# Patient Record
Sex: FEMALE | Race: WHITE | Employment: OTHER | URBAN - METROPOLITAN AREA
[De-identification: names, ages, dates, MRNs, and addresses within clinical notes are randomized per-mention and may not be internally consistent; named-entity substitution may affect disease eponyms.]

---

## 2019-01-01 ENCOUNTER — APPOINTMENT (OUTPATIENT)
Dept: GENERAL RADIOLOGY | Age: 84
DRG: 291 | End: 2019-01-01
Attending: EMERGENCY MEDICINE
Payer: MEDICARE

## 2019-01-01 ENCOUNTER — HOME CARE VISIT (OUTPATIENT)
Dept: SCHEDULING | Facility: HOME HEALTH | Age: 84
End: 2019-01-01
Payer: MEDICARE

## 2019-01-01 ENCOUNTER — APPOINTMENT (OUTPATIENT)
Dept: GENERAL RADIOLOGY | Age: 84
DRG: 640 | End: 2019-01-01
Attending: EMERGENCY MEDICINE
Payer: MEDICARE

## 2019-01-01 ENCOUNTER — APPOINTMENT (OUTPATIENT)
Dept: GENERAL RADIOLOGY | Age: 84
DRG: 291 | End: 2019-01-01
Attending: HOSPITALIST
Payer: MEDICARE

## 2019-01-01 ENCOUNTER — HOME CARE VISIT (OUTPATIENT)
Dept: HOME HEALTH SERVICES | Facility: HOME HEALTH | Age: 84
End: 2019-01-01
Payer: MEDICARE

## 2019-01-01 ENCOUNTER — APPOINTMENT (OUTPATIENT)
Dept: GENERAL RADIOLOGY | Age: 84
DRG: 291 | End: 2019-01-01
Attending: INTERNAL MEDICINE
Payer: MEDICARE

## 2019-01-01 ENCOUNTER — HOME CARE VISIT (OUTPATIENT)
Dept: HOSPICE | Facility: HOSPICE | Age: 84
End: 2019-01-01
Payer: MEDICARE

## 2019-01-01 ENCOUNTER — APPOINTMENT (OUTPATIENT)
Dept: ULTRASOUND IMAGING | Age: 84
DRG: 291 | End: 2019-01-01
Attending: HOSPITALIST
Payer: MEDICARE

## 2019-01-01 ENCOUNTER — APPOINTMENT (OUTPATIENT)
Dept: NON INVASIVE DIAGNOSTICS | Age: 84
DRG: 291 | End: 2019-01-01
Attending: INTERNAL MEDICINE
Payer: MEDICARE

## 2019-01-01 ENCOUNTER — HOME CARE VISIT (OUTPATIENT)
Dept: HOME HEALTH SERVICES | Facility: HOME HEALTH | Age: 84
End: 2019-01-01

## 2019-01-01 ENCOUNTER — HOSPITAL ENCOUNTER (INPATIENT)
Age: 84
LOS: 7 days | Discharge: NURSING, REHAB OR LONG-TERM CARE | DRG: 291 | End: 2019-12-03
Attending: EMERGENCY MEDICINE | Admitting: HOSPITALIST
Payer: MEDICARE

## 2019-01-01 ENCOUNTER — PATIENT OUTREACH (OUTPATIENT)
Dept: CASE MANAGEMENT | Age: 84
End: 2019-01-01

## 2019-01-01 ENCOUNTER — APPOINTMENT (OUTPATIENT)
Dept: GENERAL RADIOLOGY | Age: 84
DRG: 308 | End: 2019-01-01
Attending: INTERNAL MEDICINE
Payer: MEDICARE

## 2019-01-01 ENCOUNTER — APPOINTMENT (OUTPATIENT)
Dept: NON INVASIVE DIAGNOSTICS | Age: 84
DRG: 291 | End: 2019-01-01
Attending: FAMILY MEDICINE
Payer: MEDICARE

## 2019-01-01 ENCOUNTER — APPOINTMENT (OUTPATIENT)
Dept: NON INVASIVE DIAGNOSTICS | Age: 84
DRG: 308 | End: 2019-01-01
Attending: INTERNAL MEDICINE
Payer: MEDICARE

## 2019-01-01 ENCOUNTER — APPOINTMENT (OUTPATIENT)
Dept: GENERAL RADIOLOGY | Age: 84
DRG: 291 | End: 2019-01-01
Attending: FAMILY MEDICINE
Payer: MEDICARE

## 2019-01-01 ENCOUNTER — HOSPITAL ENCOUNTER (INPATIENT)
Dept: INTERVENTIONAL RADIOLOGY/VASCULAR | Age: 84
Discharge: HOME | DRG: 291 | End: 2019-11-15
Attending: SURGERY | Admitting: FAMILY MEDICINE
Payer: MEDICARE

## 2019-01-01 ENCOUNTER — HOSPITAL ENCOUNTER (INPATIENT)
Age: 84
LOS: 5 days | Discharge: NURSING, REHAB OR LONG-TERM CARE | DRG: 308 | End: 2019-12-27
Attending: EMERGENCY MEDICINE | Admitting: INTERNAL MEDICINE
Payer: MEDICARE

## 2019-01-01 ENCOUNTER — HOME HEALTH ADMISSION (OUTPATIENT)
Dept: HOME HEALTH SERVICES | Facility: HOME HEALTH | Age: 84
End: 2019-01-01
Payer: MEDICARE

## 2019-01-01 ENCOUNTER — APPOINTMENT (OUTPATIENT)
Dept: VASCULAR SURGERY | Age: 84
DRG: 291 | End: 2019-01-01
Attending: INTERNAL MEDICINE
Payer: MEDICARE

## 2019-01-01 ENCOUNTER — HOSPITAL ENCOUNTER (INPATIENT)
Age: 84
LOS: 3 days | Discharge: HOME HEALTH | DRG: 291 | End: 2019-10-29
Attending: EMERGENCY MEDICINE | Admitting: INTERNAL MEDICINE
Payer: MEDICARE

## 2019-01-01 ENCOUNTER — APPOINTMENT (OUTPATIENT)
Dept: GENERAL RADIOLOGY | Age: 84
DRG: 308 | End: 2019-01-01
Attending: EMERGENCY MEDICINE
Payer: MEDICARE

## 2019-01-01 ENCOUNTER — HOSPICE ADMISSION (OUTPATIENT)
Dept: HOSPICE | Facility: HOSPICE | Age: 84
End: 2019-01-01
Payer: MEDICARE

## 2019-01-01 ENCOUNTER — HOSPITAL ENCOUNTER (INPATIENT)
Age: 84
LOS: 2 days | Discharge: HOSPICE | DRG: 640 | End: 2019-12-30
Attending: EMERGENCY MEDICINE | Admitting: HOSPITALIST
Payer: MEDICARE

## 2019-01-01 ENCOUNTER — HOSPITAL ENCOUNTER (INPATIENT)
Age: 84
LOS: 14 days | Discharge: NURSING, REHAB OR LONG-TERM CARE | DRG: 291 | End: 2019-11-22
Attending: EMERGENCY MEDICINE | Admitting: FAMILY MEDICINE
Payer: MEDICARE

## 2019-01-01 VITALS
DIASTOLIC BLOOD PRESSURE: 63 MMHG | OXYGEN SATURATION: 100 % | SYSTOLIC BLOOD PRESSURE: 89 MMHG | HEART RATE: 87 BPM | RESPIRATION RATE: 14 BRPM

## 2019-01-01 VITALS
DIASTOLIC BLOOD PRESSURE: 65 MMHG | OXYGEN SATURATION: 95 % | HEART RATE: 91 BPM | RESPIRATION RATE: 16 BRPM | TEMPERATURE: 96.5 F | SYSTOLIC BLOOD PRESSURE: 92 MMHG

## 2019-01-01 VITALS
DIASTOLIC BLOOD PRESSURE: 58 MMHG | OXYGEN SATURATION: 100 % | TEMPERATURE: 96 F | HEART RATE: 81 BPM | RESPIRATION RATE: 16 BRPM | SYSTOLIC BLOOD PRESSURE: 102 MMHG

## 2019-01-01 VITALS
TEMPERATURE: 97 F | HEART RATE: 75 BPM | OXYGEN SATURATION: 98 % | RESPIRATION RATE: 17 BRPM | SYSTOLIC BLOOD PRESSURE: 104 MMHG | DIASTOLIC BLOOD PRESSURE: 72 MMHG

## 2019-01-01 VITALS
OXYGEN SATURATION: 100 % | DIASTOLIC BLOOD PRESSURE: 55 MMHG | HEART RATE: 89 BPM | BODY MASS INDEX: 30.78 KG/M2 | WEIGHT: 152.7 LBS | SYSTOLIC BLOOD PRESSURE: 102 MMHG | HEIGHT: 59 IN | TEMPERATURE: 97.3 F | RESPIRATION RATE: 16 BRPM

## 2019-01-01 VITALS
TEMPERATURE: 97.8 F | HEART RATE: 145 BPM | OXYGEN SATURATION: 98 % | SYSTOLIC BLOOD PRESSURE: 93 MMHG | DIASTOLIC BLOOD PRESSURE: 70 MMHG

## 2019-01-01 VITALS
TEMPERATURE: 96.3 F | DIASTOLIC BLOOD PRESSURE: 66 MMHG | HEART RATE: 81 BPM | OXYGEN SATURATION: 100 % | SYSTOLIC BLOOD PRESSURE: 86 MMHG | RESPIRATION RATE: 14 BRPM

## 2019-01-01 VITALS
WEIGHT: 139.99 LBS | RESPIRATION RATE: 20 BRPM | BODY MASS INDEX: 30.2 KG/M2 | HEIGHT: 57 IN | SYSTOLIC BLOOD PRESSURE: 106 MMHG | OXYGEN SATURATION: 88 % | TEMPERATURE: 97.3 F | DIASTOLIC BLOOD PRESSURE: 38 MMHG | HEART RATE: 80 BPM

## 2019-01-01 VITALS
WEIGHT: 137.5 LBS | HEART RATE: 87 BPM | SYSTOLIC BLOOD PRESSURE: 83 MMHG | DIASTOLIC BLOOD PRESSURE: 64 MMHG | OXYGEN SATURATION: 90 %

## 2019-01-01 VITALS
RESPIRATION RATE: 18 BRPM | WEIGHT: 136.3 LBS | HEART RATE: 62 BPM | SYSTOLIC BLOOD PRESSURE: 114 MMHG | OXYGEN SATURATION: 95 % | DIASTOLIC BLOOD PRESSURE: 49 MMHG | TEMPERATURE: 97.7 F

## 2019-01-01 VITALS
OXYGEN SATURATION: 100 % | SYSTOLIC BLOOD PRESSURE: 102 MMHG | HEART RATE: 81 BPM | DIASTOLIC BLOOD PRESSURE: 58 MMHG | TEMPERATURE: 96 F

## 2019-01-01 VITALS
RESPIRATION RATE: 18 BRPM | HEIGHT: 57 IN | DIASTOLIC BLOOD PRESSURE: 52 MMHG | SYSTOLIC BLOOD PRESSURE: 121 MMHG | OXYGEN SATURATION: 99 % | BODY MASS INDEX: 30.48 KG/M2 | WEIGHT: 141.3 LBS | HEART RATE: 101 BPM | TEMPERATURE: 98.3 F

## 2019-01-01 VITALS
TEMPERATURE: 97.6 F | HEART RATE: 97 BPM | HEIGHT: 57 IN | RESPIRATION RATE: 16 BRPM | WEIGHT: 135.3 LBS | DIASTOLIC BLOOD PRESSURE: 60 MMHG | SYSTOLIC BLOOD PRESSURE: 113 MMHG | OXYGEN SATURATION: 100 % | BODY MASS INDEX: 29.19 KG/M2

## 2019-01-01 DIAGNOSIS — I48.91 ATRIAL FIBRILLATION WITH RAPID VENTRICULAR RESPONSE (HCC): Primary | ICD-10-CM

## 2019-01-01 DIAGNOSIS — Z99.2 ESRD NEEDING DIALYSIS (HCC): ICD-10-CM

## 2019-01-01 DIAGNOSIS — N30.00 ACUTE CYSTITIS WITHOUT HEMATURIA: ICD-10-CM

## 2019-01-01 DIAGNOSIS — Z71.89 GOALS OF CARE, COUNSELING/DISCUSSION: ICD-10-CM

## 2019-01-01 DIAGNOSIS — I48.91 ATRIAL FIBRILLATION, UNSPECIFIED TYPE (HCC): Primary | ICD-10-CM

## 2019-01-01 DIAGNOSIS — I95.9 HYPOTENSION, UNSPECIFIED HYPOTENSION TYPE: ICD-10-CM

## 2019-01-01 DIAGNOSIS — N18.6 ESRD NEEDING DIALYSIS (HCC): ICD-10-CM

## 2019-01-01 DIAGNOSIS — J96.01 ACUTE RESPIRATORY FAILURE WITH HYPOXIA (HCC): ICD-10-CM

## 2019-01-01 DIAGNOSIS — I50.9 ACUTE CONGESTIVE HEART FAILURE, UNSPECIFIED HEART FAILURE TYPE (HCC): ICD-10-CM

## 2019-01-01 DIAGNOSIS — E87.1 HYPONATREMIA: ICD-10-CM

## 2019-01-01 DIAGNOSIS — R77.8 ELEVATED TROPONIN: ICD-10-CM

## 2019-01-01 DIAGNOSIS — R79.1 ELEVATED INR: ICD-10-CM

## 2019-01-01 DIAGNOSIS — R06.00 DYSPNEA, UNSPECIFIED TYPE: Primary | ICD-10-CM

## 2019-01-01 DIAGNOSIS — N17.9 ACUTE RENAL FAILURE, UNSPECIFIED ACUTE RENAL FAILURE TYPE (HCC): ICD-10-CM

## 2019-01-01 DIAGNOSIS — R53.81 DEBILITY: ICD-10-CM

## 2019-01-01 DIAGNOSIS — R06.03 RESPIRATORY DISTRESS: Primary | ICD-10-CM

## 2019-01-01 DIAGNOSIS — R09.02 HYPOXIA: ICD-10-CM

## 2019-01-01 DIAGNOSIS — R06.03 ACUTE RESPIRATORY DISTRESS: Primary | ICD-10-CM

## 2019-01-01 DIAGNOSIS — J81.1 CHRONIC PULMONARY EDEMA: ICD-10-CM

## 2019-01-01 DIAGNOSIS — R06.02 SHORTNESS OF BREATH: ICD-10-CM

## 2019-01-01 DIAGNOSIS — J96.00 ACUTE RESPIRATORY FAILURE, UNSPECIFIED WHETHER WITH HYPOXIA OR HYPERCAPNIA (HCC): ICD-10-CM

## 2019-01-01 DIAGNOSIS — Z71.89 ADVANCED CARE PLANNING/COUNSELING DISCUSSION: ICD-10-CM

## 2019-01-01 LAB
ABO + RH BLD: NORMAL
ABO + RH BLD: NORMAL
ALBUMIN SERPL-MCNC: 2.1 G/DL (ref 3.4–5)
ALBUMIN SERPL-MCNC: 2.2 G/DL (ref 3.4–5)
ALBUMIN SERPL-MCNC: 2.3 G/DL (ref 3.4–5)
ALBUMIN SERPL-MCNC: 2.6 G/DL (ref 3.4–5)
ALBUMIN SERPL-MCNC: 2.6 G/DL (ref 3.4–5)
ALBUMIN SERPL-MCNC: 2.7 G/DL (ref 3.4–5)
ALBUMIN SERPL-MCNC: 2.9 G/DL (ref 3.4–5)
ALBUMIN SERPL-MCNC: 2.9 G/DL (ref 3.4–5)
ALBUMIN SERPL-MCNC: 3.1 G/DL (ref 3.4–5)
ALBUMIN SERPL-MCNC: 3.2 G/DL (ref 3.4–5)
ALBUMIN SERPL-MCNC: 3.2 G/DL (ref 3.4–5)
ALBUMIN SERPL-MCNC: 3.3 G/DL (ref 3.4–5)
ALBUMIN SERPL-MCNC: 3.7 G/DL (ref 3.4–5)
ALBUMIN/GLOB SERPL: 0.7 {RATIO} (ref 0.8–1.7)
ALBUMIN/GLOB SERPL: 0.7 {RATIO} (ref 0.8–1.7)
ALBUMIN/GLOB SERPL: 0.8 {RATIO} (ref 0.8–1.7)
ALBUMIN/GLOB SERPL: 0.8 {RATIO} (ref 0.8–1.7)
ALBUMIN/GLOB SERPL: 1 {RATIO} (ref 0.8–1.7)
ALBUMIN/GLOB SERPL: 1.1 {RATIO} (ref 0.8–1.7)
ALBUMIN/GLOB SERPL: 1.2 {RATIO} (ref 0.8–1.7)
ALP SERPL-CCNC: 105 U/L (ref 45–117)
ALP SERPL-CCNC: 108 U/L (ref 45–117)
ALP SERPL-CCNC: 111 U/L (ref 45–117)
ALP SERPL-CCNC: 146 U/L (ref 45–117)
ALP SERPL-CCNC: 198 U/L (ref 45–117)
ALP SERPL-CCNC: 262 U/L (ref 45–117)
ALP SERPL-CCNC: 93 U/L (ref 45–117)
ALP SERPL-CCNC: 93 U/L (ref 45–117)
ALP SERPL-CCNC: 96 U/L (ref 45–117)
ALP SERPL-CCNC: 97 U/L (ref 45–117)
ALP SERPL-CCNC: 97 U/L (ref 45–117)
ALT SERPL-CCNC: 11 U/L (ref 13–56)
ALT SERPL-CCNC: 11 U/L (ref 13–56)
ALT SERPL-CCNC: 12 U/L (ref 13–56)
ALT SERPL-CCNC: 12 U/L (ref 13–56)
ALT SERPL-CCNC: 18 U/L (ref 13–56)
ALT SERPL-CCNC: 18 U/L (ref 13–56)
ALT SERPL-CCNC: 19 U/L (ref 13–56)
ALT SERPL-CCNC: 24 U/L (ref 13–56)
ALT SERPL-CCNC: 30 U/L (ref 13–56)
ALT SERPL-CCNC: 33 U/L (ref 13–56)
ALT SERPL-CCNC: 72 U/L (ref 13–56)
ANION GAP SERPL CALC-SCNC: 10 MMOL/L (ref 3–18)
ANION GAP SERPL CALC-SCNC: 11 MMOL/L (ref 3–18)
ANION GAP SERPL CALC-SCNC: 12 MMOL/L (ref 3–18)
ANION GAP SERPL CALC-SCNC: 13 MMOL/L (ref 3–18)
ANION GAP SERPL CALC-SCNC: 14 MMOL/L (ref 3–18)
ANION GAP SERPL CALC-SCNC: 15 MMOL/L (ref 3–18)
ANION GAP SERPL CALC-SCNC: 15 MMOL/L (ref 3–18)
ANION GAP SERPL CALC-SCNC: 5 MMOL/L (ref 3–18)
ANION GAP SERPL CALC-SCNC: 5 MMOL/L (ref 3–18)
ANION GAP SERPL CALC-SCNC: 6 MMOL/L (ref 3–18)
ANION GAP SERPL CALC-SCNC: 6 MMOL/L (ref 3–18)
ANION GAP SERPL CALC-SCNC: 7 MMOL/L (ref 3–18)
ANION GAP SERPL CALC-SCNC: 8 MMOL/L (ref 3–18)
ANION GAP SERPL CALC-SCNC: 8 MMOL/L (ref 3–18)
ANION GAP SERPL CALC-SCNC: 9 MMOL/L (ref 3–18)
APPEARANCE UR: ABNORMAL
APPEARANCE UR: CLEAR
APTT PPP: 110.8 SEC (ref 23–36.4)
APTT PPP: 121.8 SEC (ref 23–36.4)
APTT PPP: 150.2 SEC (ref 23–36.4)
APTT PPP: 175.2 SEC (ref 23–36.4)
APTT PPP: 35.8 SEC (ref 23–36.4)
APTT PPP: 40.5 SEC (ref 23–36.4)
APTT PPP: 43.8 SEC (ref 23–36.4)
APTT PPP: 63 SEC (ref 23–36.4)
APTT PPP: 66.1 SEC (ref 23–36.4)
APTT PPP: 85.4 SEC (ref 23–36.4)
APTT PPP: 89.8 SEC (ref 23–36.4)
APTT PPP: 95.3 SEC (ref 23–36.4)
APTT PPP: 98.7 SEC (ref 23–36.4)
APTT PPP: >180 SEC (ref 23–36.4)
ARTERIAL PATENCY WRIST A: ABNORMAL
ARTERIAL PATENCY WRIST A: YES
AST SERPL-CCNC: 16 U/L (ref 10–38)
AST SERPL-CCNC: 16 U/L (ref 10–38)
AST SERPL-CCNC: 17 U/L (ref 10–38)
AST SERPL-CCNC: 18 U/L (ref 10–38)
AST SERPL-CCNC: 18 U/L (ref 10–38)
AST SERPL-CCNC: 19 U/L (ref 10–38)
AST SERPL-CCNC: 20 U/L (ref 10–38)
AST SERPL-CCNC: 21 U/L (ref 10–38)
AST SERPL-CCNC: 27 U/L (ref 10–38)
AST SERPL-CCNC: 29 U/L (ref 10–38)
AST SERPL-CCNC: 79 U/L (ref 10–38)
ATRIAL RATE: 159 BPM
ATRIAL RATE: 163 BPM
ATRIAL RATE: 63 BPM
ATRIAL RATE: 65 BPM
ATRIAL RATE: 97 BPM
AV VELOCITY RATIO: 0.58
AV VELOCITY RATIO: 0.63
AV VELOCITY RATIO: 0.77
AV VTI RATIO: 0.6
AV VTI RATIO: 0.7
AV VTI RATIO: 0.8
BACTERIA SPEC CULT: ABNORMAL
BACTERIA SPEC CULT: NORMAL
BACTERIA URNS QL MICRO: ABNORMAL /HPF
BASE DEFICIT BLD-SCNC: 2 MMOL/L
BASE DEFICIT BLDV-SCNC: 8 MMOL/L
BASOPHILS # BLD: 0 K/UL (ref 0–0.1)
BASOPHILS # BLD: 0.1 K/UL (ref 0–0.1)
BASOPHILS NFR BLD: 0 % (ref 0–2)
BASOPHILS NFR BLD: 0 % (ref 0–3)
BASOPHILS NFR BLD: 1 % (ref 0–2)
BDY SITE: ABNORMAL
BDY SITE: ABNORMAL
BILIRUB SERPL-MCNC: 0.4 MG/DL (ref 0.2–1)
BILIRUB SERPL-MCNC: 0.4 MG/DL (ref 0.2–1)
BILIRUB SERPL-MCNC: 0.5 MG/DL (ref 0.2–1)
BILIRUB SERPL-MCNC: 0.6 MG/DL (ref 0.2–1)
BILIRUB SERPL-MCNC: 0.7 MG/DL (ref 0.2–1)
BILIRUB SERPL-MCNC: 0.9 MG/DL (ref 0.2–1)
BILIRUB SERPL-MCNC: 1 MG/DL (ref 0.2–1)
BILIRUB UR QL: ABNORMAL
BILIRUB UR QL: NEGATIVE
BLD PROD TYP BPU: NORMAL
BLOOD GROUP ANTIBODIES SERPL: NORMAL
BLOOD GROUP ANTIBODIES SERPL: NORMAL
BNP SERPL-MCNC: 2420 PG/ML (ref 0–1800)
BNP SERPL-MCNC: 9930 PG/ML (ref 0–1800)
BNP SERPL-MCNC: ABNORMAL PG/ML (ref 0–1800)
BPU ID: NORMAL
BUN SERPL-MCNC: 101 MG/DL (ref 7–18)
BUN SERPL-MCNC: 21 MG/DL (ref 7–18)
BUN SERPL-MCNC: 22 MG/DL (ref 7–18)
BUN SERPL-MCNC: 27 MG/DL (ref 7–18)
BUN SERPL-MCNC: 27 MG/DL (ref 7–18)
BUN SERPL-MCNC: 28 MG/DL (ref 7–18)
BUN SERPL-MCNC: 31 MG/DL (ref 7–18)
BUN SERPL-MCNC: 32 MG/DL (ref 7–18)
BUN SERPL-MCNC: 33 MG/DL (ref 7–18)
BUN SERPL-MCNC: 34 MG/DL (ref 7–18)
BUN SERPL-MCNC: 37 MG/DL (ref 7–18)
BUN SERPL-MCNC: 37 MG/DL (ref 7–18)
BUN SERPL-MCNC: 38 MG/DL (ref 7–18)
BUN SERPL-MCNC: 39 MG/DL (ref 7–18)
BUN SERPL-MCNC: 42 MG/DL (ref 7–18)
BUN SERPL-MCNC: 43 MG/DL (ref 7–18)
BUN SERPL-MCNC: 46 MG/DL (ref 7–18)
BUN SERPL-MCNC: 48 MG/DL (ref 7–18)
BUN SERPL-MCNC: 54 MG/DL (ref 7–18)
BUN SERPL-MCNC: 55 MG/DL (ref 7–18)
BUN SERPL-MCNC: 72 MG/DL (ref 7–18)
BUN SERPL-MCNC: 76 MG/DL (ref 7–18)
BUN SERPL-MCNC: 77 MG/DL (ref 7–18)
BUN SERPL-MCNC: 81 MG/DL (ref 7–18)
BUN SERPL-MCNC: 84 MG/DL (ref 7–18)
BUN SERPL-MCNC: 85 MG/DL (ref 7–18)
BUN SERPL-MCNC: 87 MG/DL (ref 7–18)
BUN SERPL-MCNC: 88 MG/DL (ref 7–18)
BUN SERPL-MCNC: 96 MG/DL (ref 7–18)
BUN/CREAT SERPL: 10 (ref 12–20)
BUN/CREAT SERPL: 11 (ref 12–20)
BUN/CREAT SERPL: 12 (ref 12–20)
BUN/CREAT SERPL: 12 (ref 12–20)
BUN/CREAT SERPL: 13 (ref 12–20)
BUN/CREAT SERPL: 14 (ref 12–20)
BUN/CREAT SERPL: 14 (ref 12–20)
BUN/CREAT SERPL: 15 (ref 12–20)
BUN/CREAT SERPL: 16 (ref 12–20)
BUN/CREAT SERPL: 17 (ref 12–20)
BUN/CREAT SERPL: 18 (ref 12–20)
BUN/CREAT SERPL: 19 (ref 12–20)
BUN/CREAT SERPL: 20 (ref 12–20)
BUN/CREAT SERPL: 24 (ref 12–20)
BUN/CREAT SERPL: 27 (ref 12–20)
BUN/CREAT SERPL: 29 (ref 12–20)
BUN/CREAT SERPL: 30 (ref 12–20)
BUN/CREAT SERPL: 31 (ref 12–20)
BUN/CREAT SERPL: 9 (ref 12–20)
CALCIUM SERPL-MCNC: 7.7 MG/DL (ref 8.5–10.1)
CALCIUM SERPL-MCNC: 7.8 MG/DL (ref 8.5–10.1)
CALCIUM SERPL-MCNC: 7.9 MG/DL (ref 8.5–10.1)
CALCIUM SERPL-MCNC: 8 MG/DL (ref 8.5–10.1)
CALCIUM SERPL-MCNC: 8 MG/DL (ref 8.5–10.1)
CALCIUM SERPL-MCNC: 8.1 MG/DL (ref 8.5–10.1)
CALCIUM SERPL-MCNC: 8.2 MG/DL (ref 8.5–10.1)
CALCIUM SERPL-MCNC: 8.3 MG/DL (ref 8.5–10.1)
CALCIUM SERPL-MCNC: 8.4 MG/DL (ref 8.5–10.1)
CALCIUM SERPL-MCNC: 8.5 MG/DL (ref 8.5–10.1)
CALCIUM SERPL-MCNC: 8.6 MG/DL (ref 8.5–10.1)
CALCIUM SERPL-MCNC: 8.8 MG/DL (ref 8.5–10.1)
CALCULATED P AXIS, ECG09: 35 DEGREES
CALCULATED P AXIS, ECG09: 50 DEGREES
CALCULATED R AXIS, ECG10: 11 DEGREES
CALCULATED R AXIS, ECG10: 22 DEGREES
CALCULATED R AXIS, ECG10: 31 DEGREES
CALCULATED R AXIS, ECG10: 35 DEGREES
CALCULATED R AXIS, ECG10: 43 DEGREES
CALCULATED T AXIS, ECG11: -157 DEGREES
CALCULATED T AXIS, ECG11: -157 DEGREES
CALCULATED T AXIS, ECG11: 158 DEGREES
CALCULATED T AXIS, ECG11: 37 DEGREES
CALCULATED T AXIS, ECG11: 52 DEGREES
CALLED TO:,BCALL1: NORMAL
CAOX CRY URNS QL MICRO: ABNORMAL
CHLORIDE SERPL-SCNC: 100 MMOL/L (ref 100–111)
CHLORIDE SERPL-SCNC: 100 MMOL/L (ref 100–111)
CHLORIDE SERPL-SCNC: 101 MMOL/L (ref 100–111)
CHLORIDE SERPL-SCNC: 102 MMOL/L (ref 100–111)
CHLORIDE SERPL-SCNC: 103 MMOL/L (ref 100–111)
CHLORIDE SERPL-SCNC: 104 MMOL/L (ref 100–111)
CHLORIDE SERPL-SCNC: 105 MMOL/L (ref 100–111)
CHLORIDE SERPL-SCNC: 105 MMOL/L (ref 100–111)
CHLORIDE SERPL-SCNC: 107 MMOL/L (ref 100–111)
CHLORIDE SERPL-SCNC: 86 MMOL/L (ref 100–111)
CHLORIDE SERPL-SCNC: 86 MMOL/L (ref 100–111)
CHLORIDE SERPL-SCNC: 87 MMOL/L (ref 100–111)
CHLORIDE SERPL-SCNC: 88 MMOL/L (ref 100–111)
CHLORIDE SERPL-SCNC: 88 MMOL/L (ref 100–111)
CHLORIDE SERPL-SCNC: 89 MMOL/L (ref 100–111)
CHLORIDE SERPL-SCNC: 90 MMOL/L (ref 100–111)
CHLORIDE SERPL-SCNC: 91 MMOL/L (ref 100–111)
CHLORIDE SERPL-SCNC: 92 MMOL/L (ref 100–111)
CHLORIDE SERPL-SCNC: 94 MMOL/L (ref 100–111)
CHLORIDE SERPL-SCNC: 94 MMOL/L (ref 100–111)
CHLORIDE SERPL-SCNC: 96 MMOL/L (ref 100–111)
CHLORIDE SERPL-SCNC: 96 MMOL/L (ref 100–111)
CHLORIDE SERPL-SCNC: 97 MMOL/L (ref 100–111)
CHLORIDE SERPL-SCNC: 98 MMOL/L (ref 100–111)
CHLORIDE SERPL-SCNC: 98 MMOL/L (ref 100–111)
CHLORIDE SERPL-SCNC: 99 MMOL/L (ref 100–111)
CK MB CFR SERPL CALC: 2.6 % (ref 0–4)
CK MB CFR SERPL CALC: 2.8 % (ref 0–4)
CK MB CFR SERPL CALC: 3.3 % (ref 0–4)
CK MB CFR SERPL CALC: 3.4 % (ref 0–4)
CK MB CFR SERPL CALC: 3.9 % (ref 0–4)
CK MB CFR SERPL CALC: 4.8 % (ref 0–4)
CK MB CFR SERPL CALC: 4.9 % (ref 0–4)
CK MB CFR SERPL CALC: ABNORMAL % (ref 0–4)
CK MB CFR SERPL CALC: NORMAL % (ref 0–4)
CK MB SERPL-MCNC: 1.1 NG/ML (ref 5–25)
CK MB SERPL-MCNC: 1.8 NG/ML (ref 5–25)
CK MB SERPL-MCNC: 2 NG/ML (ref 5–25)
CK MB SERPL-MCNC: 2.2 NG/ML (ref 5–25)
CK MB SERPL-MCNC: 2.3 NG/ML (ref 5–25)
CK MB SERPL-MCNC: 2.3 NG/ML (ref 5–25)
CK MB SERPL-MCNC: 2.4 NG/ML (ref 5–25)
CK MB SERPL-MCNC: <1 NG/ML (ref 5–25)
CK SERPL-CCNC: 13 U/L (ref 26–192)
CK SERPL-CCNC: 14 U/L (ref 26–192)
CK SERPL-CCNC: 39 U/L (ref 26–192)
CK SERPL-CCNC: 47 U/L (ref 26–192)
CK SERPL-CCNC: 48 U/L (ref 26–192)
CK SERPL-CCNC: 54 U/L (ref 26–192)
CK SERPL-CCNC: 57 U/L (ref 26–192)
CK SERPL-CCNC: 58 U/L (ref 26–192)
CK SERPL-CCNC: 61 U/L (ref 26–192)
CK SERPL-CCNC: 70 U/L (ref 26–192)
CK SERPL-CCNC: 70 U/L (ref 26–192)
CO2 SERPL-SCNC: 21 MMOL/L (ref 21–32)
CO2 SERPL-SCNC: 22 MMOL/L (ref 21–32)
CO2 SERPL-SCNC: 22 MMOL/L (ref 21–32)
CO2 SERPL-SCNC: 23 MMOL/L (ref 21–32)
CO2 SERPL-SCNC: 24 MMOL/L (ref 21–32)
CO2 SERPL-SCNC: 26 MMOL/L (ref 21–32)
CO2 SERPL-SCNC: 27 MMOL/L (ref 21–32)
CO2 SERPL-SCNC: 28 MMOL/L (ref 21–32)
CO2 SERPL-SCNC: 29 MMOL/L (ref 21–32)
CO2 SERPL-SCNC: 30 MMOL/L (ref 21–32)
CO2 SERPL-SCNC: 31 MMOL/L (ref 21–32)
CO2 SERPL-SCNC: 32 MMOL/L (ref 21–32)
CO2 SERPL-SCNC: 32 MMOL/L (ref 21–32)
CO2 SERPL-SCNC: 33 MMOL/L (ref 21–32)
COLOR UR: ABNORMAL
COLOR UR: YELLOW
CREAT SERPL-MCNC: 1.66 MG/DL (ref 0.6–1.3)
CREAT SERPL-MCNC: 1.7 MG/DL (ref 0.6–1.3)
CREAT SERPL-MCNC: 1.75 MG/DL (ref 0.6–1.3)
CREAT SERPL-MCNC: 1.84 MG/DL (ref 0.6–1.3)
CREAT SERPL-MCNC: 1.86 MG/DL (ref 0.6–1.3)
CREAT SERPL-MCNC: 1.91 MG/DL (ref 0.6–1.3)
CREAT SERPL-MCNC: 2.12 MG/DL (ref 0.6–1.3)
CREAT SERPL-MCNC: 2.15 MG/DL (ref 0.6–1.3)
CREAT SERPL-MCNC: 2.22 MG/DL (ref 0.6–1.3)
CREAT SERPL-MCNC: 2.31 MG/DL (ref 0.6–1.3)
CREAT SERPL-MCNC: 2.34 MG/DL (ref 0.6–1.3)
CREAT SERPL-MCNC: 2.36 MG/DL (ref 0.6–1.3)
CREAT SERPL-MCNC: 2.45 MG/DL (ref 0.6–1.3)
CREAT SERPL-MCNC: 2.47 MG/DL (ref 0.6–1.3)
CREAT SERPL-MCNC: 2.5 MG/DL (ref 0.6–1.3)
CREAT SERPL-MCNC: 2.51 MG/DL (ref 0.6–1.3)
CREAT SERPL-MCNC: 2.56 MG/DL (ref 0.6–1.3)
CREAT SERPL-MCNC: 2.61 MG/DL (ref 0.6–1.3)
CREAT SERPL-MCNC: 2.73 MG/DL (ref 0.6–1.3)
CREAT SERPL-MCNC: 2.77 MG/DL (ref 0.6–1.3)
CREAT SERPL-MCNC: 2.77 MG/DL (ref 0.6–1.3)
CREAT SERPL-MCNC: 2.99 MG/DL (ref 0.6–1.3)
CREAT SERPL-MCNC: 3 MG/DL (ref 0.6–1.3)
CREAT SERPL-MCNC: 3.16 MG/DL (ref 0.6–1.3)
CREAT SERPL-MCNC: 3.19 MG/DL (ref 0.6–1.3)
CREAT SERPL-MCNC: 3.89 MG/DL (ref 0.6–1.3)
CREAT SERPL-MCNC: 4.2 MG/DL (ref 0.6–1.3)
CREAT SERPL-MCNC: 4.25 MG/DL (ref 0.6–1.3)
CREAT SERPL-MCNC: 4.37 MG/DL (ref 0.6–1.3)
CREAT SERPL-MCNC: 4.39 MG/DL (ref 0.6–1.3)
CREAT SERPL-MCNC: 5.14 MG/DL (ref 0.6–1.3)
CREAT SERPL-MCNC: 5.32 MG/DL (ref 0.6–1.3)
CREAT SERPL-MCNC: 6.27 MG/DL (ref 0.6–1.3)
CREAT UR-MCNC: 76 MG/DL (ref 30–125)
CROSSMATCH RESULT,%XM: NORMAL
DIAGNOSIS, 93000: NORMAL
DIFFERENTIAL METHOD BLD: ABNORMAL
ECHO AO ASC DIAM: 2.34 CM
ECHO AO ASC DIAM: 2.76 CM
ECHO AO ASC DIAM: 2.92 CM
ECHO AO SINUS VALSALVA DIAM: 2.51 CM
ECHO AV AREA PEAK VELOCITY: 1.2 CM2
ECHO AV AREA PEAK VELOCITY: 1.7 CM2
ECHO AV AREA PEAK VELOCITY: 1.8 CM2
ECHO AV AREA VTI: 1.3 CM2
ECHO AV AREA VTI: 1.7 CM2
ECHO AV AREA VTI: 1.8 CM2
ECHO AV AREA/BSA PEAK VELOCITY: 0.7 CM2/M2
ECHO AV AREA/BSA PEAK VELOCITY: 1.2 CM2/M2
ECHO AV AREA/BSA VTI: 0.8 CM2/M2
ECHO AV AREA/BSA VTI: 1.2 CM2/M2
ECHO AV MEAN GRADIENT: 2.3 MMHG
ECHO AV MEAN GRADIENT: 2.8 MMHG
ECHO AV MEAN GRADIENT: 4.1 MMHG
ECHO AV MEAN VELOCITY: 0.71 M/S
ECHO AV MEAN VELOCITY: 0.76 M/S
ECHO AV MEAN VELOCITY: 0.96 M/S
ECHO AV PEAK GRADIENT: 4.6 MMHG
ECHO AV PEAK GRADIENT: 5.5 MMHG
ECHO AV PEAK GRADIENT: 7.3 MMHG
ECHO AV PEAK VELOCITY: 106.72 CM/S
ECHO AV PEAK VELOCITY: 117.13 CM/S
ECHO AV PEAK VELOCITY: 135.38 CM/S
ECHO AV VTI: 13.23 CM
ECHO AV VTI: 15.92 CM
ECHO AV VTI: 28.65 CM
ECHO IVC PROX: 1.46 CM
ECHO IVC PROX: 2.08 CM
ECHO LA AREA 2C: 17.53 CM2
ECHO LA AREA 2C: 24.94 CM2
ECHO LA AREA 4C: 16 CM2
ECHO LA AREA 4C: 18.8 CM2
ECHO LA AREA 4C: 23.6 CM2
ECHO LA MAJOR AXIS: 3.75 CM
ECHO LA MAJOR AXIS: 4.51 CM
ECHO LA MAJOR AXIS: 4.69 CM
ECHO LA VOL 2C: 54.31 ML (ref 22–52)
ECHO LA VOL 2C: 77.17 ML (ref 22–52)
ECHO LA VOL 4C: 42.27 ML (ref 22–52)
ECHO LA VOL 4C: 48.46 ML (ref 22–52)
ECHO LA VOL 4C: 65.05 ML (ref 22–52)
ECHO LA VOL BP: 52.78 ML (ref 22–52)
ECHO LA VOL BP: 77.09 ML (ref 22–52)
ECHO LA VOLUME INDEX A2C: 33.57 ML/M2 (ref 16–28)
ECHO LA VOLUME INDEX A2C: 51.87 ML/M2 (ref 16–28)
ECHO LA VOLUME INDEX A4C: 26.13 ML/M2 (ref 16–28)
ECHO LA VOLUME INDEX A4C: 43.72 ML/M2 (ref 16–28)
ECHO LV E' LATERAL VELOCITY: 7 CM/S
ECHO LV E' LATERAL VELOCITY: 8 CM/S
ECHO LV E' LATERAL VELOCITY: 9 CM/S
ECHO LV E' SEPTAL VELOCITY: 5 CM/S
ECHO LV E' SEPTAL VELOCITY: 6 CM/S
ECHO LV E' SEPTAL VELOCITY: 8 CM/S
ECHO LV EDV A2C: 25.2 ML
ECHO LV EDV A2C: 33.2 ML
ECHO LV EDV A2C: 42.1 ML
ECHO LV EDV A4C: 35 ML
ECHO LV EDV A4C: 36.3 ML
ECHO LV EDV A4C: 45.5 ML
ECHO LV EDV BP: 30.7 ML (ref 56–104)
ECHO LV EDV BP: 36 ML (ref 56–104)
ECHO LV EDV BP: 45.3 ML (ref 56–104)
ECHO LV EDV INDEX A4C: 21.6 ML/M2
ECHO LV EDV INDEX A4C: 24.4 ML/M2
ECHO LV EDV INDEX BP: 19 ML/M2
ECHO LV EDV INDEX BP: 24.2 ML/M2
ECHO LV EDV NDEX A2C: 15.6 ML/M2
ECHO LV EDV NDEX A2C: 22.3 ML/M2
ECHO LV EDV TEICHHOLZ: 0.57 ML
ECHO LV EDV TEICHHOLZ: 6.39 ML
ECHO LV EJECTION FRACTION A2C: 45 %
ECHO LV EJECTION FRACTION A2C: 61 %
ECHO LV EJECTION FRACTION A2C: 64 %
ECHO LV EJECTION FRACTION A4C: 52 %
ECHO LV EJECTION FRACTION A4C: 62 %
ECHO LV EJECTION FRACTION A4C: 63 %
ECHO LV EJECTION FRACTION BIPLANE: 53.3 % (ref 55–100)
ECHO LV EJECTION FRACTION BIPLANE: 57.9 % (ref 55–100)
ECHO LV EJECTION FRACTION BIPLANE: 62.6 % (ref 55–100)
ECHO LV ESV A2C: 13.1 ML
ECHO LV ESV A2C: 13.9 ML
ECHO LV ESV A2C: 15.2 ML
ECHO LV ESV A4C: 13.4 ML
ECHO LV ESV A4C: 17 ML
ECHO LV ESV A4C: 17.6 ML
ECHO LV ESV BP: 14.3 ML (ref 19–49)
ECHO LV ESV BP: 15.2 ML (ref 19–49)
ECHO LV ESV BP: 17 ML (ref 19–49)
ECHO LV ESV INDEX A2C: 8.6 ML/M2
ECHO LV ESV INDEX A2C: 8.8 ML/M2
ECHO LV ESV INDEX A4C: 11.8 ML/M2
ECHO LV ESV INDEX A4C: 8.3 ML/M2
ECHO LV ESV INDEX BP: 10.2 ML/M2
ECHO LV ESV INDEX BP: 8.8 ML/M2
ECHO LV ESV TEICHHOLZ: 0.16 ML
ECHO LV ESV TEICHHOLZ: 0.3 ML
ECHO LV INTERNAL DIMENSION DIASTOLIC: 1.8 CM (ref 3.9–5.3)
ECHO LV INTERNAL DIMENSION DIASTOLIC: 3.89 CM (ref 3.9–5.3)
ECHO LV INTERNAL DIMENSION DIASTOLIC: 4.36 CM (ref 3.9–5.3)
ECHO LV INTERNAL DIMENSION SYSTOLIC: 2.51 CM
ECHO LV INTERNAL DIMENSION SYSTOLIC: 2.55 CM
ECHO LV INTERNAL DIMENSION SYSTOLIC: 3.33 CM
ECHO LV IVSD: 0.46 CM (ref 0.6–0.9)
ECHO LV IVSD: 0.94 CM (ref 0.6–0.9)
ECHO LV IVSD: 1 CM (ref 0.6–0.9)
ECHO LV MASS 2D: 148.8 G (ref 67–162)
ECHO LV MASS 2D: 165.8 G (ref 67–162)
ECHO LV MASS 2D: 3.8 G (ref 67–162)
ECHO LV MASS INDEX 2D: 2.6 G/M2 (ref 43–95)
ECHO LV MASS INDEX 2D: 92 G/M2 (ref 43–95)
ECHO LV POSTERIOR WALL DIASTOLIC: 0.56 CM (ref 0.6–0.9)
ECHO LV POSTERIOR WALL DIASTOLIC: 0.91 CM (ref 0.6–0.9)
ECHO LV POSTERIOR WALL DIASTOLIC: 1.25 CM (ref 0.6–0.9)
ECHO LV POSTERIOR WALL SYSTOLIC: 0 CM
ECHO LVOT DIAM: 1.55 CM
ECHO LVOT DIAM: 1.74 CM
ECHO LVOT DIAM: 1.92 CM
ECHO LVOT PEAK GRADIENT: 1.8 MMHG
ECHO LVOT PEAK GRADIENT: 2.4 MMHG
ECHO LVOT PEAK GRADIENT: 3.3 MMHG
ECHO LVOT PEAK VELOCITY: 66.92 CM/S
ECHO LVOT PEAK VELOCITY: 77.93 CM/S
ECHO LVOT PEAK VELOCITY: 90.59 CM/S
ECHO LVOT VTI: 12.06 CM
ECHO LVOT VTI: 16.43 CM
ECHO LVOT VTI: 9.2 CM
ECHO MAIN PULMONARY ARTERY DIAMETER: 1.9 CM
ECHO MV A VELOCITY: 136.11 CM/S
ECHO MV A VELOCITY: 21.21 CM/S
ECHO MV A VELOCITY: 4.31 CM/S
ECHO MV AREA PHT: 1.8 CM2
ECHO MV AREA PHT: 5.4 CM2
ECHO MV AREA PISA: 0.1 CM2
ECHO MV AREA VTI: 1 CM2
ECHO MV AREA VTI: 1 CM2
ECHO MV E DECELERATION TIME (DT): 140.5 MS
ECHO MV E DECELERATION TIME (DT): 149.3 MS
ECHO MV E DECELERATION TIME (DT): 432.9 MS
ECHO MV E VELOCITY: 180.24 CM/S
ECHO MV E VELOCITY: 185.58 CM/S
ECHO MV E VELOCITY: 186.69 CM/S
ECHO MV E/A RATIO: 1.32
ECHO MV E/A RATIO: 43.06
ECHO MV E/A RATIO: 8.8
ECHO MV E/E' LATERAL: 20.74
ECHO MV E/E' LATERAL: 22.53
ECHO MV E/E' LATERAL: 26.51
ECHO MV E/E' RATIO (AVERAGED): 22.53
ECHO MV E/E' RATIO (AVERAGED): 28.72
ECHO MV E/E' RATIO (AVERAGED): 29.04
ECHO MV E/E' SEPTAL: 22.53
ECHO MV E/E' SEPTAL: 30.93
ECHO MV E/E' SEPTAL: 37.34
ECHO MV EROA PISA: 0.1 CM2
ECHO MV MAX VELOCITY: 163.99 CM/S
ECHO MV MAX VELOCITY: 199.53 CM/S
ECHO MV MEAN GRADIENT: 3.1 MMHG
ECHO MV MEAN GRADIENT: 8.7 MMHG
ECHO MV MEAN INFLOW VELOCITY: 0.79 M/S
ECHO MV MEAN INFLOW VELOCITY: 1.36 M/S
ECHO MV MEAN INFLOW VELOCITY: 3.37 M/S
ECHO MV PEAK GRADIENT: 10.8 MMHG
ECHO MV PEAK GRADIENT: 15.9 MMHG
ECHO MV PRESSURE HALF TIME (PHT): 119.1 MS
ECHO MV PRESSURE HALF TIME (PHT): 40.7 MS
ECHO MV REGURGITANT PEAK GRADIENT: 69.3 MMHG
ECHO MV REGURGITANT PEAK GRADIENT: 77.8 MMHG
ECHO MV REGURGITANT PEAK VELOCITY: 416.17 CM/S
ECHO MV REGURGITANT PEAK VELOCITY: 441.13 CM/S
ECHO MV REGURGITANT RADIUS PISA: 0.5 CM
ECHO MV REGURGITANT VOLUME: 14.2 CC
ECHO MV REGURGITANT VTIA: 101.88 CM
ECHO MV REGURGITANT VTIA: 104.38 CM
ECHO MV VTI: 28.67 CM
ECHO MV VTI: 49.48 CM
ECHO PULMONARY ARTERY SYSTOLIC PRESSURE (PASP): 50 MMHG
ECHO PV MAX VELOCITY: 90.7 CM/S
ECHO PV PEAK GRADIENT: 3.3 MMHG
ECHO RA AREA 4C: 13.15 CM2
ECHO RA AREA 4C: 17.26 CM2
ECHO RA AREA 4C: 17.51 CM2
ECHO RA VOLUME: 34.7 ML
ECHO RA VOLUME: 43.2 ML
ECHO RA VOLUME: 52.5 ML
ECHO RV INTERNAL DIMENSION: 3.37 CM
ECHO RV INTERNAL DIMENSION: 3.45 CM
ECHO RV LONGITUDINAL DIMENSION: 5.9 CM
ECHO RV TAPSE: 1.85 CM (ref 1.5–2)
ECHO RVOT PEAK VELOCITY: 62.25 CM/S
ECHO TRICUSPID ANNULAR PEAK SYSTOLIC VELOCITY: 13 CM/S
ECHO TRICUSPID ANNULAR PEAK SYSTOLIC VELOCITY: 2 CM/S
ECHO TV REGURGITANT MAX VELOCITY: 294.89 CM/S
ECHO TV REGURGITANT MAX VELOCITY: 299.12 CM/S
ECHO TV REGURGITANT MAX VELOCITY: 317.18 CM/S
ECHO TV REGURGITANT PEAK GRADIENT: 34.8 MMHG
ECHO TV REGURGITANT PEAK GRADIENT: 35.8 MMHG
ECHO TV REGURGITANT PEAK GRADIENT: 40.2 MMHG
EOSINOPHIL # BLD: 0 K/UL (ref 0–0.4)
EOSINOPHIL # BLD: 0.1 K/UL (ref 0–0.4)
EOSINOPHIL # BLD: 0.2 K/UL (ref 0–0.4)
EOSINOPHIL # BLD: 0.3 K/UL (ref 0–0.4)
EOSINOPHIL # BLD: 0.4 K/UL (ref 0–0.4)
EOSINOPHIL # BLD: 0.6 K/UL (ref 0–0.4)
EOSINOPHIL NFR BLD: 0 % (ref 0–5)
EOSINOPHIL NFR BLD: 1 % (ref 0–5)
EOSINOPHIL NFR BLD: 2 % (ref 0–5)
EOSINOPHIL NFR BLD: 2 % (ref 0–5)
EOSINOPHIL NFR BLD: 3 % (ref 0–5)
EOSINOPHIL NFR BLD: 4 % (ref 0–5)
EOSINOPHIL NFR BLD: 5 % (ref 0–5)
EOSINOPHIL NFR BLD: 5 % (ref 0–5)
EOSINOPHIL NFR BLD: 7 % (ref 0–5)
EPITH CASTS URNS QL MICRO: ABNORMAL /LPF (ref 0–5)
ERYTHROCYTE [DISTWIDTH] IN BLOOD BY AUTOMATED COUNT: 13.5 % (ref 11.6–14.5)
ERYTHROCYTE [DISTWIDTH] IN BLOOD BY AUTOMATED COUNT: 13.7 % (ref 11.6–14.5)
ERYTHROCYTE [DISTWIDTH] IN BLOOD BY AUTOMATED COUNT: 13.8 % (ref 11.6–14.5)
ERYTHROCYTE [DISTWIDTH] IN BLOOD BY AUTOMATED COUNT: 13.8 % (ref 11.6–14.5)
ERYTHROCYTE [DISTWIDTH] IN BLOOD BY AUTOMATED COUNT: 14.4 % (ref 11.6–14.5)
ERYTHROCYTE [DISTWIDTH] IN BLOOD BY AUTOMATED COUNT: 14.5 % (ref 11.6–14.5)
ERYTHROCYTE [DISTWIDTH] IN BLOOD BY AUTOMATED COUNT: 14.5 % (ref 11.6–14.5)
ERYTHROCYTE [DISTWIDTH] IN BLOOD BY AUTOMATED COUNT: 14.7 % (ref 11.6–14.5)
ERYTHROCYTE [DISTWIDTH] IN BLOOD BY AUTOMATED COUNT: 15 % (ref 11.6–14.5)
ERYTHROCYTE [DISTWIDTH] IN BLOOD BY AUTOMATED COUNT: 15.7 % (ref 11.6–14.5)
ERYTHROCYTE [DISTWIDTH] IN BLOOD BY AUTOMATED COUNT: 15.7 % (ref 11.6–14.5)
ERYTHROCYTE [DISTWIDTH] IN BLOOD BY AUTOMATED COUNT: 15.9 % (ref 11.6–14.5)
ERYTHROCYTE [DISTWIDTH] IN BLOOD BY AUTOMATED COUNT: 17.4 % (ref 11.6–14.5)
ERYTHROCYTE [DISTWIDTH] IN BLOOD BY AUTOMATED COUNT: 18 % (ref 11.6–14.5)
ERYTHROCYTE [DISTWIDTH] IN BLOOD BY AUTOMATED COUNT: 18.3 % (ref 11.6–14.5)
ERYTHROCYTE [DISTWIDTH] IN BLOOD BY AUTOMATED COUNT: 18.3 % (ref 11.6–14.5)
ERYTHROCYTE [DISTWIDTH] IN BLOOD BY AUTOMATED COUNT: 18.4 % (ref 11.6–14.5)
ERYTHROCYTE [DISTWIDTH] IN BLOOD BY AUTOMATED COUNT: 18.4 % (ref 11.6–14.5)
ERYTHROCYTE [DISTWIDTH] IN BLOOD BY AUTOMATED COUNT: 18.5 % (ref 11.6–14.5)
ERYTHROCYTE [DISTWIDTH] IN BLOOD BY AUTOMATED COUNT: 18.6 % (ref 11.6–14.5)
ERYTHROCYTE [DISTWIDTH] IN BLOOD BY AUTOMATED COUNT: 18.6 % (ref 11.6–14.5)
ERYTHROCYTE [DISTWIDTH] IN BLOOD BY AUTOMATED COUNT: 19 % (ref 11.6–14.5)
FERRITIN SERPL-MCNC: 395 NG/ML (ref 8–388)
GAS FLOW.O2 O2 DELIVERY SYS: ABNORMAL L/MIN
GAS FLOW.O2 O2 DELIVERY SYS: ABNORMAL L/MIN
GLOBULIN SER CALC-MCNC: 2.8 G/DL (ref 2–4)
GLOBULIN SER CALC-MCNC: 2.8 G/DL (ref 2–4)
GLOBULIN SER CALC-MCNC: 3 G/DL (ref 2–4)
GLOBULIN SER CALC-MCNC: 3.1 G/DL (ref 2–4)
GLOBULIN SER CALC-MCNC: 3.2 G/DL (ref 2–4)
GLOBULIN SER CALC-MCNC: 3.3 G/DL (ref 2–4)
GLUCOSE BLD STRIP.AUTO-MCNC: 101 MG/DL (ref 70–110)
GLUCOSE BLD STRIP.AUTO-MCNC: 105 MG/DL (ref 70–110)
GLUCOSE BLD STRIP.AUTO-MCNC: 126 MG/DL (ref 70–110)
GLUCOSE BLD STRIP.AUTO-MCNC: 70 MG/DL (ref 70–110)
GLUCOSE BLD STRIP.AUTO-MCNC: 83 MG/DL (ref 70–110)
GLUCOSE BLD STRIP.AUTO-MCNC: 88 MG/DL (ref 70–110)
GLUCOSE SERPL-MCNC: 100 MG/DL (ref 74–99)
GLUCOSE SERPL-MCNC: 101 MG/DL (ref 74–99)
GLUCOSE SERPL-MCNC: 102 MG/DL (ref 74–99)
GLUCOSE SERPL-MCNC: 103 MG/DL (ref 74–99)
GLUCOSE SERPL-MCNC: 103 MG/DL (ref 74–99)
GLUCOSE SERPL-MCNC: 104 MG/DL (ref 74–99)
GLUCOSE SERPL-MCNC: 104 MG/DL (ref 74–99)
GLUCOSE SERPL-MCNC: 108 MG/DL (ref 74–99)
GLUCOSE SERPL-MCNC: 113 MG/DL (ref 74–99)
GLUCOSE SERPL-MCNC: 113 MG/DL (ref 74–99)
GLUCOSE SERPL-MCNC: 114 MG/DL (ref 74–99)
GLUCOSE SERPL-MCNC: 121 MG/DL (ref 74–99)
GLUCOSE SERPL-MCNC: 128 MG/DL (ref 74–99)
GLUCOSE SERPL-MCNC: 135 MG/DL (ref 74–99)
GLUCOSE SERPL-MCNC: 136 MG/DL (ref 74–99)
GLUCOSE SERPL-MCNC: 137 MG/DL (ref 74–99)
GLUCOSE SERPL-MCNC: 145 MG/DL (ref 74–99)
GLUCOSE SERPL-MCNC: 150 MG/DL (ref 74–99)
GLUCOSE SERPL-MCNC: 217 MG/DL (ref 74–99)
GLUCOSE SERPL-MCNC: 64 MG/DL (ref 74–99)
GLUCOSE SERPL-MCNC: 77 MG/DL (ref 74–99)
GLUCOSE SERPL-MCNC: 80 MG/DL (ref 74–99)
GLUCOSE SERPL-MCNC: 82 MG/DL (ref 74–99)
GLUCOSE SERPL-MCNC: 89 MG/DL (ref 74–99)
GLUCOSE SERPL-MCNC: 89 MG/DL (ref 74–99)
GLUCOSE SERPL-MCNC: 90 MG/DL (ref 74–99)
GLUCOSE SERPL-MCNC: 93 MG/DL (ref 74–99)
GLUCOSE SERPL-MCNC: 97 MG/DL (ref 74–99)
GLUCOSE SERPL-MCNC: 98 MG/DL (ref 74–99)
GLUCOSE UR STRIP.AUTO-MCNC: NEGATIVE MG/DL
HBV CORE AB SERPL QL IA: NEGATIVE
HBV CORE IGM SER QL: NEGATIVE
HBV GENOTYPE: NORMAL
HBV IU/ML, 551649: NORMAL IU/ML
HBV SURFACE AB SER QL IA: NEGATIVE
HBV SURFACE AB SER QL IA: NEGATIVE
HBV SURFACE AB SERPL IA-ACNC: <3.1 MIU/ML
HBV SURFACE AB SERPL IA-ACNC: <3.1 MIU/ML
HBV SURFACE AG SER QL: 0.19 INDEX
HBV SURFACE AG SER QL: <0.1 INDEX
HBV SURFACE AG SER QL: NEGATIVE
HBV SURFACE AG SER QL: NEGATIVE
HCO3 BLD-SCNC: 24.1 MMOL/L (ref 22–26)
HCO3 BLDV-SCNC: 17.6 MMOL/L (ref 23–28)
HCT VFR BLD AUTO: 26.1 % (ref 35–45)
HCT VFR BLD AUTO: 26.2 % (ref 35–45)
HCT VFR BLD AUTO: 26.7 % (ref 35–45)
HCT VFR BLD AUTO: 27.1 % (ref 35–45)
HCT VFR BLD AUTO: 27.1 % (ref 35–45)
HCT VFR BLD AUTO: 27.2 % (ref 35–45)
HCT VFR BLD AUTO: 27.4 % (ref 35–45)
HCT VFR BLD AUTO: 27.5 % (ref 35–45)
HCT VFR BLD AUTO: 27.6 % (ref 35–45)
HCT VFR BLD AUTO: 28.1 % (ref 35–45)
HCT VFR BLD AUTO: 29 % (ref 35–45)
HCT VFR BLD AUTO: 29.1 % (ref 35–45)
HCT VFR BLD AUTO: 29.1 % (ref 35–45)
HCT VFR BLD AUTO: 29.2 % (ref 35–45)
HCT VFR BLD AUTO: 30 % (ref 35–45)
HCT VFR BLD AUTO: 30.5 % (ref 35–45)
HCT VFR BLD AUTO: 30.7 % (ref 35–45)
HCT VFR BLD AUTO: 31.4 % (ref 35–45)
HCT VFR BLD AUTO: 31.5 % (ref 35–45)
HCT VFR BLD AUTO: 31.6 % (ref 35–45)
HCT VFR BLD AUTO: 32.1 % (ref 35–45)
HCT VFR BLD AUTO: 32.1 % (ref 35–45)
HCT VFR BLD AUTO: 32.2 % (ref 35–45)
HCT VFR BLD AUTO: 33 % (ref 35–45)
HCT VFR BLD AUTO: 33.4 % (ref 35–45)
HCT VFR BLD AUTO: 33.6 % (ref 35–45)
HCT VFR BLD AUTO: 33.6 % (ref 35–45)
HCT VFR BLD AUTO: 33.7 % (ref 35–45)
HCT VFR BLD AUTO: 33.7 % (ref 35–45)
HCT VFR BLD AUTO: 35.3 % (ref 35–45)
HCT VFR BLD AUTO: 35.8 % (ref 35–45)
HCT VFR BLD AUTO: 38.5 % (ref 35–45)
HEP BS AB COMMENT,HBSAC: ABNORMAL
HEP BS AB COMMENT,HBSAC: ABNORMAL
HGB BLD-MCNC: 10 G/DL (ref 12–16)
HGB BLD-MCNC: 10 G/DL (ref 12–16)
HGB BLD-MCNC: 10.1 G/DL (ref 12–16)
HGB BLD-MCNC: 10.2 G/DL (ref 12–16)
HGB BLD-MCNC: 10.3 G/DL (ref 12–16)
HGB BLD-MCNC: 10.4 G/DL (ref 12–16)
HGB BLD-MCNC: 10.5 G/DL (ref 12–16)
HGB BLD-MCNC: 10.5 G/DL (ref 12–16)
HGB BLD-MCNC: 10.7 G/DL (ref 12–16)
HGB BLD-MCNC: 11 G/DL (ref 12–16)
HGB BLD-MCNC: 11.8 G/DL (ref 12–16)
HGB BLD-MCNC: 7.8 G/DL (ref 12–16)
HGB BLD-MCNC: 8.2 G/DL (ref 12–16)
HGB BLD-MCNC: 8.4 G/DL (ref 12–16)
HGB BLD-MCNC: 8.6 G/DL (ref 12–16)
HGB BLD-MCNC: 9.1 G/DL (ref 12–16)
HGB BLD-MCNC: 9.2 G/DL (ref 12–16)
HGB BLD-MCNC: 9.2 G/DL (ref 12–16)
HGB BLD-MCNC: 9.3 G/DL (ref 12–16)
HGB BLD-MCNC: 9.4 G/DL (ref 12–16)
HGB BLD-MCNC: 9.6 G/DL (ref 12–16)
HGB BLD-MCNC: 9.7 G/DL (ref 12–16)
HGB BLD-MCNC: 9.7 G/DL (ref 12–16)
HGB BLD-MCNC: 9.8 G/DL (ref 12–16)
HGB BLD-MCNC: 9.8 G/DL (ref 12–16)
HGB BLD-MCNC: 9.9 G/DL (ref 12–16)
HGB UR QL STRIP: ABNORMAL
HGB UR QL STRIP: NEGATIVE
HYALINE CASTS URNS QL MICRO: ABNORMAL /LPF (ref 0–2)
INR PPP: 1.1 (ref 0.8–1.2)
INR PPP: 1.2 (ref 0.8–1.2)
INR PPP: 1.2 (ref 0.8–1.2)
INR PPP: 1.3 (ref 0.8–1.2)
INR PPP: 1.4 (ref 0.8–1.2)
INR PPP: 1.5 (ref 0.8–1.2)
INR PPP: 1.6 (ref 0.8–1.2)
INR PPP: 1.6 (ref 0.8–1.2)
INR PPP: 1.7 (ref 0.8–1.2)
INR PPP: 1.8 (ref 0.8–1.2)
INR PPP: 1.9 (ref 0.8–1.2)
INR PPP: 2 (ref 0.8–1.2)
INR PPP: 2 (ref 0.8–1.2)
INR PPP: 2.2 (ref 0.8–1.2)
INR PPP: 2.5 (ref 0.8–1.2)
INR PPP: 2.5 (ref 0.8–1.2)
INR PPP: 3.4 (ref 0.8–1.2)
INR PPP: 3.8 (ref 0.8–1.2)
INR PPP: 4.1 (ref 0.8–1.2)
INR PPP: 4.7 (ref 0.8–1.2)
INR PPP: 4.9 (ref 0.8–1.2)
INR PPP: 5 (ref 0.8–1.2)
INR PPP: 5.2 (ref 0.8–1.2)
INR PPP: 5.4 (ref 0.8–1.2)
INR PPP: 5.4 (ref 0.8–1.2)
INR PPP: 5.8 (ref 0.8–1.2)
INR PPP: 5.9 (ref 0.8–1.2)
INR PPP: 6.4 (ref 0.8–1.2)
INR PPP: 8 (ref 0.8–1.2)
IRON SATN MFR SERPL: 4 %
IRON SERPL-MCNC: 11 UG/DL (ref 50–175)
KETONES UR QL STRIP.AUTO: ABNORMAL MG/DL
KETONES UR QL STRIP.AUTO: NEGATIVE MG/DL
LACTATE BLD-SCNC: 0.75 MMOL/L (ref 0.4–2)
LACTATE BLD-SCNC: 3.7 MMOL/L (ref 0.4–2)
LACTATE SERPL-SCNC: 0.8 MMOL/L (ref 0.4–2)
LEFT BASILIC VEIN LOWER ARM MID DIAM: 0.24 CM
LEFT BASILIC VEIN LOWER ARM PROX DIAM: 0.28 CM
LEFT BASILIC VEIN LOWER DIST DEPTH: 0.12 CM
LEFT BASILIC VEIN LOWER MID DEPTH: 0.16 CM
LEFT BASILIC VEIN LOWER PROX DEPTH: 0.19 CM
LEFT BASILIC VEIN UPPER ARM MID DIAM: 0.61 CM
LEFT BASILIC VEIN UPPER ARM PROX DIAM: 0.83 CM
LEFT BASILIC VEIN UPPER MID DEPTH: 0.68 CM
LEFT BASILIC VEIN UPPER PROX DEPTH: 0.6 CM
LEFT BASILIC VEIN WRIST DIAM: 0.17 CM
LEFT BASLIC VEIN LOWER ARM DIST DIAM: 0.2 CM
LEFT BRACHIAL VEIN 1 DIAM: 0.36 CM
LEFT BRACHIAL VEIN 2 DIAM: 0.38 CM
LEFT CEPHALIC VEIN LOWER ARM DIST DIAM: 0.18 CM
LEFT CEPHALIC VEIN LOWER ARM MID DIAM: 0.18 CM
LEFT CEPHALIC VEIN LOWER ARM PROX DIAM: 0.21 CM
LEFT CEPHALIC VEIN LOWER DIST DEPTH: 0.09 CM
LEFT CEPHALIC VEIN LOWER MID DEPTH: 0.13 CM
LEFT CEPHALIC VEIN LOWER PROX DEPTH: 0.23 CM
LEFT CEPHALIC VEIN WRIST DIAM: 0.16 CM
LEUKOCYTE ESTERASE UR QL STRIP.AUTO: ABNORMAL
LEUKOCYTE ESTERASE UR QL STRIP.AUTO: NEGATIVE
LOG10 HBV AS IU/ML 551596: NORMAL LOG10 IU/ML
LVFS 2D: -39.34 %
LVFS 2D: 23.53 %
LVFS 2D: 34.37 %
LVOT MG: 0.87 MMHG
LVOT MG: 1.42 MMHG
LVOT MG: 1.63 MMHG
LVOT MV: 0.43 CM/S
LVOT MV: 0.55 CM/S
LVOT MV: 0.57 CM/S
LVSV (MOD BI): 13.65 ML
LVSV (MOD BI): 9.81 ML
LVSV (MOD SINGLE 4C): 12.24 ML
LVSV (MOD SINGLE 4C): 12.98 ML
LVSV (MOD SINGLE): 13.17 ML
LVSV (MOD SINGLE): 6.8 ML
LVSV (TEICH): -8.4 ML
LVSV (TEICH): 24.33 ML
LYMPHOCYTES # BLD: 0 K/UL (ref 0.8–3.5)
LYMPHOCYTES # BLD: 0.3 K/UL (ref 0.9–3.6)
LYMPHOCYTES # BLD: 0.3 K/UL (ref 0.9–3.6)
LYMPHOCYTES # BLD: 0.6 K/UL (ref 0.9–3.6)
LYMPHOCYTES # BLD: 0.7 K/UL (ref 0.9–3.6)
LYMPHOCYTES # BLD: 0.7 K/UL (ref 0.9–3.6)
LYMPHOCYTES # BLD: 0.8 K/UL (ref 0.9–3.6)
LYMPHOCYTES # BLD: 0.9 K/UL (ref 0.9–3.6)
LYMPHOCYTES # BLD: 1 K/UL (ref 0.9–3.6)
LYMPHOCYTES # BLD: 1.6 K/UL (ref 0.9–3.6)
LYMPHOCYTES # BLD: 1.7 K/UL (ref 0.9–3.6)
LYMPHOCYTES # BLD: 2.2 K/UL (ref 0.9–3.6)
LYMPHOCYTES NFR BLD: 0 % (ref 20–51)
LYMPHOCYTES NFR BLD: 10 % (ref 21–52)
LYMPHOCYTES NFR BLD: 12 % (ref 21–52)
LYMPHOCYTES NFR BLD: 19 % (ref 21–52)
LYMPHOCYTES NFR BLD: 26 % (ref 21–52)
LYMPHOCYTES NFR BLD: 28 % (ref 21–52)
LYMPHOCYTES NFR BLD: 3 % (ref 21–52)
LYMPHOCYTES NFR BLD: 3 % (ref 21–52)
LYMPHOCYTES NFR BLD: 35 % (ref 21–52)
LYMPHOCYTES NFR BLD: 5 % (ref 21–52)
LYMPHOCYTES NFR BLD: 6 % (ref 21–52)
LYMPHOCYTES NFR BLD: 6 % (ref 21–52)
LYMPHOCYTES NFR BLD: 7 % (ref 21–52)
LYMPHOCYTES NFR BLD: 7 % (ref 21–52)
LYMPHOCYTES NFR BLD: 8 % (ref 21–52)
LYMPHOCYTES NFR BLD: 9 % (ref 21–52)
Lab: 0.1 CM
Lab: 0.1 CM
Lab: 0.12 CM
Lab: 0.13 CM
Lab: 0.17 CM
MAGNESIUM SERPL-MCNC: 1.9 MG/DL (ref 1.6–2.6)
MAGNESIUM SERPL-MCNC: 1.9 MG/DL (ref 1.6–2.6)
MAGNESIUM SERPL-MCNC: 2 MG/DL (ref 1.6–2.6)
MAGNESIUM SERPL-MCNC: 2 MG/DL (ref 1.6–2.6)
MAGNESIUM SERPL-MCNC: 2.2 MG/DL (ref 1.6–2.6)
MAGNESIUM SERPL-MCNC: 2.3 MG/DL (ref 1.6–2.6)
MAGNESIUM SERPL-MCNC: 2.4 MG/DL (ref 1.6–2.6)
MAGNESIUM SERPL-MCNC: 2.4 MG/DL (ref 1.6–2.6)
MAGNESIUM SERPL-MCNC: 2.5 MG/DL (ref 1.6–2.6)
MCH RBC QN AUTO: 27.4 PG (ref 24–34)
MCH RBC QN AUTO: 29.2 PG (ref 24–34)
MCH RBC QN AUTO: 29.4 PG (ref 24–34)
MCH RBC QN AUTO: 29.5 PG (ref 24–34)
MCH RBC QN AUTO: 29.7 PG (ref 24–34)
MCH RBC QN AUTO: 29.8 PG (ref 24–34)
MCH RBC QN AUTO: 29.9 PG (ref 24–34)
MCH RBC QN AUTO: 29.9 PG (ref 24–34)
MCH RBC QN AUTO: 30 PG (ref 24–34)
MCH RBC QN AUTO: 30 PG (ref 24–34)
MCH RBC QN AUTO: 30.1 PG (ref 24–34)
MCH RBC QN AUTO: 30.1 PG (ref 24–34)
MCH RBC QN AUTO: 30.2 PG (ref 24–34)
MCH RBC QN AUTO: 30.3 PG (ref 24–34)
MCH RBC QN AUTO: 30.4 PG (ref 24–34)
MCH RBC QN AUTO: 30.6 PG (ref 24–34)
MCH RBC QN AUTO: 30.8 PG (ref 24–34)
MCH RBC QN AUTO: 30.8 PG (ref 24–34)
MCH RBC QN AUTO: 31 PG (ref 24–34)
MCH RBC QN AUTO: 31.4 PG (ref 24–34)
MCHC RBC AUTO-ENTMCNC: 29.8 G/DL (ref 31–37)
MCHC RBC AUTO-ENTMCNC: 29.9 G/DL (ref 31–37)
MCHC RBC AUTO-ENTMCNC: 30.1 G/DL (ref 31–37)
MCHC RBC AUTO-ENTMCNC: 30.2 G/DL (ref 31–37)
MCHC RBC AUTO-ENTMCNC: 30.3 G/DL (ref 31–37)
MCHC RBC AUTO-ENTMCNC: 30.5 G/DL (ref 31–37)
MCHC RBC AUTO-ENTMCNC: 30.5 G/DL (ref 31–37)
MCHC RBC AUTO-ENTMCNC: 30.6 G/DL (ref 31–37)
MCHC RBC AUTO-ENTMCNC: 30.7 G/DL (ref 31–37)
MCHC RBC AUTO-ENTMCNC: 30.9 G/DL (ref 31–37)
MCHC RBC AUTO-ENTMCNC: 31 G/DL (ref 31–37)
MCHC RBC AUTO-ENTMCNC: 31.2 G/DL (ref 31–37)
MCHC RBC AUTO-ENTMCNC: 31.2 G/DL (ref 31–37)
MCHC RBC AUTO-ENTMCNC: 31.3 G/DL (ref 31–37)
MCHC RBC AUTO-ENTMCNC: 31.5 G/DL (ref 31–37)
MCHC RBC AUTO-ENTMCNC: 31.6 G/DL (ref 31–37)
MCHC RBC AUTO-ENTMCNC: 31.6 G/DL (ref 31–37)
MCHC RBC AUTO-ENTMCNC: 31.7 G/DL (ref 31–37)
MCHC RBC AUTO-ENTMCNC: 31.7 G/DL (ref 31–37)
MCHC RBC AUTO-ENTMCNC: 32.2 G/DL (ref 31–37)
MCHC RBC AUTO-ENTMCNC: 32.6 G/DL (ref 31–37)
MCHC RBC AUTO-ENTMCNC: 32.7 G/DL (ref 31–37)
MCHC RBC AUTO-ENTMCNC: 32.7 G/DL (ref 31–37)
MCHC RBC AUTO-ENTMCNC: 32.8 G/DL (ref 31–37)
MCHC RBC AUTO-ENTMCNC: 33 G/DL (ref 31–37)
MCHC RBC AUTO-ENTMCNC: 33.1 G/DL (ref 31–37)
MCHC RBC AUTO-ENTMCNC: 33.4 G/DL (ref 31–37)
MCHC RBC AUTO-ENTMCNC: 33.5 G/DL (ref 31–37)
MCHC RBC AUTO-ENTMCNC: 33.6 G/DL (ref 31–37)
MCHC RBC AUTO-ENTMCNC: 33.9 G/DL (ref 31–37)
MCV RBC AUTO: 100 FL (ref 74–97)
MCV RBC AUTO: 83.8 FL (ref 74–97)
MCV RBC AUTO: 88.9 FL (ref 74–97)
MCV RBC AUTO: 89.2 FL (ref 74–97)
MCV RBC AUTO: 89.5 FL (ref 74–97)
MCV RBC AUTO: 90.1 FL (ref 74–97)
MCV RBC AUTO: 90.5 FL (ref 74–97)
MCV RBC AUTO: 91.4 FL (ref 74–97)
MCV RBC AUTO: 91.5 FL (ref 74–97)
MCV RBC AUTO: 92.1 FL (ref 74–97)
MCV RBC AUTO: 92.1 FL (ref 74–97)
MCV RBC AUTO: 92.2 FL (ref 74–97)
MCV RBC AUTO: 93.3 FL (ref 74–97)
MCV RBC AUTO: 95.1 FL (ref 74–97)
MCV RBC AUTO: 95.3 FL (ref 74–97)
MCV RBC AUTO: 95.5 FL (ref 74–97)
MCV RBC AUTO: 95.7 FL (ref 74–97)
MCV RBC AUTO: 95.9 FL (ref 74–97)
MCV RBC AUTO: 96.5 FL (ref 74–97)
MCV RBC AUTO: 96.7 FL (ref 74–97)
MCV RBC AUTO: 96.8 FL (ref 74–97)
MCV RBC AUTO: 97.1 FL (ref 74–97)
MCV RBC AUTO: 97.2 FL (ref 74–97)
MCV RBC AUTO: 97.3 FL (ref 74–97)
MCV RBC AUTO: 97.7 FL (ref 74–97)
MCV RBC AUTO: 98 FL (ref 74–97)
MCV RBC AUTO: 98.1 FL (ref 74–97)
MCV RBC AUTO: 98.2 FL (ref 74–97)
MCV RBC AUTO: 98.5 FL (ref 74–97)
MCV RBC AUTO: 99.1 FL (ref 74–97)
MCV RBC AUTO: 99.7 FL (ref 74–97)
MCV RBC AUTO: 99.7 FL (ref 74–97)
MONOCYTES # BLD: 0.2 K/UL (ref 0–1)
MONOCYTES # BLD: 0.4 K/UL (ref 0.05–1.2)
MONOCYTES # BLD: 0.5 K/UL (ref 0.05–1.2)
MONOCYTES # BLD: 0.6 K/UL (ref 0.05–1.2)
MONOCYTES # BLD: 0.6 K/UL (ref 0.05–1.2)
MONOCYTES # BLD: 0.7 K/UL (ref 0.05–1.2)
MONOCYTES # BLD: 0.8 K/UL (ref 0.05–1.2)
MONOCYTES # BLD: 1 K/UL (ref 0.05–1.2)
MONOCYTES # BLD: 1 K/UL (ref 0.05–1.2)
MONOCYTES NFR BLD: 1 % (ref 2–9)
MONOCYTES NFR BLD: 10 % (ref 3–10)
MONOCYTES NFR BLD: 10 % (ref 3–10)
MONOCYTES NFR BLD: 12 % (ref 3–10)
MONOCYTES NFR BLD: 12 % (ref 3–10)
MONOCYTES NFR BLD: 13 % (ref 3–10)
MONOCYTES NFR BLD: 4 % (ref 3–10)
MONOCYTES NFR BLD: 4 % (ref 3–10)
MONOCYTES NFR BLD: 5 % (ref 3–10)
MONOCYTES NFR BLD: 5 % (ref 3–10)
MONOCYTES NFR BLD: 6 % (ref 3–10)
MONOCYTES NFR BLD: 6 % (ref 3–10)
MONOCYTES NFR BLD: 7 % (ref 3–10)
MONOCYTES NFR BLD: 7 % (ref 3–10)
MONOCYTES NFR BLD: 8 % (ref 3–10)
MONOCYTES NFR BLD: 9 % (ref 3–10)
MV DEC SLOPE: 12.22
MV DEC SLOPE: 14.19
MV DEC SLOPE: 4.16
NEUTS BAND NFR BLD MANUAL: 12 % (ref 0–5)
NEUTS SEG # BLD: 10.5 K/UL (ref 1.8–8)
NEUTS SEG # BLD: 11.6 K/UL (ref 1.8–8)
NEUTS SEG # BLD: 11.8 K/UL (ref 1.8–8)
NEUTS SEG # BLD: 12.3 K/UL (ref 1.8–8)
NEUTS SEG # BLD: 14.8 K/UL (ref 1.8–8)
NEUTS SEG # BLD: 3 K/UL (ref 1.8–8)
NEUTS SEG # BLD: 3.1 K/UL (ref 1.8–8)
NEUTS SEG # BLD: 3.7 K/UL (ref 1.8–8)
NEUTS SEG # BLD: 3.8 K/UL (ref 1.8–8)
NEUTS SEG # BLD: 5.3 K/UL (ref 1.8–8)
NEUTS SEG # BLD: 5.5 K/UL (ref 1.8–8)
NEUTS SEG # BLD: 5.7 K/UL (ref 1.8–8)
NEUTS SEG # BLD: 6.2 K/UL (ref 1.8–8)
NEUTS SEG # BLD: 6.6 K/UL (ref 1.8–8)
NEUTS SEG # BLD: 8 K/UL (ref 1.8–8)
NEUTS SEG # BLD: 8.5 K/UL (ref 1.8–8)
NEUTS SEG # BLD: 8.9 K/UL (ref 1.8–8)
NEUTS SEG # BLD: 9.7 K/UL (ref 1.8–8)
NEUTS SEG NFR BLD: 49 % (ref 40–73)
NEUTS SEG NFR BLD: 52 % (ref 40–73)
NEUTS SEG NFR BLD: 56 % (ref 40–73)
NEUTS SEG NFR BLD: 69 % (ref 40–73)
NEUTS SEG NFR BLD: 73 % (ref 40–73)
NEUTS SEG NFR BLD: 76 % (ref 40–73)
NEUTS SEG NFR BLD: 76 % (ref 40–73)
NEUTS SEG NFR BLD: 81 % (ref 40–73)
NEUTS SEG NFR BLD: 81 % (ref 40–73)
NEUTS SEG NFR BLD: 82 % (ref 40–73)
NEUTS SEG NFR BLD: 84 % (ref 40–73)
NEUTS SEG NFR BLD: 87 % (ref 40–73)
NEUTS SEG NFR BLD: 87 % (ref 42–75)
NEUTS SEG NFR BLD: 88 % (ref 40–73)
NEUTS SEG NFR BLD: 88 % (ref 40–73)
NEUTS SEG NFR BLD: 89 % (ref 40–73)
NEUTS SEG NFR BLD: 91 % (ref 40–73)
NEUTS SEG NFR BLD: 93 % (ref 40–73)
NITRITE UR QL STRIP.AUTO: NEGATIVE
NITRITE UR QL STRIP.AUTO: POSITIVE
O2/TOTAL GAS SETTING VFR VENT: 100 %
O2/TOTAL GAS SETTING VFR VENT: 35 %
OSMOLALITY SERPL: 294 MOSM/KG H2O (ref 280–301)
OSMOLALITY UR: 387 MOSM/KG H2O (ref 50–1400)
P-R INTERVAL, ECG05: 138 MS
P-R INTERVAL, ECG05: 142 MS
PCO2 BLD: 45.8 MMHG (ref 35–45)
PCO2 BLDV: 32.7 MMHG (ref 41–51)
PEEP RESPIRATORY: 6 CMH2O
PEEP RESPIRATORY: 6 CMH2O
PH BLD: 7.33 [PH] (ref 7.35–7.45)
PH BLDV: 7.34 [PH] (ref 7.32–7.42)
PH UR STRIP: 5 [PH] (ref 5–8)
PH UR STRIP: 6 [PH] (ref 5–8)
PHOSPHATE SERPL-MCNC: 2 MG/DL (ref 2.5–4.9)
PHOSPHATE SERPL-MCNC: 2 MG/DL (ref 2.5–4.9)
PHOSPHATE SERPL-MCNC: 2.2 MG/DL (ref 2.5–4.9)
PHOSPHATE SERPL-MCNC: 2.8 MG/DL (ref 2.5–4.9)
PHOSPHATE SERPL-MCNC: 3.1 MG/DL (ref 2.5–4.9)
PHOSPHATE SERPL-MCNC: 3.2 MG/DL (ref 2.5–4.9)
PHOSPHATE SERPL-MCNC: 3.2 MG/DL (ref 2.5–4.9)
PHOSPHATE SERPL-MCNC: 3.7 MG/DL (ref 2.5–4.9)
PHOSPHATE SERPL-MCNC: 5 MG/DL (ref 2.5–4.9)
PHOSPHATE SERPL-MCNC: 7.1 MG/DL (ref 2.5–4.9)
PHOSPHATE SERPL-MCNC: 7.3 MG/DL (ref 2.5–4.9)
PHOSPHATE SERPL-MCNC: 7.9 MG/DL (ref 2.5–4.9)
PIP ISTAT,IPIP: 12
PIP ISTAT,IPIP: 14
PLATELET # BLD AUTO: 100 K/UL (ref 135–420)
PLATELET # BLD AUTO: 105 K/UL (ref 135–420)
PLATELET # BLD AUTO: 114 K/UL (ref 135–420)
PLATELET # BLD AUTO: 124 K/UL (ref 135–420)
PLATELET # BLD AUTO: 132 K/UL (ref 135–420)
PLATELET # BLD AUTO: 148 K/UL (ref 135–420)
PLATELET # BLD AUTO: 158 K/UL (ref 135–420)
PLATELET # BLD AUTO: 161 K/UL (ref 135–420)
PLATELET # BLD AUTO: 165 K/UL (ref 135–420)
PLATELET # BLD AUTO: 167 K/UL (ref 135–420)
PLATELET # BLD AUTO: 169 K/UL (ref 135–420)
PLATELET # BLD AUTO: 175 K/UL (ref 135–420)
PLATELET # BLD AUTO: 199 K/UL (ref 135–420)
PLATELET # BLD AUTO: 202 K/UL (ref 135–420)
PLATELET # BLD AUTO: 203 K/UL (ref 135–420)
PLATELET # BLD AUTO: 208 K/UL (ref 135–420)
PLATELET # BLD AUTO: 209 K/UL (ref 135–420)
PLATELET # BLD AUTO: 215 K/UL (ref 135–420)
PLATELET # BLD AUTO: 218 K/UL (ref 135–420)
PLATELET # BLD AUTO: 223 K/UL (ref 135–420)
PLATELET # BLD AUTO: 231 K/UL (ref 135–420)
PLATELET # BLD AUTO: 237 K/UL (ref 135–420)
PLATELET # BLD AUTO: 239 K/UL (ref 135–420)
PLATELET # BLD AUTO: 239 K/UL (ref 135–420)
PLATELET # BLD AUTO: 244 K/UL (ref 135–420)
PLATELET # BLD AUTO: 247 K/UL (ref 135–420)
PLATELET # BLD AUTO: 260 K/UL (ref 135–420)
PLATELET # BLD AUTO: 271 K/UL (ref 135–420)
PLATELET # BLD AUTO: 271 K/UL (ref 135–420)
PLATELET # BLD AUTO: 96 K/UL (ref 135–420)
PLATELET COMMENTS,PCOM: ABNORMAL
PMV BLD AUTO: 10.1 FL (ref 9.2–11.8)
PMV BLD AUTO: 10.3 FL (ref 9.2–11.8)
PMV BLD AUTO: 10.4 FL (ref 9.2–11.8)
PMV BLD AUTO: 10.5 FL (ref 9.2–11.8)
PMV BLD AUTO: 10.6 FL (ref 9.2–11.8)
PMV BLD AUTO: 10.9 FL (ref 9.2–11.8)
PMV BLD AUTO: 10.9 FL (ref 9.2–11.8)
PMV BLD AUTO: 11 FL (ref 9.2–11.8)
PMV BLD AUTO: 11.1 FL (ref 9.2–11.8)
PMV BLD AUTO: 11.2 FL (ref 9.2–11.8)
PMV BLD AUTO: 11.2 FL (ref 9.2–11.8)
PMV BLD AUTO: 11.3 FL (ref 9.2–11.8)
PMV BLD AUTO: 11.4 FL (ref 9.2–11.8)
PMV BLD AUTO: 11.5 FL (ref 9.2–11.8)
PMV BLD AUTO: 11.5 FL (ref 9.2–11.8)
PMV BLD AUTO: 11.6 FL (ref 9.2–11.8)
PMV BLD AUTO: 11.6 FL (ref 9.2–11.8)
PMV BLD AUTO: 11.7 FL (ref 9.2–11.8)
PMV BLD AUTO: 11.7 FL (ref 9.2–11.8)
PMV BLD AUTO: 12 FL (ref 9.2–11.8)
PMV BLD AUTO: 12.7 FL (ref 9.2–11.8)
PMV BLD AUTO: 8.7 FL (ref 9.2–11.8)
PO2 BLD: 142 MMHG (ref 80–100)
PO2 BLDV: 36 MMHG (ref 25–40)
POTASSIUM SERPL-SCNC: 3.1 MMOL/L (ref 3.5–5.5)
POTASSIUM SERPL-SCNC: 3.2 MMOL/L (ref 3.5–5.5)
POTASSIUM SERPL-SCNC: 3.3 MMOL/L (ref 3.5–5.5)
POTASSIUM SERPL-SCNC: 3.5 MMOL/L (ref 3.5–5.5)
POTASSIUM SERPL-SCNC: 3.6 MMOL/L (ref 3.5–5.5)
POTASSIUM SERPL-SCNC: 3.7 MMOL/L (ref 3.5–5.5)
POTASSIUM SERPL-SCNC: 3.7 MMOL/L (ref 3.5–5.5)
POTASSIUM SERPL-SCNC: 3.8 MMOL/L (ref 3.5–5.5)
POTASSIUM SERPL-SCNC: 3.8 MMOL/L (ref 3.5–5.5)
POTASSIUM SERPL-SCNC: 3.9 MMOL/L (ref 3.5–5.5)
POTASSIUM SERPL-SCNC: 4 MMOL/L (ref 3.5–5.5)
POTASSIUM SERPL-SCNC: 4.1 MMOL/L (ref 3.5–5.5)
POTASSIUM SERPL-SCNC: 4.3 MMOL/L (ref 3.5–5.5)
POTASSIUM SERPL-SCNC: 4.5 MMOL/L (ref 3.5–5.5)
POTASSIUM SERPL-SCNC: 4.7 MMOL/L (ref 3.5–5.5)
POTASSIUM SERPL-SCNC: 5 MMOL/L (ref 3.5–5.5)
PROT SERPL-MCNC: 4.9 G/DL (ref 6.4–8.2)
PROT SERPL-MCNC: 5.2 G/DL (ref 6.4–8.2)
PROT SERPL-MCNC: 5.5 G/DL (ref 6.4–8.2)
PROT SERPL-MCNC: 5.7 G/DL (ref 6.4–8.2)
PROT SERPL-MCNC: 5.9 G/DL (ref 6.4–8.2)
PROT SERPL-MCNC: 6 G/DL (ref 6.4–8.2)
PROT SERPL-MCNC: 6.1 G/DL (ref 6.4–8.2)
PROT SERPL-MCNC: 6.3 G/DL (ref 6.4–8.2)
PROT SERPL-MCNC: 6.8 G/DL (ref 6.4–8.2)
PROT UR STRIP-MCNC: 100 MG/DL
PROT UR STRIP-MCNC: NEGATIVE MG/DL
PROTHROMBIN TIME: 14.2 SEC (ref 11.5–15.2)
PROTHROMBIN TIME: 15 SEC (ref 11.5–15.2)
PROTHROMBIN TIME: 15 SEC (ref 11.5–15.2)
PROTHROMBIN TIME: 15.5 SEC (ref 11.5–15.2)
PROTHROMBIN TIME: 16.6 SEC (ref 11.5–15.2)
PROTHROMBIN TIME: 17.6 SEC (ref 11.5–15.2)
PROTHROMBIN TIME: 17.7 SEC (ref 11.5–15.2)
PROTHROMBIN TIME: 17.9 SEC (ref 11.5–15.2)
PROTHROMBIN TIME: 18.4 SEC (ref 11.5–15.2)
PROTHROMBIN TIME: 19.2 SEC (ref 11.5–15.2)
PROTHROMBIN TIME: 19.7 SEC (ref 11.5–15.2)
PROTHROMBIN TIME: 20.2 SEC (ref 11.5–15.2)
PROTHROMBIN TIME: 21.2 SEC (ref 11.5–15.2)
PROTHROMBIN TIME: 22.5 SEC (ref 11.5–15.2)
PROTHROMBIN TIME: 22.6 SEC (ref 11.5–15.2)
PROTHROMBIN TIME: 24.1 SEC (ref 11.5–15.2)
PROTHROMBIN TIME: 26.6 SEC (ref 11.5–15.2)
PROTHROMBIN TIME: 26.7 SEC (ref 11.5–15.2)
PROTHROMBIN TIME: 34.4 SEC (ref 11.5–15.2)
PROTHROMBIN TIME: 37.4 SEC (ref 11.5–15.2)
PROTHROMBIN TIME: 39.7 SEC (ref 11.5–15.2)
PROTHROMBIN TIME: 43.6 SEC (ref 11.5–15.2)
PROTHROMBIN TIME: 45.6 SEC (ref 11.5–15.2)
PROTHROMBIN TIME: 45.8 SEC (ref 11.5–15.2)
PROTHROMBIN TIME: 47.6 SEC (ref 11.5–15.2)
PROTHROMBIN TIME: 48.7 SEC (ref 11.5–15.2)
PROTHROMBIN TIME: 49.3 SEC (ref 11.5–15.2)
PROTHROMBIN TIME: 51.9 SEC (ref 11.5–15.2)
PROTHROMBIN TIME: 52.7 SEC (ref 11.5–15.2)
PROTHROMBIN TIME: 56.3 SEC (ref 11.5–15.2)
PROTHROMBIN TIME: 66.5 SEC (ref 11.5–15.2)
Q-T INTERVAL, ECG07: 282 MS
Q-T INTERVAL, ECG07: 294 MS
Q-T INTERVAL, ECG07: 336 MS
Q-T INTERVAL, ECG07: 336 MS
Q-T INTERVAL, ECG07: 410 MS
QRS DURATION, ECG06: 78 MS
QRS DURATION, ECG06: 78 MS
QRS DURATION, ECG06: 84 MS
QRS DURATION, ECG06: 86 MS
QRS DURATION, ECG06: 86 MS
QTC CALCULATION (BEZET), ECG08: 419 MS
QTC CALCULATION (BEZET), ECG08: 424 MS
QTC CALCULATION (BEZET), ECG08: 426 MS
QTC CALCULATION (BEZET), ECG08: 445 MS
QTC CALCULATION (BEZET), ECG08: 463 MS
RBC # BLD AUTO: 2.67 M/UL (ref 4.2–5.3)
RBC # BLD AUTO: 2.79 M/UL (ref 4.2–5.3)
RBC # BLD AUTO: 2.79 M/UL (ref 4.2–5.3)
RBC # BLD AUTO: 2.83 M/UL (ref 4.2–5.3)
RBC # BLD AUTO: 2.97 M/UL (ref 4.2–5.3)
RBC # BLD AUTO: 2.99 M/UL (ref 4.2–5.3)
RBC # BLD AUTO: 3.01 M/UL (ref 4.2–5.3)
RBC # BLD AUTO: 3.02 M/UL (ref 4.2–5.3)
RBC # BLD AUTO: 3.05 M/UL (ref 4.2–5.3)
RBC # BLD AUTO: 3.06 M/UL (ref 4.2–5.3)
RBC # BLD AUTO: 3.07 M/UL (ref 4.2–5.3)
RBC # BLD AUTO: 3.18 M/UL (ref 4.2–5.3)
RBC # BLD AUTO: 3.2 M/UL (ref 4.2–5.3)
RBC # BLD AUTO: 3.25 M/UL (ref 4.2–5.3)
RBC # BLD AUTO: 3.25 M/UL (ref 4.2–5.3)
RBC # BLD AUTO: 3.26 M/UL (ref 4.2–5.3)
RBC # BLD AUTO: 3.29 M/UL (ref 4.2–5.3)
RBC # BLD AUTO: 3.32 M/UL (ref 4.2–5.3)
RBC # BLD AUTO: 3.36 M/UL (ref 4.2–5.3)
RBC # BLD AUTO: 3.37 M/UL (ref 4.2–5.3)
RBC # BLD AUTO: 3.37 M/UL (ref 4.2–5.3)
RBC # BLD AUTO: 3.41 M/UL (ref 4.2–5.3)
RBC # BLD AUTO: 3.46 M/UL (ref 4.2–5.3)
RBC # BLD AUTO: 3.48 M/UL (ref 4.2–5.3)
RBC # BLD AUTO: 3.52 M/UL (ref 4.2–5.3)
RBC # BLD AUTO: 3.52 M/UL (ref 4.2–5.3)
RBC # BLD AUTO: 3.58 M/UL (ref 4.2–5.3)
RBC # BLD AUTO: 3.59 M/UL (ref 4.2–5.3)
RBC # BLD AUTO: 3.68 M/UL (ref 4.2–5.3)
RBC # BLD AUTO: 3.94 M/UL (ref 4.2–5.3)
RBC #/AREA URNS HPF: ABNORMAL /HPF (ref 0–5)
RBC MORPH BLD: ABNORMAL
RIGHT ANTECUBITAL VEIN DIAM: 0.71 CM
RIGHT BASILIC VEIN LOWER ARM MID DIAM: 0.24 CM
RIGHT BASILIC VEIN LOWER ARM PROX DIAM: 0.24 CM
RIGHT BASILIC VEIN LOWER MID DEPTH: 0.14 CM
RIGHT BASILIC VEIN LOWER PROX DEPTH: 0.12 CM
RIGHT BASILIC VEIN UPPER ARM DIST DIAM: 0.58 CM
RIGHT BASILIC VEIN UPPER ARM MID DIAM: 0.73 CM
RIGHT BASILIC VEIN UPPER ARM PROX DIAM: 0.54 CM
RIGHT BASILIC VEIN UPPER DIST DEPTH: 0.73 CM
RIGHT BASILIC VEIN UPPER MID DEPTH: 0.54 CM
RIGHT BASILIC VEIN UPPER PROX DEPTH: 0.73 CM
RIGHT BASILIC VEIN WRIST DIAM: 0.15 CM
RIGHT BASLIC VEIN LOWER ARM DIST DIAM: 0.19 CM
RIGHT BRACHIAL VEIN 1 DIAM: 0.25 CM
RIGHT BRACHIAL VEIN 2 DIAM: 0.2 CM
RIGHT CEPHALIC VEIN LOWER ARM DIST DIAM: 0.3 CM
RIGHT CEPHALIC VEIN LOWER ARM MID DIAM: 0.3 CM
RIGHT CEPHALIC VEIN LOWER ARM PROX DIAM: 0.38 CM
RIGHT CEPHALIC VEIN LOWER DIST DEPTH: 0.11 CM
RIGHT CEPHALIC VEIN LOWER MID DEPTH: 0.1 CM
RIGHT CEPHALIC VEIN LOWER PROX DEPTH: 0.22 CM
RIGHT CEPHALIC VEIN WRIST DIAM: 0.39 CM
SAO2 % BLD: 99 % (ref 92–97)
SAO2 % BLDV: 67 % (ref 65–88)
SERVICE CMNT-IMP: ABNORMAL
SERVICE CMNT-IMP: NORMAL
SODIUM SERPL-SCNC: 121 MMOL/L (ref 136–145)
SODIUM SERPL-SCNC: 123 MMOL/L (ref 136–145)
SODIUM SERPL-SCNC: 124 MMOL/L (ref 136–145)
SODIUM SERPL-SCNC: 125 MMOL/L (ref 136–145)
SODIUM SERPL-SCNC: 126 MMOL/L (ref 136–145)
SODIUM SERPL-SCNC: 129 MMOL/L (ref 136–145)
SODIUM SERPL-SCNC: 132 MMOL/L (ref 136–145)
SODIUM SERPL-SCNC: 133 MMOL/L (ref 136–145)
SODIUM SERPL-SCNC: 134 MMOL/L (ref 136–145)
SODIUM SERPL-SCNC: 135 MMOL/L (ref 136–145)
SODIUM SERPL-SCNC: 136 MMOL/L (ref 136–145)
SODIUM SERPL-SCNC: 137 MMOL/L (ref 136–145)
SODIUM SERPL-SCNC: 137 MMOL/L (ref 136–145)
SODIUM SERPL-SCNC: 138 MMOL/L (ref 136–145)
SODIUM SERPL-SCNC: 139 MMOL/L (ref 136–145)
SODIUM SERPL-SCNC: 140 MMOL/L (ref 136–145)
SODIUM SERPL-SCNC: 141 MMOL/L (ref 136–145)
SODIUM SERPL-SCNC: 142 MMOL/L (ref 136–145)
SODIUM UR-SCNC: 15 MMOL/L (ref 20–110)
SP GR UR REFRACTOMETRY: 1.01 (ref 1–1.03)
SP GR UR REFRACTOMETRY: 1.01 (ref 1–1.03)
SP GR UR REFRACTOMETRY: 1.02 (ref 1–1.03)
SP GR UR REFRACTOMETRY: 1.02 (ref 1–1.03)
SPECIMEN EXP DATE BLD: NORMAL
SPECIMEN EXP DATE BLD: NORMAL
SPECIMEN TYPE: ABNORMAL
SPECIMEN TYPE: ABNORMAL
SPONTANEOUS TIMED, IST: YES
SPONTANEOUS TIMED, IST: YES
STATUS OF UNIT,%ST: NORMAL
TEST INFORMATION: NORMAL
TIBC SERPL-MCNC: 271 UG/DL (ref 250–450)
TOTAL RESP. RATE, ITRR: 20
TOTAL RESP. RATE, ITRR: 22
TROPONIN I SERPL-MCNC: 0.02 NG/ML (ref 0–0.04)
TROPONIN I SERPL-MCNC: 0.02 NG/ML (ref 0–0.04)
TROPONIN I SERPL-MCNC: 0.03 NG/ML (ref 0–0.04)
TROPONIN I SERPL-MCNC: 0.25 NG/ML (ref 0–0.04)
TROPONIN I SERPL-MCNC: 0.26 NG/ML (ref 0–0.04)
TROPONIN I SERPL-MCNC: 0.26 NG/ML (ref 0–0.04)
TROPONIN I SERPL-MCNC: 0.96 NG/ML (ref 0–0.04)
TROPONIN I SERPL-MCNC: 1.06 NG/ML (ref 0–0.04)
TROPONIN I SERPL-MCNC: 1.38 NG/ML (ref 0–0.04)
TROPONIN I SERPL-MCNC: <0.02 NG/ML (ref 0–0.04)
TSH SERPL DL<=0.05 MIU/L-ACNC: 2.48 UIU/ML (ref 0.36–3.74)
TSH SERPL DL<=0.05 MIU/L-ACNC: 2.53 UIU/ML (ref 0.36–3.74)
UNIT DIVISION, %UDIV: 0
UROBILINOGEN UR QL STRIP.AUTO: 0.2 EU/DL (ref 0.2–1)
UROBILINOGEN UR QL STRIP.AUTO: 0.2 EU/DL (ref 0.2–1)
UROBILINOGEN UR QL STRIP.AUTO: 1 EU/DL (ref 0.2–1)
UROBILINOGEN UR QL STRIP.AUTO: 1 EU/DL (ref 0.2–1)
UUN UR-MCNC: 732 MG/DL
VENTRICULAR RATE, ECG03: 114 BPM
VENTRICULAR RATE, ECG03: 136 BPM
VENTRICULAR RATE, ECG03: 138 BPM
VENTRICULAR RATE, ECG03: 63 BPM
VENTRICULAR RATE, ECG03: 97 BPM
WBC # BLD AUTO: 10.1 K/UL (ref 4.6–13.2)
WBC # BLD AUTO: 10.1 K/UL (ref 4.6–13.2)
WBC # BLD AUTO: 10.5 K/UL (ref 4.6–13.2)
WBC # BLD AUTO: 10.8 K/UL (ref 4.6–13.2)
WBC # BLD AUTO: 11.8 K/UL (ref 4.6–13.2)
WBC # BLD AUTO: 12.1 K/UL (ref 4.6–13.2)
WBC # BLD AUTO: 12.1 K/UL (ref 4.6–13.2)
WBC # BLD AUTO: 13.1 K/UL (ref 4.6–13.2)
WBC # BLD AUTO: 13.4 K/UL (ref 4.6–13.2)
WBC # BLD AUTO: 14.6 K/UL (ref 4.6–13.2)
WBC # BLD AUTO: 17 K/UL (ref 4.6–13.2)
WBC # BLD AUTO: 4.3 K/UL (ref 4.6–13.2)
WBC # BLD AUTO: 5.4 K/UL (ref 4.6–13.2)
WBC # BLD AUTO: 5.7 K/UL (ref 4.6–13.2)
WBC # BLD AUTO: 6.3 K/UL (ref 4.6–13.2)
WBC # BLD AUTO: 6.6 K/UL (ref 4.6–13.2)
WBC # BLD AUTO: 6.8 K/UL (ref 4.6–13.2)
WBC # BLD AUTO: 6.8 K/UL (ref 4.6–13.2)
WBC # BLD AUTO: 7 K/UL (ref 4.6–13.2)
WBC # BLD AUTO: 7.2 K/UL (ref 4.6–13.2)
WBC # BLD AUTO: 7.5 K/UL (ref 4.6–13.2)
WBC # BLD AUTO: 7.5 K/UL (ref 4.6–13.2)
WBC # BLD AUTO: 7.8 K/UL (ref 4.6–13.2)
WBC # BLD AUTO: 7.9 K/UL (ref 4.6–13.2)
WBC # BLD AUTO: 8 K/UL (ref 4.6–13.2)
WBC # BLD AUTO: 8 K/UL (ref 4.6–13.2)
WBC # BLD AUTO: 8.1 K/UL (ref 4.6–13.2)
WBC # BLD AUTO: 8.2 K/UL (ref 4.6–13.2)
WBC # BLD AUTO: 9 K/UL (ref 4.6–13.2)
WBC # BLD AUTO: 9.4 K/UL (ref 4.6–13.2)
WBC MORPH BLD: ABNORMAL
WBC URNS QL MICRO: ABNORMAL /HPF (ref 0–5)
YEAST URNS QL MICRO: ABNORMAL

## 2019-01-01 PROCEDURE — 74011250636 HC RX REV CODE- 250/636: Performed by: INTERNAL MEDICINE

## 2019-01-01 PROCEDURE — 74011250636 HC RX REV CODE- 250/636: Performed by: FAMILY MEDICINE

## 2019-01-01 PROCEDURE — 36415 COLL VENOUS BLD VENIPUNCTURE: CPT

## 2019-01-01 PROCEDURE — 74011250637 HC RX REV CODE- 250/637: Performed by: INTERNAL MEDICINE

## 2019-01-01 PROCEDURE — 74011250637 HC RX REV CODE- 250/637: Performed by: FAMILY MEDICINE

## 2019-01-01 PROCEDURE — 97110 THERAPEUTIC EXERCISES: CPT

## 2019-01-01 PROCEDURE — 85610 PROTHROMBIN TIME: CPT

## 2019-01-01 PROCEDURE — C9113 INJ PANTOPRAZOLE SODIUM, VIA: HCPCS | Performed by: INTERNAL MEDICINE

## 2019-01-01 PROCEDURE — 77010033678 HC OXYGEN DAILY

## 2019-01-01 PROCEDURE — 3331090002 HH PPS REVENUE DEBIT

## 2019-01-01 PROCEDURE — 5A1D70Z PERFORMANCE OF URINARY FILTRATION, INTERMITTENT, LESS THAN 6 HOURS PER DAY: ICD-10-PCS | Performed by: INTERNAL MEDICINE

## 2019-01-01 PROCEDURE — 83540 ASSAY OF IRON: CPT

## 2019-01-01 PROCEDURE — 80048 BASIC METABOLIC PNL TOTAL CA: CPT

## 2019-01-01 PROCEDURE — 74011000250 HC RX REV CODE- 250: Performed by: INTERNAL MEDICINE

## 2019-01-01 PROCEDURE — G0152 HHCP-SERV OF OT,EA 15 MIN: HCPCS

## 2019-01-01 PROCEDURE — 74011250636 HC RX REV CODE- 250/636: Performed by: HOSPITALIST

## 2019-01-01 PROCEDURE — 83880 ASSAY OF NATRIURETIC PEPTIDE: CPT

## 2019-01-01 PROCEDURE — P9047 ALBUMIN (HUMAN), 25%, 50ML: HCPCS | Performed by: INTERNAL MEDICINE

## 2019-01-01 PROCEDURE — 85027 COMPLETE CBC AUTOMATED: CPT

## 2019-01-01 PROCEDURE — 65660000000 HC RM CCU STEPDOWN

## 2019-01-01 PROCEDURE — 97163 PT EVAL HIGH COMPLEX 45 MIN: CPT

## 2019-01-01 PROCEDURE — 3331090001 HH PPS REVENUE CREDIT

## 2019-01-01 PROCEDURE — 94760 N-INVAS EAR/PLS OXIMETRY 1: CPT

## 2019-01-01 PROCEDURE — 85730 THROMBOPLASTIN TIME PARTIAL: CPT

## 2019-01-01 PROCEDURE — 90935 HEMODIALYSIS ONE EVALUATION: CPT

## 2019-01-01 PROCEDURE — 84540 ASSAY OF URINE/UREA-N: CPT

## 2019-01-01 PROCEDURE — G0495 RN CARE TRAIN/EDU IN HH: HCPCS

## 2019-01-01 PROCEDURE — 83735 ASSAY OF MAGNESIUM: CPT

## 2019-01-01 PROCEDURE — 85025 COMPLETE CBC W/AUTO DIFF WBC: CPT

## 2019-01-01 PROCEDURE — 93005 ELECTROCARDIOGRAM TRACING: CPT

## 2019-01-01 PROCEDURE — 84300 ASSAY OF URINE SODIUM: CPT

## 2019-01-01 PROCEDURE — 92610 EVALUATE SWALLOWING FUNCTION: CPT

## 2019-01-01 PROCEDURE — 94640 AIRWAY INHALATION TREATMENT: CPT

## 2019-01-01 PROCEDURE — 80053 COMPREHEN METABOLIC PANEL: CPT

## 2019-01-01 PROCEDURE — 92526 ORAL FUNCTION THERAPY: CPT

## 2019-01-01 PROCEDURE — 87340 HEPATITIS B SURFACE AG IA: CPT

## 2019-01-01 PROCEDURE — 74011250636 HC RX REV CODE- 250/636: Performed by: EMERGENCY MEDICINE

## 2019-01-01 PROCEDURE — 90686 IIV4 VACC NO PRSV 0.5 ML IM: CPT | Performed by: INTERNAL MEDICINE

## 2019-01-01 PROCEDURE — 74011250637 HC RX REV CODE- 250/637: Performed by: HOSPITALIST

## 2019-01-01 PROCEDURE — 97535 SELF CARE MNGMENT TRAINING: CPT

## 2019-01-01 PROCEDURE — 77030013140 HC MSK NEB VYRM -A

## 2019-01-01 PROCEDURE — 87086 URINE CULTURE/COLONY COUNT: CPT

## 2019-01-01 PROCEDURE — 82550 ASSAY OF CK (CPK): CPT

## 2019-01-01 PROCEDURE — 82803 BLOOD GASES ANY COMBINATION: CPT

## 2019-01-01 PROCEDURE — 81001 URINALYSIS AUTO W/SCOPE: CPT

## 2019-01-01 PROCEDURE — 71046 X-RAY EXAM CHEST 2 VIEWS: CPT

## 2019-01-01 PROCEDURE — 84100 ASSAY OF PHOSPHORUS: CPT

## 2019-01-01 PROCEDURE — P9047 ALBUMIN (HUMAN), 25%, 50ML: HCPCS | Performed by: FAMILY MEDICINE

## 2019-01-01 PROCEDURE — 87040 BLOOD CULTURE FOR BACTERIA: CPT

## 2019-01-01 PROCEDURE — 82962 GLUCOSE BLOOD TEST: CPT

## 2019-01-01 PROCEDURE — 97161 PT EVAL LOW COMPLEX 20 MIN: CPT

## 2019-01-01 PROCEDURE — 74011000250 HC RX REV CODE- 250: Performed by: EMERGENCY MEDICINE

## 2019-01-01 PROCEDURE — 97530 THERAPEUTIC ACTIVITIES: CPT

## 2019-01-01 PROCEDURE — 71045 X-RAY EXAM CHEST 1 VIEW: CPT

## 2019-01-01 PROCEDURE — HHS10554 SHAMPOO/BODY WASH 8 OZ ALOE VESTA

## 2019-01-01 PROCEDURE — 96375 TX/PRO/DX INJ NEW DRUG ADDON: CPT

## 2019-01-01 PROCEDURE — 99285 EMERGENCY DEPT VISIT HI MDM: CPT

## 2019-01-01 PROCEDURE — 74011000258 HC RX REV CODE- 258: Performed by: FAMILY MEDICINE

## 2019-01-01 PROCEDURE — 83605 ASSAY OF LACTIC ACID: CPT

## 2019-01-01 PROCEDURE — 96374 THER/PROPH/DIAG INJ IV PUSH: CPT

## 2019-01-01 PROCEDURE — 94761 N-INVAS EAR/PLS OXIMETRY MLT: CPT

## 2019-01-01 PROCEDURE — 74011000250 HC RX REV CODE- 250: Performed by: FAMILY MEDICINE

## 2019-01-01 PROCEDURE — 82570 ASSAY OF URINE CREATININE: CPT

## 2019-01-01 PROCEDURE — 97162 PT EVAL MOD COMPLEX 30 MIN: CPT

## 2019-01-01 PROCEDURE — 97164 PT RE-EVAL EST PLAN CARE: CPT

## 2019-01-01 PROCEDURE — 74011250636 HC RX REV CODE- 250/636

## 2019-01-01 PROCEDURE — G0157 HHC PT ASSISTANT EA 15: HCPCS

## 2019-01-01 PROCEDURE — 99218 HC RM OBSERVATION: CPT

## 2019-01-01 PROCEDURE — 36558 INSERT TUNNELED CV CATH: CPT

## 2019-01-01 PROCEDURE — 30233N1 TRANSFUSION OF NONAUTOLOGOUS RED BLOOD CELLS INTO PERIPHERAL VEIN, PERCUTANEOUS APPROACH: ICD-10-PCS | Performed by: INTERNAL MEDICINE

## 2019-01-01 PROCEDURE — 86705 HEP B CORE ANTIBODY IGM: CPT

## 2019-01-01 PROCEDURE — A6250 SKIN SEAL PROTECT MOISTURIZR: HCPCS

## 2019-01-01 PROCEDURE — 3336500001 HSPC ELECTION

## 2019-01-01 PROCEDURE — 77030011256 HC DRSG MEPILEX <16IN NO BORD MOLN -A

## 2019-01-01 PROCEDURE — 51798 US URINE CAPACITY MEASURE: CPT

## 2019-01-01 PROCEDURE — 84443 ASSAY THYROID STIM HORMONE: CPT

## 2019-01-01 PROCEDURE — 86706 HEP B SURFACE ANTIBODY: CPT

## 2019-01-01 PROCEDURE — A9270 NON-COVERED ITEM OR SERVICE: HCPCS

## 2019-01-01 PROCEDURE — 3E02340 INTRODUCTION OF INFLUENZA VACCINE INTO MUSCLE, PERCUTANEOUS APPROACH: ICD-10-PCS | Performed by: INTERNAL MEDICINE

## 2019-01-01 PROCEDURE — 83935 ASSAY OF URINE OSMOLALITY: CPT

## 2019-01-01 PROCEDURE — 65610000006 HC RM INTENSIVE CARE

## 2019-01-01 PROCEDURE — 97167 OT EVAL HIGH COMPLEX 60 MIN: CPT

## 2019-01-01 PROCEDURE — 83930 ASSAY OF BLOOD OSMOLALITY: CPT

## 2019-01-01 PROCEDURE — 76450000000

## 2019-01-01 PROCEDURE — 94660 CPAP INITIATION&MGMT: CPT

## 2019-01-01 PROCEDURE — 02HV33Z INSERTION OF INFUSION DEVICE INTO SUPERIOR VENA CAVA, PERCUTANEOUS APPROACH: ICD-10-PCS | Performed by: SURGERY

## 2019-01-01 PROCEDURE — 80069 RENAL FUNCTION PANEL: CPT

## 2019-01-01 PROCEDURE — 82728 ASSAY OF FERRITIN: CPT

## 2019-01-01 PROCEDURE — 86900 BLOOD TYPING SEROLOGIC ABO: CPT

## 2019-01-01 PROCEDURE — 93306 TTE W/DOPPLER COMPLETE: CPT

## 2019-01-01 PROCEDURE — 74011000258 HC RX REV CODE- 258: Performed by: INTERNAL MEDICINE

## 2019-01-01 PROCEDURE — 81003 URINALYSIS AUTO W/O SCOPE: CPT

## 2019-01-01 PROCEDURE — 76770 US EXAM ABDO BACK WALL COMP: CPT

## 2019-01-01 PROCEDURE — 5A09357 ASSISTANCE WITH RESPIRATORY VENTILATION, LESS THAN 24 CONSECUTIVE HOURS, CONTINUOUS POSITIVE AIRWAY PRESSURE: ICD-10-PCS | Performed by: INTERNAL MEDICINE

## 2019-01-01 PROCEDURE — G0155 HHCP-SVS OF CSW,EA 15 MIN: HCPCS

## 2019-01-01 PROCEDURE — G0299 HHS/HOSPICE OF RN EA 15 MIN: HCPCS

## 2019-01-01 PROCEDURE — 3331090004 HSPC SERVICE INTENSITY ADD-ON

## 2019-01-01 PROCEDURE — 74011000250 HC RX REV CODE- 250

## 2019-01-01 PROCEDURE — 0JH63XZ INSERTION OF TUNNELED VASCULAR ACCESS DEVICE INTO CHEST SUBCUTANEOUS TISSUE AND FASCIA, PERCUTANEOUS APPROACH: ICD-10-PCS | Performed by: SURGERY

## 2019-01-01 PROCEDURE — 86704 HEP B CORE ANTIBODY TOTAL: CPT

## 2019-01-01 PROCEDURE — 400013 HH SOC

## 2019-01-01 PROCEDURE — 77030035694 HC MSK BIPAP FLL FAC PERFMAX PHIL -B

## 2019-01-01 PROCEDURE — 65270000029 HC RM PRIVATE

## 2019-01-01 PROCEDURE — 97116 GAIT TRAINING THERAPY: CPT

## 2019-01-01 PROCEDURE — 74011000250 HC RX REV CODE- 250: Performed by: HOSPITALIST

## 2019-01-01 PROCEDURE — 3331090003 HH PPS REVENUE ADJ

## 2019-01-01 PROCEDURE — 74011000250 HC RX REV CODE- 250: Performed by: SURGERY

## 2019-01-01 PROCEDURE — 86923 COMPATIBILITY TEST ELECTRIC: CPT

## 2019-01-01 PROCEDURE — 0651 HSPC ROUTINE HOME CARE

## 2019-01-01 PROCEDURE — 74011250637 HC RX REV CODE- 250/637: Performed by: EMERGENCY MEDICINE

## 2019-01-01 PROCEDURE — 97166 OT EVAL MOD COMPLEX 45 MIN: CPT

## 2019-01-01 PROCEDURE — 36600 WITHDRAWAL OF ARTERIAL BLOOD: CPT

## 2019-01-01 PROCEDURE — T4541 LARGE DISPOSABLE UNDERPAD: HCPCS

## 2019-01-01 PROCEDURE — P9016 RBC LEUKOCYTES REDUCED: HCPCS

## 2019-01-01 PROCEDURE — A4927 NON-STERILE GLOVES: HCPCS

## 2019-01-01 PROCEDURE — G0151 HHCP-SERV OF PT,EA 15 MIN: HCPCS

## 2019-01-01 PROCEDURE — 36430 TRANSFUSION BLD/BLD COMPNT: CPT

## 2019-01-01 PROCEDURE — 90471 IMMUNIZATION ADMIN: CPT

## 2019-01-01 PROCEDURE — 87106 FUNGI IDENTIFICATION YEAST: CPT

## 2019-01-01 PROCEDURE — 99152 MOD SED SAME PHYS/QHP 5/>YRS: CPT

## 2019-01-01 PROCEDURE — 94762 N-INVAS EAR/PLS OXIMTRY CONT: CPT

## 2019-01-01 PROCEDURE — 5A09357 ASSISTANCE WITH RESPIRATORY VENTILATION, LESS THAN 24 CONSECUTIVE HOURS, CONTINUOUS POSITIVE AIRWAY PRESSURE: ICD-10-PCS | Performed by: HOSPITALIST

## 2019-01-01 PROCEDURE — 96361 HYDRATE IV INFUSION ADD-ON: CPT

## 2019-01-01 PROCEDURE — 74011000258 HC RX REV CODE- 258: Performed by: HOSPITALIST

## 2019-01-01 PROCEDURE — 93970 EXTREMITY STUDY: CPT

## 2019-01-01 PROCEDURE — 74011250636 HC RX REV CODE- 250/636: Performed by: SURGERY

## 2019-01-01 PROCEDURE — T4522 ADULT SIZE BRIEF/DIAPER MED: HCPCS

## 2019-01-01 PROCEDURE — 5A1D70Z PERFORMANCE OF URINARY FILTRATION, INTERMITTENT, LESS THAN 6 HOURS PER DAY: ICD-10-PCS | Performed by: HOSPITALIST

## 2019-01-01 PROCEDURE — 87517 HEPATITIS B DNA QUANT: CPT

## 2019-01-01 RX ORDER — HEPARIN SODIUM 1000 [USP'U]/ML
10000 INJECTION, SOLUTION INTRAVENOUS; SUBCUTANEOUS
Status: COMPLETED | OUTPATIENT
Start: 2019-01-01 | End: 2019-01-01

## 2019-01-01 RX ORDER — ASPIRIN 81 MG/1
81 TABLET ORAL DAILY
Qty: 30 TAB | Refills: 0 | Status: SHIPPED | OUTPATIENT
Start: 2019-01-01 | End: 2019-01-01

## 2019-01-01 RX ORDER — AMIODARONE HYDROCHLORIDE 200 MG/1
400 TABLET ORAL DAILY
Status: DISCONTINUED | OUTPATIENT
Start: 2019-01-01 | End: 2019-01-01 | Stop reason: HOSPADM

## 2019-01-01 RX ORDER — SODIUM CHLORIDE 9 MG/ML
25 INJECTION, SOLUTION INTRAVENOUS CONTINUOUS
Status: DISCONTINUED | OUTPATIENT
Start: 2019-01-01 | End: 2019-01-01 | Stop reason: ALTCHOICE

## 2019-01-01 RX ORDER — SODIUM CHLORIDE 9 MG/ML
250 INJECTION, SOLUTION INTRAVENOUS AS NEEDED
Status: DISCONTINUED | OUTPATIENT
Start: 2019-01-01 | End: 2019-01-01 | Stop reason: HOSPADM

## 2019-01-01 RX ORDER — FLUCONAZOLE 50 MG/1
50 TABLET ORAL DAILY
Qty: 7 TAB | Refills: 0 | Status: ON HOLD
Start: 2019-01-01 | End: 2019-01-01

## 2019-01-01 RX ORDER — FUROSEMIDE 10 MG/ML
20 INJECTION INTRAMUSCULAR; INTRAVENOUS
Status: DISCONTINUED | OUTPATIENT
Start: 2019-01-01 | End: 2019-01-01

## 2019-01-01 RX ORDER — HEPARIN SODIUM 1000 [USP'U]/ML
60 INJECTION, SOLUTION INTRAVENOUS; SUBCUTANEOUS ONCE
Status: COMPLETED | OUTPATIENT
Start: 2019-01-01 | End: 2019-01-01

## 2019-01-01 RX ORDER — GUAIFENESIN 600 MG/1
600 TABLET, EXTENDED RELEASE ORAL EVERY 12 HOURS
Status: DISCONTINUED | OUTPATIENT
Start: 2019-01-01 | End: 2019-01-01 | Stop reason: HOSPADM

## 2019-01-01 RX ORDER — HEPARIN SODIUM 1000 [USP'U]/ML
80 INJECTION, SOLUTION INTRAVENOUS; SUBCUTANEOUS
Status: COMPLETED | OUTPATIENT
Start: 2019-01-01 | End: 2019-01-01

## 2019-01-01 RX ORDER — NYSTATIN 100000 [USP'U]/G
POWDER TOPICAL 3 TIMES DAILY
Status: DISCONTINUED | OUTPATIENT
Start: 2019-01-01 | End: 2019-01-01

## 2019-01-01 RX ORDER — ATORVASTATIN CALCIUM 20 MG/1
40 TABLET, FILM COATED ORAL DAILY
Status: DISCONTINUED | OUTPATIENT
Start: 2019-01-01 | End: 2019-01-01

## 2019-01-01 RX ORDER — WARFARIN 2 MG/1
2 TABLET ORAL DAILY
Qty: 10 TAB | Refills: 0 | Status: SHIPPED
Start: 2019-01-01 | End: 2019-01-01

## 2019-01-01 RX ORDER — PAROXETINE HYDROCHLORIDE 20 MG/1
20 TABLET, FILM COATED ORAL DAILY
Status: DISCONTINUED | OUTPATIENT
Start: 2019-01-01 | End: 2019-01-01 | Stop reason: HOSPADM

## 2019-01-01 RX ORDER — METOPROLOL TARTRATE 25 MG/1
25 TABLET, FILM COATED ORAL EVERY 12 HOURS
Status: DISCONTINUED | OUTPATIENT
Start: 2019-01-01 | End: 2019-01-01

## 2019-01-01 RX ORDER — SODIUM CHLORIDE 9 MG/ML
100 INJECTION, SOLUTION INTRAVENOUS
Status: DISCONTINUED | OUTPATIENT
Start: 2019-01-01 | End: 2019-01-01 | Stop reason: HOSPADM

## 2019-01-01 RX ORDER — MORPHINE SULFATE 100 MG/5ML
10 SOLUTION ORAL
Status: DISCONTINUED | OUTPATIENT
Start: 2019-01-01 | End: 2019-01-01 | Stop reason: HOSPADM

## 2019-01-01 RX ORDER — SODIUM CHLORIDE 0.9 % (FLUSH) 0.9 %
5-40 SYRINGE (ML) INJECTION AS NEEDED
Status: DISCONTINUED | OUTPATIENT
Start: 2019-01-01 | End: 2019-01-01 | Stop reason: HOSPADM

## 2019-01-01 RX ORDER — IPRATROPIUM BROMIDE 0.5 MG/2.5ML
SOLUTION RESPIRATORY (INHALATION)
Status: COMPLETED
Start: 2019-01-01 | End: 2019-01-01

## 2019-01-01 RX ORDER — AMIODARONE HYDROCHLORIDE 200 MG/1
400 TABLET ORAL EVERY 12 HOURS
Status: DISCONTINUED | OUTPATIENT
Start: 2019-01-01 | End: 2019-01-01

## 2019-01-01 RX ORDER — PANTOPRAZOLE SODIUM 40 MG/1
40 TABLET, DELAYED RELEASE ORAL EVERY EVENING
Qty: 10 TAB | Refills: 0 | Status: SHIPPED
Start: 2019-01-01 | End: 2019-01-01

## 2019-01-01 RX ORDER — FUROSEMIDE 10 MG/ML
40 INJECTION INTRAMUSCULAR; INTRAVENOUS
Status: DISCONTINUED | OUTPATIENT
Start: 2019-01-01 | End: 2019-01-01

## 2019-01-01 RX ORDER — WARFARIN 2 MG/1
2 TABLET ORAL DAILY
Qty: 10 TAB | Refills: 0 | Status: ON HOLD
Start: 2019-01-01 | End: 2019-01-01 | Stop reason: SDUPTHER

## 2019-01-01 RX ORDER — IPRATROPIUM BROMIDE 0.5 MG/2.5ML
0.5 SOLUTION RESPIRATORY (INHALATION)
Status: DISCONTINUED | OUTPATIENT
Start: 2019-01-01 | End: 2019-01-01 | Stop reason: HOSPADM

## 2019-01-01 RX ORDER — FUROSEMIDE 10 MG/ML
60 INJECTION INTRAMUSCULAR; INTRAVENOUS ONCE
Status: COMPLETED | OUTPATIENT
Start: 2019-01-01 | End: 2019-01-01

## 2019-01-01 RX ORDER — IPRATROPIUM BROMIDE AND ALBUTEROL SULFATE 2.5; .5 MG/3ML; MG/3ML
3 SOLUTION RESPIRATORY (INHALATION)
Status: COMPLETED | OUTPATIENT
Start: 2019-01-01 | End: 2019-01-01

## 2019-01-01 RX ORDER — WARFARIN 3 MG/1
3 TABLET ORAL ONCE
Status: COMPLETED | OUTPATIENT
Start: 2019-01-01 | End: 2019-01-01

## 2019-01-01 RX ORDER — CARVEDILOL 3.12 MG/1
6.25 TABLET ORAL 2 TIMES DAILY WITH MEALS
Status: DISCONTINUED | OUTPATIENT
Start: 2019-01-01 | End: 2019-01-01 | Stop reason: DRUGHIGH

## 2019-01-01 RX ORDER — ATORVASTATIN CALCIUM 20 MG/1
40 TABLET, FILM COATED ORAL DAILY
Status: DISCONTINUED | OUTPATIENT
Start: 2019-01-01 | End: 2019-01-01 | Stop reason: HOSPADM

## 2019-01-01 RX ORDER — NYSTATIN 100000 [USP'U]/ML
500000 SUSPENSION ORAL 4 TIMES DAILY
Status: DISCONTINUED | OUTPATIENT
Start: 2019-01-01 | End: 2019-01-01

## 2019-01-01 RX ORDER — ASPIRIN 325 MG
325 TABLET ORAL
Status: COMPLETED | OUTPATIENT
Start: 2019-01-01 | End: 2019-01-01

## 2019-01-01 RX ORDER — AMIODARONE HYDROCHLORIDE 400 MG/1
400 TABLET ORAL DAILY
Qty: 10 TAB | Refills: 0 | Status: SHIPPED
Start: 2019-01-01 | End: 2019-01-01

## 2019-01-01 RX ORDER — MIDODRINE HYDROCHLORIDE 2.5 MG/1
2.5 TABLET ORAL
Status: DISCONTINUED | OUTPATIENT
Start: 2019-01-01 | End: 2019-01-01

## 2019-01-01 RX ORDER — DIGOXIN 0.25 MG/ML
250 INJECTION INTRAMUSCULAR; INTRAVENOUS
Status: COMPLETED | OUTPATIENT
Start: 2019-01-01 | End: 2019-01-01

## 2019-01-01 RX ORDER — HEPARIN SODIUM 1000 [USP'U]/ML
INJECTION, SOLUTION INTRAVENOUS; SUBCUTANEOUS
Status: COMPLETED
Start: 2019-01-01 | End: 2019-01-01

## 2019-01-01 RX ORDER — WARFARIN 1 MG/1
2 TABLET ORAL ONCE
Status: COMPLETED | OUTPATIENT
Start: 2019-01-01 | End: 2019-01-01

## 2019-01-01 RX ORDER — POTASSIUM CHLORIDE 750 MG/1
10 TABLET, EXTENDED RELEASE ORAL
Status: COMPLETED | OUTPATIENT
Start: 2019-01-01 | End: 2019-01-01

## 2019-01-01 RX ORDER — METOPROLOL TARTRATE 5 MG/5ML
5 INJECTION INTRAVENOUS ONCE
Status: COMPLETED | OUTPATIENT
Start: 2019-01-01 | End: 2019-01-01

## 2019-01-01 RX ORDER — ERGOCALCIFEROL 1.25 MG/1
50000 CAPSULE ORAL
Status: DISCONTINUED | OUTPATIENT
Start: 2019-01-01 | End: 2019-01-01 | Stop reason: HOSPADM

## 2019-01-01 RX ORDER — ASPIRIN 325 MG
325 TABLET ORAL DAILY
Status: DISCONTINUED | OUTPATIENT
Start: 2019-01-01 | End: 2019-01-01 | Stop reason: HOSPADM

## 2019-01-01 RX ORDER — LORAZEPAM 2 MG/ML
1 CONCENTRATE ORAL
Qty: 30 ML | Refills: 0 | Status: SHIPPED | OUTPATIENT
Start: 2019-01-01

## 2019-01-01 RX ORDER — SODIUM,POTASSIUM PHOSPHATES 280-250MG
1 POWDER IN PACKET (EA) ORAL 2 TIMES DAILY
Status: DISCONTINUED | OUTPATIENT
Start: 2019-01-01 | End: 2019-01-01 | Stop reason: HOSPADM

## 2019-01-01 RX ORDER — ASPIRIN 81 MG/1
81 TABLET ORAL DAILY
Status: DISCONTINUED | OUTPATIENT
Start: 2019-01-01 | End: 2019-01-01

## 2019-01-01 RX ORDER — NALOXONE HYDROCHLORIDE 0.4 MG/ML
0.4 INJECTION, SOLUTION INTRAMUSCULAR; INTRAVENOUS; SUBCUTANEOUS AS NEEDED
Status: DISCONTINUED | OUTPATIENT
Start: 2019-01-01 | End: 2019-01-01 | Stop reason: ALTCHOICE

## 2019-01-01 RX ORDER — PHYTONADIONE 10 MG/ML
2.5 INJECTION, EMULSION INTRAMUSCULAR; INTRAVENOUS; SUBCUTANEOUS
Status: COMPLETED | OUTPATIENT
Start: 2019-01-01 | End: 2019-01-01

## 2019-01-01 RX ORDER — METOPROLOL TARTRATE 25 MG/1
12.5 TABLET, FILM COATED ORAL EVERY 12 HOURS
Status: DISCONTINUED | OUTPATIENT
Start: 2019-01-01 | End: 2019-01-01

## 2019-01-01 RX ORDER — DILTIAZEM HYDROCHLORIDE 5 MG/ML
0.25 INJECTION INTRAVENOUS ONCE
Status: ACTIVE | OUTPATIENT
Start: 2019-01-01 | End: 2019-01-01

## 2019-01-01 RX ORDER — DILTIAZEM HYDROCHLORIDE 5 MG/ML
0.25 INJECTION INTRAVENOUS ONCE
Status: COMPLETED | OUTPATIENT
Start: 2019-01-01 | End: 2019-01-01

## 2019-01-01 RX ORDER — HEPARIN SODIUM 200 [USP'U]/100ML
INJECTION, SOLUTION INTRAVENOUS
Status: DISCONTINUED
Start: 2019-01-01 | End: 2019-01-01

## 2019-01-01 RX ORDER — DILTIAZEM HYDROCHLORIDE 60 MG/1
60 TABLET, FILM COATED ORAL
Status: DISCONTINUED | OUTPATIENT
Start: 2019-01-01 | End: 2019-01-01 | Stop reason: HOSPADM

## 2019-01-01 RX ORDER — SPIRONOLACTONE 25 MG/1
25 TABLET ORAL DAILY
Status: DISCONTINUED | OUTPATIENT
Start: 2019-01-01 | End: 2019-01-01

## 2019-01-01 RX ORDER — FUROSEMIDE 10 MG/ML
40 INJECTION INTRAMUSCULAR; INTRAVENOUS EVERY 12 HOURS
Status: DISCONTINUED | OUTPATIENT
Start: 2019-01-01 | End: 2019-01-01

## 2019-01-01 RX ORDER — ASPIRIN 325 MG
50000 TABLET, DELAYED RELEASE (ENTERIC COATED) ORAL
Status: ON HOLD | COMMUNITY
End: 2019-01-01

## 2019-01-01 RX ORDER — CEFTRIAXONE 1 G/1
1 INJECTION, POWDER, FOR SOLUTION INTRAMUSCULAR; INTRAVENOUS
Status: COMPLETED | OUTPATIENT
Start: 2019-01-01 | End: 2019-01-01

## 2019-01-01 RX ORDER — NYSTATIN 100000 [USP'U]/ML
500000 SUSPENSION ORAL 4 TIMES DAILY
Qty: 100 ML | Refills: 0 | Status: SHIPPED
Start: 2019-01-01 | End: 2019-01-01

## 2019-01-01 RX ORDER — MIDODRINE HYDROCHLORIDE 2.5 MG/1
2.5 TABLET ORAL
Qty: 90 TAB | Refills: 0 | Status: SHIPPED
Start: 2019-01-01 | End: 2019-01-01

## 2019-01-01 RX ORDER — HEPARIN SODIUM 1000 [USP'U]/ML
40 INJECTION, SOLUTION INTRAVENOUS; SUBCUTANEOUS ONCE
Status: COMPLETED | OUTPATIENT
Start: 2019-01-01 | End: 2019-01-01

## 2019-01-01 RX ORDER — HEPARIN SODIUM 1000 [USP'U]/ML
4300 INJECTION, SOLUTION INTRAVENOUS; SUBCUTANEOUS
Status: DISCONTINUED | OUTPATIENT
Start: 2019-01-01 | End: 2019-01-01 | Stop reason: HOSPADM

## 2019-01-01 RX ORDER — SPIRONOLACTONE 25 MG/1
25 TABLET ORAL DAILY
COMMUNITY
End: 2019-01-01

## 2019-01-01 RX ORDER — AMIODARONE HYDROCHLORIDE 150 MG/3ML
150 INJECTION, SOLUTION INTRAVENOUS
Status: DISCONTINUED | OUTPATIENT
Start: 2019-01-01 | End: 2019-01-01

## 2019-01-01 RX ORDER — PROPOFOL 10 MG/ML
INJECTION, EMULSION INTRAVENOUS
Status: DISCONTINUED
Start: 2019-01-01 | End: 2019-01-01

## 2019-01-01 RX ORDER — ZINC SULFATE 50(220)MG
1 CAPSULE ORAL DAILY
Status: DISCONTINUED | OUTPATIENT
Start: 2019-01-01 | End: 2019-01-01 | Stop reason: HOSPADM

## 2019-01-01 RX ORDER — HEPARIN SODIUM 1000 [USP'U]/ML
3000 INJECTION, SOLUTION INTRAVENOUS; SUBCUTANEOUS ONCE
Status: COMPLETED | OUTPATIENT
Start: 2019-01-01 | End: 2019-01-01

## 2019-01-01 RX ORDER — HEPARIN SODIUM 200 [USP'U]/100ML
500 INJECTION, SOLUTION INTRAVENOUS
Status: COMPLETED | OUTPATIENT
Start: 2019-01-01 | End: 2019-01-01

## 2019-01-01 RX ORDER — NYSTATIN 100000 [USP'U]/ML
500000 SUSPENSION ORAL 4 TIMES DAILY
Status: DISCONTINUED | OUTPATIENT
Start: 2019-01-01 | End: 2019-01-01 | Stop reason: HOSPADM

## 2019-01-01 RX ORDER — FUROSEMIDE 40 MG/1
40 TABLET ORAL 2 TIMES DAILY
Status: DISCONTINUED | OUTPATIENT
Start: 2019-01-01 | End: 2019-01-01 | Stop reason: HOSPADM

## 2019-01-01 RX ORDER — ACETAMINOPHEN 325 MG/1
650 TABLET ORAL
Status: DISCONTINUED | OUTPATIENT
Start: 2019-01-01 | End: 2019-01-01 | Stop reason: HOSPADM

## 2019-01-01 RX ORDER — ASPIRIN 81 MG/1
81 TABLET ORAL DAILY
Status: DISCONTINUED | OUTPATIENT
Start: 2019-01-01 | End: 2019-01-01 | Stop reason: HOSPADM

## 2019-01-01 RX ORDER — MIDODRINE HYDROCHLORIDE 2.5 MG/1
2.5 TABLET ORAL
Status: DISCONTINUED | OUTPATIENT
Start: 2019-01-01 | End: 2019-01-01 | Stop reason: HOSPADM

## 2019-01-01 RX ORDER — DILTIAZEM HYDROCHLORIDE 30 MG/1
30 TABLET, FILM COATED ORAL
Qty: 10 TAB | Refills: 0 | Status: SHIPPED
Start: 2019-01-01 | End: 2019-01-01

## 2019-01-01 RX ORDER — FLUCONAZOLE 100 MG/1
50 TABLET ORAL DAILY
Status: DISCONTINUED | OUTPATIENT
Start: 2019-01-01 | End: 2019-01-01 | Stop reason: HOSPADM

## 2019-01-01 RX ORDER — IPRATROPIUM BROMIDE AND ALBUTEROL SULFATE 2.5; .5 MG/3ML; MG/3ML
3 SOLUTION RESPIRATORY (INHALATION)
Status: DISCONTINUED | OUTPATIENT
Start: 2019-01-01 | End: 2019-01-01 | Stop reason: HOSPADM

## 2019-01-01 RX ORDER — POLYETHYLENE GLYCOL 3350 17 G/17G
17 POWDER, FOR SOLUTION ORAL 2 TIMES DAILY
Status: DISCONTINUED | OUTPATIENT
Start: 2019-01-01 | End: 2019-01-01

## 2019-01-01 RX ORDER — AMIODARONE HYDROCHLORIDE 400 MG/1
400 TABLET ORAL DAILY
Qty: 3 TAB | Refills: 0 | Status: SHIPPED
Start: 2019-01-01 | End: 2019-01-01

## 2019-01-01 RX ORDER — DILTIAZEM HYDROCHLORIDE 30 MG/1
30 TABLET, FILM COATED ORAL
Status: DISCONTINUED | OUTPATIENT
Start: 2019-01-01 | End: 2019-01-01

## 2019-01-01 RX ORDER — CEPHALEXIN 250 MG/1
250 CAPSULE ORAL EVERY 8 HOURS
Status: DISCONTINUED | OUTPATIENT
Start: 2019-01-01 | End: 2019-01-01 | Stop reason: DRUGHIGH

## 2019-01-01 RX ORDER — FUROSEMIDE 10 MG/ML
20 INJECTION INTRAMUSCULAR; INTRAVENOUS 2 TIMES DAILY
Status: DISCONTINUED | OUTPATIENT
Start: 2019-01-01 | End: 2019-01-01

## 2019-01-01 RX ORDER — ONDANSETRON 2 MG/ML
4 INJECTION INTRAMUSCULAR; INTRAVENOUS
Status: DISCONTINUED | OUTPATIENT
Start: 2019-01-01 | End: 2019-01-01 | Stop reason: HOSPADM

## 2019-01-01 RX ORDER — ERGOCALCIFEROL 1.25 MG/1
50000 CAPSULE ORAL
COMMUNITY
End: 2019-01-01

## 2019-01-01 RX ORDER — DIGOXIN 0.25 MG/ML
125 INJECTION INTRAMUSCULAR; INTRAVENOUS
Status: COMPLETED | OUTPATIENT
Start: 2019-01-01 | End: 2019-01-01

## 2019-01-01 RX ORDER — MORPHINE SULFATE 100 MG/5ML
10 SOLUTION ORAL
Qty: 30 ML | Refills: 0 | Status: SHIPPED | OUTPATIENT
Start: 2019-01-01 | End: 2020-01-01

## 2019-01-01 RX ORDER — PANTOPRAZOLE SODIUM 40 MG/1
40 TABLET, DELAYED RELEASE ORAL
Status: DISCONTINUED | OUTPATIENT
Start: 2019-01-01 | End: 2019-01-01 | Stop reason: HOSPADM

## 2019-01-01 RX ORDER — CARVEDILOL 12.5 MG/1
12.5 TABLET ORAL 2 TIMES DAILY WITH MEALS
Status: DISCONTINUED | OUTPATIENT
Start: 2019-01-01 | End: 2019-01-01

## 2019-01-01 RX ORDER — DILTIAZEM HYDROCHLORIDE 30 MG/1
30 TABLET, FILM COATED ORAL
Status: DISCONTINUED | OUTPATIENT
Start: 2019-01-01 | End: 2019-01-01 | Stop reason: HOSPADM

## 2019-01-01 RX ORDER — DIPHENHYDRAMINE HYDROCHLORIDE 50 MG/ML
25-50 INJECTION, SOLUTION INTRAMUSCULAR; INTRAVENOUS ONCE
Status: COMPLETED | OUTPATIENT
Start: 2019-01-01 | End: 2019-01-01

## 2019-01-01 RX ORDER — DILTIAZEM HYDROCHLORIDE 60 MG/1
60 TABLET, FILM COATED ORAL
Status: DISCONTINUED | OUTPATIENT
Start: 2019-01-01 | End: 2019-01-01

## 2019-01-01 RX ORDER — HEPARIN SODIUM 10000 [USP'U]/100ML
12-25 INJECTION, SOLUTION INTRAVENOUS
Status: DISCONTINUED | OUTPATIENT
Start: 2019-01-01 | End: 2019-01-01

## 2019-01-01 RX ORDER — PAROXETINE HYDROCHLORIDE 20 MG/1
20 TABLET, FILM COATED ORAL DAILY
COMMUNITY
End: 2019-01-01

## 2019-01-01 RX ORDER — FUROSEMIDE 10 MG/ML
40 INJECTION INTRAMUSCULAR; INTRAVENOUS ONCE
Status: COMPLETED | OUTPATIENT
Start: 2019-01-01 | End: 2019-01-01

## 2019-01-01 RX ORDER — AMIODARONE HYDROCHLORIDE 200 MG/1
200 TABLET ORAL DAILY
Status: DISCONTINUED | OUTPATIENT
Start: 2019-01-01 | End: 2019-01-01 | Stop reason: HOSPADM

## 2019-01-01 RX ORDER — METOPROLOL TARTRATE 50 MG/1
100 TABLET ORAL EVERY 12 HOURS
Status: DISCONTINUED | OUTPATIENT
Start: 2019-01-01 | End: 2019-01-01

## 2019-01-01 RX ORDER — ALBUMIN HUMAN 250 G/1000ML
25 SOLUTION INTRAVENOUS
Status: DISCONTINUED | OUTPATIENT
Start: 2019-01-01 | End: 2019-01-01 | Stop reason: HOSPADM

## 2019-01-01 RX ORDER — CARVEDILOL 12.5 MG/1
12.5 TABLET ORAL 2 TIMES DAILY WITH MEALS
COMMUNITY
End: 2019-01-01

## 2019-01-01 RX ORDER — ERGOCALCIFEROL 1.25 MG/1
50000 CAPSULE ORAL
Status: DISCONTINUED | OUTPATIENT
Start: 2019-01-01 | End: 2019-01-01

## 2019-01-01 RX ORDER — MIDAZOLAM HYDROCHLORIDE 1 MG/ML
.5-4 INJECTION, SOLUTION INTRAMUSCULAR; INTRAVENOUS
Status: DISCONTINUED | OUTPATIENT
Start: 2019-01-01 | End: 2019-01-01 | Stop reason: ALTCHOICE

## 2019-01-01 RX ORDER — ATORVASTATIN CALCIUM 40 MG/1
40 TABLET, FILM COATED ORAL DAILY
Qty: 30 TAB | Refills: 0 | Status: SHIPPED | OUTPATIENT
Start: 2019-01-01 | End: 2019-01-01

## 2019-01-01 RX ORDER — WARFARIN SODIUM 5 MG/1
5 TABLET ORAL ONCE
Status: COMPLETED | OUTPATIENT
Start: 2019-01-01 | End: 2019-01-01

## 2019-01-01 RX ORDER — PANTOPRAZOLE SODIUM 40 MG/10ML
40 INJECTION, POWDER, LYOPHILIZED, FOR SOLUTION INTRAVENOUS EVERY 24 HOURS
Status: DISCONTINUED | OUTPATIENT
Start: 2019-01-01 | End: 2019-01-01 | Stop reason: ALTCHOICE

## 2019-01-01 RX ORDER — GUAIFENESIN/DEXTROMETHORPHAN 100-10MG/5
5 SYRUP ORAL
Status: DISCONTINUED | OUTPATIENT
Start: 2019-01-01 | End: 2019-01-01 | Stop reason: HOSPADM

## 2019-01-01 RX ORDER — PANTOPRAZOLE SODIUM 40 MG/1
40 TABLET, DELAYED RELEASE ORAL EVERY EVENING
Status: DISCONTINUED | OUTPATIENT
Start: 2019-01-01 | End: 2019-01-01 | Stop reason: HOSPADM

## 2019-01-01 RX ORDER — ALBUMIN HUMAN 250 G/1000ML
12.5 SOLUTION INTRAVENOUS ONCE
Status: COMPLETED | OUTPATIENT
Start: 2019-01-01 | End: 2019-01-01

## 2019-01-01 RX ORDER — HEPARIN SODIUM 1000 [USP'U]/ML
INJECTION, SOLUTION INTRAVENOUS; SUBCUTANEOUS
Status: DISCONTINUED
Start: 2019-01-01 | End: 2019-01-01

## 2019-01-01 RX ORDER — HEPARIN SODIUM 10000 [USP'U]/100ML
18-36 INJECTION, SOLUTION INTRAVENOUS
Status: DISCONTINUED | OUTPATIENT
Start: 2019-01-01 | End: 2019-01-01

## 2019-01-01 RX ORDER — DIGOXIN 125 MCG
0.12 TABLET ORAL DAILY
Status: DISCONTINUED | OUTPATIENT
Start: 2019-01-01 | End: 2019-01-01

## 2019-01-01 RX ORDER — ALPRAZOLAM 0.5 MG/1
0.25 TABLET ORAL ONCE
Status: COMPLETED | OUTPATIENT
Start: 2019-01-01 | End: 2019-01-01

## 2019-01-01 RX ORDER — NYSTATIN 100000 [USP'U]/ML
1 SUSPENSION ORAL 4 TIMES DAILY
Qty: 140 ML | Refills: 0 | Status: SHIPPED
Start: 2019-01-01 | End: 2019-01-01

## 2019-01-01 RX ORDER — CEPHALEXIN 250 MG/1
500 CAPSULE ORAL 3 TIMES DAILY
Status: DISCONTINUED | OUTPATIENT
Start: 2019-01-01 | End: 2019-01-01 | Stop reason: DRUGHIGH

## 2019-01-01 RX ORDER — DOXYCYCLINE 100 MG/1
100 CAPSULE ORAL 2 TIMES DAILY
Qty: 10 CAP | Refills: 0 | Status: SHIPPED
Start: 2019-01-01 | End: 2019-01-01

## 2019-01-01 RX ORDER — ALBUMIN HUMAN 250 G/1000ML
25 SOLUTION INTRAVENOUS ONCE
Status: COMPLETED | OUTPATIENT
Start: 2019-01-01 | End: 2019-01-01

## 2019-01-01 RX ORDER — MAG HYDROX/ALUMINUM HYD/SIMETH 200-200-20
30 SUSPENSION, ORAL (FINAL DOSE FORM) ORAL
Status: ON HOLD | COMMUNITY
End: 2019-01-01

## 2019-01-01 RX ORDER — METOPROLOL TARTRATE 50 MG/1
50 TABLET ORAL EVERY 12 HOURS
Status: DISCONTINUED | OUTPATIENT
Start: 2019-01-01 | End: 2019-01-01

## 2019-01-01 RX ORDER — NALOXONE HYDROCHLORIDE 0.4 MG/ML
0.4 INJECTION, SOLUTION INTRAMUSCULAR; INTRAVENOUS; SUBCUTANEOUS AS NEEDED
Status: DISCONTINUED | OUTPATIENT
Start: 2019-01-01 | End: 2019-01-01 | Stop reason: HOSPADM

## 2019-01-01 RX ORDER — AMIODARONE HYDROCHLORIDE 200 MG/1
400 TABLET ORAL DAILY
Status: DISCONTINUED | OUTPATIENT
Start: 2019-01-01 | End: 2019-01-01

## 2019-01-01 RX ORDER — FLUCONAZOLE 100 MG/1
50 TABLET ORAL DAILY
Status: DISCONTINUED | OUTPATIENT
Start: 2019-01-01 | End: 2019-01-01

## 2019-01-01 RX ORDER — FENTANYL CITRATE 50 UG/ML
INJECTION, SOLUTION INTRAMUSCULAR; INTRAVENOUS
Status: COMPLETED
Start: 2019-01-01 | End: 2019-01-01

## 2019-01-01 RX ORDER — CEPHALEXIN 250 MG/1
250 CAPSULE ORAL EVERY 12 HOURS
Status: DISCONTINUED | OUTPATIENT
Start: 2019-01-01 | End: 2019-01-01

## 2019-01-01 RX ORDER — FENTANYL CITRATE 50 UG/ML
25-200 INJECTION, SOLUTION INTRAMUSCULAR; INTRAVENOUS
Status: DISCONTINUED | OUTPATIENT
Start: 2019-01-01 | End: 2019-01-01 | Stop reason: ALTCHOICE

## 2019-01-01 RX ORDER — MORPHINE SULFATE 2 MG/ML
2 INJECTION, SOLUTION INTRAMUSCULAR; INTRAVENOUS
Status: DISCONTINUED | OUTPATIENT
Start: 2019-01-01 | End: 2019-01-01 | Stop reason: HOSPADM

## 2019-01-01 RX ORDER — LORAZEPAM 2 MG/ML
1 CONCENTRATE ORAL
Status: DISCONTINUED | OUTPATIENT
Start: 2019-01-01 | End: 2019-01-01 | Stop reason: HOSPADM

## 2019-01-01 RX ORDER — POTASSIUM CHLORIDE 20 MEQ/1
40 TABLET, EXTENDED RELEASE ORAL
Status: COMPLETED | OUTPATIENT
Start: 2019-01-01 | End: 2019-01-01

## 2019-01-01 RX ORDER — METOPROLOL TARTRATE 5 MG/5ML
2.5 INJECTION INTRAVENOUS ONCE
Status: COMPLETED | OUTPATIENT
Start: 2019-01-01 | End: 2019-01-01

## 2019-01-01 RX ORDER — NYSTATIN 100000 [USP'U]/G
POWDER TOPICAL 3 TIMES DAILY
Status: DISCONTINUED | OUTPATIENT
Start: 2019-01-01 | End: 2019-01-01 | Stop reason: HOSPADM

## 2019-01-01 RX ORDER — DOXYCYCLINE 100 MG/1
100 CAPSULE ORAL 2 TIMES DAILY
Status: DISPENSED | OUTPATIENT
Start: 2019-01-01 | End: 2019-01-01

## 2019-01-01 RX ORDER — METOPROLOL TARTRATE 5 MG/5ML
INJECTION INTRAVENOUS
Status: COMPLETED
Start: 2019-01-01 | End: 2019-01-01

## 2019-01-01 RX ORDER — SODIUM CHLORIDE 9 MG/ML
100 INJECTION, SOLUTION INTRAVENOUS CONTINUOUS
Status: DISCONTINUED | OUTPATIENT
Start: 2019-01-01 | End: 2019-01-01

## 2019-01-01 RX ORDER — SODIUM,POTASSIUM PHOSPHATES 280-250MG
1 POWDER IN PACKET (EA) ORAL 2 TIMES DAILY
Qty: 4 PACKET | Refills: 0 | Status: SHIPPED
Start: 2019-01-01 | End: 2019-01-01

## 2019-01-01 RX ORDER — HYDROCODONE BITARTRATE AND ACETAMINOPHEN 5; 325 MG/1; MG/1
1 TABLET ORAL
Status: DISCONTINUED | OUTPATIENT
Start: 2019-01-01 | End: 2019-01-01 | Stop reason: HOSPADM

## 2019-01-01 RX ORDER — IPRATROPIUM BROMIDE 0.5 MG/2.5ML
0.5 SOLUTION RESPIRATORY (INHALATION) 3 TIMES DAILY
Status: DISCONTINUED | OUTPATIENT
Start: 2019-01-01 | End: 2019-01-01

## 2019-01-01 RX ORDER — NYSTATIN 100000 [USP'U]/G
POWDER TOPICAL 3 TIMES DAILY
Qty: 1 BOTTLE | Refills: 0 | Status: SHIPPED
Start: 2019-01-01 | End: 2019-01-01

## 2019-01-01 RX ORDER — WARFARIN 1 MG/1
1 TABLET ORAL DAILY
COMMUNITY
End: 2019-01-01

## 2019-01-01 RX ORDER — AMIODARONE HYDROCHLORIDE 200 MG/1
200 TABLET ORAL DAILY
Qty: 10 TAB | Refills: 0 | Status: SHIPPED
Start: 2019-01-01 | End: 2019-01-01

## 2019-01-01 RX ORDER — LIDOCAINE HYDROCHLORIDE 10 MG/ML
1-20 INJECTION INFILTRATION; PERINEURAL
Status: COMPLETED | OUTPATIENT
Start: 2019-01-01 | End: 2019-01-01

## 2019-01-01 RX ORDER — ONDANSETRON 2 MG/ML
INJECTION INTRAMUSCULAR; INTRAVENOUS
Status: DISPENSED
Start: 2019-01-01 | End: 2019-01-01

## 2019-01-01 RX ORDER — HYDROCODONE BITARTRATE AND ACETAMINOPHEN 5; 325 MG/1; MG/1
1 TABLET ORAL
Qty: 10 TAB | Refills: 0 | Status: SHIPPED | OUTPATIENT
Start: 2019-01-01 | End: 2019-01-01

## 2019-01-01 RX ORDER — SODIUM CHLORIDE 0.9 % (FLUSH) 0.9 %
5-40 SYRINGE (ML) INJECTION EVERY 8 HOURS
Status: DISCONTINUED | OUTPATIENT
Start: 2019-01-01 | End: 2019-01-01 | Stop reason: HOSPADM

## 2019-01-01 RX ORDER — AMIODARONE HYDROCHLORIDE 200 MG/1
400 TABLET ORAL EVERY 8 HOURS
Status: DISCONTINUED | OUTPATIENT
Start: 2019-01-01 | End: 2019-01-01

## 2019-01-01 RX ORDER — MAGNESIUM SULFATE 1 G/100ML
1 INJECTION INTRAVENOUS ONCE
Status: COMPLETED | OUTPATIENT
Start: 2019-01-01 | End: 2019-01-01

## 2019-01-01 RX ORDER — WARFARIN 1 MG/1
1 TABLET ORAL DAILY
Status: DISCONTINUED | OUTPATIENT
Start: 2019-01-01 | End: 2019-01-01

## 2019-01-01 RX ORDER — DIPHENHYDRAMINE HYDROCHLORIDE 50 MG/ML
INJECTION, SOLUTION INTRAMUSCULAR; INTRAVENOUS
Status: COMPLETED
Start: 2019-01-01 | End: 2019-01-01

## 2019-01-01 RX ORDER — SODIUM CHLORIDE 0.9 % (FLUSH) 0.9 %
5-10 SYRINGE (ML) INJECTION AS NEEDED
Status: DISCONTINUED | OUTPATIENT
Start: 2019-01-01 | End: 2019-01-01 | Stop reason: HOSPADM

## 2019-01-01 RX ORDER — POLYETHYLENE GLYCOL 3350 17 G/17G
17 POWDER, FOR SOLUTION ORAL DAILY
Status: DISCONTINUED | OUTPATIENT
Start: 2019-01-01 | End: 2019-01-01 | Stop reason: HOSPADM

## 2019-01-01 RX ORDER — PHYTONADIONE 10 MG/ML
10 INJECTION, EMULSION INTRAMUSCULAR; INTRAVENOUS; SUBCUTANEOUS ONCE
Status: COMPLETED | OUTPATIENT
Start: 2019-01-01 | End: 2019-01-01

## 2019-01-01 RX ORDER — FUROSEMIDE 40 MG/1
40 TABLET ORAL EVERY OTHER DAY
COMMUNITY
End: 2019-01-01

## 2019-01-01 RX ORDER — CLINDAMYCIN PHOSPHATE 900 MG/50ML
900 INJECTION, SOLUTION INTRAVENOUS
Status: DISCONTINUED | OUTPATIENT
Start: 2019-01-01 | End: 2019-01-01

## 2019-01-01 RX ORDER — PANTOPRAZOLE SODIUM 40 MG/1
40 TABLET, DELAYED RELEASE ORAL EVERY EVENING
Status: DISCONTINUED | OUTPATIENT
Start: 2019-01-01 | End: 2019-01-01

## 2019-01-01 RX ORDER — DILTIAZEM HYDROCHLORIDE 60 MG/1
60 TABLET, FILM COATED ORAL
Qty: 10 TAB | Refills: 0 | Status: SHIPPED
Start: 2019-01-01 | End: 2019-01-01

## 2019-01-01 RX ORDER — MIDODRINE HYDROCHLORIDE 2.5 MG/1
10 TABLET ORAL ONCE
Status: COMPLETED | OUTPATIENT
Start: 2019-01-01 | End: 2019-01-01

## 2019-01-01 RX ORDER — FUROSEMIDE 10 MG/ML
40 INJECTION INTRAMUSCULAR; INTRAVENOUS
Status: COMPLETED | OUTPATIENT
Start: 2019-01-01 | End: 2019-01-01

## 2019-01-01 RX ORDER — PAROXETINE HYDROCHLORIDE 20 MG/1
20 TABLET, FILM COATED ORAL DAILY
Status: DISCONTINUED | OUTPATIENT
Start: 2019-01-01 | End: 2019-01-01

## 2019-01-01 RX ORDER — FUROSEMIDE 10 MG/ML
40 INJECTION INTRAMUSCULAR; INTRAVENOUS 2 TIMES DAILY
Status: DISCONTINUED | OUTPATIENT
Start: 2019-01-01 | End: 2019-01-01

## 2019-01-01 RX ORDER — CARVEDILOL 12.5 MG/1
12.5 TABLET ORAL 2 TIMES DAILY WITH MEALS
Status: DISCONTINUED | OUTPATIENT
Start: 2019-01-01 | End: 2019-01-01 | Stop reason: HOSPADM

## 2019-01-01 RX ORDER — FLUMAZENIL 0.1 MG/ML
0.2 INJECTION INTRAVENOUS
Status: DISCONTINUED | OUTPATIENT
Start: 2019-01-01 | End: 2019-01-01 | Stop reason: ALTCHOICE

## 2019-01-01 RX ORDER — NALOXONE HYDROCHLORIDE 0.4 MG/ML
INJECTION, SOLUTION INTRAMUSCULAR; INTRAVENOUS; SUBCUTANEOUS
Status: DISCONTINUED
Start: 2019-01-01 | End: 2019-01-01 | Stop reason: WASHOUT

## 2019-01-01 RX ADMIN — METOPROLOL TARTRATE 50 MG: 50 TABLET ORAL at 08:50

## 2019-01-01 RX ADMIN — PAROXETINE HYDROCHLORIDE 20 MG: 20 TABLET, FILM COATED ORAL at 08:14

## 2019-01-01 RX ADMIN — NYSTATIN 500000 UNITS: 100000 SUSPENSION ORAL at 17:55

## 2019-01-01 RX ADMIN — METOPROLOL TARTRATE 5 MG: 5 INJECTION INTRAVENOUS at 12:15

## 2019-01-01 RX ADMIN — NYSTATIN 500000 UNITS: 100000 SUSPENSION ORAL at 09:30

## 2019-01-01 RX ADMIN — POTASSIUM CHLORIDE 40 MEQ: 1500 TABLET, EXTENDED RELEASE ORAL at 14:12

## 2019-01-01 RX ADMIN — DIPHENHYDRAMINE HYDROCHLORIDE AND LIDOCAINE HYDROCHLORIDE AND ALUMINUM HYDROXIDE AND MAGNESIUM HYDRO 5 ML: KIT at 14:40

## 2019-01-01 RX ADMIN — ASPIRIN 325 MG ORAL TABLET 325 MG: 325 PILL ORAL at 08:14

## 2019-01-01 RX ADMIN — AMIODARONE HYDROCHLORIDE 400 MG: 200 TABLET ORAL at 08:34

## 2019-01-01 RX ADMIN — PAROXETINE 20 MG: 20 TABLET, FILM COATED ORAL at 09:33

## 2019-01-01 RX ADMIN — CEFTRIAXONE 1 G: 1 INJECTION, POWDER, FOR SOLUTION INTRAMUSCULAR; INTRAVENOUS at 21:01

## 2019-01-01 RX ADMIN — NYSTATIN: 100000 POWDER TOPICAL at 15:15

## 2019-01-01 RX ADMIN — DILTIAZEM HYDROCHLORIDE 30 MG: 30 TABLET, FILM COATED ORAL at 16:19

## 2019-01-01 RX ADMIN — PAROXETINE 20 MG: 20 TABLET, FILM COATED ORAL at 08:33

## 2019-01-01 RX ADMIN — DOXYCYCLINE 100 MG: 100 CAPSULE ORAL at 22:02

## 2019-01-01 RX ADMIN — METOPROLOL TARTRATE 12.5 MG: 25 TABLET ORAL at 09:48

## 2019-01-01 RX ADMIN — DILTIAZEM HYDROCHLORIDE 60 MG: 60 TABLET, FILM COATED ORAL at 07:30

## 2019-01-01 RX ADMIN — MIDODRINE HYDROCHLORIDE 2.5 MG: 2.5 TABLET ORAL at 12:10

## 2019-01-01 RX ADMIN — DILTIAZEM HYDROCHLORIDE 30 MG: 30 TABLET, FILM COATED ORAL at 15:13

## 2019-01-01 RX ADMIN — IPRATROPIUM BROMIDE AND ALBUTEROL SULFATE 3 ML: .5; 3 SOLUTION RESPIRATORY (INHALATION) at 22:46

## 2019-01-01 RX ADMIN — DILTIAZEM HYDROCHLORIDE 60 MG: 60 TABLET, FILM COATED ORAL at 11:27

## 2019-01-01 RX ADMIN — PAROXETINE HYDROCHLORIDE 20 MG: 20 TABLET, FILM COATED ORAL at 08:36

## 2019-01-01 RX ADMIN — HEPARIN SODIUM 11 UNITS/KG/HR: 10000 INJECTION, SOLUTION INTRAVENOUS at 09:48

## 2019-01-01 RX ADMIN — Medication 81 MG: at 08:53

## 2019-01-01 RX ADMIN — NYSTATIN 500000 UNITS: 100000 SUSPENSION ORAL at 22:01

## 2019-01-01 RX ADMIN — WARFARIN SODIUM 1 MG: 1 TABLET ORAL at 08:42

## 2019-01-01 RX ADMIN — SODIUM CHLORIDE 500 ML: 900 INJECTION, SOLUTION INTRAVENOUS at 14:21

## 2019-01-01 RX ADMIN — Medication 81 MG: at 10:54

## 2019-01-01 RX ADMIN — ZINC SULFATE 220 MG (50 MG) CAPSULE 1 CAPSULE: CAPSULE at 09:45

## 2019-01-01 RX ADMIN — DILTIAZEM HYDROCHLORIDE 60 MG: 60 TABLET, FILM COATED ORAL at 17:31

## 2019-01-01 RX ADMIN — DIPHENHYDRAMINE HYDROCHLORIDE AND LIDOCAINE HYDROCHLORIDE AND ALUMINUM HYDROXIDE AND MAGNESIUM HYDRO 5 ML: KIT at 16:24

## 2019-01-01 RX ADMIN — IPRATROPIUM BROMIDE 0.5 MG: 0.5 SOLUTION RESPIRATORY (INHALATION) at 07:44

## 2019-01-01 RX ADMIN — DILTIAZEM HYDROCHLORIDE 30 MG: 30 TABLET, FILM COATED ORAL at 07:01

## 2019-01-01 RX ADMIN — Medication 81 MG: at 09:05

## 2019-01-01 RX ADMIN — PAROXETINE 20 MG: 20 TABLET, FILM COATED ORAL at 10:55

## 2019-01-01 RX ADMIN — HEPARIN SODIUM 12 UNITS/KG/HR: 10000 INJECTION, SOLUTION INTRAVENOUS at 06:48

## 2019-01-01 RX ADMIN — FUROSEMIDE 40 MG: 10 INJECTION, SOLUTION INTRAMUSCULAR; INTRAVENOUS at 08:54

## 2019-01-01 RX ADMIN — MIDODRINE HYDROCHLORIDE 2.5 MG: 2.5 TABLET ORAL at 11:27

## 2019-01-01 RX ADMIN — NYSTATIN 500000 UNITS: 100000 SUSPENSION ORAL at 21:46

## 2019-01-01 RX ADMIN — CEFTRIAXONE 1 G: 1 INJECTION, POWDER, FOR SOLUTION INTRAMUSCULAR; INTRAVENOUS at 21:40

## 2019-01-01 RX ADMIN — Medication 81 MG: at 08:28

## 2019-01-01 RX ADMIN — Medication 10 ML: at 14:25

## 2019-01-01 RX ADMIN — FUROSEMIDE 40 MG: 40 TABLET ORAL at 10:05

## 2019-01-01 RX ADMIN — AMIODARONE HYDROCHLORIDE 400 MG: 200 TABLET ORAL at 10:01

## 2019-01-01 RX ADMIN — ATORVASTATIN CALCIUM 40 MG: 20 TABLET, FILM COATED ORAL at 09:48

## 2019-01-01 RX ADMIN — POLYETHYLENE GLYCOL 3350 17 G: 17 POWDER, FOR SOLUTION ORAL at 10:00

## 2019-01-01 RX ADMIN — WARFARIN SODIUM 5 MG: 5 TABLET ORAL at 18:09

## 2019-01-01 RX ADMIN — MIDODRINE HYDROCHLORIDE 2.5 MG: 2.5 TABLET ORAL at 17:30

## 2019-01-01 RX ADMIN — PANTOPRAZOLE SODIUM 40 MG: 40 TABLET, DELAYED RELEASE ORAL at 18:08

## 2019-01-01 RX ADMIN — DILTIAZEM HYDROCHLORIDE 30 MG: 30 TABLET, FILM COATED ORAL at 18:06

## 2019-01-01 RX ADMIN — NYSTATIN 500000 UNITS: 100000 SUSPENSION ORAL at 08:53

## 2019-01-01 RX ADMIN — HEPARIN SODIUM 2680 UNITS: 1000 INJECTION INTRAVENOUS; SUBCUTANEOUS at 09:47

## 2019-01-01 RX ADMIN — DIPHENHYDRAMINE HYDROCHLORIDE AND LIDOCAINE HYDROCHLORIDE AND ALUMINUM HYDROXIDE AND MAGNESIUM HYDRO 5 ML: KIT at 14:10

## 2019-01-01 RX ADMIN — HEPARIN SODIUM 4300 UNITS: 1000 INJECTION INTRAVENOUS; SUBCUTANEOUS at 18:30

## 2019-01-01 RX ADMIN — Medication 81 MG: at 08:52

## 2019-01-01 RX ADMIN — DOXYCYCLINE 100 MG: 100 CAPSULE ORAL at 10:53

## 2019-01-01 RX ADMIN — DIPHENHYDRAMINE HYDROCHLORIDE AND LIDOCAINE HYDROCHLORIDE AND ALUMINUM HYDROXIDE AND MAGNESIUM HYDRO 5 ML: KIT at 16:30

## 2019-01-01 RX ADMIN — CEFTRIAXONE 1 G: 1 INJECTION, POWDER, FOR SOLUTION INTRAMUSCULAR; INTRAVENOUS at 22:21

## 2019-01-01 RX ADMIN — BENZOCAINE AND MENTHOL 1 LOZENGE: 15; 3.6 LOZENGE ORAL at 22:29

## 2019-01-01 RX ADMIN — CEPHALEXIN 250 MG: 250 CAPSULE ORAL at 17:56

## 2019-01-01 RX ADMIN — NYSTATIN 500000 UNITS: 100000 SUSPENSION ORAL at 22:33

## 2019-01-01 RX ADMIN — SODIUM CHLORIDE 40 MG: 9 INJECTION, SOLUTION INTRAMUSCULAR; INTRAVENOUS; SUBCUTANEOUS at 09:05

## 2019-01-01 RX ADMIN — DOXYCYCLINE 100 MG: 100 INJECTION, POWDER, LYOPHILIZED, FOR SOLUTION INTRAVENOUS at 20:30

## 2019-01-01 RX ADMIN — AMIODARONE HYDROCHLORIDE 0.5 MG/MIN: 1.8 INJECTION, SOLUTION INTRAVENOUS at 10:35

## 2019-01-01 RX ADMIN — DILTIAZEM HYDROCHLORIDE 60 MG: 60 TABLET, FILM COATED ORAL at 06:36

## 2019-01-01 RX ADMIN — POLYETHYLENE GLYCOL 3350 17 G: 17 POWDER, FOR SOLUTION ORAL at 10:24

## 2019-01-01 RX ADMIN — ATORVASTATIN CALCIUM 40 MG: 20 TABLET, FILM COATED ORAL at 08:42

## 2019-01-01 RX ADMIN — DIPHENHYDRAMINE HYDROCHLORIDE AND LIDOCAINE HYDROCHLORIDE AND ALUMINUM HYDROXIDE AND MAGNESIUM HYDRO 5 ML: KIT at 09:50

## 2019-01-01 RX ADMIN — PAROXETINE 20 MG: 20 TABLET, FILM COATED ORAL at 09:48

## 2019-01-01 RX ADMIN — AMIODARONE HYDROCHLORIDE 400 MG: 200 TABLET ORAL at 08:28

## 2019-01-01 RX ADMIN — PHYTONADIONE 10 MG: 10 INJECTION, EMULSION INTRAMUSCULAR; INTRAVENOUS; SUBCUTANEOUS at 08:15

## 2019-01-01 RX ADMIN — ATORVASTATIN CALCIUM 40 MG: 20 TABLET, FILM COATED ORAL at 08:36

## 2019-01-01 RX ADMIN — Medication 81 MG: at 08:54

## 2019-01-01 RX ADMIN — ATORVASTATIN CALCIUM 40 MG: 20 TABLET, FILM COATED ORAL at 15:10

## 2019-01-01 RX ADMIN — SODIUM CHLORIDE 40 MG: 9 INJECTION, SOLUTION INTRAMUSCULAR; INTRAVENOUS; SUBCUTANEOUS at 08:36

## 2019-01-01 RX ADMIN — POLYETHYLENE GLYCOL 3350 17 G: 17 POWDER, FOR SOLUTION ORAL at 21:01

## 2019-01-01 RX ADMIN — IPRATROPIUM BROMIDE 0.5 MG: 0.5 SOLUTION RESPIRATORY (INHALATION) at 20:15

## 2019-01-01 RX ADMIN — ATORVASTATIN CALCIUM 40 MG: 20 TABLET, FILM COATED ORAL at 10:54

## 2019-01-01 RX ADMIN — Medication 81 MG: at 09:48

## 2019-01-01 RX ADMIN — METOPROLOL TARTRATE 100 MG: 50 TABLET ORAL at 10:00

## 2019-01-01 RX ADMIN — WARFARIN SODIUM 2 MG: 1 TABLET ORAL at 18:06

## 2019-01-01 RX ADMIN — DILTIAZEM HYDROCHLORIDE 30 MG: 30 TABLET, FILM COATED ORAL at 12:12

## 2019-01-01 RX ADMIN — ATORVASTATIN CALCIUM 40 MG: 20 TABLET, FILM COATED ORAL at 10:05

## 2019-01-01 RX ADMIN — WARFARIN SODIUM 4 MG: 3 TABLET ORAL at 17:09

## 2019-01-01 RX ADMIN — DILTIAZEM HYDROCHLORIDE 30 MG: 30 TABLET, FILM COATED ORAL at 11:06

## 2019-01-01 RX ADMIN — NYSTATIN 500000 UNITS: 100000 SUSPENSION ORAL at 14:40

## 2019-01-01 RX ADMIN — NYSTATIN 500000 UNITS: 100000 SUSPENSION ORAL at 08:29

## 2019-01-01 RX ADMIN — GUAIFENESIN AND DEXTROMETHORPHAN 5 ML: 100; 10 SYRUP ORAL at 21:04

## 2019-01-01 RX ADMIN — DILTIAZEM HYDROCHLORIDE 30 MG: 30 TABLET, FILM COATED ORAL at 17:13

## 2019-01-01 RX ADMIN — Medication 10 ML: at 22:07

## 2019-01-01 RX ADMIN — ATORVASTATIN CALCIUM 40 MG: 20 TABLET, FILM COATED ORAL at 08:52

## 2019-01-01 RX ADMIN — Medication 10 ML: at 06:00

## 2019-01-01 RX ADMIN — HEPARIN SODIUM 8000 UNITS: 1000 INJECTION INTRAVENOUS; SUBCUTANEOUS at 11:32

## 2019-01-01 RX ADMIN — PAROXETINE 20 MG: 20 TABLET, FILM COATED ORAL at 10:00

## 2019-01-01 RX ADMIN — PAROXETINE 20 MG: 20 TABLET, FILM COATED ORAL at 09:30

## 2019-01-01 RX ADMIN — PAROXETINE 20 MG: 20 TABLET, FILM COATED ORAL at 08:28

## 2019-01-01 RX ADMIN — IRON SUCROSE 200 MG: 20 INJECTION, SOLUTION INTRAVENOUS at 13:56

## 2019-01-01 RX ADMIN — DILTIAZEM HYDROCHLORIDE 30 MG: 30 TABLET, FILM COATED ORAL at 12:22

## 2019-01-01 RX ADMIN — FUROSEMIDE 20 MG: 10 INJECTION, SOLUTION INTRAMUSCULAR; INTRAVENOUS at 10:00

## 2019-01-01 RX ADMIN — Medication 81 MG: at 10:23

## 2019-01-01 RX ADMIN — NYSTATIN: 100000 POWDER TOPICAL at 16:00

## 2019-01-01 RX ADMIN — PANTOPRAZOLE SODIUM 40 MG: 40 TABLET, DELAYED RELEASE ORAL at 17:33

## 2019-01-01 RX ADMIN — AMIODARONE HYDROCHLORIDE 400 MG: 200 TABLET ORAL at 22:19

## 2019-01-01 RX ADMIN — DIPHENHYDRAMINE HYDROCHLORIDE 25 MG: 50 INJECTION, SOLUTION INTRAMUSCULAR; INTRAVENOUS at 11:14

## 2019-01-01 RX ADMIN — IRON SUCROSE 200 MG: 20 INJECTION, SOLUTION INTRAVENOUS at 14:58

## 2019-01-01 RX ADMIN — ZINC SULFATE 220 MG (50 MG) CAPSULE 1 CAPSULE: CAPSULE at 10:00

## 2019-01-01 RX ADMIN — PAROXETINE 20 MG: 20 TABLET, FILM COATED ORAL at 08:12

## 2019-01-01 RX ADMIN — ERGOCALCIFEROL 50000 UNITS: 1.25 CAPSULE ORAL at 09:00

## 2019-01-01 RX ADMIN — NYSTATIN: 100000 POWDER TOPICAL at 09:00

## 2019-01-01 RX ADMIN — NYSTATIN: 100000 POWDER TOPICAL at 11:24

## 2019-01-01 RX ADMIN — AMIODARONE HYDROCHLORIDE 400 MG: 200 TABLET ORAL at 15:14

## 2019-01-01 RX ADMIN — HEPARIN SODIUM 4300 UNITS: 1000 INJECTION INTRAVENOUS; SUBCUTANEOUS at 14:13

## 2019-01-01 RX ADMIN — DIPHENHYDRAMINE HYDROCHLORIDE AND LIDOCAINE HYDROCHLORIDE AND ALUMINUM HYDROXIDE AND MAGNESIUM HYDRO 5 ML: KIT at 12:15

## 2019-01-01 RX ADMIN — AZITHROMYCIN MONOHYDRATE 500 MG: 500 INJECTION, POWDER, LYOPHILIZED, FOR SOLUTION INTRAVENOUS at 08:00

## 2019-01-01 RX ADMIN — Medication 10 ML: at 21:52

## 2019-01-01 RX ADMIN — AMIODARONE HYDROCHLORIDE 400 MG: 200 TABLET ORAL at 19:10

## 2019-01-01 RX ADMIN — ZINC SULFATE 220 MG (50 MG) CAPSULE 1 CAPSULE: CAPSULE at 09:00

## 2019-01-01 RX ADMIN — NYSTATIN 500000 UNITS: 100000 SUSPENSION ORAL at 08:42

## 2019-01-01 RX ADMIN — NYSTATIN 500000 UNITS: 100000 SUSPENSION ORAL at 22:46

## 2019-01-01 RX ADMIN — GUAIFENESIN AND DEXTROMETHORPHAN 5 ML: 100; 10 SYRUP ORAL at 01:23

## 2019-01-01 RX ADMIN — AMIODARONE HYDROCHLORIDE 400 MG: 200 TABLET ORAL at 06:19

## 2019-01-01 RX ADMIN — IRON SUCROSE 100 MG: 20 INJECTION, SOLUTION INTRAVENOUS at 16:17

## 2019-01-01 RX ADMIN — POLYETHYLENE GLYCOL 3350 17 G: 17 POWDER, FOR SOLUTION ORAL at 10:55

## 2019-01-01 RX ADMIN — AMIODARONE HYDROCHLORIDE 0.5 MG/MIN: 1.8 INJECTION, SOLUTION INTRAVENOUS at 00:39

## 2019-01-01 RX ADMIN — WARFARIN SODIUM 4 MG: 3 TABLET ORAL at 17:23

## 2019-01-01 RX ADMIN — DIPHENHYDRAMINE HYDROCHLORIDE AND LIDOCAINE HYDROCHLORIDE AND ALUMINUM HYDROXIDE AND MAGNESIUM HYDRO 5 ML: KIT at 06:45

## 2019-01-01 RX ADMIN — Medication 81 MG: at 10:25

## 2019-01-01 RX ADMIN — NYSTATIN: 100000 POWDER TOPICAL at 09:20

## 2019-01-01 RX ADMIN — AMIODARONE HYDROCHLORIDE 400 MG: 200 TABLET ORAL at 21:39

## 2019-01-01 RX ADMIN — DILTIAZEM HYDROCHLORIDE 30 MG: 30 TABLET, FILM COATED ORAL at 07:09

## 2019-01-01 RX ADMIN — FUROSEMIDE 40 MG: 10 INJECTION, SOLUTION INTRAMUSCULAR; INTRAVENOUS at 08:14

## 2019-01-01 RX ADMIN — MAGNESIUM SULFATE HEPTAHYDRATE 1 G: 1 INJECTION, SOLUTION INTRAVENOUS at 19:06

## 2019-01-01 RX ADMIN — ZINC SULFATE 220 MG (50 MG) CAPSULE 1 CAPSULE: CAPSULE at 09:30

## 2019-01-01 RX ADMIN — IPRATROPIUM BROMIDE AND ALBUTEROL SULFATE 3 ML: .5; 3 SOLUTION RESPIRATORY (INHALATION) at 01:41

## 2019-01-01 RX ADMIN — POTASSIUM & SODIUM PHOSPHATES POWDER PACK 280-160-250 MG 1 PACKET: 280-160-250 PACK at 20:33

## 2019-01-01 RX ADMIN — NYSTATIN 500000 UNITS: 100000 SUSPENSION ORAL at 17:33

## 2019-01-01 RX ADMIN — ATORVASTATIN CALCIUM 40 MG: 20 TABLET, FILM COATED ORAL at 09:45

## 2019-01-01 RX ADMIN — AMIODARONE HYDROCHLORIDE 200 MG: 200 TABLET ORAL at 10:25

## 2019-01-01 RX ADMIN — SPIRONOLACTONE 25 MG: 25 TABLET ORAL at 10:05

## 2019-01-01 RX ADMIN — DIPHENHYDRAMINE HYDROCHLORIDE AND LIDOCAINE HYDROCHLORIDE AND ALUMINUM HYDROXIDE AND MAGNESIUM HYDRO 5 ML: KIT at 23:04

## 2019-01-01 RX ADMIN — AMIODARONE HYDROCHLORIDE 400 MG: 200 TABLET ORAL at 21:47

## 2019-01-01 RX ADMIN — DIPHENHYDRAMINE HYDROCHLORIDE AND LIDOCAINE HYDROCHLORIDE AND ALUMINUM HYDROXIDE AND MAGNESIUM HYDRO 5 ML: KIT at 17:09

## 2019-01-01 RX ADMIN — POTASSIUM CHLORIDE 40 MEQ: 1500 TABLET, EXTENDED RELEASE ORAL at 10:58

## 2019-01-01 RX ADMIN — Medication 81 MG: at 08:33

## 2019-01-01 RX ADMIN — NYSTATIN: 100000 POWDER TOPICAL at 17:12

## 2019-01-01 RX ADMIN — IPRATROPIUM BROMIDE 0.5 MG: 0.5 SOLUTION RESPIRATORY (INHALATION) at 08:59

## 2019-01-01 RX ADMIN — DILTIAZEM HYDROCHLORIDE 30 MG: 30 TABLET, FILM COATED ORAL at 13:06

## 2019-01-01 RX ADMIN — CEFTRIAXONE 1 G: 1 INJECTION, POWDER, FOR SOLUTION INTRAMUSCULAR; INTRAVENOUS at 18:44

## 2019-01-01 RX ADMIN — DIGOXIN 250 MCG: 0.25 INJECTION INTRAMUSCULAR; INTRAVENOUS at 03:55

## 2019-01-01 RX ADMIN — IPRATROPIUM BROMIDE AND ALBUTEROL SULFATE 3 ML: .5; 3 SOLUTION RESPIRATORY (INHALATION) at 21:12

## 2019-01-01 RX ADMIN — ERGOCALCIFEROL 50000 UNITS: 1.25 CAPSULE ORAL at 12:22

## 2019-01-01 RX ADMIN — EPOETIN ALFA-EPBX 10000 UNITS: 10000 INJECTION, SOLUTION INTRAVENOUS; SUBCUTANEOUS at 10:05

## 2019-01-01 RX ADMIN — Medication 81 MG: at 08:49

## 2019-01-01 RX ADMIN — NYSTATIN 500000 UNITS: 100000 SUSPENSION ORAL at 14:12

## 2019-01-01 RX ADMIN — ATORVASTATIN CALCIUM 40 MG: 20 TABLET, FILM COATED ORAL at 10:25

## 2019-01-01 RX ADMIN — AMIODARONE HYDROCHLORIDE 0.5 MG/MIN: 1.8 INJECTION, SOLUTION INTRAVENOUS at 22:33

## 2019-01-01 RX ADMIN — IPRATROPIUM BROMIDE 0.5 MG: 0.5 SOLUTION RESPIRATORY (INHALATION) at 13:31

## 2019-01-01 RX ADMIN — CEFTRIAXONE 1 G: 1 INJECTION, POWDER, FOR SOLUTION INTRAMUSCULAR; INTRAVENOUS at 17:12

## 2019-01-01 RX ADMIN — DILTIAZEM HYDROCHLORIDE 30 MG: 30 TABLET, FILM COATED ORAL at 12:00

## 2019-01-01 RX ADMIN — AMIODARONE HYDROCHLORIDE 200 MG: 200 TABLET ORAL at 09:05

## 2019-01-01 RX ADMIN — Medication 81 MG: at 08:42

## 2019-01-01 RX ADMIN — Medication 10 ML: at 15:31

## 2019-01-01 RX ADMIN — NYSTATIN 500000 UNITS: 100000 SUSPENSION ORAL at 18:13

## 2019-01-01 RX ADMIN — DILTIAZEM HYDROCHLORIDE 2.5 MG/HR: 5 INJECTION INTRAVENOUS at 07:04

## 2019-01-01 RX ADMIN — AMIODARONE HYDROCHLORIDE 0.5 MG/MIN: 1.8 INJECTION, SOLUTION INTRAVENOUS at 05:59

## 2019-01-01 RX ADMIN — INFLUENZA VIRUS VACCINE 0.5 ML: 15; 15; 15; 15 SUSPENSION INTRAMUSCULAR at 15:37

## 2019-01-01 RX ADMIN — NYSTATIN 500000 UNITS: 100000 SUSPENSION ORAL at 22:22

## 2019-01-01 RX ADMIN — PHYTONADIONE 10 MG: 10 INJECTION, EMULSION INTRAMUSCULAR; INTRAVENOUS; SUBCUTANEOUS at 06:10

## 2019-01-01 RX ADMIN — FUROSEMIDE 40 MG: 10 INJECTION, SOLUTION INTRAMUSCULAR; INTRAVENOUS at 23:10

## 2019-01-01 RX ADMIN — Medication 81 MG: at 09:00

## 2019-01-01 RX ADMIN — ASPIRIN 325 MG ORAL TABLET 325 MG: 325 PILL ORAL at 14:20

## 2019-01-01 RX ADMIN — IRON SUCROSE 200 MG: 20 INJECTION, SOLUTION INTRAVENOUS at 14:23

## 2019-01-01 RX ADMIN — Medication 81 MG: at 09:46

## 2019-01-01 RX ADMIN — CEFTRIAXONE 2 G: 2 INJECTION, POWDER, FOR SOLUTION INTRAMUSCULAR; INTRAVENOUS at 08:00

## 2019-01-01 RX ADMIN — FUROSEMIDE 40 MG: 40 TABLET ORAL at 21:58

## 2019-01-01 RX ADMIN — NYSTATIN 500000 UNITS: 100000 SUSPENSION ORAL at 23:04

## 2019-01-01 RX ADMIN — HEPARIN SODIUM 4300 UNITS: 1000 INJECTION INTRAVENOUS; SUBCUTANEOUS at 16:38

## 2019-01-01 RX ADMIN — ALBUMIN (HUMAN) 25 G: 0.25 INJECTION, SOLUTION INTRAVENOUS at 14:28

## 2019-01-01 RX ADMIN — Medication 10 ML: at 16:00

## 2019-01-01 RX ADMIN — WARFARIN SODIUM 3 MG: 3 TABLET ORAL at 17:55

## 2019-01-01 RX ADMIN — MORPHINE SULFATE 10 MG: 100 SOLUTION ORAL at 13:47

## 2019-01-01 RX ADMIN — DOXYCYCLINE 100 MG: 100 CAPSULE ORAL at 08:36

## 2019-01-01 RX ADMIN — PAROXETINE HYDROCHLORIDE 20 MG: 20 TABLET, FILM COATED ORAL at 08:42

## 2019-01-01 RX ADMIN — DIPHENHYDRAMINE HYDROCHLORIDE AND LIDOCAINE HYDROCHLORIDE AND ALUMINUM HYDROXIDE AND MAGNESIUM HYDRO 5 ML: KIT at 08:29

## 2019-01-01 RX ADMIN — GUAIFENESIN AND DEXTROMETHORPHAN 5 ML: 100; 10 SYRUP ORAL at 16:19

## 2019-01-01 RX ADMIN — SODIUM CHLORIDE 40 MG: 9 INJECTION, SOLUTION INTRAMUSCULAR; INTRAVENOUS; SUBCUTANEOUS at 09:33

## 2019-01-01 RX ADMIN — DOXYCYCLINE 100 MG: 100 CAPSULE ORAL at 22:16

## 2019-01-01 RX ADMIN — ACETAMINOPHEN 650 MG: 325 SOLUTION ORAL at 20:33

## 2019-01-01 RX ADMIN — METOPROLOL TARTRATE 2.5 MG: 5 INJECTION INTRAVENOUS at 16:24

## 2019-01-01 RX ADMIN — NYSTATIN: 100000 POWDER TOPICAL at 08:23

## 2019-01-01 RX ADMIN — DIPHENHYDRAMINE HYDROCHLORIDE AND LIDOCAINE HYDROCHLORIDE AND ALUMINUM HYDROXIDE AND MAGNESIUM HYDRO 5 ML: KIT at 19:01

## 2019-01-01 RX ADMIN — HEPARIN SODIUM 12 UNITS/KG/HR: 10000 INJECTION, SOLUTION INTRAVENOUS at 14:59

## 2019-01-01 RX ADMIN — DILTIAZEM HYDROCHLORIDE 30 MG: 30 TABLET, FILM COATED ORAL at 16:13

## 2019-01-01 RX ADMIN — Medication 81 MG: at 08:41

## 2019-01-01 RX ADMIN — Medication 10 ML: at 08:00

## 2019-01-01 RX ADMIN — Medication 10 ML: at 15:05

## 2019-01-01 RX ADMIN — METOPROLOL TARTRATE 12.5 MG: 25 TABLET ORAL at 10:24

## 2019-01-01 RX ADMIN — AMIODARONE HYDROCHLORIDE 1 MG/MIN: 1.8 INJECTION, SOLUTION INTRAVENOUS at 19:11

## 2019-01-01 RX ADMIN — AMIODARONE HYDROCHLORIDE 400 MG: 200 TABLET ORAL at 09:48

## 2019-01-01 RX ADMIN — AMIODARONE HYDROCHLORIDE 400 MG: 200 TABLET ORAL at 08:42

## 2019-01-01 RX ADMIN — ACETAMINOPHEN 650 MG: 325 TABLET ORAL at 23:31

## 2019-01-01 RX ADMIN — IPRATROPIUM BROMIDE 0.5 MG: 0.5 SOLUTION RESPIRATORY (INHALATION) at 19:59

## 2019-01-01 RX ADMIN — DIPHENHYDRAMINE HYDROCHLORIDE AND LIDOCAINE HYDROCHLORIDE AND ALUMINUM HYDROXIDE AND MAGNESIUM HYDRO 5 ML: KIT at 14:28

## 2019-01-01 RX ADMIN — DIPHENHYDRAMINE HYDROCHLORIDE AND LIDOCAINE HYDROCHLORIDE AND ALUMINUM HYDROXIDE AND MAGNESIUM HYDRO 5 ML: KIT at 06:42

## 2019-01-01 RX ADMIN — ALBUMIN (HUMAN) 25 G: 0.25 INJECTION, SOLUTION INTRAVENOUS at 11:36

## 2019-01-01 RX ADMIN — Medication 10 ML: at 14:10

## 2019-01-01 RX ADMIN — SODIUM CHLORIDE 40 MG: 9 INJECTION, SOLUTION INTRAMUSCULAR; INTRAVENOUS; SUBCUTANEOUS at 10:53

## 2019-01-01 RX ADMIN — PHYTONADIONE 10 MG: 10 INJECTION, EMULSION INTRAMUSCULAR; INTRAVENOUS; SUBCUTANEOUS at 18:57

## 2019-01-01 RX ADMIN — ATORVASTATIN CALCIUM 40 MG: 20 TABLET, FILM COATED ORAL at 08:49

## 2019-01-01 RX ADMIN — PAROXETINE 20 MG: 20 TABLET, FILM COATED ORAL at 08:36

## 2019-01-01 RX ADMIN — DIPHENHYDRAMINE HYDROCHLORIDE AND LIDOCAINE HYDROCHLORIDE AND ALUMINUM HYDROXIDE AND MAGNESIUM HYDRO 5 ML: KIT at 17:55

## 2019-01-01 RX ADMIN — DILTIAZEM HYDROCHLORIDE 30 MG: 30 TABLET, FILM COATED ORAL at 06:38

## 2019-01-01 RX ADMIN — NYSTATIN: 100000 POWDER TOPICAL at 22:03

## 2019-01-01 RX ADMIN — DILTIAZEM HYDROCHLORIDE 60 MG: 60 TABLET, FILM COATED ORAL at 12:10

## 2019-01-01 RX ADMIN — AMIODARONE HYDROCHLORIDE 400 MG: 200 TABLET ORAL at 06:55

## 2019-01-01 RX ADMIN — DIPHENHYDRAMINE HYDROCHLORIDE AND LIDOCAINE HYDROCHLORIDE AND ALUMINUM HYDROXIDE AND MAGNESIUM HYDRO 5 ML: KIT at 22:33

## 2019-01-01 RX ADMIN — NYSTATIN: 100000 POWDER TOPICAL at 08:52

## 2019-01-01 RX ADMIN — SODIUM CHLORIDE 250 ML: 900 INJECTION, SOLUTION INTRAVENOUS at 17:49

## 2019-01-01 RX ADMIN — PAROXETINE HYDROCHLORIDE 20 MG: 20 TABLET, FILM COATED ORAL at 08:52

## 2019-01-01 RX ADMIN — ATORVASTATIN CALCIUM 40 MG: 20 TABLET, FILM COATED ORAL at 10:53

## 2019-01-01 RX ADMIN — Medication 81 MG: at 10:53

## 2019-01-01 RX ADMIN — NYSTATIN 500000 UNITS: 100000 SUSPENSION ORAL at 12:23

## 2019-01-01 RX ADMIN — NYSTATIN 500000 UNITS: 100000 SUSPENSION ORAL at 22:02

## 2019-01-01 RX ADMIN — POTASSIUM & SODIUM PHOSPHATES POWDER PACK 280-160-250 MG 1 PACKET: 280-160-250 PACK at 17:23

## 2019-01-01 RX ADMIN — AMIODARONE HYDROCHLORIDE 400 MG: 200 TABLET ORAL at 13:10

## 2019-01-01 RX ADMIN — DILTIAZEM HYDROCHLORIDE 30 MG: 30 TABLET, FILM COATED ORAL at 14:10

## 2019-01-01 RX ADMIN — Medication 10 ML: at 14:41

## 2019-01-01 RX ADMIN — DIPHENHYDRAMINE HYDROCHLORIDE AND LIDOCAINE HYDROCHLORIDE AND ALUMINUM HYDROXIDE AND MAGNESIUM HYDRO 5 ML: KIT at 22:01

## 2019-01-01 RX ADMIN — PAROXETINE 20 MG: 20 TABLET, FILM COATED ORAL at 09:00

## 2019-01-01 RX ADMIN — DIPHENHYDRAMINE HYDROCHLORIDE AND LIDOCAINE HYDROCHLORIDE AND ALUMINUM HYDROXIDE AND MAGNESIUM HYDRO 5 ML: KIT at 12:22

## 2019-01-01 RX ADMIN — DILTIAZEM HYDROCHLORIDE 30 MG: 30 TABLET, FILM COATED ORAL at 19:01

## 2019-01-01 RX ADMIN — PHYTONADIONE 2.5 MG: 10 INJECTION, EMULSION INTRAMUSCULAR; INTRAVENOUS; SUBCUTANEOUS at 13:30

## 2019-01-01 RX ADMIN — Medication 10 ML: at 23:03

## 2019-01-01 RX ADMIN — MIDODRINE HYDROCHLORIDE 2.5 MG: 2.5 TABLET ORAL at 12:40

## 2019-01-01 RX ADMIN — AMIODARONE HYDROCHLORIDE 200 MG: 200 TABLET ORAL at 08:36

## 2019-01-01 RX ADMIN — AMIODARONE HYDROCHLORIDE 400 MG: 200 TABLET ORAL at 11:05

## 2019-01-01 RX ADMIN — VANCOMYCIN HYDROCHLORIDE 1000 MG: 1 INJECTION, POWDER, LYOPHILIZED, FOR SOLUTION INTRAVENOUS at 09:20

## 2019-01-01 RX ADMIN — Medication 81 MG: at 09:30

## 2019-01-01 RX ADMIN — PANTOPRAZOLE SODIUM 40 MG: 40 TABLET, DELAYED RELEASE ORAL at 17:59

## 2019-01-01 RX ADMIN — BENZOCAINE AND MENTHOL 1 LOZENGE: 15; 3.6 LOZENGE ORAL at 22:12

## 2019-01-01 RX ADMIN — Medication 10 ML: at 14:00

## 2019-01-01 RX ADMIN — Medication 81 MG: at 09:43

## 2019-01-01 RX ADMIN — DIPHENHYDRAMINE HYDROCHLORIDE AND LIDOCAINE HYDROCHLORIDE AND ALUMINUM HYDROXIDE AND MAGNESIUM HYDRO 5 ML: KIT at 17:24

## 2019-01-01 RX ADMIN — WARFARIN SODIUM 3 MG: 3 TABLET ORAL at 19:00

## 2019-01-01 RX ADMIN — CEFTRIAXONE 1 G: 1 INJECTION, POWDER, FOR SOLUTION INTRAMUSCULAR; INTRAVENOUS at 23:09

## 2019-01-01 RX ADMIN — PAROXETINE 20 MG: 20 TABLET, FILM COATED ORAL at 08:50

## 2019-01-01 RX ADMIN — DILTIAZEM HYDROCHLORIDE 30 MG: 30 TABLET, FILM COATED ORAL at 08:00

## 2019-01-01 RX ADMIN — IRON SUCROSE 200 MG: 20 INJECTION, SOLUTION INTRAVENOUS at 11:18

## 2019-01-01 RX ADMIN — DILTIAZEM HYDROCHLORIDE 30 MG: 30 TABLET, FILM COATED ORAL at 12:41

## 2019-01-01 RX ADMIN — HEPARIN SODIUM 3000 UNITS: 1000 INJECTION, SOLUTION INTRAVENOUS; SUBCUTANEOUS at 11:50

## 2019-01-01 RX ADMIN — DIPHENHYDRAMINE HYDROCHLORIDE AND LIDOCAINE HYDROCHLORIDE AND ALUMINUM HYDROXIDE AND MAGNESIUM HYDRO 5 ML: KIT at 21:46

## 2019-01-01 RX ADMIN — ACETAMINOPHEN 650 MG: 325 TABLET ORAL at 01:33

## 2019-01-01 RX ADMIN — WARFARIN SODIUM 2 MG: 1 TABLET ORAL at 17:12

## 2019-01-01 RX ADMIN — DOXYCYCLINE 100 MG: 100 CAPSULE ORAL at 09:46

## 2019-01-01 RX ADMIN — NYSTATIN 500000 UNITS: 100000 SUSPENSION ORAL at 18:18

## 2019-01-01 RX ADMIN — IPRATROPIUM BROMIDE AND ALBUTEROL SULFATE 3 ML: .5; 3 SOLUTION RESPIRATORY (INHALATION) at 09:51

## 2019-01-01 RX ADMIN — DIPHENHYDRAMINE HYDROCHLORIDE AND LIDOCAINE HYDROCHLORIDE AND ALUMINUM HYDROXIDE AND MAGNESIUM HYDRO 5 ML: KIT at 22:44

## 2019-01-01 RX ADMIN — Medication 10 ML: at 18:13

## 2019-01-01 RX ADMIN — POTASSIUM & SODIUM PHOSPHATES POWDER PACK 280-160-250 MG 1 PACKET: 280-160-250 PACK at 09:31

## 2019-01-01 RX ADMIN — MIDODRINE HYDROCHLORIDE 10 MG: 2.5 TABLET ORAL at 15:27

## 2019-01-01 RX ADMIN — HEPARIN SODIUM 12 UNITS/KG/HR: 10000 INJECTION, SOLUTION INTRAVENOUS at 17:08

## 2019-01-01 RX ADMIN — FENTANYL CITRATE 25 MCG: 50 INJECTION INTRAMUSCULAR; INTRAVENOUS at 11:14

## 2019-01-01 RX ADMIN — EPOETIN ALFA-EPBX 6000 UNITS: 3000 INJECTION, SOLUTION INTRAVENOUS; SUBCUTANEOUS at 22:19

## 2019-01-01 RX ADMIN — PAROXETINE 20 MG: 20 TABLET, FILM COATED ORAL at 09:45

## 2019-01-01 RX ADMIN — DIGOXIN 250 MCG: 0.25 INJECTION INTRAMUSCULAR; INTRAVENOUS at 14:54

## 2019-01-01 RX ADMIN — PAROXETINE HYDROCHLORIDE 20 MG: 20 TABLET, FILM COATED ORAL at 10:05

## 2019-01-01 RX ADMIN — DILTIAZEM HYDROCHLORIDE 30 MG: 30 TABLET, FILM COATED ORAL at 09:21

## 2019-01-01 RX ADMIN — Medication 10 ML: at 21:18

## 2019-01-01 RX ADMIN — FUROSEMIDE 60 MG: 10 INJECTION, SOLUTION INTRAMUSCULAR; INTRAVENOUS at 17:33

## 2019-01-01 RX ADMIN — ATORVASTATIN CALCIUM 40 MG: 20 TABLET, FILM COATED ORAL at 09:21

## 2019-01-01 RX ADMIN — DIPHENHYDRAMINE HYDROCHLORIDE AND LIDOCAINE HYDROCHLORIDE AND ALUMINUM HYDROXIDE AND MAGNESIUM HYDRO 5 ML: KIT at 22:21

## 2019-01-01 RX ADMIN — CEPHALEXIN 250 MG: 250 CAPSULE ORAL at 22:02

## 2019-01-01 RX ADMIN — ATORVASTATIN CALCIUM 40 MG: 20 TABLET, FILM COATED ORAL at 08:53

## 2019-01-01 RX ADMIN — MIDODRINE HYDROCHLORIDE 2.5 MG: 2.5 TABLET ORAL at 08:22

## 2019-01-01 RX ADMIN — DILTIAZEM HYDROCHLORIDE 60 MG: 60 TABLET, FILM COATED ORAL at 08:23

## 2019-01-01 RX ADMIN — DIPHENHYDRAMINE HYDROCHLORIDE AND LIDOCAINE HYDROCHLORIDE AND ALUMINUM HYDROXIDE AND MAGNESIUM HYDRO 5 ML: KIT at 12:00

## 2019-01-01 RX ADMIN — METOPROLOL TARTRATE 12.5 MG: 25 TABLET ORAL at 21:53

## 2019-01-01 RX ADMIN — ATORVASTATIN CALCIUM 40 MG: 20 TABLET, FILM COATED ORAL at 09:31

## 2019-01-01 RX ADMIN — IPRATROPIUM BROMIDE 0.5 MG: 0.5 SOLUTION RESPIRATORY (INHALATION) at 13:53

## 2019-01-01 RX ADMIN — HEPARIN SODIUM 3760 UNITS: 1000 INJECTION INTRAVENOUS; SUBCUTANEOUS at 14:59

## 2019-01-01 RX ADMIN — Medication 81 MG: at 10:00

## 2019-01-01 RX ADMIN — HEPARIN SODIUM 9 UNITS/KG/HR: 10000 INJECTION, SOLUTION INTRAVENOUS at 16:13

## 2019-01-01 RX ADMIN — FUROSEMIDE 40 MG: 40 TABLET ORAL at 20:10

## 2019-01-01 RX ADMIN — MIDODRINE HYDROCHLORIDE 2.5 MG: 2.5 TABLET ORAL at 17:59

## 2019-01-01 RX ADMIN — GUAIFENESIN 600 MG: 600 TABLET, EXTENDED RELEASE ORAL at 23:04

## 2019-01-01 RX ADMIN — PAROXETINE 20 MG: 20 TABLET, FILM COATED ORAL at 08:54

## 2019-01-01 RX ADMIN — POLYETHYLENE GLYCOL 3350 17 G: 17 POWDER, FOR SOLUTION ORAL at 09:31

## 2019-01-01 RX ADMIN — AMIODARONE HYDROCHLORIDE 1 MG/MIN: 1.8 INJECTION, SOLUTION INTRAVENOUS at 03:56

## 2019-01-01 RX ADMIN — IPRATROPIUM BROMIDE 0.5 MG: 0.5 SOLUTION RESPIRATORY (INHALATION) at 14:16

## 2019-01-01 RX ADMIN — METOPROLOL TARTRATE 25 MG: 25 TABLET ORAL at 08:54

## 2019-01-01 RX ADMIN — AMIODARONE HYDROCHLORIDE 1 MG/MIN: 1.8 INJECTION, SOLUTION INTRAVENOUS at 22:10

## 2019-01-01 RX ADMIN — DILTIAZEM HYDROCHLORIDE 14.5 MG: 5 INJECTION INTRAVENOUS at 12:34

## 2019-01-01 RX ADMIN — IPRATROPIUM BROMIDE 0.5 MG: 0.5 SOLUTION RESPIRATORY (INHALATION) at 12:36

## 2019-01-01 RX ADMIN — FUROSEMIDE 40 MG: 10 INJECTION, SOLUTION INTRAMUSCULAR; INTRAVENOUS at 08:49

## 2019-01-01 RX ADMIN — ATORVASTATIN CALCIUM 40 MG: 20 TABLET, FILM COATED ORAL at 08:33

## 2019-01-01 RX ADMIN — PAROXETINE 20 MG: 20 TABLET, FILM COATED ORAL at 10:01

## 2019-01-01 RX ADMIN — IPRATROPIUM BROMIDE 0.5 MG: 0.5 SOLUTION RESPIRATORY (INHALATION) at 20:16

## 2019-01-01 RX ADMIN — ALBUMIN (HUMAN) 25 G: 0.25 INJECTION, SOLUTION INTRAVENOUS at 21:00

## 2019-01-01 RX ADMIN — ALPRAZOLAM 0.25 MG: 0.5 TABLET ORAL at 16:24

## 2019-01-01 RX ADMIN — ONDANSETRON 4 MG: 2 INJECTION INTRAMUSCULAR; INTRAVENOUS at 15:32

## 2019-01-01 RX ADMIN — DOXYCYCLINE 100 MG: 100 CAPSULE ORAL at 09:32

## 2019-01-01 RX ADMIN — AMIODARONE HYDROCHLORIDE 0.5 MG/MIN: 1.8 INJECTION, SOLUTION INTRAVENOUS at 11:52

## 2019-01-01 RX ADMIN — ATORVASTATIN CALCIUM 40 MG: 20 TABLET, FILM COATED ORAL at 10:00

## 2019-01-01 RX ADMIN — MIDODRINE HYDROCHLORIDE 2.5 MG: 2.5 TABLET ORAL at 13:10

## 2019-01-01 RX ADMIN — MIDODRINE HYDROCHLORIDE 2.5 MG: 2.5 TABLET ORAL at 08:42

## 2019-01-01 RX ADMIN — AMIODARONE HYDROCHLORIDE 200 MG: 200 TABLET ORAL at 10:53

## 2019-01-01 RX ADMIN — Medication 81 MG: at 09:21

## 2019-01-01 RX ADMIN — ATORVASTATIN CALCIUM 40 MG: 20 TABLET, FILM COATED ORAL at 08:54

## 2019-01-01 RX ADMIN — Medication 10 ML: at 05:32

## 2019-01-01 RX ADMIN — AMIODARONE HYDROCHLORIDE 400 MG: 200 TABLET ORAL at 13:55

## 2019-01-01 RX ADMIN — IPRATROPIUM BROMIDE 0.5 MG: 0.5 SOLUTION RESPIRATORY (INHALATION) at 14:05

## 2019-01-01 RX ADMIN — IPRATROPIUM BROMIDE AND ALBUTEROL SULFATE 3 ML: .5; 3 SOLUTION RESPIRATORY (INHALATION) at 10:23

## 2019-01-01 RX ADMIN — PAROXETINE HYDROCHLORIDE 20 MG: 20 TABLET, FILM COATED ORAL at 08:22

## 2019-01-01 RX ADMIN — WARFARIN SODIUM 2 MG: 1 TABLET ORAL at 20:46

## 2019-01-01 RX ADMIN — PAROXETINE 20 MG: 20 TABLET, FILM COATED ORAL at 09:46

## 2019-01-01 RX ADMIN — FUROSEMIDE 40 MG: 10 INJECTION, SOLUTION INTRAMUSCULAR; INTRAVENOUS at 19:51

## 2019-01-01 RX ADMIN — Medication 81 MG: at 08:36

## 2019-01-01 RX ADMIN — DIPHENHYDRAMINE HYDROCHLORIDE AND LIDOCAINE HYDROCHLORIDE AND ALUMINUM HYDROXIDE AND MAGNESIUM HYDRO 5 ML: KIT at 11:30

## 2019-01-01 RX ADMIN — CEFTRIAXONE 1 G: 1 INJECTION, POWDER, FOR SOLUTION INTRAMUSCULAR; INTRAVENOUS at 17:30

## 2019-01-01 RX ADMIN — AMIODARONE HYDROCHLORIDE 400 MG: 200 TABLET ORAL at 14:16

## 2019-01-01 RX ADMIN — ZINC SULFATE 220 MG (50 MG) CAPSULE 1 CAPSULE: CAPSULE at 10:22

## 2019-01-01 RX ADMIN — DILTIAZEM HYDROCHLORIDE 60 MG: 60 TABLET, FILM COATED ORAL at 17:56

## 2019-01-01 RX ADMIN — AZITHROMYCIN MONOHYDRATE 500 MG: 500 INJECTION, POWDER, LYOPHILIZED, FOR SOLUTION INTRAVENOUS at 08:10

## 2019-01-01 RX ADMIN — SODIUM CHLORIDE 500 ML: 900 INJECTION, SOLUTION INTRAVENOUS at 13:28

## 2019-01-01 RX ADMIN — AMIODARONE HYDROCHLORIDE 400 MG: 200 TABLET ORAL at 09:45

## 2019-01-01 RX ADMIN — ATORVASTATIN CALCIUM 40 MG: 20 TABLET, FILM COATED ORAL at 09:32

## 2019-01-01 RX ADMIN — FUROSEMIDE 40 MG: 10 INJECTION, SOLUTION INTRAMUSCULAR; INTRAVENOUS at 08:33

## 2019-01-01 RX ADMIN — NYSTATIN 500000 UNITS: 100000 SUSPENSION ORAL at 22:43

## 2019-01-01 RX ADMIN — Medication 10 ML: at 22:00

## 2019-01-01 RX ADMIN — ACETAMINOPHEN 650 MG: 325 TABLET ORAL at 00:32

## 2019-01-01 RX ADMIN — ATORVASTATIN CALCIUM 40 MG: 20 TABLET, FILM COATED ORAL at 08:12

## 2019-01-01 RX ADMIN — SODIUM CHLORIDE 25 ML/HR: 900 INJECTION, SOLUTION INTRAVENOUS at 11:10

## 2019-01-01 RX ADMIN — NYSTATIN 500000 UNITS: 100000 SUSPENSION ORAL at 17:08

## 2019-01-01 RX ADMIN — MEROPENEM 500 MG: 500 INJECTION, POWDER, FOR SOLUTION INTRAVENOUS at 16:29

## 2019-01-01 RX ADMIN — ALBUMIN (HUMAN) 25 G: 0.25 INJECTION, SOLUTION INTRAVENOUS at 04:53

## 2019-01-01 RX ADMIN — PAROXETINE 20 MG: 20 TABLET, FILM COATED ORAL at 09:05

## 2019-01-01 RX ADMIN — ASPIRIN 325 MG ORAL TABLET 325 MG: 325 PILL ORAL at 08:36

## 2019-01-01 RX ADMIN — HEPARIN SODIUM 3000 UNITS: 1000 INJECTION INTRAVENOUS; SUBCUTANEOUS at 11:50

## 2019-01-01 RX ADMIN — POLYETHYLENE GLYCOL 3350 17 G: 17 POWDER, FOR SOLUTION ORAL at 08:33

## 2019-01-01 RX ADMIN — AMIODARONE HYDROCHLORIDE 150 MG: 1.5 INJECTION, SOLUTION INTRAVENOUS at 16:17

## 2019-01-01 RX ADMIN — CEPHALEXIN 500 MG: 250 CAPSULE ORAL at 09:44

## 2019-01-01 RX ADMIN — POLYETHYLENE GLYCOL 3350 17 G: 17 POWDER, FOR SOLUTION ORAL at 08:53

## 2019-01-01 RX ADMIN — DOXYCYCLINE 100 MG: 100 CAPSULE ORAL at 23:34

## 2019-01-01 RX ADMIN — MIDODRINE HYDROCHLORIDE 2.5 MG: 2.5 TABLET ORAL at 15:14

## 2019-01-01 RX ADMIN — GUAIFENESIN 600 MG: 600 TABLET, EXTENDED RELEASE ORAL at 20:33

## 2019-01-01 RX ADMIN — ATORVASTATIN CALCIUM 40 MG: 20 TABLET, FILM COATED ORAL at 09:46

## 2019-01-01 RX ADMIN — PANTOPRAZOLE SODIUM 40 MG: 40 TABLET, DELAYED RELEASE ORAL at 06:44

## 2019-01-01 RX ADMIN — GUAIFENESIN 600 MG: 600 TABLET, EXTENDED RELEASE ORAL at 09:30

## 2019-01-01 RX ADMIN — LIDOCAINE HYDROCHLORIDE 20 ML: 10 INJECTION, SOLUTION INFILTRATION; PERINEURAL at 11:32

## 2019-01-01 RX ADMIN — ASPIRIN 325 MG ORAL TABLET 325 MG: 325 PILL ORAL at 15:10

## 2019-01-01 RX ADMIN — NYSTATIN: 100000 POWDER TOPICAL at 19:53

## 2019-01-01 RX ADMIN — POTASSIUM CHLORIDE 10 MEQ: 10 TABLET, EXTENDED RELEASE ORAL at 05:48

## 2019-01-01 RX ADMIN — POLYETHYLENE GLYCOL 3350 17 G: 17 POWDER, FOR SOLUTION ORAL at 08:29

## 2019-01-01 RX ADMIN — PAROXETINE 20 MG: 20 TABLET, FILM COATED ORAL at 11:05

## 2019-01-01 RX ADMIN — ZINC SULFATE 220 MG (50 MG) CAPSULE 1 CAPSULE: CAPSULE at 08:28

## 2019-01-01 RX ADMIN — MIDODRINE HYDROCHLORIDE 2.5 MG: 2.5 TABLET ORAL at 18:44

## 2019-01-01 RX ADMIN — DOXYCYCLINE 100 MG: 100 CAPSULE ORAL at 21:12

## 2019-01-01 RX ADMIN — DILTIAZEM HYDROCHLORIDE 60 MG: 60 TABLET, FILM COATED ORAL at 17:34

## 2019-01-01 RX ADMIN — DIPHENHYDRAMINE HYDROCHLORIDE AND LIDOCAINE HYDROCHLORIDE AND ALUMINUM HYDROXIDE AND MAGNESIUM HYDRO 5 ML: KIT at 07:30

## 2019-01-01 RX ADMIN — DILTIAZEM HYDROCHLORIDE 30 MG: 30 TABLET, FILM COATED ORAL at 10:58

## 2019-01-01 RX ADMIN — DILTIAZEM HYDROCHLORIDE 30 MG: 30 TABLET, FILM COATED ORAL at 09:47

## 2019-01-01 RX ADMIN — Medication 10 ML: at 13:56

## 2019-01-01 RX ADMIN — NYSTATIN 500000 UNITS: 100000 SUSPENSION ORAL at 16:18

## 2019-01-01 RX ADMIN — CARVEDILOL 12.5 MG: 12.5 TABLET, FILM COATED ORAL at 16:38

## 2019-01-01 RX ADMIN — ERGOCALCIFEROL 50000 UNITS: 1.25 CAPSULE ORAL at 19:52

## 2019-01-01 RX ADMIN — ATORVASTATIN CALCIUM 40 MG: 20 TABLET, FILM COATED ORAL at 10:23

## 2019-01-01 RX ADMIN — DIPHENHYDRAMINE HYDROCHLORIDE AND LIDOCAINE HYDROCHLORIDE AND ALUMINUM HYDROXIDE AND MAGNESIUM HYDRO 5 ML: KIT at 18:13

## 2019-01-01 RX ADMIN — WARFARIN SODIUM 4 MG: 3 TABLET ORAL at 18:45

## 2019-01-01 RX ADMIN — EPOETIN ALFA-EPBX 10000 UNITS: 10000 INJECTION, SOLUTION INTRAVENOUS; SUBCUTANEOUS at 17:54

## 2019-01-01 RX ADMIN — Medication 10 ML: at 16:55

## 2019-01-01 RX ADMIN — HEPARIN SODIUM 4300 UNITS: 1000 INJECTION INTRAVENOUS; SUBCUTANEOUS at 18:38

## 2019-01-01 RX ADMIN — METOPROLOL TARTRATE 50 MG: 50 TABLET ORAL at 23:09

## 2019-01-01 RX ADMIN — METOPROLOL TARTRATE 12.5 MG: 25 TABLET ORAL at 10:00

## 2019-01-01 RX ADMIN — POLYETHYLENE GLYCOL 3350 17 G: 17 POWDER, FOR SOLUTION ORAL at 09:48

## 2019-01-01 RX ADMIN — Medication 10 ML: at 06:51

## 2019-01-01 RX ADMIN — Medication 10 ML: at 21:02

## 2019-01-01 RX ADMIN — ATORVASTATIN CALCIUM 40 MG: 20 TABLET, FILM COATED ORAL at 08:14

## 2019-01-01 RX ADMIN — NYSTATIN 500000 UNITS: 100000 SUSPENSION ORAL at 17:24

## 2019-01-01 RX ADMIN — CEPHALEXIN 250 MG: 250 CAPSULE ORAL at 06:36

## 2019-01-01 RX ADMIN — AMIODARONE HYDROCHLORIDE 400 MG: 200 TABLET ORAL at 10:23

## 2019-01-01 RX ADMIN — ZINC SULFATE 220 MG (50 MG) CAPSULE 1 CAPSULE: CAPSULE at 08:53

## 2019-01-01 RX ADMIN — AMIODARONE HYDROCHLORIDE 400 MG: 200 TABLET ORAL at 22:03

## 2019-01-01 RX ADMIN — NYSTATIN 500000 UNITS: 100000 SUSPENSION ORAL at 12:00

## 2019-01-01 RX ADMIN — METOPROLOL TARTRATE 12.5 MG: 25 TABLET ORAL at 21:04

## 2019-01-01 RX ADMIN — HEPARIN SODIUM 11 UNITS/KG/HR: 10000 INJECTION, SOLUTION INTRAVENOUS at 15:04

## 2019-01-01 RX ADMIN — FUROSEMIDE 40 MG: 10 INJECTION, SOLUTION INTRAMUSCULAR; INTRAVENOUS at 21:40

## 2019-01-01 RX ADMIN — METOPROLOL TARTRATE 75 MG: 50 TABLET ORAL at 08:33

## 2019-01-01 RX ADMIN — PAROXETINE 20 MG: 20 TABLET, FILM COATED ORAL at 10:53

## 2019-01-01 RX ADMIN — DILTIAZEM HYDROCHLORIDE 30 MG: 30 TABLET, FILM COATED ORAL at 23:22

## 2019-01-01 RX ADMIN — DIGOXIN 125 MCG: 0.25 INJECTION INTRAMUSCULAR; INTRAVENOUS at 22:30

## 2019-01-01 RX ADMIN — FUROSEMIDE 40 MG: 10 INJECTION, SOLUTION INTRAMUSCULAR; INTRAVENOUS at 22:21

## 2019-01-01 RX ADMIN — ATORVASTATIN CALCIUM 40 MG: 20 TABLET, FILM COATED ORAL at 08:29

## 2019-01-01 RX ADMIN — DILTIAZEM HYDROCHLORIDE 30 MG: 30 TABLET, FILM COATED ORAL at 06:44

## 2019-01-01 RX ADMIN — HEPARIN SODIUM 18 UNITS/KG/HR: 10000 INJECTION, SOLUTION INTRAVENOUS at 14:08

## 2019-01-01 RX ADMIN — CARVEDILOL 12.5 MG: 12.5 TABLET, FILM COATED ORAL at 08:00

## 2019-01-01 RX ADMIN — PAROXETINE HYDROCHLORIDE 20 MG: 20 TABLET, FILM COATED ORAL at 09:21

## 2019-01-01 RX ADMIN — FUROSEMIDE 40 MG: 10 INJECTION, SOLUTION INTRAMUSCULAR; INTRAVENOUS at 13:05

## 2019-01-01 RX ADMIN — PAROXETINE 20 MG: 20 TABLET, FILM COATED ORAL at 08:53

## 2019-01-01 RX ADMIN — NYSTATIN 500000 UNITS: 100000 SUSPENSION ORAL at 13:56

## 2019-01-01 RX ADMIN — CARVEDILOL 12.5 MG: 12.5 TABLET, FILM COATED ORAL at 16:44

## 2019-01-01 RX ADMIN — ATORVASTATIN CALCIUM 40 MG: 20 TABLET, FILM COATED ORAL at 09:05

## 2019-01-01 RX ADMIN — CARVEDILOL 12.5 MG: 12.5 TABLET, FILM COATED ORAL at 10:05

## 2019-01-01 RX ADMIN — MIDODRINE HYDROCHLORIDE 2.5 MG: 2.5 TABLET ORAL at 09:21

## 2019-01-01 RX ADMIN — CEFTRIAXONE 1 G: 1 INJECTION, POWDER, FOR SOLUTION INTRAMUSCULAR; INTRAVENOUS at 21:52

## 2019-01-01 RX ADMIN — PANTOPRAZOLE SODIUM 40 MG: 40 INJECTION, POWDER, FOR SOLUTION INTRAVENOUS at 17:13

## 2019-01-01 RX ADMIN — SODIUM CHLORIDE 40 MG: 9 INJECTION, SOLUTION INTRAMUSCULAR; INTRAVENOUS; SUBCUTANEOUS at 10:25

## 2019-01-01 RX ADMIN — ZINC SULFATE 220 MG (50 MG) CAPSULE 1 CAPSULE: CAPSULE at 10:54

## 2019-01-01 RX ADMIN — METOPROLOL TARTRATE 75 MG: 50 TABLET ORAL at 22:22

## 2019-01-01 RX ADMIN — FLUCONAZOLE 50 MG: 100 TABLET ORAL at 22:24

## 2019-01-01 RX ADMIN — DOXYCYCLINE 100 MG: 100 CAPSULE ORAL at 10:25

## 2019-01-01 RX ADMIN — IPRATROPIUM BROMIDE 0.5 MG: 0.5 SOLUTION RESPIRATORY (INHALATION) at 23:59

## 2019-01-01 RX ADMIN — DILTIAZEM HYDROCHLORIDE 30 MG: 30 TABLET, FILM COATED ORAL at 07:30

## 2019-01-01 RX ADMIN — AMIODARONE HYDROCHLORIDE 400 MG: 200 TABLET ORAL at 08:25

## 2019-01-01 RX ADMIN — FUROSEMIDE 40 MG: 10 INJECTION, SOLUTION INTRAMUSCULAR; INTRAVENOUS at 21:18

## 2019-01-01 RX ADMIN — IPRATROPIUM BROMIDE 0.5 MG: 0.5 SOLUTION RESPIRATORY (INHALATION) at 07:13

## 2019-01-01 RX ADMIN — Medication 10 ML: at 22:22

## 2019-01-01 RX ADMIN — POLYETHYLENE GLYCOL 3350 17 G: 17 POWDER, FOR SOLUTION ORAL at 22:20

## 2019-01-01 RX ADMIN — AMIODARONE HYDROCHLORIDE 0.5 MG/MIN: 1.8 INJECTION, SOLUTION INTRAVENOUS at 15:00

## 2019-01-01 RX ADMIN — CEFTRIAXONE 1 G: 1 INJECTION, POWDER, FOR SOLUTION INTRAMUSCULAR; INTRAVENOUS at 17:59

## 2019-01-01 RX ADMIN — HEPARIN SODIUM 1000 UNITS: 200 INJECTION, SOLUTION INTRAVENOUS at 11:38

## 2019-01-01 RX ADMIN — Medication 81 MG: at 08:12

## 2019-01-01 RX ADMIN — PAROXETINE 20 MG: 20 TABLET, FILM COATED ORAL at 10:23

## 2019-01-01 RX ADMIN — ALBUMIN (HUMAN) 25 G: 0.25 INJECTION, SOLUTION INTRAVENOUS at 12:23

## 2019-01-01 RX ADMIN — Medication 81 MG: at 09:32

## 2019-01-01 RX ADMIN — PANTOPRAZOLE SODIUM 40 MG: 40 TABLET, DELAYED RELEASE ORAL at 17:31

## 2019-01-01 RX ADMIN — DIGOXIN 125 MCG: 0.25 INJECTION INTRAMUSCULAR; INTRAVENOUS at 08:21

## 2019-01-01 RX ADMIN — NYSTATIN: 100000 POWDER TOPICAL at 21:30

## 2019-01-01 RX ADMIN — FUROSEMIDE 40 MG: 10 INJECTION, SOLUTION INTRAMUSCULAR; INTRAVENOUS at 08:05

## 2019-01-01 RX ADMIN — ATORVASTATIN CALCIUM 40 MG: 20 TABLET, FILM COATED ORAL at 11:06

## 2019-01-01 RX ADMIN — DILTIAZEM HYDROCHLORIDE 30 MG: 30 TABLET, FILM COATED ORAL at 17:29

## 2019-01-01 RX ADMIN — AMIODARONE HYDROCHLORIDE 400 MG: 200 TABLET ORAL at 10:00

## 2019-01-01 RX ADMIN — AMIODARONE HYDROCHLORIDE 0.5 MG/MIN: 1.8 INJECTION, SOLUTION INTRAVENOUS at 11:24

## 2019-01-01 RX ADMIN — SODIUM CHLORIDE 200 ML: 900 INJECTION, SOLUTION INTRAVENOUS at 12:05

## 2019-01-01 RX ADMIN — CARVEDILOL 6.25 MG: 3.12 TABLET, FILM COATED ORAL at 18:23

## 2019-01-01 RX ADMIN — CEFTRIAXONE 1 G: 1 INJECTION, POWDER, FOR SOLUTION INTRAMUSCULAR; INTRAVENOUS at 18:20

## 2019-01-01 RX ADMIN — DOXYCYCLINE 100 MG: 100 INJECTION, POWDER, LYOPHILIZED, FOR SOLUTION INTRAVENOUS at 09:47

## 2019-01-01 RX ADMIN — AMIODARONE HYDROCHLORIDE 400 MG: 200 TABLET ORAL at 08:54

## 2019-01-01 RX ADMIN — METOPROLOL TARTRATE 100 MG: 50 TABLET ORAL at 21:39

## 2019-01-01 RX ADMIN — NYSTATIN 500000 UNITS: 100000 SUSPENSION ORAL at 11:05

## 2019-01-01 RX ADMIN — MIDODRINE HYDROCHLORIDE 2.5 MG: 2.5 TABLET ORAL at 17:33

## 2019-01-01 RX ADMIN — AMIODARONE HYDROCHLORIDE 200 MG: 200 TABLET ORAL at 09:32

## 2019-01-01 RX ADMIN — HEPARIN SODIUM 18 UNITS/KG/HR: 10000 INJECTION, SOLUTION INTRAVENOUS at 15:23

## 2019-01-01 RX ADMIN — MIDODRINE HYDROCHLORIDE 2.5 MG: 2.5 TABLET ORAL at 18:08

## 2019-01-01 RX ADMIN — NYSTATIN 500000 UNITS: 100000 SUSPENSION ORAL at 10:55

## 2019-01-01 RX ADMIN — Medication 10 ML: at 21:46

## 2019-01-01 RX ADMIN — NYSTATIN 500000 UNITS: 100000 SUSPENSION ORAL at 14:10

## 2019-01-01 RX ADMIN — CARVEDILOL 12.5 MG: 12.5 TABLET, FILM COATED ORAL at 08:36

## 2019-01-01 RX ADMIN — NYSTATIN 500000 UNITS: 100000 SUSPENSION ORAL at 10:24

## 2019-01-01 RX ADMIN — AMIODARONE HYDROCHLORIDE 200 MG: 200 TABLET ORAL at 08:13

## 2019-01-01 RX ADMIN — Medication 10 ML: at 10:07

## 2019-01-01 RX ADMIN — HEPARIN SODIUM 7 UNITS/KG/HR: 10000 INJECTION, SOLUTION INTRAVENOUS at 07:26

## 2019-01-01 RX ADMIN — ASPIRIN 325 MG ORAL TABLET 325 MG: 325 PILL ORAL at 10:05

## 2019-01-01 RX ADMIN — Medication 81 MG: at 11:06

## 2019-01-01 RX ADMIN — METOPROLOL TARTRATE 25 MG: 25 TABLET ORAL at 21:18

## 2019-01-01 RX ADMIN — IPRATROPIUM BROMIDE 0.5 MG: 0.5 SOLUTION RESPIRATORY (INHALATION) at 07:28

## 2019-01-01 RX ADMIN — AMIODARONE HYDROCHLORIDE 200 MG: 200 TABLET ORAL at 09:30

## 2019-01-01 RX ADMIN — PANTOPRAZOLE SODIUM 40 MG: 40 INJECTION, POWDER, FOR SOLUTION INTRAVENOUS at 19:06

## 2019-01-01 RX ADMIN — PANTOPRAZOLE SODIUM 40 MG: 40 INJECTION, POWDER, FOR SOLUTION INTRAVENOUS at 18:44

## 2019-01-01 RX ADMIN — Medication 10 ML: at 22:44

## 2019-01-01 RX ADMIN — AMIODARONE HYDROCHLORIDE 200 MG: 200 TABLET ORAL at 09:46

## 2019-01-01 RX ADMIN — POLYETHYLENE GLYCOL 3350 17 G: 17 POWDER, FOR SOLUTION ORAL at 11:05

## 2019-01-01 RX ADMIN — Medication 10 ML: at 16:18

## 2019-01-01 RX ADMIN — DIPHENHYDRAMINE HYDROCHLORIDE AND LIDOCAINE HYDROCHLORIDE AND ALUMINUM HYDROXIDE AND MAGNESIUM HYDRO 5 ML: KIT at 11:07

## 2019-01-01 RX ADMIN — MEROPENEM 500 MG: 500 INJECTION, POWDER, FOR SOLUTION INTRAVENOUS at 17:23

## 2019-01-01 RX ADMIN — CEFTRIAXONE 1 G: 1 INJECTION, POWDER, FOR SOLUTION INTRAMUSCULAR; INTRAVENOUS at 08:05

## 2019-01-01 RX ADMIN — ATORVASTATIN CALCIUM 40 MG: 20 TABLET, FILM COATED ORAL at 10:01

## 2019-01-01 RX ADMIN — PAROXETINE 20 MG: 20 TABLET, FILM COATED ORAL at 10:25

## 2019-01-01 RX ADMIN — NYSTATIN: 100000 POWDER TOPICAL at 22:19

## 2019-01-01 RX ADMIN — SODIUM CHLORIDE 40 MG: 9 INJECTION, SOLUTION INTRAMUSCULAR; INTRAVENOUS; SUBCUTANEOUS at 09:47

## 2019-01-01 RX ADMIN — AMIODARONE HYDROCHLORIDE 0.5 MG/MIN: 1.8 INJECTION, SOLUTION INTRAVENOUS at 01:55

## 2019-01-01 RX ADMIN — HEPARIN SODIUM 5600 UNITS: 1000 INJECTION INTRAVENOUS; SUBCUTANEOUS at 07:25

## 2019-01-01 RX ADMIN — NYSTATIN 500000 UNITS: 100000 SUSPENSION ORAL at 09:48

## 2019-01-01 RX ADMIN — ATORVASTATIN CALCIUM 40 MG: 20 TABLET, FILM COATED ORAL at 08:23

## 2019-01-01 RX ADMIN — PAROXETINE HYDROCHLORIDE 20 MG: 20 TABLET, FILM COATED ORAL at 08:41

## 2019-01-01 RX ADMIN — GUAIFENESIN 600 MG: 600 TABLET, EXTENDED RELEASE ORAL at 09:00

## 2019-01-01 RX ADMIN — AMIODARONE HYDROCHLORIDE 150 MG: 1.5 INJECTION, SOLUTION INTRAVENOUS at 19:06

## 2019-01-01 RX ADMIN — DILTIAZEM HYDROCHLORIDE 30 MG: 30 TABLET, FILM COATED ORAL at 21:47

## 2019-01-01 RX ADMIN — DILTIAZEM HYDROCHLORIDE 30 MG: 30 TABLET, FILM COATED ORAL at 13:55

## 2019-01-01 RX ADMIN — METOPROLOL TARTRATE 12.5 MG: 25 TABLET ORAL at 22:01

## 2019-01-01 RX ADMIN — METOPROLOL TARTRATE 2.5 MG: 5 INJECTION INTRAVENOUS at 14:55

## 2019-01-01 RX ADMIN — ERGOCALCIFEROL 50000 UNITS: 1.25 CAPSULE ORAL at 09:05

## 2019-01-01 RX ADMIN — DILTIAZEM HYDROCHLORIDE 30 MG: 30 TABLET, FILM COATED ORAL at 12:23

## 2019-01-01 RX ADMIN — HEPARIN SODIUM 4300 UNITS: 1000 INJECTION INTRAVENOUS; SUBCUTANEOUS at 18:29

## 2019-01-01 RX ADMIN — ZINC SULFATE 220 MG (50 MG) CAPSULE 1 CAPSULE: CAPSULE at 11:06

## 2019-01-01 RX ADMIN — DILTIAZEM HYDROCHLORIDE 30 MG: 30 TABLET, FILM COATED ORAL at 17:23

## 2019-01-01 RX ADMIN — Medication 10 ML: at 22:08

## 2019-01-01 RX ADMIN — DIPHENHYDRAMINE HYDROCHLORIDE AND LIDOCAINE HYDROCHLORIDE AND ALUMINUM HYDROXIDE AND MAGNESIUM HYDRO 5 ML: KIT at 17:30

## 2019-01-01 RX ADMIN — HEPARIN SODIUM 9 UNITS/KG/HR: 10000 INJECTION, SOLUTION INTRAVENOUS at 19:14

## 2019-01-01 RX ADMIN — NYSTATIN 500000 UNITS: 100000 SUSPENSION ORAL at 18:08

## 2019-01-01 RX ADMIN — FUROSEMIDE 40 MG: 40 TABLET ORAL at 08:36

## 2019-01-01 RX ADMIN — ALBUMIN (HUMAN) 12.5 G: 0.25 INJECTION, SOLUTION INTRAVENOUS at 12:28

## 2019-01-01 RX ADMIN — MIDODRINE HYDROCHLORIDE 2.5 MG: 2.5 TABLET ORAL at 08:52

## 2019-01-01 RX ADMIN — DILTIAZEM HYDROCHLORIDE 30 MG: 30 TABLET, FILM COATED ORAL at 08:38

## 2019-10-26 PROBLEM — I50.32 CHRONIC DIASTOLIC CONGESTIVE HEART FAILURE (HCC): Status: ACTIVE | Noted: 2019-01-01

## 2019-10-26 PROBLEM — N18.4 STAGE 4 CHRONIC KIDNEY DISEASE (HCC): Status: ACTIVE | Noted: 2018-08-23

## 2019-10-26 PROBLEM — I50.9 HEART FAILURE (HCC): Status: ACTIVE | Noted: 2018-07-09

## 2019-10-26 PROBLEM — D50.9 IRON DEFICIENCY ANEMIA: Status: ACTIVE | Noted: 2018-11-06

## 2019-10-26 PROBLEM — I50.9 CHF (CONGESTIVE HEART FAILURE) (HCC): Status: ACTIVE | Noted: 2019-01-01

## 2019-10-26 PROBLEM — J96.90 RESPIRATORY FAILURE (HCC): Status: ACTIVE | Noted: 2019-01-01

## 2019-10-26 NOTE — H&P
History & Physical 
 
Patient: Cristian Herron MRN: [id number]  CSN: [id number] YOB: 1935  Age: 80 y.o. Sex: female DOA: 10/26/2019 Chief Complaint:  
Chief Complaint Patient presents with  Respiratory Distress HPI:  
 
Cristian Herron is a 80 y.o.  female who 
Kidney disease CHF presents to the emergency room with shortness of breath that woke her up from her sleep at about 5:30 in the morning her son called EMS at that time she was found to have hypoxemia with sats in 70% range they did give her CPAP with some improvement of her edema and shortness of breath however when she arrived into the emergency room she was quickly transitioned to BiPAP she was given IV Lasix 40 mg with some diuresis and ABG was done which showed some mild hypercapnia patient states that she weighs herself daily she did note that she is gained about 5 pounds this week she did not double her Lasix her last echo was done about a year ago she mostly had diastolic dysfunction History reviewed. No pertinent past medical history. History reviewed. No pertinent surgical history. History reviewed. No pertinent family history. Social History Socioeconomic History  Marital status:  Spouse name: Not on file  Number of children: Not on file  Years of education: Not on file  Highest education level: Not on file Tobacco Use  Smoking status: Never Smoker  Smokeless tobacco: Never Used Substance and Sexual Activity  Drug use: Not Currently Prior to Admission medications Not on File Allergies Allergen Reactions  Penicillins Hives Review of Systems GENERAL: Patient alert, awake and oriented times 3, able to communicate full sentences and not in distress. HEENT: No change in vision, no earache, tinnitus, sore throat or sinus congestion. NECK: No pain or stiffness. PULMONARY: +shortness of breath, cough or wheeze. Cardiovascular: no pnd or orthopnea, no CP GASTROINTESTINAL: No abdominal pain, nausea, vomiting or diarrhea, melena or bright red blood per rectum. GENITOURINARY: No urinary frequency, urgency, hesitancy or dysuria. MUSCULOSKELETAL:+ joint or muscle pain, no back pain, no recent trauma. DERMATOLOGIC: No rash, no itching, no lesions. ENDOCRINE: No polyuria, polydipsia, no heat or cold intolerance. No recent change in weight. HEMATOLOGICAL: No anemia or easy bruising or bleeding. NEUROLOGIC: No headache, seizures, numbness, tingling+ weakness. Physical Exam:  
 
Physical Exam: 
Visit Vitals /64 Pulse 90 Temp 97.6 °F (36.4 °C) Resp 27 Wt 63.5 kg (140 lb 1.6 oz) SpO2 98% O2 Device: BIPAP Temp (24hrs), Av.6 °F (36.4 °C), Min:97.6 °F (36.4 °C), Max:97.6 °F (36.4 °C) No intake/output data recorded. No intake/output data recorded. General:  Alert, cooperative, no distress, appears stated age. Head: Normocephalic, without obvious abnormality, atraumatic. Eyes:  Conjunctivae/corneas clear. PERRL, EOMs intact. Nose: Nares normal. No drainage or sinus tenderness. Neck: Supple, symmetrical, trachea midline, no adenopathy, thyroid: no enlargement, no carotid bruit and no JVD. Lungs:   Clear to auscultation bilaterally. Heart:  Regular rate and rhythm, S1, S2 normal.  
  Abdomen: Soft, non-tender. Bowel sounds normal.   
Extremities: Extremities normal, atraumatic, no cyanosis or edema. Pulses: 2+ and symmetric all extremities. Skin:  No rashes or lesions Neurologic: AAOx3, No focal motor or sensory deficit. Labs Reviewed: All lab results for the last 24 hours reviewed. and EKG Procedures/imaging: see electronic medical records for all procedures/Xrays and details which were not copied into this note but were reviewed prior to creation of Plan Recent Results (from the past 24 hour(s)) EKG, 12 LEAD, INITIAL Collection Time: 10/26/19  6:53 AM  
Result Value Ref Range Ventricular Rate 97 BPM  
 Atrial Rate 97 BPM  
 P-R Interval 142 ms QRS Duration 78 ms Q-T Interval 336 ms  
 QTC Calculation (Bezet) 426 ms Calculated P Axis 50 degrees Calculated R Axis 43 degrees Calculated T Axis 52 degrees Diagnosis Normal sinus rhythm Normal ECG No previous ECGs available CBC WITH AUTOMATED DIFF Collection Time: 10/26/19  6:55 AM  
Result Value Ref Range WBC 6.3 4.6 - 13.2 K/uL  
 RBC 3.20 (L) 4.20 - 5.30 M/uL HGB 9.6 (L) 12.0 - 16.0 g/dL HCT 30.5 (L) 35.0 - 45.0 % MCV 95.3 74.0 - 97.0 FL  
 MCH 30.0 24.0 - 34.0 PG  
 MCHC 31.5 31.0 - 37.0 g/dL  
 RDW 13.5 11.6 - 14.5 % PLATELET 077 910 - 693 K/uL MPV 10.1 9.2 - 11.8 FL  
 NEUTROPHILS 49 40 - 73 % LYMPHOCYTES 35 21 - 52 % MONOCYTES 10 3 - 10 % EOSINOPHILS 5 0 - 5 % BASOPHILS 1 0 - 2 %  
 ABS. NEUTROPHILS 3.1 1.8 - 8.0 K/UL  
 ABS. LYMPHOCYTES 2.2 0.9 - 3.6 K/UL  
 ABS. MONOCYTES 0.6 0.05 - 1.2 K/UL  
 ABS. EOSINOPHILS 0.3 0.0 - 0.4 K/UL  
 ABS. BASOPHILS 0.0 0.0 - 0.1 K/UL  
 DF AUTOMATED CARDIAC PANEL,(CK, CKMB & TROPONIN) Collection Time: 10/26/19  6:55 AM  
Result Value Ref Range CK 58 26 - 192 U/L  
 CK - MB <1.0 <3.6 ng/ml CK-MB Index  0.0 - 4.0 % CALCULATION NOT PERFORMED WHEN RESULT IS BELOW LINEAR LIMIT Troponin-I, QT <0.02 0.0 - 6.420 NG/ML  
METABOLIC PANEL, COMPREHENSIVE Collection Time: 10/26/19  6:55 AM  
Result Value Ref Range Sodium 139 136 - 145 mmol/L Potassium 5.0 3.5 - 5.5 mmol/L Chloride 105 100 - 111 mmol/L  
 CO2 24 21 - 32 mmol/L Anion gap 10 3.0 - 18 mmol/L Glucose 217 (H) 74 - 99 mg/dL BUN 22 (H) 7.0 - 18 MG/DL Creatinine 1.70 (H) 0.6 - 1.3 MG/DL  
 BUN/Creatinine ratio 13 12 - 20 GFR est AA 35 (L) >60 ml/min/1.73m2 GFR est non-AA 29 (L) >60 ml/min/1.73m2 Calcium 7.9 (L) 8.5 - 10.1 MG/DL  Bilirubin, total 0.4 0.2 - 1.0 MG/DL  
 ALT (SGPT) 12 (L) 13 - 56 U/L  
 AST (SGOT) 21 10 - 38 U/L Alk. phosphatase 97 45 - 117 U/L Protein, total 6.1 (L) 6.4 - 8.2 g/dL Albumin 3.1 (L) 3.4 - 5.0 g/dL Globulin 3.0 2.0 - 4.0 g/dL A-G Ratio 1.0 0.8 - 1.7 NT-PRO BNP Collection Time: 10/26/19  6:55 AM  
Result Value Ref Range NT pro-BNP 2,420 (H) 0 - 1,800 PG/ML  
POC LACTIC ACID Collection Time: 10/26/19  7:04 AM  
Result Value Ref Range Lactic Acid (POC) 3.70 (HH) 0.40 - 2.00 mmol/L Assessment/Plan Active Problems: 
   
 
 
Cardiovascular #1 cardiac enzymes will be obtained she will be given 3 doses of IV Lasix and monitored for worsening renal function she is also placed on sodium restriction there is mild elevation in troponin this is likely demand ischemia we will trend cardiac enzymes consider heparin drip if she trends upwards cardiology consulted Pulmonary: She should be n.p.o. while she is on the BiPAP will try to quickly transition her to nasal cannula pulmonary is consulted GI: 
IV Protonix will be started for protection Hematology: DVT prevention with heparin subcu : 
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 
Contracts to safety she is a full code her son confirms this will continue her Paxil 20 mg daily DVT/GI Prophylaxis: Hep SQ Discussed with patient at bedside about hospital admission and my plan care, who understood and agree with my plan care.  
 
Cherelle Matias MD 
10/26/2019 8:04 AM

## 2019-10-26 NOTE — ED NOTES
Report to [facility]. Pt states feeling much less short of breath at present. Lactic 3.7 code sepsis called.

## 2019-10-26 NOTE — PROGRESS NOTES
ABG'S OBTAINED AND RESULTS WERE GIVEN TO DR. Hector Regan. DECREASED FIO2 TO 24% SECONDARY TO PO2 142 ON 35%.

## 2019-10-26 NOTE — CONSULTS
[facility] Pulmonary, Critical Care, and Sleep Medicine Name: Julio Singh MRN: [id number] : 1935 Hospital: [facility] [place] Date: 10/26/2019  Room: 106/[address] [place] [address] Note Consult requesting physician: Dr. David Arana Reason for Consult: Acute hypoxemic respiratory failure, congestive heart failure, non-ST elevation MI, hypertensive urgency, Subjective/History of Present Illness:  
 
Patient is a 80 y.o. female with PMHx significant for known congestive heart failure mostly diastolic dysfunction. Records evaluated from [facility].  Patient is supposed to be on Lasix. Daily. Occasional skipped Lasix and is noncompliant with diet. Noticed previous multiple admissions to [facility] for congestive heart failure hypertension. Non-smoker. No history of asthma COPD. No history of hypercarbia. Acute shortness of breath and acute hypoxemia. No home oxygen previously. Immediately placed on the BiPAP given Lasix and blood pressure control. Gradual improvement. Chest x-ray suggestive of congestive heart failure. First lactic acid was elevated but repeat was normal.  Repeat chest x-ray to make sure all resolves. Has received 1 dose of antibiotic. Follow-up chest x-ray if normal will stop. Patient has known chronic renal insufficiency. 10/26/2019: 
Due to on chronic congestive heart failure. Echo BNP troponin EKG to follow-up. Known hypertensive heart. Questionable compliance. Rapid improvement of BiPAP and diuresis. Less likely has a pneumonia but will reevaluate right lower lobe again with a chest x-ray in the morning PA and lateral. 
No history of smoking low suspicion for airway issues. Has known chronic renal insufficiency. So questionable value of troponins. Follow. I/O last 24 hrs: Intake/Output Summary (Last 24 hours) at 10/26/2019 2865 Last data filed at 10/26/2019 5653 Gross per 24 hour Intake  Output 250 ml Net -250 ml History taken from patient and EMR Review of Systems: A comprehensive review of systems was negative except for that written in the HPI. Review of Systems:  
HEENT: No epistaxis, no nasal drainage, no difficulty in swallowing, no redness in eyes Respiratory: as above Cardiovascular: no chest pain, no palpitations, no chronic leg edema, no syncope Gastrointestinal: no abd pain, no vomiting, no diarrhea, no bleeding symptoms Genitourinary: No urinary symptoms or hematuria Musculoskeletal:Neg 
Neurological: No focal weakness, no seizures, no headaches Behvioral/Psych: No anxiety, no depression Constitutional: No fever, no chills, no weight loss, no night sweats Allergies Allergen Reactions  Penicillins Hives History reviewed. No pertinent past medical history. History reviewed. No pertinent surgical history. Social History Tobacco Use  Smoking status: Never Smoker  Smokeless tobacco: Never Used Substance Use Topics  Alcohol use: Not on file History reviewed. No pertinent family history. Prior to Admission medications Medication Sig Start Date End Date Taking? Authorizing Provider  
ergocalciferol (VITAMIN D2) 50,000 unit capsule Take 50,000 Units by mouth daily. Yes Other, MD Napoleon  
carvedilol (COREG) 12.5 mg tablet Take 12.5 mg by mouth two (2) times daily (with meals). Yes Jessu, MD Napoleon  
spironolactone (ALDACTONE) 25 mg tablet Take 25 mg by mouth daily. Yes Napoleon Lee MD  
PARoxetine (PAXIL) 20 mg tablet Take 20 mg by mouth daily. Yes Jesus, MD Napoleon  
furosemide (LASIX) 40 mg tablet Take 40 mg by mouth every other day. Yes Jesus, MD Napoleon  
 
Current Facility-Administered Medications Medication Dose Route Frequency  carvedilol (COREG) tablet 6.25 mg  6.25 mg Oral BID WITH MEALS  
 ipratropium (ATROVENT) 0.02 % nebulizer solution 0.5 mg  0.5 mg Nebulization TID RT  
  furosemide (LASIX) injection 40 mg  40 mg IntraVENous Q12H  
 [START ON 10/27/2019] azithromycin (ZITHROMAX) 500 mg in 0.9% sodium chloride 250 mL (VIAL-MATE)  500 mg IntraVENous Q24H  
 [START ON 10/27/2019] cefTRIAXone (ROCEPHIN) 2 g in sterile water (preservative free) 20 mL IV syringe  2 g IntraVENous Q24H  
 aspirin tablet 325 mg  325 mg Oral DAILY  atorvastatin (LIPITOR) tablet 40 mg  40 mg Oral DAILY Objective:  
Vital Signs:   
Visit Vitals /54 Pulse 66 Temp 96.7 °F (35.9 °C) Resp 21 Wt 63.5 kg (140 lb 1.6 oz) SpO2 100% O2 Device: Nasal cannula O2 Flow Rate (L/min): 2 l/min Temp (24hrs), Av.2 °F (36.2 °C), Min:96.7 °F (35.9 °C), Max:97.6 °F (36.4 °C) Intake/Output:  
Last shift:      10/26 0701 - 10/26 1900 In: -  
Out: 250 [Urine:250] Last 3 shifts: No intake/output data recorded. Intake/Output Summary (Last 24 hours) at 10/26/2019 1259 Last data filed at 10/26/2019 2209 Gross per 24 hour Intake  Output 250 ml Net -250 ml Last 3 Recorded Weights in this Encounter 10/26/19 1339 Weight: 63.5 kg (140 lb 1.6 oz) bipap 12/ Recent Labs 10/26/19 
0301 PHI 7.330* PCO2I 45.8*  
PO2I 142* HCO3I 24.1 FIO2I 35 Physical Exam:  
 
General/Neurology: Alert, Awake, in moderate respiratory distress Head:   Normocephalic, without obvious abnormality, atraumatic. Eye:   EOM intact no scleral icterus, no pallor, no cyanosis. Nose:   No sinus tenderness Throat:  Lips, mucosa, and tongue normal. No oral thrush. Neck:   Supple, symmetric. No lymphadenopathy. Trachea midline Lung:   Bilateral crackles at the bases heart:   Regular rate & rhythm. S1 S2 present. No murmur. No JVD. Abdomen:  Soft. NT. ND. +BS. No masses. Extremities:  yes pedal edema. No cyanosis. No clubbing. Pulses: 2+ and symmetric in DP. Capillary refill: normal 
Lymphatic:  No cervical or supraclavicular palpable lymphadenopathy. Musculoskeletal: No joint swelling. No tenderness. Skin:   Color, texture, turgor normal. No rashes or lesions. Data:  
   
Recent Results (from the past 24 hour(s)) EKG, 12 LEAD, INITIAL Collection Time: 10/26/19  6:53 AM  
Result Value Ref Range Ventricular Rate 97 BPM  
 Atrial Rate 97 BPM  
 P-R Interval 142 ms QRS Duration 78 ms Q-T Interval 336 ms  
 QTC Calculation (Bezet) 426 ms Calculated P Axis 50 degrees Calculated R Axis 43 degrees Calculated T Axis 52 degrees Diagnosis Normal sinus rhythm Normal ECG No previous ECGs available Confirmed by Alisa Martínez MD, -- ([id number]) on 10/26/2019 10:31:45 AM 
  
CBC WITH AUTOMATED DIFF Collection Time: 10/26/19  6:55 AM  
Result Value Ref Range WBC 6.3 4.6 - 13.2 K/uL  
 RBC 3.20 (L) 4.20 - 5.30 M/uL HGB 9.6 (L) 12.0 - 16.0 g/dL HCT 30.5 (L) 35.0 - 45.0 % MCV 95.3 74.0 - 97.0 FL  
 MCH 30.0 24.0 - 34.0 PG  
 MCHC 31.5 31.0 - 37.0 g/dL  
 RDW 13.5 11.6 - 14.5 % PLATELET 851 391 - 961 K/uL MPV 10.1 9.2 - 11.8 FL  
 NEUTROPHILS 49 40 - 73 % LYMPHOCYTES 35 21 - 52 % MONOCYTES 10 3 - 10 % EOSINOPHILS 5 0 - 5 % BASOPHILS 1 0 - 2 %  
 ABS. NEUTROPHILS 3.1 1.8 - 8.0 K/UL  
 ABS. LYMPHOCYTES 2.2 0.9 - 3.6 K/UL  
 ABS. MONOCYTES 0.6 0.05 - 1.2 K/UL  
 ABS. EOSINOPHILS 0.3 0.0 - 0.4 K/UL  
 ABS. BASOPHILS 0.0 0.0 - 0.1 K/UL  
 DF AUTOMATED CARDIAC PANEL,(CK, CKMB & TROPONIN) Collection Time: 10/26/19  6:55 AM  
Result Value Ref Range CK 58 26 - 192 U/L  
 CK - MB <1.0 <3.6 ng/ml CK-MB Index  0.0 - 4.0 % CALCULATION NOT PERFORMED WHEN RESULT IS BELOW LINEAR LIMIT Troponin-I, QT <0.02 0.0 - 1.575 NG/ML  
METABOLIC PANEL, COMPREHENSIVE Collection Time: 10/26/19  6:55 AM  
Result Value Ref Range Sodium 139 136 - 145 mmol/L Potassium 5.0 3.5 - 5.5 mmol/L Chloride 105 100 - 111 mmol/L  
 CO2 24 21 - 32 mmol/L Anion gap 10 3.0 - 18 mmol/L Glucose 217 (H) 74 - 99 mg/dL BUN 22 (H) 7.0 - 18 MG/DL Creatinine 1.70 (H) 0.6 - 1.3 MG/DL  
 BUN/Creatinine ratio 13 12 - 20 GFR est AA 35 (L) >60 ml/min/1.73m2 GFR est non-AA 29 (L) >60 ml/min/1.73m2 Calcium 7.9 (L) 8.5 - 10.1 MG/DL Bilirubin, total 0.4 0.2 - 1.0 MG/DL  
 ALT (SGPT) 12 (L) 13 - 56 U/L  
 AST (SGOT) 21 10 - 38 U/L Alk. phosphatase 97 45 - 117 U/L Protein, total 6.1 (L) 6.4 - 8.2 g/dL Albumin 3.1 (L) 3.4 - 5.0 g/dL Globulin 3.0 2.0 - 4.0 g/dL A-G Ratio 1.0 0.8 - 1.7 NT-PRO BNP Collection Time: 10/26/19  6:55 AM  
Result Value Ref Range NT pro-BNP 2,420 (H) 0 - 1,800 PG/ML  
MAGNESIUM Collection Time: 10/26/19  6:55 AM  
Result Value Ref Range Magnesium 2.3 1.6 - 2.6 mg/dL POC LACTIC ACID Collection Time: 10/26/19  7:04 AM  
Result Value Ref Range Lactic Acid (POC) 3.70 (HH) 0.40 - 2.00 mmol/L  
POC G3 Collection Time: 10/26/19  7:58 AM  
Result Value Ref Range Device: BIPAP    
 FIO2 (POC) 35 % pH (POC) 7.330 (L) 7.35 - 7.45    
 pCO2 (POC) 45.8 (H) 35.0 - 45.0 MMHG  
 pO2 (POC) 142 (H) 80 - 100 MMHG  
 HCO3 (POC) 24.1 22 - 26 MMOL/L  
 sO2 (POC) 99 (H) 92 - 97 % Base deficit (POC) 2 mmol/L  
 PEEP/CPAP (POC) 6.0 cmH2O  
 PIP (POC) 12 Allens test (POC) YES Total resp. rate 22 Site RIGHT BRACHIAL Specimen type (POC) ARTERIAL Performed by Vianney Montague Spontaneous timed YES    
URINALYSIS W/ RFLX MICROSCOPIC Collection Time: 10/26/19  9:20 AM  
Result Value Ref Range Color YELLOW Appearance CLEAR Specific gravity 1.008 1.005 - 1.030    
 pH (UA) 6.0 5.0 - 8.0 Protein NEGATIVE  NEG mg/dL Glucose NEGATIVE  NEG mg/dL Ketone NEGATIVE  NEG mg/dL Bilirubin NEGATIVE  NEG Blood NEGATIVE  NEG Urobilinogen 0.2 0.2 - 1.0 EU/dL Nitrites NEGATIVE  NEG Leukocyte Esterase NEGATIVE  NEG    
LACTIC ACID Collection Time: 10/26/19 11:16 AM  
Result Value Ref Range Lactic acid 0.8 0.4 - 2.0 MMOL/L  
CARDIAC PANEL,(CK, CKMB & TROPONIN) Collection Time: 10/26/19 11:16 AM  
Result Value Ref Range CK 57 26 - 192 U/L  
 CK - MB 2.2 <3.6 ng/ml CK-MB Index 3.9 0.0 - 4.0 % Troponin-I, QT 0.96 (H) 0.0 - 0.045 NG/ML Chemistry Recent Labs 10/26/19 
5179 *   
K 5.0  
 CO2 24 BUN 22* CREA 1.70* CA 7.9*  
MG 2.3 AGAP 10 BUCR 13 AP 97  
TP 6.1* ALB 3.1*  
GLOB 3.0 AGRAT 1.0 Lactic Acid Lactic acid Date Value Ref Range Status 10/26/2019 0.8 0.4 - 2.0 MMOL/L Final  
 
Recent Labs 10/26/19 
1116 LAC 0.8 Liver Enzymes Protein, total  
Date Value Ref Range Status 10/26/2019 6.1 (L) 6.4 - 8.2 g/dL Final  
 
Albumin Date Value Ref Range Status 10/26/2019 3.1 (L) 3.4 - 5.0 g/dL Final  
 
Globulin Date Value Ref Range Status 10/26/2019 3.0 2.0 - 4.0 g/dL Final  
 
A-G Ratio Date Value Ref Range Status 10/26/2019 1.0 0.8 - 1.7   Final  
 
AST (SGOT) Date Value Ref Range Status 10/26/2019 21 10 - 38 U/L Final  
 
Alk. phosphatase Date Value Ref Range Status 10/26/2019 97 45 - 117 U/L Final  
 
Recent Labs 10/26/19 
7953 TP 6.1* ALB 3.1*  
GLOB 3.0 AGRAT 1.0  
SGOT 21 AP 97 CBC w/Diff Recent Labs 10/26/19 
0853 WBC 6.3  
RBC 3.20* HGB 9.6* HCT 30.5*  GRANS 49 LYMPH 35 EOS 5 Cardiac Enzymes Lab Results Component Value Date/Time CPK 57 10/26/2019 11:16 AM  
 CPK 58 10/26/2019 06:55 AM  
 CKMB 2.2 10/26/2019 11:16 AM  
 CKMB <1.0 10/26/2019 06:55 AM  
 CKND1 3.9 10/26/2019 11:16 AM  
 CKND1  10/26/2019 06:55 AM  
  CALCULATION NOT PERFORMED WHEN RESULT IS BELOW LINEAR LIMIT  
 TROIQ 0.96 (H) 10/26/2019 11:16 AM  
 TROIQ <0.02 10/26/2019 06:55 AM  
  
 
BNP No results found for: BNP, BNPP, XBNPT Coagulation No results for input(s): PTP, INR, APTT, INREXT in the last 72 hours.    
 
Thyroid  No results found for: T4, T3U, TSH, TSHEXT   
 No results found for: T4  
 
Urinalysis Lab Results Component Value Date/Time Color YELLOW 10/26/2019 09:20 AM  
 Appearance CLEAR 10/26/2019 09:20 AM  
 Specific gravity 1.008 10/26/2019 09:20 AM  
 pH (UA) 6.0 10/26/2019 09:20 AM  
 Protein NEGATIVE  10/26/2019 09:20 AM  
 Glucose NEGATIVE  10/26/2019 09:20 AM  
 Ketone NEGATIVE  10/26/2019 09:20 AM  
 Bilirubin NEGATIVE  10/26/2019 09:20 AM  
 Urobilinogen 0.2 10/26/2019 09:20 AM  
 Nitrites NEGATIVE  10/26/2019 09:20 AM  
 Leukocyte Esterase NEGATIVE  10/26/2019 09:20 AM  
  
 
Micro  No results for input(s): SDES, CULT in the last 72 hours. No results for input(s): CULT in the last 72 hours. Culture data during this hospitalization. All Micro Results Procedure Component Value Units Date/Time CULTURE, URINE [[id number]] Collected:  10/26/19 0920 Order Status:  Completed Specimen:  Cath Urine Updated:  10/26/19 0932 CULTURE, BLOOD [[id number]] Collected:  10/26/19 0745 Order Status:  Completed Specimen:  Blood Updated:  10/26/19 0757 CULTURE, BLOOD [[id number]] Collected:  10/26/19 0719 Order Status:  Completed Specimen:  Blood Updated:  10/26/19 1236 PFT Ultrasound LE Doppler ECHO Images report reviewed by me: 
CT (Most Recent) (CT chest reviewed by me) No results found for this or any previous visit. CXR reviewed by me: 
XR (Most Recent). CXR  reviewed by me and compared with previous CXR Results from [facility] [place] encounter on 10/26/19 XR CHEST PORT Narrative EXAM: CHEST RADIOGRAPH 
 
CLINICAL INDICATION/HISTORY: SOB 
-Additional: None COMPARISON: None TECHNIQUE: Portable frontal view of the chest 
 
_______________ FINDINGS: 
 
SUPPORT DEVICES: None. HEART AND MEDIASTINUM: No appreciable cardiomegaly. Remaining mediastinal 
contours within normal limits. LUNGS AND PLEURAL SPACES: No consolidation, pleural effusion, or pneumothorax. BONY THORAX AND SOFT TISSUES: Prior lower cervical anterior fusion. Unremarkable. 
 
_______________ Impression IMPRESSION: 
 
No active cardiopulmonary disease. IMPRESSION:  
· Acute hypoxemic respiratory failure/suspect related to congestive heart failure cannot exclude right lower lobe pneumonia less likely. Repeat chest x-ray after diuresis · Acute respiratory failure requiring noninvasive ventilation. Rapid improvement with BiPAP. Continue BiPAP as needed. No history of COPD asthma or obstructive sleep apnea. Might require nocturnal noninvasive as needed. Otherwise daytime oxygen · Congestive heart failure documented at [facility]. 
· Questionable compliance with medications. · Hypertensive heart. · Start diuresis resume home medications. · Diet adjustment. Follow echo troponin non-ST elevation MI. Positive troponin. Most likely related to demand ischemia · Should be a right lower lobe pneumonia. Suspect this is related to congestive heart failure. We will repeat chest x-ray tomorrow monitor white blood cells. No significant cough · Code status:   
  
RECOMMENDATIONS:  
Respiratory: Acute hypoxemic respiratory failure requiring noninvasive ventilation. Currently improving. After diuresis and BiPAP. BP control. Patient no significant chest pain. Positive troponins follow EKG tropes Protecting airway. precautions. Incentive spirometry. CVS: Follow-up echo troponin EKG. Treatment for congestive heart failure ID: Doubt patient has pneumonia follow-up chest x-ray for diuresis. If no infiltrate. No cough stop antibiotics tomorrow Sepsis bundle and protocol followed. Follow serial lactic acid, frequent BMP check, fluid resuscitation. Follow cultures. Deescalate antibiotic when appropriate. Hematology/Oncology: Follow white blood cell Renal: Renal insufficiency for creatinine potassium magnesium GI/: X Endocrine: Monitor BS. SSI. Neurology: No noninvasive Toxicology: None 
Pain/Sedation: None Skin/Wound: None Electrolytes: Replace electrolytes per ICU electrolyte replacement protocol. Follow daily IVF: Congestive heart failure avoid fluids Nutrition: Cardiac diet low-salt diet Prophylaxis: DVT Prophylaxis: yes. GI Prophylaxis: This. Restraints: none Lines/Tubes: PIV Will defer respective systems problem management to primary and other respective consultant and follow patient in ICU with primary and other medical team. 
Further recommendations will be based on the patient's response to recommended treatment and results of the investigation ordered. Quality Care: PPI, DVT prophylaxis, HOB elevated, Infection control all reviewed and addressed. Care of plan d/w RN, RT, MDR. 
D/w patient Moderate complexity decision making was performed during the evaluation of this patient at high risk for decompensation with multiple organ involvement. Total critical care time spent rendering care exclusive of procedures: 35 minutes. Latanya Lira MD 
10/26/2019

## 2019-10-26 NOTE — CONSULTS
Cardiolology  Inpatient Consult Patient: Susi Orosco               Sex: female          DOA: 10/26/2019 YOB: 1935      Age:  80 y.o.        LOS:  LOS: 0 days Susi Orosco is a 80 y.o. female admitted for CHF (congestive heart failure) ([facility] [I50.9] Res[facility] failure ([facility] [J96.90] Recommendations: 
· Continue current measures · Echo · Follow Impression: 
· HFpEF by history · Hypertension · CKD · Hyperlipidemia · Other problems as enumerated below Patient Active Problem List  
 Diagnosis Date Noted  Respiratory failure ([facility] 10/26/2019  Chronic diastolic congestive heart failure ([facility] 04/15/2019  Iron deficiency anemia 11/06/2018  Stage 4 chronic kidney disease ([facility] 08/23/2018  Heart failure ([facility] 07/09/2018  Cobalamin deficiency 11/20/2013  Hypercholesterolemia 10/01/2013  Benign essential hypertension 10/25/2012  Hypothyroidism 10/25/2012  Hypertensive heart disease 10/05/2011 Dung Carballo MD 
History reviewed. No pertinent past medical history. History reviewed. No pertinent surgical history. Allergies Allergen Reactions  Penicillins Hives History reviewed. No pertinent family history. Current Facility-Administered Medications Medication Dose Route Frequency  sodium chloride (NS) flush 5-10 mL  5-10 mL IntraVENous PRN  
 carvedilol (COREG) tablet 6.25 mg  6.25 mg Oral BID WITH MEALS  ondansetron (ZOFRAN) injection 4 mg  4 mg IntraVENous Q6H PRN  
 acetaminophen (TYLENOL) tablet 650 mg  650 mg Oral Q6H PRN  
 ipratropium (ATROVENT) 0.02 % nebulizer solution 0.5 mg  0.5 mg Nebulization TID RT  
 furosemide (LASIX) injection 40 mg  40 mg IntraVENous Q12H  
 [START ON 10/27/2019] azithromycin (ZITHROMAX) 500 mg in 0.9% sodium chloride 250 mL (VIAL-MATE)  500 mg IntraVENous Q24H  
 [START ON 10/27/2019] cefTRIAXone (ROCEPHIN) 2 g in sterile water (preservative free) 20 mL IV syringe  2 g IntraVENous Q24H  
 aspirin tablet 325 mg  325 mg Oral DAILY  atorvastatin (LIPITOR) tablet 40 mg  40 mg Oral DAILY Review of Symptoms: 
Review of Systems Constitutional: Positive for fatigue. Negative for chills, diaphoresis, fever and unexpected weight change. HENT: Negative for congestion, drooling, ear pain, rhinorrhea, sore throat, tinnitus and trouble swallowing. Eyes: Negative for photophobia, pain, redness and visual disturbance. Respiratory: Positive for cough and shortness of breath. Negative for choking, chest tightness, wheezing and stridor. Cardiovascular: Positive for leg swelling. Negative for chest pain and palpitations. Gastrointestinal: Negative for abdominal distention, abdominal pain, anal bleeding, blood in stool, constipation, diarrhea, nausea and vomiting. Endocrine: Negative for cold intolerance, heat intolerance, polydipsia and polyuria. Genitourinary: Negative for difficulty urinating, dysuria, flank pain, frequency, hematuria and urgency. Musculoskeletal: Negative for arthralgias, back pain and neck pain. Skin: Negative for color change, rash and wound. Allergic/Immunologic: Negative for immunocompromised state. Neurological: Negative for dizziness, seizures, syncope, speech difficulty, light-headedness and headaches. Hematological: Does not bruise/bleed easily. Psychiatric/Behavioral: Negative for agitation, behavioral problems, hallucinations, self-injury and suicidal ideas. The patient is not hyperactive. Subjective: 
Omar Hernandez is a 80 y.o. female with PMHX of chronic heart failure mostly diastolic, CKD 4, essential hypertension who presents to the emergency department C/O respiratory difficulty. Paramedics were called for sudden onset of shortness of breath and arrived in her house to discover her sitting upright having difficulty breathing and sats in the 70% range. She does not use home O2. They will put her on CPAP and started nitro tablets sublingual and noticing her blood pressure was 210/110. She denies chest pain then arrives without complaints of chest pain. She does acknowledge leg edema which is an ongoing symptom with her associated with \"some heart problems \". Paramedics started an IV gave her with CPAP but no other medications were added. 
  
Her symptoms started insidiously yesterday when today she went to bed. However she woke up this morning and felt more significantly short of breath she denies any fevers chills or ill contact. She has no cough at this timeframe. She felt like she did have a cough yesterday but was never really productive. She denies any nausea vomiting or diarrhea. No sinus symptoms no significant headache she denies any fevers or chills. She also denies any known ill contacts. She does have a history of CHF and kidney problems but her historical revelation of the symptoms was somewhat slow. She seems quite winded in responding to all her questions however the acuity of the condition in the lab machine seem to be confounding her initial history. CHF CKD and hypertension more mostly extracted from her other hospital records from [address] [place] [address]. Records indicate she is never been here at this hospital.Pt denies any other sxs or complaints. In the ICU her symptoms have improved significantly.  
  
 . Cardiac risk factors: extant. Physical Exam 
 
Visit Vitals /54 Pulse 66 Temp 96.7 °F (35.9 °C) Resp 21 Wt 63.5 kg (140 lb 1.6 oz) SpO2 100% Physical Exam  
Constitutional: She is oriented to person, place, and time. She appears well-developed and well-nourished. No distress. HENT:  
Head: Normocephalic and atraumatic. Right Ear: External ear normal.  
Left Ear: External ear normal.  
Mouth/Throat: Oropharynx is clear and moist. No oropharyngeal exudate. Eyes: Pupils are equal, round, and reactive to light. Conjunctivae and EOM are normal. No scleral icterus. No pallor Neck: Normal range of motion. Neck supple. No JVD present. No tracheal deviation present. No thyromegaly present. Cardiovascular: Normal rate, regular rhythm, S1 normal, S2 normal, normal heart sounds and normal pulses. Pulmonary/Chest: No stridor. She is in respiratory distress. She has rales. Abdominal: Soft. Bowel sounds are normal. She exhibits no distension. There is no tenderness. There is no rebound and no guarding. Musculoskeletal: Normal range of motion. She exhibits no edema or tenderness. No soft tissue injuries Lymphadenopathy:  
  She has no cervical adenopathy. Neurological: She is alert and oriented to person, place, and time. She has normal reflexes. No cranial nerve deficit. Coordination normal.  
Skin: Skin is warm and dry. No rash noted. She is not diaphoretic. No erythema. Psychiatric: She has a normal mood and affect. Her behavior is normal. Judgment and thought content normal.  
 
 
 
Cardiographics Telemetry: normal sinus rhythm ECG: noraml Echocardiogram: Not done Recent radiology, intake/output and wt reviewed Labs:  
Recent Results (from the past 48 hour(s)) EKG, 12 LEAD, INITIAL Collection Time: 10/26/19  6:53 AM  
Result Value Ref Range Ventricular Rate 97 BPM  
 Atrial Rate 97 BPM  
 P-R Interval 142 ms QRS Duration 78 ms Q-T Interval 336 ms  
 QTC Calculation (Bezet) 426 ms Calculated P Axis 50 degrees Calculated R Axis 43 degrees Calculated T Axis 52 degrees Diagnosis Normal sinus rhythm Normal ECG No previous ECGs available Confirmed by Rekha Martin MD, -- ([id number]) on 10/26/2019 10:31:45 AM 
  
CBC WITH AUTOMATED DIFF Collection Time: 10/26/19  6:55 AM  
Result Value Ref Range WBC 6.3 4.6 - 13.2 K/uL  
 RBC 3.20 (L) 4.20 - 5.30 M/uL HGB 9.6 (L) 12.0 - 16.0 g/dL HCT 30.5 (L) 35.0 - 45.0 % MCV 95.3 74.0 - 97.0 FL  
 MCH 30.0 24.0 - 34.0 PG  
 MCHC 31.5 31.0 - 37.0 g/dL  
 RDW 13.5 11.6 - 14.5 % PLATELET 155 321 - 118 K/uL MPV 10.1 9.2 - 11.8 FL  
 NEUTROPHILS 49 40 - 73 % LYMPHOCYTES 35 21 - 52 % MONOCYTES 10 3 - 10 % EOSINOPHILS 5 0 - 5 % BASOPHILS 1 0 - 2 %  
 ABS. NEUTROPHILS 3.1 1.8 - 8.0 K/UL  
 ABS. LYMPHOCYTES 2.2 0.9 - 3.6 K/UL  
 ABS. MONOCYTES 0.6 0.05 - 1.2 K/UL  
 ABS. EOSINOPHILS 0.3 0.0 - 0.4 K/UL  
 ABS. BASOPHILS 0.0 0.0 - 0.1 K/UL  
 DF AUTOMATED CARDIAC PANEL,(CK, CKMB & TROPONIN) Collection Time: 10/26/19  6:55 AM  
Result Value Ref Range CK 58 26 - 192 U/L  
 CK - MB <1.0 <3.6 ng/ml CK-MB Index  0.0 - 4.0 % CALCULATION NOT PERFORMED WHEN RESULT IS BELOW LINEAR LIMIT Troponin-I, QT <0.02 0.0 - 9.553 NG/ML  
METABOLIC PANEL, COMPREHENSIVE Collection Time: 10/26/19  6:55 AM  
Result Value Ref Range Sodium 139 136 - 145 mmol/L Potassium 5.0 3.5 - 5.5 mmol/L Chloride 105 100 - 111 mmol/L  
 CO2 24 21 - 32 mmol/L Anion gap 10 3.0 - 18 mmol/L Glucose 217 (H) 74 - 99 mg/dL BUN 22 (H) 7.0 - 18 MG/DL Creatinine 1.70 (H) 0.6 - 1.3 MG/DL  
 BUN/Creatinine ratio 13 12 - 20 GFR est AA 35 (L) >60 ml/min/1.73m2 GFR est non-AA 29 (L) >60 ml/min/1.73m2 Calcium 7.9 (L) 8.5 - 10.1 MG/DL Bilirubin, total 0.4 0.2 - 1.0 MG/DL  
 ALT (SGPT) 12 (L) 13 - 56 U/L  
 AST (SGOT) 21 10 - 38 U/L Alk. phosphatase 97 45 - 117 U/L Protein, total 6.1 (L) 6.4 - 8.2 g/dL Albumin 3.1 (L) 3.4 - 5.0 g/dL Globulin 3.0 2.0 - 4.0 g/dL A-G Ratio 1.0 0.8 - 1.7 NT-PRO BNP Collection Time: 10/26/19  6:55 AM  
Result Value Ref Range NT pro-BNP 2,420 (H) 0 - 1,800 PG/ML  
MAGNESIUM Collection Time: 10/26/19  6:55 AM  
Result Value Ref Range Magnesium 2.3 1.6 - 2.6 mg/dL POC LACTIC ACID Collection Time: 10/26/19  7:04 AM  
Result Value Ref Range  Lactic Acid (POC) 3.70 (HH) 0.40 - 2.00 mmol/L  
POC G3  
 Collection Time: 10/26/19  7:58 AM  
Result Value Ref Range Device: BIPAP    
 FIO2 (POC) 35 % pH (POC) 7.330 (L) 7.35 - 7.45    
 pCO2 (POC) 45.8 (H) 35.0 - 45.0 MMHG  
 pO2 (POC) 142 (H) 80 - 100 MMHG  
 HCO3 (POC) 24.1 22 - 26 MMOL/L  
 sO2 (POC) 99 (H) 92 - 97 % Base deficit (POC) 2 mmol/L  
 PEEP/CPAP (POC) 6.0 cmH2O  
 PIP (POC) 12 Allens test (POC) YES Total resp. rate 22 Site RIGHT BRACHIAL Specimen type (POC) ARTERIAL Performed by Jourdan Hinojosa Spontaneous timed YES    
URINALYSIS W/ RFLX MICROSCOPIC Collection Time: 10/26/19  9:20 AM  
Result Value Ref Range Color YELLOW Appearance CLEAR Specific gravity 1.008 1.005 - 1.030    
 pH (UA) 6.0 5.0 - 8.0 Protein NEGATIVE  NEG mg/dL Glucose NEGATIVE  NEG mg/dL Ketone NEGATIVE  NEG mg/dL Bilirubin NEGATIVE  NEG Blood NEGATIVE  NEG Urobilinogen 0.2 0.2 - 1.0 EU/dL Nitrites NEGATIVE  NEG Leukocyte Esterase NEGATIVE  NEG    
LACTIC ACID Collection Time: 10/26/19 11:16 AM  
Result Value Ref Range Lactic acid 0.8 0.4 - 2.0 MMOL/L  
CARDIAC PANEL,(CK, CKMB & TROPONIN) Collection Time: 10/26/19 11:16 AM  
Result Value Ref Range CK 57 26 - 192 U/L  
 CK - MB 2.2 <3.6 ng/ml CK-MB Index 3.9 0.0 - 4.0 % Troponin-I, QT 0.96 (H) 0.0 - 0.045 NG/ML Cristal Koch MD

## 2019-10-26 NOTE — ED PROVIDER NOTES
EMERGENCY DEPARTMENT HISTORY AND PHYSICAL EXAM 
 
Date: 10/26/2019 Patient Name: Carmelina Li History of Presenting Illness Chief Complaint Patient presents with  Respiratory Distress History Provided By: Patient EMS and her son by EMS Chief Complaint: Acute respiratory distress Duration: 2 days Timing:  Worsening Location: Denies any pain just windedness Quality: Severe Severity: Severe Modifying Factors: None Associated Symptoms: denies any other associated signs or symptoms Additional History (Context):  
6:59 AM 
Carmelina Li is a 80 y.o. female with PMHX of chronic heart failure mostly diastolic, CKD 4, essential hypertension who presents to the emergency department C/O respiratory difficulty. Paramedics were called for sudden onset of shortness of breath and arrived in her house to discover her sitting upright having difficulty breathing and sats in the 70% range. She does not use home O2. They will put her on CPAP and started nitro tablets sublingual and noticing her blood pressure was 210/110. She denies chest pain then arrives without complaints of chest pain. She does acknowledge leg edema which is an ongoing symptom with her associated with \"some heart problems \". Paramedics started an IV gave her with CPAP but no other medications were added. Her symptoms started insidiously yesterday when today she went to bed. However she woke up this morning and felt more significantly short of breath she denies any fevers chills or ill contact. She has no cough at this timeframe. She felt like she did have a cough yesterday but was never really productive. She denies any nausea vomiting or diarrhea. No sinus symptoms no significant headache she denies any fevers or chills. She also denies any known ill contacts.   She does have a history of CHF and kidney problems but her historical revelation of the symptoms was somewhat slow. She seems quite winded in responding to all her questions however the acuity of the condition in the lab machine seem to be confounding her initial history. CHF CKD and hypertension more mostly extracted from her other hospital records from [facility]. Records indicate she is never been here at this hospital.Pt denies any other sxs or complaints. PCP: Hernesto Keene MD 
 
 
 
Past History Past Medical History: 
History reviewed. No pertinent past medical history. Past Surgical History: 
History reviewed. No pertinent surgical history. Family History: 
History reviewed. No pertinent family history. Social History: 
Social History Tobacco Use  Smoking status: Never Smoker  Smokeless tobacco: Never Used Substance Use Topics  Alcohol use: Not on file  Drug use: Not Currently Allergies: Allergies Allergen Reactions  Penicillins Hives Review of Systems Review of Systems Constitutional: Positive for fatigue. Negative for chills, diaphoresis, fever and unexpected weight change. HENT: Negative for congestion, drooling, ear pain, rhinorrhea, sore throat, tinnitus and trouble swallowing. Eyes: Negative for photophobia, pain, redness and visual disturbance. Respiratory: Positive for cough and shortness of breath. Negative for choking, chest tightness, wheezing and stridor. Cardiovascular: Positive for leg swelling. Negative for chest pain and palpitations. Gastrointestinal: Negative for abdominal distention, abdominal pain, anal bleeding, blood in stool, constipation, diarrhea, nausea and vomiting. Endocrine: Negative for cold intolerance, heat intolerance, polydipsia and polyuria. Genitourinary: Negative for difficulty urinating, dysuria, flank pain, frequency, hematuria and urgency. Musculoskeletal: Negative for arthralgias, back pain and neck pain. Skin: Negative for color change, rash and wound. Allergic/Immunologic: Negative for immunocompromised state. Neurological: Negative for dizziness, seizures, syncope, speech difficulty, light-headedness and headaches. Hematological: Does not bruise/bleed easily. Psychiatric/Behavioral: Negative for agitation, behavioral problems, hallucinations, self-injury and suicidal ideas. The patient is not hyperactive. Physical Exam  
 
Vitals:  
 10/26/19 0651 10/26/19 0654 10/26/19 0700 10/26/19 0700 BP: 150/76  144/64 Pulse: 100  90 Resp: 24  27 Temp:      
SpO2: 100% 100% 90% 98% Weight:      
 
Physical Exam  
Constitutional: She is oriented to person, place, and time. She appears well-developed and well-nourished. No distress. HENT:  
Head: Normocephalic and atraumatic. Right Ear: External ear normal.  
Left Ear: External ear normal.  
Mouth/Throat: Oropharynx is clear and moist. No oropharyngeal exudate. Eyes: Pupils are equal, round, and reactive to light. Conjunctivae and EOM are normal. No scleral icterus. No pallor Neck: Normal range of motion. Neck supple. No JVD present. No tracheal deviation present. No thyromegaly present. Cardiovascular: Normal rate, regular rhythm, S1 normal, S2 normal, normal heart sounds and normal pulses. Pulmonary/Chest: No stridor. She is in respiratory distress. She has rales. Abdominal: Soft. Bowel sounds are normal. She exhibits no distension. There is no tenderness. There is no rebound and no guarding. Musculoskeletal: Normal range of motion. She exhibits no edema or tenderness. No soft tissue injuries Lymphadenopathy:  
  She has no cervical adenopathy. Neurological: She is alert and oriented to person, place, and time. She has normal reflexes. No cranial nerve deficit. Coordination normal.  
Skin: Skin is warm and dry. No rash noted. She is not diaphoretic. No erythema. Psychiatric: She has a normal mood and affect.  Her behavior is normal. Judgment and thought content normal.  
 Nursing note and vitals reviewed. Diagnostic Study Results Labs - Recent Results (from the past 12 hour(s)) EKG, 12 LEAD, INITIAL Collection Time: 10/26/19  6:53 AM  
Result Value Ref Range Ventricular Rate 97 BPM  
 Atrial Rate 97 BPM  
 P-R Interval 142 ms QRS Duration 78 ms Q-T Interval 336 ms  
 QTC Calculation (Bezet) 426 ms Calculated P Axis 50 degrees Calculated R Axis 43 degrees Calculated T Axis 52 degrees Diagnosis Normal sinus rhythm Normal ECG No previous ECGs available CBC WITH AUTOMATED DIFF Collection Time: 10/26/19  6:55 AM  
Result Value Ref Range WBC 6.3 4.6 - 13.2 K/uL  
 RBC 3.20 (L) 4.20 - 5.30 M/uL HGB 9.6 (L) 12.0 - 16.0 g/dL HCT 30.5 (L) 35.0 - 45.0 % MCV 95.3 74.0 - 97.0 FL  
 MCH 30.0 24.0 - 34.0 PG  
 MCHC 31.5 31.0 - 37.0 g/dL  
 RDW 13.5 11.6 - 14.5 % PLATELET 310 158 - 763 K/uL MPV 10.1 9.2 - 11.8 FL  
 NEUTROPHILS 49 40 - 73 % LYMPHOCYTES 35 21 - 52 % MONOCYTES 10 3 - 10 % EOSINOPHILS 5 0 - 5 % BASOPHILS 1 0 - 2 %  
 ABS. NEUTROPHILS 3.1 1.8 - 8.0 K/UL  
 ABS. LYMPHOCYTES 2.2 0.9 - 3.6 K/UL  
 ABS. MONOCYTES 0.6 0.05 - 1.2 K/UL  
 ABS. EOSINOPHILS 0.3 0.0 - 0.4 K/UL  
 ABS. BASOPHILS 0.0 0.0 - 0.1 K/UL  
 DF AUTOMATED CARDIAC PANEL,(CK, CKMB & TROPONIN) Collection Time: 10/26/19  6:55 AM  
Result Value Ref Range CK 58 26 - 192 U/L  
 CK - MB <1.0 <3.6 ng/ml CK-MB Index  0.0 - 4.0 % CALCULATION NOT PERFORMED WHEN RESULT IS BELOW LINEAR LIMIT Troponin-I, QT <0.02 0.0 - 7.823 NG/ML  
METABOLIC PANEL, COMPREHENSIVE Collection Time: 10/26/19  6:55 AM  
Result Value Ref Range Sodium 139 136 - 145 mmol/L Potassium 5.0 3.5 - 5.5 mmol/L Chloride 105 100 - 111 mmol/L  
 CO2 24 21 - 32 mmol/L Anion gap 10 3.0 - 18 mmol/L Glucose 217 (H) 74 - 99 mg/dL BUN 22 (H) 7.0 - 18 MG/DL  Creatinine 1.70 (H) 0.6 - 1.3 MG/DL  
 BUN/Creatinine ratio 13 12 - 20    
 GFR est AA 35 (L) >60 ml/min/1.73m2 GFR est non-AA 29 (L) >60 ml/min/1.73m2 Calcium 7.9 (L) 8.5 - 10.1 MG/DL Bilirubin, total 0.4 0.2 - 1.0 MG/DL  
 ALT (SGPT) 12 (L) 13 - 56 U/L  
 AST (SGOT) 21 10 - 38 U/L Alk. phosphatase 97 45 - 117 U/L Protein, total 6.1 (L) 6.4 - 8.2 g/dL Albumin 3.1 (L) 3.4 - 5.0 g/dL Globulin 3.0 2.0 - 4.0 g/dL A-G Ratio 1.0 0.8 - 1.7 NT-PRO BNP Collection Time: 10/26/19  6:55 AM  
Result Value Ref Range NT pro-BNP 2,420 (H) 0 - 1,800 PG/ML  
POC LACTIC ACID Collection Time: 10/26/19  7:04 AM  
Result Value Ref Range Lactic Acid (POC) 3.70 (HH) 0.40 - 2.00 mmol/L Radiologic Studies -  
Preliminary x-rays were ordered however they have not been interpreted by radiology staff. XR CHEST PORT    (Results Pending) RADIOLOGY FINDINGS Chest x-ray shows vascular congestion with bilateral pleural effusion, opacity in the right lower lobe concerning for infiltrate Pending review by Radiologist 
Recorded by Tanya Montero DO 
 
 
CT Results  (Last 48 hours) None CXR Results  (Last 48 hours) None Medications given in the ED- Medications  
sodium chloride (NS) flush 5-10 mL (has no administration in time range)  
cefTRIAXone (ROCEPHIN) injection 1 g (has no administration in time range) azithromycin (ZITHROMAX) 500 mg in 0.9% sodium chloride 250 mL (VIAL-MATE) (has no administration in time range)  
furosemide (LASIX) injection 40 mg (has no administration in time range) Medical Decision Making I am the first provider for this patient. I reviewed the vital signs, available nursing notes, past medical history, past surgical history, family history and social history. Vital Signs-Reviewed the patient's vital signs. Pulse Oximetry Analysis -98% % on CPAP Cardiac Monitor: 
Rate: 95 bpm 
Rhythm: Normal sinus rhythm EKG interpretation: (Preliminary) 6:57 AM  
 Normal sinus rhythm, rate 97, no visible arrhythmia, no ischemic changes, there are some subtle motion artifacts found on her EKG. EKG read by Jaspal Eason MD at 6:57 AM 
 
Records Reviewed: Nursing Notes and Old Medical Records Provider Notes (Medical Decision Making): This pleasant lady arrives with symptoms of acute exacerbation of chronic CHF. She does not have any fevers or chills or red flags to the signal code sepsis. Her blood pressure was significantly elevated and treated directly with paramedics and respiratory support was given along the way. With significant improvement of her symptoms she denies any chest pain and EKG does not show any signs of acute ischemia however this is certainly in our differential.  Is possible but very unlikely this represents pulmonary embolism. We should strongly consider CHF as a primary provocateur as well as overlying CKD lactic acid was run as a screening which was significantly high and therefore code sepsis was next signal.  She does have a significant history of CHF and CKD and therefore aggressive fluid resuscitation will be avoided at this initial timeframe. Antibiotics to cover for community acquired pneumonia were initiated early. Gentle hydration would be appropriate in this patient with careful following to her lactic acid. Procedures: 
Procedures ED Course:  
7:22 AM 
 Initial assessment performed. The patients presenting problems have been discussed, and they are in agreement with the care plan formulated and outlined with them. I have encouraged them to ask questions as they arise throughout their visit. Patient arrived relatively early at the time of shift change. Initiation has been started quickly however further care will be continued on by the dayshift. Dr. Max Flowers was brought somewhat up to speed the patient condition. Patient should be admitted to the hospital for her emergency treatment. 8:12 AM Discussed patient's history, exam, and available diagnostics results with This note was partially transcribed via voice recognition software. Although efforts have been made to catch any discrepancies, it may contain sound alike words, grammatical errors, or nonsensical words. Dr. Hyun Valladares, intensivist, who agree with admission to the ICU. Diagnosis and Disposition 7:58 AM 
I have spent 40 minutes of critical care time involved in lab review, consultations with specialist, family decision-making, and documentation. During this entire length of time I was immediately available to the patient. Critical Care: The reason for providing this level of medical care for this critically ill patient was due a critical illness that impaired one or more vital organ systems such that there was a high probability of imminent or life threatening deterioration in the patients condition. This care involved high complexity decision making to assess, manipulate, and support vital system functions, to treat this degreee vital organ system failure and to prevent further life threatening deterioration of the patients condition. Core Measures: 
For Hospitalized Patients: 
 
1. Hospitalization Decision Time: The decision to hospitalize the patient was made by Xuan Montero DO at 8:00 AM on 10/26/2019 2. Aspirin: Aspirin was not given because the patient did not present with a stroke at the time of their Emergency Department evaluation 7:58 AM  Patient is being admitted to the hospital by Dallas Paul MD 
. The results of their tests and reasons for their admission have been discussed with them and/or available family. They convey agreement and understanding for the need to be admitted and for their admission diagnosis. CONDITIONS ON ADMISSION: 
Sepsis is present at the time of admission. Pneumonia is present at the time of admission. MRSA is not present at the time of admission. Wound infection is not present at the time of admission. Pressure Ulcer is not present at the time of admission. CLINICAL IMPRESSION: 
 
1. Acute respiratory distress 2. Acute congestive heart failure, unspecified heart failure type ([address] 3. Acute respiratory failure with hypoxia (HCC)   
 
_______________________________

## 2019-10-26 NOTE — ED TRIAGE NOTES
Pt arrives via ems in resp distress from home. Pt states she felt sob when going to be last night. Pt arrives on cpap from ems.

## 2019-10-26 NOTE — PROGRESS NOTES
PATIENT ARRIVED IN ER ON CPAP VIA EMS IN ACUTE RESPIRATORY DISTRESS. RA SATS HAD BEEN 77%. PATIENT WAS PLACED ON BIPAP @ 12/6 AND FIO2 WAS RAPIDLY TITRATED FROM 100% TO 35%.

## 2019-10-26 NOTE — PROGRESS NOTES
0900 Patient arrived from ED on bipap 
1200 Reassessment, patient off bipap and tolerating nasal canula 
1600 Reassessment, no changes 1803 Lab called, trop trending up, Dr Leela Jenkins notified and asked me to notify Misael Leary to see if he wants to start heparin.   
Raúl Brewer called, do not start heparin, get 12 lead EKG 
1845 EKG showed NSR, normal EKG 
1920 Report given to 801Sydni Carranza

## 2019-10-26 NOTE — ACP (ADVANCE CARE PLANNING)
AMD Reviewed  provided education on Advance Medical Directives and left a copy with the patient. Patient was encouraged to call the  if it is completed while here or if more clarification was needed.  completed visit with patient and offered Pastoral care. Chaplains will continue to follow and will provide pastoral care as needed or requested. Rev. Anneliese Denislain,Spiritual Care [facility] 
[phone]

## 2019-10-27 NOTE — PROGRESS NOTES
1920: Report received from Glendy Lezama, RN, pt stable without complaints, white board updated, resting comfortably on 2L O2 NC. Will continue to monitor.

## 2019-10-27 NOTE — PROGRESS NOTES
Cardiology Progress Note 
 
 
10/27/2019 4:53 PM 
 
Admit Date: 10/26/2019 Admit Diagnosis: [facility] [I50.9] Respiratory failure ([facility] [J96.90] Subjective:  
 
Verna Marroquin denies chest pain, chest pressure/discomfort, dyspnea. Visit Vitals /54 Pulse 63 Temp 98.4 °F (36.9 °C) Resp 18 Wt 63.5 kg (140 lb) SpO2 94% Current Facility-Administered Medications Medication Dose Route Frequency  sodium chloride (NS) flush 5-10 mL  5-10 mL IntraVENous PRN  
 ondansetron (ZOFRAN) injection 4 mg  4 mg IntraVENous Q6H PRN  
 acetaminophen (TYLENOL) tablet 650 mg  650 mg Oral Q6H PRN  
 ipratropium (ATROVENT) 0.02 % nebulizer solution 0.5 mg  0.5 mg Nebulization TID RT  
 furosemide (LASIX) injection 40 mg  40 mg IntraVENous Q12H  
 azithromycin (ZITHROMAX) 500 mg in 0.9% sodium chloride 250 mL (VIAL-MATE)  500 mg IntraVENous Q24H  cefTRIAXone (ROCEPHIN) 2 g in sterile water (preservative free) 20 mL IV syringe  2 g IntraVENous Q24H  
 aspirin tablet 325 mg  325 mg Oral DAILY  atorvastatin (LIPITOR) tablet 40 mg  40 mg Oral DAILY  carvedilol (COREG) tablet 12.5 mg  12.5 mg Oral BID WITH MEALS  
 PARoxetine (PAXIL) tablet 20 mg  20 mg Oral DAILY  benzocaine-menthol (CEPACOL) lozenge 1 Lozenge  1 Lozenge Mucous Membrane PRN Objective:  
  
Physical Exam: 
Visit Vitals /54 Pulse 63 Temp 98.4 °F (36.9 °C) Resp 18 Wt 63.5 kg (140 lb) SpO2 94% General Appearance:  Well developed, well nourished,alert and oriented x 3, and individual in no acute distress. Ears/Nose/Mouth/Throat:   Hearing grossly normal. 
  
    Neck: Supple. Chest:   Lungs clear to auscultation bilaterally. Cardiovascular:  Regular rate and rhythm, S1, S2 normal, no murmur. Abdomen:   Soft, non-tender, bowel sounds are active. Extremities: No edema bilaterally. Skin: Warm and dry. Data Review:  
Labs: Recent Results (from the past 24 hour(s)) CARDIAC PANEL,(CK, CKMB & TROPONIN) Collection Time: 10/26/19  5:05 PM  
Result Value Ref Range CK 70 26 - 192 U/L  
 CK - MB 2.4 <3.6 ng/ml CK-MB Index 3.4 0.0 - 4.0 % Troponin-I, QT 1.38 (HH) 0.0 - 0.045 NG/ML  
EKG, 12 LEAD, SUBSEQUENT Collection Time: 10/26/19  6:44 PM  
Result Value Ref Range Ventricular Rate 63 BPM  
 Atrial Rate 63 BPM  
 P-R Interval 138 ms QRS Duration 78 ms Q-T Interval 410 ms QTC Calculation (Bezet) 419 ms Calculated P Axis 35 degrees Calculated R Axis 11 degrees Calculated T Axis 37 degrees Diagnosis Normal sinus rhythm Normal ECG When compared with ECG of 26-OCT-2019 06:53, 
Vent. rate has decreased BY  34 BPM 
Confirmed by Marly Neville MD, -- ([id number]) on 10/27/2019 2:18:24 PM 
  
CARDIAC PANEL,(CK, CKMB & TROPONIN) Collection Time: 10/26/19 11:25 PM  
Result Value Ref Range CK 70 26 - 192 U/L  
 CK - MB 1.8 <3.6 ng/ml CK-MB Index 2.6 0.0 - 4.0 % Troponin-I, QT 1.06 (HH) 0.0 - 3.278 NG/ML  
METABOLIC PANEL, COMPREHENSIVE Collection Time: 10/27/19 10:20 AM  
Result Value Ref Range Sodium 137 136 - 145 mmol/L Potassium 4.1 3.5 - 5.5 mmol/L Chloride 100 100 - 111 mmol/L  
 CO2 30 21 - 32 mmol/L Anion gap 7 3.0 - 18 mmol/L Glucose 113 (H) 74 - 99 mg/dL BUN 28 (H) 7.0 - 18 MG/DL Creatinine 1.84 (H) 0.6 - 1.3 MG/DL  
 BUN/Creatinine ratio 15 12 - 20 GFR est AA 32 (L) >60 ml/min/1.73m2 GFR est non-AA 26 (L) >60 ml/min/1.73m2 Calcium 8.4 (L) 8.5 - 10.1 MG/DL Bilirubin, total 0.4 0.2 - 1.0 MG/DL  
 ALT (SGPT) 12 (L) 13 - 56 U/L  
 AST (SGOT) 17 10 - 38 U/L Alk. phosphatase 93 45 - 117 U/L Protein, total 6.3 (L) 6.4 - 8.2 g/dL Albumin 3.2 (L) 3.4 - 5.0 g/dL Globulin 3.1 2.0 - 4.0 g/dL A-G Ratio 1.0 0.8 - 1.7    
CBC WITH AUTOMATED DIFF Collection Time: 10/27/19 10:20 AM  
Result Value Ref Range WBC 5.4 4.6 - 13.2 K/uL RBC 3.29 (L) 4.20 - 5.30 M/uL HGB 9.9 (L) 12.0 - 16.0 g/dL HCT 30.7 (L) 35.0 - 45.0 % MCV 93.3 74.0 - 97.0 FL  
 MCH 30.1 24.0 - 34.0 PG  
 MCHC 32.2 31.0 - 37.0 g/dL  
 RDW 13.7 11.6 - 14.5 % PLATELET 125 685 - 936 K/uL MPV 10.3 9.2 - 11.8 FL  
 NEUTROPHILS 69 40 - 73 % LYMPHOCYTES 19 (L) 21 - 52 % MONOCYTES 8 3 - 10 % EOSINOPHILS 3 0 - 5 % BASOPHILS 1 0 - 2 %  
 ABS. NEUTROPHILS 3.7 1.8 - 8.0 K/UL  
 ABS. LYMPHOCYTES 1.0 0.9 - 3.6 K/UL  
 ABS. MONOCYTES 0.4 0.05 - 1.2 K/UL  
 ABS. EOSINOPHILS 0.2 0.0 - 0.4 K/UL  
 ABS. BASOPHILS 0.0 0.0 - 0.1 K/UL  
 DF AUTOMATED MAGNESIUM Collection Time: 10/27/19 10:20 AM  
Result Value Ref Range Magnesium 1.9 1.6 - 2.6 mg/dL ECHO ADULT COMPLETE Collection Time: 10/27/19  1:53 PM  
Result Value Ref Range Right Atrial Area 4C 13.15 cm2 AO ASC D 2.34 cm Aortic Valve Systolic Peak Velocity 037.66 cm/s AoV VTI 28.65 cm Aortic Valve Area by Continuity of Peak Velocity 1.7 cm2 Aortic Valve Area by Continuity of VTI 1.7 cm2 AoV PG 7.3 mmHg LVIDd 3.89 (A) 3.9 - 5.3 cm  
 LVPWd 1.25 (A) 0.6 - 0.9 cm LVIDs 2.55 cm IVSd 1.00 (A) 0.6 - 0.9 cm  
 LV ED Vol A2C 42.1 mL  
 LV ES Vol A4C 17.0 mL  
 LV ES Vol BP 17.0 (A) 19 - 49 mL  
 LVOT d 1.92 cm  
 LVOT Peak Velocity 77.93 cm/s LVOT Peak Gradient 2.4 mmHg LVOT VTI 16.43 cm LV E' Septal Velocity 8.00 cm/s LV E' Lateral Velocity 8.00 cm/s  
 MVA (PHT) 1.8 cm2  
 MV A Mian 136.11 cm/s  
 MV E Mian 180.24 cm/s  
 MV E/A 1.32   
 MV Mean Gradient 3.1 mmHg Mitral Valve Annulus Velocity Time Integral 49.48 cm RVIDd 3.37 cm Aortic Valve Systolic Mean Gradient 4.1 mmHg BP EF 62.6 55 - 100 % LV Ejection Fraction MOD 4C 63 % LV Ejection Fraction MOD 2C 64 % LA Vol 4C 48.46 22 - 52 mL  
 LA Area 4C 18.8 cm2 LV Mass .8 (A) 67 - 162 g  
 LVPWs 0.00 cm  
 E/E' lateral 22.53   
 E/E' septal 22.53   
 TAPSV 2.0 cm/s  
 MV Peak Gradient 10.8 mmHg E/E' ratio (averaged) 22.53 LV ES Vol A2C 15.2 mL  
 LV ED Vol A4C 45.5 mL Mitral Valve E Wave Deceleration Time 432.9 ms  
 Mitral Valve Pressure Half-time 119.1 ms Left Atrium Major Axis 4.51 cm Triscuspid Valve Regurgitation Peak Gradient 34.8 mmHg Mitral Valve Max Velocity 163.99 cm/s MVA VTI 1.0 cm2 LV ED Vol BP 45.3 (A) 56 - 104 ml  
 TR Max Velocity 294.89 cm/s Right Atrial Volume 34.7 ml Left Ventricular Fractional Shortening by 2D 25.876123354 % Left Ventricular Outflow Tract Mean Gradient 1.24637570443278 mmHg Left Ventricular Outflow Tract Mean Velocity 4.90366434252998 cm/s Mitral Valve Deceleration Drew 9.600913 Mitral valve mean inflow velocity 0.6200526 m/s AV Velocity Ratio 0.58   
 AV VTI Ratio 0.6 Aortic valve mean velocity 3.91553102154841 m/s IVC proximal 1.46 cm Telemetry: normal sinus rhythm Assessment:  
 
Principal Problem: 
  Res[facility] failure ([facility] (10/26/2019) Active Problems: 
  Heart failure ([facility] (7/9/2018) Overview: Last Assessment & Plan:  
    She has biventricular congestive heart failure. Currently she seems well  
    compensated with clear lungs and with excellent blood pressure. Continue  
    with current medications as listed on the chart including Lasix 40 mg  
    daily, Coreg 12.5 mg b.i.d. and Aldactone 25 mg daily. Low-sodium diet. Benign essential hypertension (10/25/2012) Overview: Last Assessment & Plan:  
    Blood pressure controlled on current regimen of Coreg 12.5 mg twice daily,  
    furosemide 40 mg every other day, Aldactone 25 mg daily. Continue  
    low-sodium diet. Will check electrolytes and renal function with today's  
    labs. Chronic diastolic congestive heart failure ([facility] (4/15/2019) Overview: Last Assessment & Plan:  
    Appears to be well compensated presently. Her weight is now down to 133 on our scales and the patient tells me that she currently weighs 130 lb on  
    her scale. I believe that this is probably a good dry weight for her so  
    she may continue to rely on her scale and shoot for a dry weight of 130 lb  
    as her baseline. If she starts to drift upward with her weight or notices  
    puffiness or swelling in her legs or shortness of breath she is to start  
    taking her furosemide every day and call us. Otherwise continue current  
    dose of furosemide 40 mg every other day. Continue Aldactone 25 mg daily,  
    continue Coreg 12.5 mg b.i.d. continue low sodium diet. Will check  
    metabolic profile today to reassess serum electrolytes and renal function  
    given that she is on diuretic therapy. Hypertensive heart disease (10/5/2011) Hypothyroidism (10/25/2012) Overview: Last Assessment & Plan: Most recent thyroid function studies done on 04/15/2019 show TSH and free T4 of 3.52 and 0.93 respectively. Notably she is not on thyroid  
    supplement so there is no current evidence that she is truly hypothyroid. Will continue to follow this periodically. Iron deficiency anemia (11/6/2018) Overview: Last Assessment & Plan: Will check iron profile with today's labs. Will also check ferritin  
    level. Check CBC. Currently on iron sulfate supplement every 3rd day but  
    may be able to come off of that. Stage 4 chronic kidney disease ([facility] (8/23/2018) Overview: Last Assessment & Plan:  
    Last GFR on record was 25 on 04/15/2019. Will check chemistry profile  
    with today's labs. She is still followed by Nephrology but unfortunately  
    missed her last appointment with them on 06/26/2019. I told her that it  
    was important that she be seen on a regular basis by Nephrology. Currently her rescheduled appointment is on 12/26/2019. I am not certain that that long of weight will be appropriate for her but will wait and see  
    what current labs look like. If they are stable with regards to renal  
    function I suppose she can wait till December for her next visit. Continue current antihypertensive regimen. Hopefully with the every other  
    day dosage Ng of Lasix she will be maintaining stable BUN and creatinine. Plan:  
 
Patient is comfortable on no O2. Continue current measures. Echo done. Follow.  
 
 
Jason Nash MD

## 2019-10-27 NOTE — PROGRESS NOTES
Hospitalist Progress Note Patient: Sg Conway MRN: [id number]  CSN: [id number] YOB: 1935  Age: 80 y.o. Sex: female DOA: 10/26/2019 LOS:  LOS: 1 day Chief Complaint: 
 
 
Admitted with acute respiratory failure requiring BiPAP has been off of BiPAP all night and has diuresed well her troponin did peak but is now trending downwards she denies chest pain or shortness of breath she feels like she is overall back to her regular breathing she admits to having had a few hotdogs this week with 5 pound weight gain Assessment/Plan Cardiovascular #1 Digestive heart failure with mild elevation in troponin now off of BiPAP she may be transferred to telemetry Pulmonary: She should be n.p.o. while she is on the BiPAP will try to quickly transition her to nasal cannula pulmonary is consulted Can DC antibiotics if okay with pulmonary GI: 
IV Protonix for protection 
  
Hematology: DVT prevention with heparin subcu 
  
: Already with stage III-IV chronic kidney disease will have to monitor electrolytes per protocol we will hold her spironolactone for now due to creatinine elevation and mild hyperkalemia 
  
Endo:  
on sliding scale insulin 
  
Psychiatry: 
Contracts to safety she is a full code her son confirms this will continue her Paxil 20 mg daily 
  
  
 
Patient Active Problem List  
Diagnosis Code  Respiratory failure (HCC) J96.90  
 Heart failure (HCC) I50.9  Benign essential hypertension I10  Chronic diastolic congestive heart failure (HCC) I50.32  
 Cobalamin deficiency E53.8  Hypercholesterolemia E78.00  Hypertensive heart disease I11.9  Hypothyroidism E03.9  Iron deficiency anemia D50.9  Stage 4 chronic kidney disease ([facility] N18.4 Subjective: 
 
 
 
Review of systems: 
 
Constitutional: denies fevers, chills, myalgias Respiratory: denies SOB, cough Cardiovascular: denies chest pain, palpitations Gastrointestinal: denies nausea, vomiting, diarrhea Vital signs/Intake and Output: 
Visit Vitals BP (!) 107/39 Pulse 72 Temp 97.8 °F (36.6 °C) Resp 18 Wt 63.5 kg (140 lb 1.6 oz) SpO2 100% Current Shift:  No intake/output data recorded. Last three shifts:  10/25 1901 - 10/27 0700 In: 0 Out: 1900 [ZARF:[id number]] Exam: 
 
General: Well developed, alert, NAD, OX3 Head/Neck: NCAT, supple, No masses, No lymphadenopathy CVS:Regular rate and rhythm, no M/R/G, S1/S2 heard, no thrill Lungs:Clear to auscultation bilaterally, no wheezes, rhonchi, or rales Abdomen: Soft, Nontender, No distention, Normal Bowel sounds, No hepatomegaly Extremities: No C/C/E, pulses palpable 2+ Skin:normal texture and turgor, no rashes, no lesions Neuro:grossly normal , follows commands Psych:appropriate Labs: Results:  
   
Chemistry Recent Labs 10/26/19 
8999 *   
K 5.0  
 CO2 24 BUN 22* CREA 1.70* CA 7.9* AGAP 10 BUCR 13 AP 97  
TP 6.1* ALB 3.1*  
GLOB 3.0 AGRAT 1.0  
  
CBC w/Diff Recent Labs 10/26/19 
1613 WBC 6.3  
RBC 3.20* HGB 9.6* HCT 30.5*  GRANS 49 LYMPH 35 EOS 5 Cardiac Enzymes Recent Labs 10/26/19 
2325 10/26/19 
1705 CPK 70 70 CKND1 2.6 3.4 Coagulation No results for input(s): PTP, INR, APTT, INREXT in the last 72 hours. Lipid Panel No results found for: CHOL, CHOLPOCT, CHOLX, CHLST, CHOLV, 830142, HDL, HDLP, LDL, LDLC, DLDLP, 280240, VLDLC, VLDL, TGLX, TRIGL, TRIGP, TGLPOCT, CHHD, CHHDX  
BNP No results for input(s): BNPP in the last 72 hours. Liver Enzymes Recent Labs 10/26/19 
5556 TP 6.1* ALB 3.1* AP 97 SGOT 21 Thyroid Studies Lab Results Component Value Date/Time TSH 2.48 10/26/2019 11:16 AM  
    
Procedures/imaging: see electronic medical records for all procedures/Xrays and details which were not copied into this note but were reviewed prior to creation of Plan Mary Roger MD

## 2019-10-27 NOTE — PROGRESS NOTES
0700 Report received from [facility] 
0730 Assessment complete, patient was able to get up to bedside commode, stayed off of bipap all night and is tolerating NC well 
1000 Dr Ariadne Meyer present, patient can transfer to telemetry, PT/OT eval  
1030 Report given to Anju Hebert on 3N, patient transferred upstairs for routine progression of care

## 2019-10-27 NOTE — PROGRESS NOTES
Problem: Mobility Impaired (Adult and Pediatric) Goal: *Acute Goals and Plan of Care (Insert Text) Description Physical Therapy Goals Initiated 10/27/2019 and to be accomplished within 2-4 day(s) 1. Patient will move from supine <> sit with mod I in prep for out of bed activity and change of position. 2.  Patient will perform sit<> stand with mod I/LRAD in prep for transfers/ambulation. 3.  Patient will transfer from bed <> chair with mod I/LRAD for time up in chair for completion of ADL activity. 4.  Patient will ambulate 150 feet with mod I/LRAD for improved functional mobility/safe discharge. 5.  Patient will ascend/descend 3-5 stairs with handrail(s) with minimal assistance/contact guard assist for home re-entry as needed. Outcome: Progressing Towards Goal 
 
PHYSICAL THERAPY EVALUATION Patient: Cristian Herron (41 y.o. female) Date: 10/27/2019 Primary Diagnosis: CHF (congestive heart failure) ([facility] [I50.9] Respiratory failure ([facility] [J96.90] Precautions:  Fall ASSESSMENT : 
Based on the objective data described below, the patient is an 81 yo F seen on telemetry unit and presents with decreased bilat shoulder ROM (since neck surgery per pt report) and mild decreased functional strength BLE's. Pt with limitations in functional mobility, requiring supervision for bed mobility, CGA during transfers and able to participate in GT with [address] 150ft in mchugh. Shara tends to be increased and posture slightly forward. Pt amb with QC or Rollator outside home and no device inside home, but sometimes furniture walks PTA. Pt lives with son in one story home. Reports no pain during this session. Pt left back supine in bed with all needs in reach. Recommend HHPT for follow up physical therapy upon discharge to reach maximal level of independence/safety with functional mobility.   
 
Pt Education: Role of physical therapy in acute care setting, fall prevention and safety/technique during functional mobility tasks Patient will benefit from skilled intervention to address the above impairments. Patients rehabilitation potential is considered to be Good Factors which may influence rehabilitation potential include:  
[x]         None noted 
[]         Mental ability/status []         Medical condition 
[]         Home/family situation and support systems 
[]         Safety awareness 
[]         Pain tolerance/management 
[]         Other: PLAN : 
Recommendations and Planned Interventions: 
[x]           Bed Mobility Training             []    Neuromuscular Re-Education 
[x]           Transfer Training                   []    Orthotic/Prosthetic Training 
[x]           Gait Training                          []    Modalities [x]           Therapeutic Exercises          []    Edema Management/Control 
[x]           Therapeutic Activities            [x]    Patient and Family Training/Education 
[]           Other (comment): Frequency/Duration: Patient will be followed by physical therapy 1-2 times per day to address goals. Discharge Recommendations: Home Health Further Equipment Recommendations for Discharge: N/A  
 
SUBJECTIVE:  
Patient stated I feel pretty good.  OBJECTIVE DATA SUMMARY:  
History reviewed. No pertinent past medical history. History reviewed. No pertinent surgical history. Barriers to Learning/Limitations: None Compensate with: N/A Prior Level of Function/Home Situation: amb with QC or Rollator outside home and no device inside home but sometimes furniture walks Home Situation Home Environment: Private residence # Steps to Enter: 3 Rails to Enter: Yes Hand Rails : Right One/Two Story Residence: One story Living Alone: No 
Support Systems: Child(woody) Patient Expects to be Discharged to[de-identified] Private residence Current DME Used/Available at Home: Teri Barbosa, quad, Walker, rollator, Transfer bench Tub or Shower Type: Tub/Shower combination Critical Behavior: 
Neurologic State: Alert; Appropriate for age Orientation Level: Oriented X4 Cognition: Follows commands Psychosocial 
Patient Behaviors: Calm; Cooperative Purposeful Interaction: Yes Pt Identified Daily Priority: Clinical issues (comment) [employer] Process: Nurture loving kindness;Create healing environment; Attend basic human needs Caring Interventions: Reassure; Therapeutic modalities Reassure: Therapeutic listening; Informing;Caring rounds Therapeutic Modalities: Deep breathing;Humor; Intentional therapeutic touch Skin Condition/Temp: Dry;Warm 
Skin Integrity: Intact Skin Integumentary Skin Color: Appropriate for ethnicity Skin Condition/Temp: Dry;Warm 
Skin Integrity: Intact Turgor: Epidermis thin w/ loss of subcut tissue Hair Growth: Sparce Varicosities: Present Strength:   
Strength: Generally decreased, functional 
Tone & Sensation:  
Sensation: Intact(except reports toes feel \"alseep\") Range Of Motion: 
AROM: Generally decreased, functional, bilateral shoulders elevation ~ deg PROM: Generally decreased, functional 
Functional Mobility: 
Bed Mobility: 
Supine to Sit: Supervision Sit to Supine: Supervision Scooting: Supervision Transfers: 
Sit to Stand: Contact guard assistance Stand to Sit: Contact guard assistance Balance:  
Sitting: Intact Standing: Intact; With support(RW) 
Ambulation/Gait Training: 
Distance (ft): 150 Feet (ft) Assistive Device: Gait belt;Walker, rolling Ambulation - Level of Assistance: Contact guard assistance Gait Description (WDL): Exceptions to [facility] Gait Abnormalities: (slightly forward posture) Speed/Shara: Accelerated Pain: 
Pain Scale 1: Numeric (0 - 10) Pain Intensity 1: 0 Activity Tolerance:  
Fair Please refer to the flowsheet for vital signs taken during this treatment. After treatment:  
[]         Patient left in no apparent distress sitting up in chair [x]         Patient left in no apparent distress in bed 
[x]         Call bell left within reach [x]         Nursing notified 
[]         Caregiver present 
[]         Bed alarm activated COMMUNICATION/EDUCATION:  
[x]         Fall prevention education was provided and the patient/caregiver indicated understanding. [x]         Patient/family have participated as able in goal setting and plan of care. [x]         Patient/family agree to work toward stated goals and plan of care. []         Patient understands intent and goals of therapy, but is neutral about his/her participation. []         Patient is unable to participate in goal setting and plan of care. Eval Complexity: History: LOW Complexity : Zero comorbidities / personal factors that will impact the outcome / POCExam:LOW Complexity : 1-2 Standardized tests and measures addressing body structure, function, activity limitation and / or participation in recreation  Presentation: LOW Complexity : Stable, uncomplicated  Clinical Decision Making:Low Complexity amb with RW CGA Overall Complexity:LOW Thank you for this referral. 
Sharmaine Munoz, PT Time Calculation: 20 mins

## 2019-10-27 NOTE — PROGRESS NOTES
Bedside and Verbal shift change report given to RICK Musa (oncoming nurse) by Seble Barfield (offgoing nurse). Report included the following information SBAR, Kardex, MAR and Recent Results. SITUATION: 
Code Status: No Order Reason for Admission: CHF (congestive heart failure) ([facility] [address] [I50.9] Respiratory failure ([facility] [address] [J96.90] Hospital day: 1 Problem List:  
   
Hospital Problems  Never Reviewed Codes Class Noted POA * (Principal) Respiratory failure ([facility] [address] ICD-10-CM: J96.90 ICD-9-CM: 518.81  10/26/2019 Yes Chronic diastolic congestive heart failure (HCC) ICD-10-CM: I50.32 
ICD-9-CM: 428.32, 428.0  4/15/2019 Yes Overview Signed 10/26/2019  8:03 AM by Fern Luu MD  
  Last Assessment & Plan:  
Appears to be well compensated presently. Her weight is now down to 133 on our scales and the patient tells me that she currently weighs 130 lb on her scale. I believe that this is probably a good dry weight for her so she may continue to rely on her scale and shoot for a dry weight of 130 lb as her baseline. If she starts to drift upward with her weight or notices puffiness or swelling in her legs or shortness of breath she is to start taking her furosemide every day and call us. Otherwise continue current dose of furosemide 40 mg every other day. Continue Aldactone 25 mg daily, continue Coreg 12.5 mg b.i.d. continue low sodium diet. Will check metabolic profile today to reassess serum electrolytes and renal function given that she is on diuretic therapy. Iron deficiency anemia ICD-10-CM: D50.9 ICD-9-CM: 280.9  11/6/2018 Yes Overview Signed 10/26/2019  8:03 AM by Fern Luu MD  
  Last Assessment & Plan: Will check iron profile with today's labs. Will also check ferritin level. Check CBC. Currently on iron sulfate supplement every 3rd day but may be able to come off of that. Stage 4 chronic kidney disease ([facility] [address] ICD-10-CM: N18.4 ICD-9-CM: 585.4  8/23/2018 Yes Overview Signed 10/26/2019  8:03 AM by Mayelin Lei MD  
  Last Assessment & Plan:  
Last GFR on record was 25 on 04/15/2019. Will check chemistry profile with today's labs. She is still followed by Nephrology but unfortunately missed her last appointment with them on 06/26/2019. I told her that it was important that she be seen on a regular basis by Nephrology. Currently her rescheduled appointment is on 12/26/2019. I am not certain that that long of weight will be appropriate for her but will wait and see what current labs look like. If they are stable with regards to renal function I suppose she can wait till December for her next visit. Continue current antihypertensive regimen. Hopefully with the every other day dosage Ng of Lasix she will be maintaining stable BUN and creatinine. Heart failure ([facility] [address] ICD-10-CM: I50.9 ICD-9-CM: 428.9  7/9/2018 Yes Overview Signed 10/26/2019  8:03 AM by Mayelin Lei MD  
  Last Assessment & Plan:  
She has biventricular congestive heart failure. Currently she seems well compensated with clear lungs and with excellent blood pressure. Continue with current medications as listed on the chart including Lasix 40 mg daily, Coreg 12.5 mg b.i.d. and Aldactone 25 mg daily. Low-sodium diet. Benign essential hypertension ICD-10-CM: I10 
ICD-9-CM: 401.1  10/25/2012 Yes Overview Signed 10/26/2019  8:03 AM by Mayelin Lei MD  
  Last Assessment & Plan:  
Blood pressure controlled on current regimen of Coreg 12.5 mg twice daily, furosemide 40 mg every other day, Aldactone 25 mg daily. Continue low-sodium diet. Will check electrolytes and renal function with today's labs. Hypothyroidism ICD-10-CM: E03.9 ICD-9-CM: 244.9  10/25/2012 Yes Overview Signed 10/26/2019  8:03 AM by Mayelin Lei MD  
  Last Assessment & Plan: Most recent thyroid function studies done on 04/15/2019 show TSH and free T4 of 3.52 and 0.93 respectively. Notably she is not on thyroid supplement so there is no current evidence that she is truly hypothyroid. Will continue to follow this periodically. Hypertensive heart disease ICD-10-CM: I11.9 ICD-9-CM: 402.90  10/5/2011 Yes BACKGROUND: 
 Past Medical History: History reviewed. No pertinent past medical history. Patient taking anticoagulants no Patient has a defibrillator: no  
 History of shots YES for example, flu, pneumonia, tetanus Isolation History NO for example, MRSA, CDiff ASSESSMENT: 
Changes in Assessment Throughout Shift: no 
Significant Changes in 24 hours (for example, RR/code, fall) Patient has Central Line: no Reasons if yes: n/a Patient has Dong Cath: no Reasons if yes: n/a Mobility Issues PT 
IV Patency OR Checklist 
Pending Tests Last Vitals: 
Vitals w/ MEWS Score (last day) Date/Time MEWS Score Pulse Resp Temp BP Level of Consciousness SpO2  
 10/27/19 0453  1  72  18  98.2 °F (36.8 °C)  (!) 107/39  Alert    
 10/27/19 0017    68  18  98 °F (36.7 °C)  102/43    98 % 10/26/19 2304    63  18    93/41  Responds to Voice  100 % 10/26/19 1901  2  68  18  98.6 °F (37 °C)  97/45  Alert  100 % 10/26/19 1700    68  19    117/49    100 % 10/26/19 1600    74  21    135/64    100 % 10/26/19 1500    63  17    113/49    99 % 10/26/19 1416              99 % 10/26/19 1400    68  20    121/55    100 % 10/26/19 1300    72      111/61    99 % 10/26/19 1206        98.5 °F (36.9 °C)      100 % 10/26/19 1200  1  66  20  98.5 °F (36.9 °C)  (!) 120/98  Alert  100 % 10/26/19 1130    69  19    128/56    98 % 10/26/19 1100    62  16    118/54    98 % 10/26/19 1045    62  14    109/51    99 % 10/26/19 1030    62  19    111/46    100 % 10/26/19 1015    64  14    110/63    97 % 10/26/19 1000    67  19    111/55    99 % 10/26/19 0930    66  21    107/54    100 % 10/26/19 0915    86  25    107/60    98 % 10/26/19 0902              98 % 10/26/19 0900        96.7 °F (35.9 °C)        
 10/26/19 0815      22    107/70    99 % 10/26/19 0800    71  21    116/57    100 % 10/26/19 07:00:29              98 % 10/26/19 0700    90  27    144/64    90 % 10/26/19 0654              100 % 10/26/19 0651    100  24    150/76    100 % 10/26/19 0649  2  99  23  97.6 °F (36.4 °C)  150/76  Alert  100 % PAIN Pain Assessment Pain Intensity 1: 0 (10/26/19 1901) Patient Stated Pain Goal: 0 Intervention effective: yes Time of last intervention: n/a Reassessment Completed: yes Other actions taken for pain: none, pt had no complaints of pain Last 3 Weights: 
Last 3 Recorded Weights in this Encounter 10/26/19 7310 Weight: 63.5 kg (140 lb 1.6 oz) Weight change: INTAKE/OUPUT Current Shift: No intake/output data recorded. Last three shifts: 10/25 1901 - 10/27 0700 In: 0 Out: 1900 [[id number]] RECOMMENDATIONS AND DISCHARGE PLANNING Patient needs and requests: None Pending tests/procedures: echo Discharge plan for patient: TBD Discharge planning Needs or Barriers: TBD Estimated Discharge Date: TBD Posted on Whiteboard in Patients Room: yes \"HEALS\" SAFETY CHECK A safety check occurred in the patient's room between off going nurse and oncoming nurse listed above. The safety check included the below items: 
 
H High Alert Medications Verify all high alert medication drips (heparin, PCA, etc.) E Equipment Suction is set up for ALL patients (with yanker) Red plugs utilized for all equipment (IV pumps, etc.) WOWs wiped down at end of shift. Room stocked with oxygen, suction, and other unit-specific supplies A Alarms Bed alarm is set for fall risk patients Ensure chair alarm is in place and activated if patient is up in a chair L Lines Check IV for any infiltration Dong bag is empty if patient has a Dong Tubing and IV bags are labeled Katelyn José Miguel Safety Room is clean, patient is clean, and equipment is clean. Hallways are clear from equipment besides carts. Fall bracelet on for fall risk patients Ensure room is clear and free of clutter Suction is set up for ALL patients (with doyle) Hallways are clear from equipment besides carts. Isolation precautions followed, supplies available outside room, sign posted Keyona Riley

## 2019-10-27 NOTE — ROUTINE PROCESS
Bedside shift change report given to [id number] [facility] Antoni Glass  (oncoming nurse) by Paige Van RN (offgoing nurse). Report included the following information SBAR, Kardex, Intake/Output and MAR. Paged hospitalist to see if IV lasix can be switched to oral due to lack of IV access. Joel Good called back with instructions for lasix not to give if B/P is less than 100. Systolic.

## 2019-10-27 NOTE — CONSULTS
[facility] Pulmonary, Critical Care, and Sleep Medicine Name: Edel Aviles MRN: [id number] : 1935 Hospital: [facility] [place] Date: 10/27/2019  Room: [address] [place] [address] Note Consult requesting physician: Dr. Alex Painter Reason for Consult: Acute hypoxemic respiratory failure, congestive heart failure, non-ST elevation MI, hypertensive urgency, Subjective/History of Present Illness:  
 
Patient is a 80 y.o. female with PMHx significant for known congestive heart failure mostly diastolic dysfunction. Records evaluated from [facility].  Patient is supposed to be on Lasix. Daily. Occasional skipped Lasix and is noncompliant with diet. Noticed previous multiple admissions to [facility] for congestive heart failure hypertension. Non-smoker. No history of asthma COPD. No history of hypercarbia. Acute shortness of breath and acute hypoxemia. No home oxygen previously. Immediately placed on the BiPAP given Lasix and blood pressure control. Gradual improvement. Chest x-ray suggestive of congestive heart failure. First lactic acid was elevated but repeat was normal.  Repeat chest x-ray to make sure all resolves. Has received 1 dose of antibiotic. Follow-up chest x-ray if normal will stop. Patient has known chronic renal insufficiency. 10/27/2019: 
 
Patient improved overnight. Currently off BiPAP. On home oxygen. I will DC antibiotics. Congestive heart failure. She will need cardiology follow-up She needs to be on a daily diuretics. Hypertension controlled. Chest x-ray gradually improving no signs of pneumonia white blood cells normal 
 
 
 
10/26/2019 Due to on chronic congestive heart failure. Echo BNP troponin EKG to follow-up. Known hypertensive heart. Questionable compliance. Rapid improvement of BiPAP and diuresis. Less likely has a pneumonia but will reevaluate right lower lobe again with a chest x-ray in the morning PA and lateral. 
No history of smoking low suspicion for airway issues. Has known chronic renal insufficiency. So questionable value of troponins. Follow. I/O last 24 hrs: Intake/Output Summary (Last 24 hours) at 10/27/2019 1156 Last data filed at 10/27/2019 0900 Gross per 24 hour Intake 270 ml Output 2450 ml Net -2180 ml History taken from patient and EMR Review of Systems: A comprehensive review of systems was negative except for that written in the HPI. Review of Systems:  
HEENT: No epistaxis, no nasal drainage, no difficulty in swallowing, no redness in eyes Respiratory: as above Cardiovascular: no chest pain, no palpitations, no chronic leg edema, no syncope Gastrointestinal: no abd pain, no vomiting, no diarrhea, no bleeding symptoms Genitourinary: No urinary symptoms or hematuria Musculoskeletal:Neg 
Neurological: No focal weakness, no seizures, no headaches Behvioral/Psych: No anxiety, no depression Constitutional: No fever, no chills, no weight loss, no night sweats Allergies Allergen Reactions  Penicillins Hives History reviewed. No pertinent past medical history. History reviewed. No pertinent surgical history. Social History Tobacco Use  Smoking status: Never Smoker  Smokeless tobacco: Never Used Substance Use Topics  Alcohol use: Not on file History reviewed. No pertinent family history. Prior to Admission medications Medication Sig Start Date End Date Taking? Authorizing Provider  
ergocalciferol (VITAMIN D2) 50,000 unit capsule Take 50,000 Units by mouth every seven (7) days. Yes Other, MD Napoleon  
carvedilol (COREG) 12.5 mg tablet Take 12.5 mg by mouth two (2) times daily (with meals). Yes Other, MD Napoleon  
spironolactone (ALDACTONE) 25 mg tablet Take 25 mg by mouth daily.    Yes Jesus, MD Napoleon  
 PARoxetine (PAXIL) 20 mg tablet Take 20 mg by mouth daily. Yes Jesus, MD Napoleon  
furosemide (LASIX) 40 mg tablet Take 40 mg by mouth every other day. Yes Other, MD Napoleon  
 
Current Facility-Administered Medications Medication Dose Route Frequency  ipratropium (ATROVENT) 0.02 % nebulizer solution 0.5 mg  0.5 mg Nebulization TID RT  
 furosemide (LASIX) injection 40 mg  40 mg IntraVENous Q12H  
 azithromycin (ZITHROMAX) 500 mg in 0.9% sodium chloride 250 mL (VIAL-MATE)  500 mg IntraVENous Q24H  cefTRIAXone (ROCEPHIN) 2 g in sterile water (preservative free) 20 mL IV syringe  2 g IntraVENous Q24H  
 aspirin tablet 325 mg  325 mg Oral DAILY  atorvastatin (LIPITOR) tablet 40 mg  40 mg Oral DAILY  carvedilol (COREG) tablet 12.5 mg  12.5 mg Oral BID WITH MEALS  
 PARoxetine (PAXIL) tablet 20 mg  20 mg Oral DAILY Objective:  
Vital Signs:   
Visit Vitals BP (!) 122/39 Pulse 66 Temp 97.8 °F (36.6 °C) Resp 18 Wt 63.5 kg (140 lb 1.6 oz) SpO2 96% O2 Device: Room air O2 Flow Rate (L/min): 2 l/min Temp (24hrs), Av.2 °F (36.8 °C), Min:97.8 °F (36.6 °C), Max:98.6 °F (37 °C) Intake/Output:  
Last shift:      10/27 07 - 10/27 1900 In: 270 [I.V.:270] Out: 800 [Urine:800] Last 3 shifts: 10/25 1901 - 10/27 07 In: 0 Out: 1900 [[id number]] Intake/Output Summary (Last 24 hours) at 10/27/2019 1156 Last data filed at 10/27/2019 0900 Gross per 24 hour Intake 270 ml Output 2450 ml Net -2180 ml Last 3 Recorded Weights in this Encounter 10/26/19 8616 Weight: 63.5 kg (140 lb 1.6 oz) bipap  Recent Labs 10/26/19 
3321 PHI 7.330* PCO2I 45.8*  
PO2I 142* HCO3I 24.1 FIO2I 35 Physical Exam:  
 
General/Neurology: Alert, Awake, in moderate respiratory distress Head:   Normocephalic, without obvious abnormality, atraumatic. Eye:   EOM intact no scleral icterus, no pallor, no cyanosis. Nose:   No sinus tenderness Throat:  Lips, mucosa, and tongue normal. No oral thrush. Neck:   Supple, symmetric. No lymphadenopathy. Trachea midline Lung:   Bilateral crackles at the bases heart:   Regular rate & rhythm. S1 S2 present. No murmur. No JVD. Abdomen:  Soft. NT. ND. +BS. No masses. Extremities:  yes pedal edema. No cyanosis. No clubbing. Pulses: 2+ and symmetric in DP. Capillary refill: normal 
Lymphatic:  No cervical or supraclavicular palpable lymphadenopathy. Musculoskeletal: No joint swelling. No tenderness. Skin:   Color, texture, turgor normal. No rashes or lesions. Data:  
   
Recent Results (from the past 24 hour(s)) CARDIAC PANEL,(CK, CKMB & TROPONIN) Collection Time: 10/26/19  5:05 PM  
Result Value Ref Range CK 70 26 - 192 U/L  
 CK - MB 2.4 <3.6 ng/ml CK-MB Index 3.4 0.0 - 4.0 % Troponin-I, QT 1.38 (HH) 0.0 - 0.045 NG/ML  
EKG, 12 LEAD, SUBSEQUENT Collection Time: 10/26/19  6:44 PM  
Result Value Ref Range Ventricular Rate 63 BPM  
 Atrial Rate 63 BPM  
 P-R Interval 138 ms QRS Duration 78 ms Q-T Interval 410 ms QTC Calculation (Bezet) 419 ms Calculated P Axis 35 degrees Calculated R Axis 11 degrees Calculated T Axis 37 degrees Diagnosis Normal sinus rhythm Normal ECG When compared with ECG of 26-OCT-2019 06:53, 
Vent. rate has decreased BY  34 BPM 
  
CARDIAC PANEL,(CK, CKMB & TROPONIN) Collection Time: 10/26/19 11:25 PM  
Result Value Ref Range CK 70 26 - 192 U/L  
 CK - MB 1.8 <3.6 ng/ml CK-MB Index 2.6 0.0 - 4.0 % Troponin-I, QT 1.06 (HH) 0.0 - 5.025 NG/ML  
METABOLIC PANEL, COMPREHENSIVE Collection Time: 10/27/19 10:20 AM  
Result Value Ref Range Sodium 137 136 - 145 mmol/L Potassium 4.1 3.5 - 5.5 mmol/L Chloride 100 100 - 111 mmol/L  
 CO2 30 21 - 32 mmol/L Anion gap 7 3.0 - 18 mmol/L Glucose 113 (H) 74 - 99 mg/dL BUN 28 (H) 7.0 - 18 MG/DL  Creatinine 1.84 (H) 0.6 - 1.3 MG/DL  
 BUN/Creatinine ratio 15 12 - 20    
 GFR est AA 32 (L) >60 ml/min/1.73m2 GFR est non-AA 26 (L) >60 ml/min/1.73m2 Calcium 8.4 (L) 8.5 - 10.1 MG/DL Bilirubin, total 0.4 0.2 - 1.0 MG/DL  
 ALT (SGPT) 12 (L) 13 - 56 U/L  
 AST (SGOT) 17 10 - 38 U/L Alk. phosphatase 93 45 - 117 U/L Protein, total 6.3 (L) 6.4 - 8.2 g/dL Albumin 3.2 (L) 3.4 - 5.0 g/dL Globulin 3.1 2.0 - 4.0 g/dL A-G Ratio 1.0 0.8 - 1.7    
CBC WITH AUTOMATED DIFF Collection Time: 10/27/19 10:20 AM  
Result Value Ref Range WBC 5.4 4.6 - 13.2 K/uL  
 RBC 3.29 (L) 4.20 - 5.30 M/uL HGB 9.9 (L) 12.0 - 16.0 g/dL HCT 30.7 (L) 35.0 - 45.0 % MCV 93.3 74.0 - 97.0 FL  
 MCH 30.1 24.0 - 34.0 PG  
 MCHC 32.2 31.0 - 37.0 g/dL  
 RDW 13.7 11.6 - 14.5 % PLATELET 826 738 - 491 K/uL MPV 10.3 9.2 - 11.8 FL  
 NEUTROPHILS 69 40 - 73 % LYMPHOCYTES 19 (L) 21 - 52 % MONOCYTES 8 3 - 10 % EOSINOPHILS 3 0 - 5 % BASOPHILS 1 0 - 2 %  
 ABS. NEUTROPHILS 3.7 1.8 - 8.0 K/UL  
 ABS. LYMPHOCYTES 1.0 0.9 - 3.6 K/UL  
 ABS. MONOCYTES 0.4 0.05 - 1.2 K/UL  
 ABS. EOSINOPHILS 0.2 0.0 - 0.4 K/UL  
 ABS. BASOPHILS 0.0 0.0 - 0.1 K/UL  
 DF AUTOMATED MAGNESIUM Collection Time: 10/27/19 10:20 AM  
Result Value Ref Range Magnesium 1.9 1.6 - 2.6 mg/dL Chemistry Recent Labs 10/27/19 
1020 10/26/19 
4350 * 217*  139  
K 4.1 5.0  
 105 CO2 30 24 BUN 28* 22* CREA 1.84* 1.70* CA 8.4* 7.9*  
MG 1.9 2.3 AGAP 7 10 BUCR 15 13 AP 93 97  
TP 6.3* 6.1* ALB 3.2* 3.1*  
GLOB 3.1 3.0 AGRAT 1.0 1.0 Lactic Acid Lactic acid Date Value Ref Range Status 10/26/2019 0.8 0.4 - 2.0 MMOL/L Final  
 
Recent Labs 10/26/19 
1116 LAC 0.8 Liver Enzymes Protein, total  
Date Value Ref Range Status 10/27/2019 6.3 (L) 6.4 - 8.2 g/dL Final  
 
Albumin Date Value Ref Range Status 10/27/2019 3.2 (L) 3.4 - 5.0 g/dL Final  
 
Globulin Date Value Ref Range Status 10/27/2019 3.1 2.0 - 4.0 g/dL Final  
 
A-G Ratio Date Value Ref Range Status 10/27/2019 1.0 0.8 - 1.7   Final  
 
AST (SGOT) Date Value Ref Range Status 10/27/2019 17 10 - 38 U/L Final  
 
Alk. phosphatase Date Value Ref Range Status 10/27/2019 93 45 - 117 U/L Final  
 
Recent Labs 10/27/19 
1020 10/26/19 
6954 TP 6.3* 6.1* ALB 3.2* 3.1*  
GLOB 3.1 3.0 AGRAT 1.0 1.0 SGOT 17 21 AP 93 97 CBC w/Diff Recent Labs 10/27/19 
1020 10/26/19 
1047 WBC 5.4 6.3  
RBC 3.29* 3.20* HGB 9.9* 9.6* HCT 30.7* 30.5*  247 GRANS 69 49 LYMPH 19* 35 EOS 3 5 Cardiac Enzymes Lab Results Component Value Date/Time CPK 70 10/26/2019 11:25 PM  
 CPK 70 10/26/2019 05:05 PM  
 CKMB 1.8 10/26/2019 11:25 PM  
 CKMB 2.4 10/26/2019 05:05 PM  
 CKND1 2.6 10/26/2019 11:25 PM  
 CKND1 3.4 10/26/2019 05:05 PM  
 TROIQ 1.06 () 10/26/2019 11:25 PM  
 TROIQ 1.38 ([facility]) 10/26/2019 05:05 PM  
  
 
BNP No results found for: BNP, BNPP, XBNPT Coagulation No results for input(s): PTP, INR, APTT, INREXT, INREXT in the last 72 hours. Thyroid  Lab Results Component Value Date/Time TSH 2.48 10/26/2019 11:16 AM  
   
No results found for: T4  
 
Urinalysis Lab Results Component Value Date/Time Color YELLOW 10/26/2019 09:20 AM  
 Appearance CLEAR 10/26/2019 09:20 AM  
 Specific gravity 1.008 10/26/2019 09:20 AM  
 pH (UA) 6.0 10/26/2019 09:20 AM  
 Protein NEGATIVE  10/26/2019 09:20 AM  
 Glucose NEGATIVE  10/26/2019 09:20 AM  
 Ketone NEGATIVE  10/26/2019 09:20 AM  
 Bilirubin NEGATIVE  10/26/2019 09:20 AM  
 Urobilinogen 0.2 10/26/2019 09:20 AM  
 Nitrites NEGATIVE  10/26/2019 09:20 AM  
 Leukocyte Esterase NEGATIVE  10/26/2019 09:20 AM  
  
 
Micro  Recent Labs 10/26/19 
0920 10/26/19 
0745 10/26/19 
7538 CULT NO GROWTH AFTER 17 HOURS NO GROWTH AFTER 23 HOURS NO GROWTH AFTER 23 HOURS Recent Labs 10/26/19 
0920 10/26/19 
0745 10/26/19 
7607 CULT NO GROWTH AFTER 17 HOURS NO GROWTH AFTER 23 HOURS NO GROWTH AFTER 23 HOURS Culture data during this hospitalization. All Micro Results Procedure Component Value Units Date/Time CULTURE, URINE [[id number]] Collected:  10/26/19 0920 Order Status:  Completed Specimen:  Cath Urine Updated:  10/27/19 1048 Special Requests: NO SPECIAL REQUESTS Culture result: NO GROWTH AFTER 17 HOURS     
 CULTURE, BLOOD [[id number]] Collected:  10/26/19 0719 Order Status:  Completed Specimen:  Blood Updated:  10/27/19 0710 Special Requests: NO SPECIAL REQUESTS Culture result: NO GROWTH AFTER 23 HOURS     
 CULTURE, BLOOD [[id number]] Collected:  10/26/19 0745 Order Status:  Completed Specimen:  Blood Updated:  10/27/19 0710 Special Requests: NO SPECIAL REQUESTS Culture result: NO GROWTH AFTER 23 HOURS     
  
  
 
 
PFT Ultrasound LE Doppler ECHO Images report reviewed by me: 
CT (Most Recent) (CT chest reviewed by me) No results found for this or any previous visit. CXR reviewed by me: 
XR (Most Recent). CXR  reviewed by me and compared with previous [place] [facility] [place] XR CHEST PORT Narrative EXAM: CHEST RADIOGRAPH 
 
CLINICAL INDICATION/HISTORY: SOB 
-Additional: None COMPARISON: None TECHNIQUE: Portable frontal view of the chest 
 
_______________ FINDINGS: 
 
SUPPORT DEVICES: None. HEART AND MEDIASTINUM: No appreciable cardiomegaly. Remaining mediastinal 
contours within normal limits. LUNGS AND PLEURAL SPACES: No consolidation, pleural effusion, or pneumothorax. BONY THORAX AND SOFT TISSUES: Prior lower cervical anterior fusion. Unremarkable. 
 
_______________ Impression IMPRESSION: 
 
No active cardiopulmonary disease. IMPRESSION:  
· Acute hypoxemic respiratory failure/suspect related to congestive heart failure cannot exclude right lower lobe pneumonia less likely. Repeat chest x-ray after diuresis · Acute respiratory failure requiring noninvasive ventilation. Rapid improvement with BiPAP. Continue BiPAP as needed. No history of COPD asthma or obstructive sleep apnea. Might require nocturnal noninvasive as needed. Otherwise daytime oxygen · Congestive heart failure documented at [facility]. 
· Questionable compliance with medications. · Hypertensive heart. · Start diuresis resume home medications. · Diet adjustment. Follow echo troponin non-ST elevation MI. Positive troponin. Most likely related to demand ischemia · Should be a right lower lobe pneumonia. Suspect this is related to congestive heart failure. We will repeat chest x-ray tomorrow monitor white blood cells. No significant cough · Code status:   
  
RECOMMENDATIONS:  
Respiratory: Acute hypoxemic respiratory failure requiring noninvasive ventilation. Currently improving. After diuresis and BiPAP. BP control. Patient no significant chest pain. Positive troponins follow EKG tropes Protecting airway. precautions. Incentive spirometry. CVS: Follow-up echo troponin EKG. Treatment for congestive heart failure ID: Doubt patient has pneumonia follow-up chest x-ray for diuresis. If no infiltrate. No cough stop antibiotics tomorrow Sepsis bundle and protocol followed. Follow serial lactic acid, frequent BMP check, fluid resuscitation. Follow cultures. Deescalate antibiotic when appropriate. Hematology/Oncology: Follow white blood cell Renal: Renal insufficiency for creatinine potassium magnesium GI/: X Endocrine: Monitor BS. SSI. Neurology: No noninvasive Toxicology: None 
Pain/Sedation: None Skin/Wound: None Electrolytes: Replace electrolytes per ICU electrolyte replacement protocol. Follow daily IVF: Congestive heart failure avoid fluids Nutrition: Cardiac diet low-salt diet Prophylaxis: DVT Prophylaxis: yes. GI Prophylaxis: This. Restraints: none Lines/Tubes: PIV 
 
 
 Will defer respective systems problem management to primary and other respective consultant and follow patient in ICU with primary and other medical team. 
Further recommendations will be based on the patient's response to recommended treatment and results of the investigation ordered. Quality Care: PPI, DVT prophylaxis, HOB elevated, Infection control all reviewed and addressed. Care of plan d/w RN, RT, MDR. 
D/w patient Moderate complexity decision making was performed during the evaluation of this patient at high risk for decompensation with multiple organ involvement. Total critical care time spent rendering care exclusive of procedures: 35 minutes. Jacob Pedro MD 
10/27/2019

## 2019-10-27 NOTE — PROGRESS NOTES
Critical Result Notification Received and verbally repeated the following test results Troponin of 1.06 from Lynda Canela (NAME OF TECHNICIAN) on 10/27/2019 (DATE), at 0035 (TIME). Dr. Janelle Aguilar (NAME OF PROVIDER) was notified and provided a verbal readback of the results listed above on 10/27/2019 (DATE) at 0050(TIME). Orders were not received at this time. Additional comments: Will continue to monitor. Tha Rojas

## 2019-10-27 NOTE — PROGRESS NOTES
Pt continues to consult nursing staff prior to attempting to get out of bed. Pt has remained without falls throughout my shift, bed in lowest position, call bell within reach, frequent nursing rounds, and bed alarm set. Will continue to monitor.

## 2019-10-27 NOTE — PROGRESS NOTES
2145: Spoke with Dr. Demetrice Swartz on floor, pt c/o sore throat. Requested PRN lozenge, per MD medication may be placed. Will continue to monitor.

## 2019-10-28 NOTE — PROGRESS NOTES
NAME: mAari Brower MRN: [id number] AGE: 80 y.o., : 1935 DATE OF ADMISSION: 10/26/2019 Rounding Date and Time: 10/28/2019 1300 Advance Directives:None and will be followed up with case management. Vitals:  weight is 64 kg (141 lb 1.5 oz). Her temperature is 98 °F (36.7 °C). Her blood pressure is 114/46 and her pulse is 64. Her respiration is 18 and oxygen saturation is 95%. Intake/Output Summary (Last 24 hours) at 10/28/2019 1533 Last data filed at 10/28/2019 1335 Gross per 24 hour Intake 370 ml Output  Net 370 ml Activity:Advance as tolerated Monitoring daily Interventions: 1. Monitor I&Os 2. Daily Weights 3. Medication Compliance Significant Concerns or Complications: 
Willie Skaggs concerns regarding nutrition. Discussed with Rosmery Claudio, dietician to consult and educate on diet. Dr. Rochelle Amanda has given telephone orders for nutrition consult. Education provided included:  
Medication review including s/s to monitor Dietary modification (low sodium intake and fluid restrictions) \"Living with HF\" packet: reviewed stop light, weight tracker, medication schedules, daily weights energy conservation, exercise and rest, and lifestyle modification Problem: Falls - Risk of 
Goal: *Absence of Falls Description Document Prudencio Wadsworth Fall Risk and appropriate interventions in the flowsheet. Outcome: Progressing Towards Goal 
Note:  
Fall Risk Interventions: 
Mobility Interventions: Utilize walker, cane, or other assistive device Elimination Interventions: Call light in reach Problem: Heart Failure: Discharge Outcomes Goal: *Demonstrates ability to perform prescribed activity without shortness of breath or discomfort Outcome: Progressing Towards Goal 
Note:  
Willie Skaggs sitting in chair at the bedside. Family and visitors sitting around her. No complaints or discomfort expressed. Goal: *Verbalizes understanding and describes prescribed diet Outcome: Progressing Towards Goal 
Note:  
Aarti Orr has expressed that her nutrition is the biggest concern. She is currently on a cardiac and renal diet and told that she cannot eat many things that she wants to. She has some concerns about discharge and how to manage her diet. Informed nutrition to consult. Goal: *Verbalizes understanding/describes prescribed medications Outcome: Progressing Towards Goal 
Goal: *Describes available resources and support systems Description 
(eg: Home Health, Palliative Care, Advanced Medical Directive) Outcome: Progressing Towards Goal 
Note:  
Informed Aarti Orr of monthly heart failure education and support group. She has been invited to the next session on November 21st. Showed her where meeting will be held. Family at the bedside also invited to attend. Goal: *Describes smoking cessation resources Outcome: Resolved/Met Goal: *Describes/verbalizes understanding of follow-up/return appt Description 
(eg: to physicians, diabetes treatment coordinator, and other resources Outcome: Progressing Towards Goal 
Goal: *Describes importance of continuing daily weights and changes to report to physician 
10/28/2019 1525 by Lily Moran RN Outcome: Progressing Towards Goal 
Note:  
Aarti Orr has been educated to take daily \"dry weights. \" She currently takes her weight every other day to every couple days and reports of having a working scale at home. When discussing weights informed her of taking morning weights after bathroom use and prior to eating. She has been given a daily chart to fill out and shown the stop light tool of when to contact her physician with changes. She has agreed to this task. 10/28/2019 1521 by Lily Moran RN Outcome: Progressing Towards Goal 
Note: Ms. Jennifer Holloway states that she currently takes her weight every other day to every couple of days.  Educated her on the importance of taking daily \"dry weights. \" She agrees to begin tracking weights and follow stoplight tool where if she gains weight she will contact her PCP. Electronically signed by: Annabelle Wild, MSN, APRN AGCNS-BC

## 2019-10-28 NOTE — PROGRESS NOTES
Problem: Falls - Risk of 
Goal: *Absence of Falls Description Document Laxmi Garcia Fall Risk and appropriate interventions in the flowsheet. Outcome: Progressing Towards Goal 
Note:  
Fall Risk Interventions: 
Mobility Interventions: Utilize walker, cane, or other assistive device Elimination Interventions: Call light in reach Problem: Patient Education: Go to Patient Education Activity Goal: Patient/Family Education Outcome: Progressing Towards Goal 
  
Problem: Patient Education: Go to Patient Education Activity Goal: Patient/Family Education Outcome: Progressing Towards Goal 
  
Problem: Heart Failure: Discharge Outcomes Goal: *Demonstrates ability to perform prescribed activity without shortness of breath or discomfort Outcome: Progressing Towards Goal 
Goal: *Left ventricular function assessment completed prior to or during stay, or planned for post-discharge Outcome: Progressing Towards Goal 
Goal: *ACEI prescribed if LVEF less than 40% and no contraindications or ARB prescribed Outcome: Progressing Towards Goal 
Goal: *Verbalizes understanding and describes prescribed diet Outcome: Progressing Towards Goal 
Goal: *Verbalizes understanding/describes prescribed medications Outcome: Progressing Towards Goal 
Goal: *Describes available resources and support systems Description 
(eg: Home Health, Palliative Care, Advanced Medical Directive) Outcome: Progressing Towards Goal 
Goal: *Describes smoking cessation resources Outcome: Progressing Towards Goal 
Goal: *Understands and describes signs and symptoms to report to providers(Stroke Metric) Outcome: Progressing Towards Goal 
Goal: *Describes/verbalizes understanding of follow-up/return appt Description 
(eg: to physicians, diabetes treatment coordinator, and other resources Outcome: Progressing Towards Goal 
Goal: *Describes importance of continuing daily weights and changes to report to physician Outcome: Progressing Towards Goal 
  
Problem: Hypertension Goal: *Blood pressure within specified parameters Outcome: Progressing Towards Goal 
Goal: *Fluid volume balance Outcome: Progressing Towards Goal 
Goal: *Labs within defined limits Outcome: Progressing Towards Goal 
  
Problem: Patient Education: Go to Patient Education Activity Goal: Patient/Family Education Outcome: Progressing Towards Goal

## 2019-10-28 NOTE — DISCHARGE INSTRUCTIONS
Heart Failure Education and Support Group is an interdisciplinary group that provides education that you need to help manage your heart failure. It gives you the opportunity to learn and share with others in your community. You are scheduled for November 21, 2019. Meeting will be held on the grounds of [facility] in the [facility] [address], \"[address]\" from 5:30-7:30 p.m. Please call Kacie Stallings at [phone] if you are unable to make this appointment to reschedule.

## 2019-10-28 NOTE — PROGRESS NOTES
Problem: Mobility Impaired (Adult and Pediatric) Goal: *Acute Goals and Plan of Care (Insert Text) Description Physical Therapy Goals Initiated 10/27/2019 and to be accomplished within 2-4 day(s) 1. Patient will move from supine <> sit with mod I in prep for out of bed activity and change of position. 2.  Patient will perform sit<> stand with mod I/LRAD in prep for transfers/ambulation. 3.  Patient will transfer from bed <> chair with mod I/LRAD for time up in chair for completion of ADL activity. 4.  Patient will ambulate 150 feet with mod I/LRAD for improved functional mobility/safe discharge. 5.  Patient will ascend/descend 3-5 stairs with handrail(s) with minimal assistance/contact guard assist for home re-entry as needed. Outcome: Progressing Towards Goal 
 PHYSICAL THERAPY TREATMENT Patient: Geovanny Lin (22 y.o. female) Date: 10/28/2019 Diagnosis: CHF (congestive heart failure) ([facility] [address] [I50.9] Respiratory failure ([facility] [address] [J96.90] Respiratory failure ([facility] [address] Precautions: Fall Chart, physical therapy assessment, plan of care and goals were reviewed. ASSESSMENT: 
Pt seen in supine prior to session. Pt agreeable to session. Pt able to increase in gait distance w/RW/GB. Pt required a seated rest break during gait secondary to decrease activity tolerance. Pt transferred back to the room where pt performed therex activity. Pt left sitting in upright chair after session, call bell and tray in reach, nurse notified after session. Progression toward goals: 
?      Improving appropriately and progressing toward goals ? Improving slowly and progressing toward goals ? Not making progress toward goals and plan of care will be adjusted PLAN: 
Patient continues to benefit from skilled intervention to address the above impairments. Continue treatment per established plan of care. Discharge Recommendations:  Home Health Further Equipment Recommendations for Discharge:  N/A  
 
 SUBJECTIVE:  
Patient stated I feel pretty good today.  OBJECTIVE DATA SUMMARY:  
Critical Behavior: 
Neurologic State: Alert, Appropriate for age Orientation Level: Appropriate for age, Oriented X4 Cognition: Appropriate decision making, Appropriate for age attention/concentration, Appropriate safety awareness, Follows commands Safety/Judgement: Awareness of environment, Fall prevention, Insight into deficits Functional Mobility Training: 
Bed Mobility: 
Supine to Sit: Supervision Sit to Supine: Supervision Transfers: 
Sit to Stand: Supervision Stand to Sit: Supervision Balance: 
Sitting: Intact Standing: Intact; With support Range Of Motion: 
 AROM: Generally decreased, functional 
 PROM: Generally decreased, functional 
Ambulation/Gait Training: 
Distance (ft): 250 Feet (ft) Assistive Device: Gait belt;Walker, rolling Ambulation - Level of Assistance: Supervision;Contact guard assistance Gait Abnormalities: Decreased step clearance Speed/Shara: Accelerated Pain: 
Pain Scale 1: Numeric (0 - 10) Pain Intensity 1: 0 Therapeutic Exercises:  
 
 
EXERCISE Sets Reps Active Active Assist  
Passive Self ROM Comments Ankle Pumps    [] [] [] [] Quad Sets/Glut Sets   [] [] [] [] Hamstring Sets   [] [] [] [] Short Arc Quads   [] [] [] [] Heel Slides   [] [] [] [] Straight Leg Raises   [] [] [] [] Hip Abd/Add 2 10 [x] [] [] [] Long Arc Quads 2 8 [x] [] [] [] Seated Marching 2 10 [x] [] [] []   
Standing Marching   [] [] [] []   
   [] [] [] [] Activity Tolerance:  
Fair Please refer to the flowsheet for vital signs taken during this treatment. After treatment:  
? Patient left in no apparent distress sitting up in chair ? Patient left in no apparent distress in bed 
? Call bell left within reach ? Nursing notified ? Caregiver present ? Bed alarm activated Elizabeth Lau PT Time Calculation: 23 mins

## 2019-10-28 NOTE — PROGRESS NOTES
Hospitalist Progress Note-critical care note Patient: Erin Reyes MRN: [id number]  CSN: [id number] YOB: 1935  Age: 80 y.o. Sex: female DOA: 10/26/2019 LOS:  LOS: 2 days Chief complaint: Respiratory failure CHF CKD Assessment/Plan Hospital Problems  Never Reviewed Codes Class Noted POA * (Principal) Respiratory failure ([address] ICD-10-CM: J96.90 ICD-9-CM: 518.81  10/26/2019 Yes Chronic diastolic congestive heart failure (HCC) ICD-10-CM: I50.32 
ICD-9-CM: 428.32, 428.0  4/15/2019 Yes Overview Signed 10/26/2019  8:03 AM by Smooth Calderon MD  
    
  
   
 Iron deficiency anemia ICD-10-CM: D50.9 ICD-9-CM: 280.9  11/6/2018 Yes Overview Signed 10/26/2019  8:03 AM by Smooth Calderon MD  
    
  
   
 Stage 4 chronic kidney disease [facility]) ICD-10-CM: N18.4 ICD-9-CM: 585.4  8/23/2018 Yes Overview Signed 10/26/2019  8:03 AM by Smooth Calderon MD  
    
  
   
 Heart failure [facility]) ICD-10-CM: I50.9 ICD-9-CM: 428.9  7/9/2018 Yes Overview Signed 10/26/2019  8:03 AM by Smooth Calderon MD  
   
  
  
   
 Benign essential hypertension ICD-10-CM: I10 
ICD-9-CM: 401.1  10/25/2012 Yes Overview Signed 10/26/2019  8:03 AM by Smooth Calderon MD  
   
  
  
   
 Hypothyroidism ICD-10-CM: X22.9 ICD-9-CM: 244.9  10/25/2012 Yes Overview Signed 10/26/2019  8:03 AM by Smooth Calderon MD  
    
  
   
 Hypertensive heart disease ICD-10-CM: I11.9 ICD-9-CM: 402.90  10/5/2011 Yes Acute respiratory failure Resolved. At the room air, shortness of breath got improving. close to her baseline Was in ICU with BiPAP Acute on chronic CHF diastolic Audiology on board, continue Lasix. Aspirin Coreg EF was normal 
Need to be follow-up with cardiology as outpatient Elevated troponin No ACS per cardiology CE level trending down CKD 4 Follow-up with nephrologist as outpatient Continue monitor creatinine, avoid NSAIDs and IV contrast 
 
Hypertensive heart disease Continue blood pressure control follow-up with the cardiologist 
 
Hypothyroidism? Not on medication. ?  We will check with patient, TSH was good Anemia Due to chronic renal disease, will continue monitor H&H Subjective: Shortness of breath better, but still not back to my baseline Daughter sister and son were at the bedside,. All questions have been answered. 35 total min's spent on patient care including >50% on counseling/coordinating care. Discussed the above assessments. also discussed labs, medications and hospital course Planning discharge patient home tomorrow if clear per cardiologist 
 
Disposition :1-2 days Review of systems: 
 
General: No fevers or chills. Cardiovascular: No chest pain or pressure. No palpitations. Pulmonary:  shortness of breath better Gastrointestinal: No nausea, vomiting. Vital signs/Intake and Output: 
Visit Vitals /46 (BP 1 Location: Right arm, BP Patient Position: At rest;Supine) Pulse 64 Temp 98 °F (36.7 °C) Resp 18 Wt 64 kg (141 lb 1.5 oz) SpO2 95% Breastfeeding? No  
 
Current Shift:  No intake/output data recorded. Last three shifts:  10/26 1901 - 10/28 0700 In: 520 [P.O.:250; I.V.:270] Out: 1550 [[facility]:0577] Physical Exam: 
General: WD, WN. Alert, cooperative, no acute distress   
HEENT: NC, Atraumatic. PERRLA, anicteric sclerae. Lungs: CTA Bilaterally. No Wheezing/Rhonchi/Rales. Heart:  Regular  rhythm,  + murmur, No Rubs, No Gallops Abdomen: Soft, Non distended, Non tender.  +Bowel sounds, Extremities: No c/c/e Psych:   Not anxious or agitated. Neurologic:  No acute neurological deficit. Labs: Results:  
   
Chemistry Recent Labs 10/28/19 
0250 10/27/19 
1020 10/26/19 
4388 GLU 90 113* 217*  137 139  
K 3.8 4.1 5.0  
CL 97* 100 105 CO2 30 30 24 BUN 32* 28* 22* CREA 1.91* 1.84* 1.70* CA 8.5 8.4* 7.9* AGAP 9 7 10 BUCR 17 15 13 AP 93 93 97  
TP 6.3* 6.3* 6.1* ALB 3.2* 3.2* 3.1*  
GLOB 3.1 3.1 3.0 AGRAT 1.0 1.0 1.0  
  
CBC w/Diff Recent Labs 10/28/19 
0250 10/27/19 
1020 10/26/19 
9875 WBC 6.6 5.4 6.3  
RBC 3.41* 3.29* 3.20* HGB 10.3* 9.9* 9.6* HCT 31.4* 30.7* 30.5*  215 247 GRANS 56 69 49 LYMPH 26 19* 35 EOS 5 3 5 Cardiac Enzymes Recent Labs 10/26/19 
2325 10/26/19 
1705 CPK 70 70 CKND1 2.6 3.4 Coagulation No results for input(s): PTP, INR, APTT, INREXT, INREXT in the last 72 hours. Lipid Panel No results found for: CHOL, CHOLPOCT, CHOLX, CHLST, CHOLV, 477047, HDL, HDLP, LDL, LDLC, DLDLP, 154036, VLDLC, VLDL, TGLX, TRIGL, TRIGP, TGLPOCT, CHHD, CHHDX  
BNP No results for input(s): BNPP in the last 72 hours. Liver Enzymes Recent Labs 10/28/19 
0250 TP 6.3* ALB 3.2* AP 93 SGOT 16 Thyroid Studies Lab Results Component Value Date/Time TSH 2.48 10/26/2019 11:16 AM  
    
Procedures/imaging: see electronic medical records for all procedures/Xrays and details which were not copied into this note but were reviewed prior to creation of Plan Brionna Saenz MD

## 2019-10-28 NOTE — PROGRESS NOTES
OCCUPATIONAL THERAPY EVALUATION/DISCHARGE Patient: Nathan Cox (89 y.o. female) Date: 10/28/2019 Primary Diagnosis: CHF (congestive heart failure) ([facility] [address] [I50.9] Respiratory failure ([facility] [address] [J96.90] Precautions:  
PLOF: Patient is grossly mod I w/ Rollator. Pt has assistance with driving, but assists her brother with cooking and housekeeping tasks. ASSESSMENT AND RECOMMENDATIONS: 
Based on the objective data described below, the patient presents with mild decreased functional endurance, likely also related to increased time in bed. Pt grossly S/mod I w/o LOB in room. Pt setup for ADLs d/t environment of hospital rooms, but would be mod I in home. Pt educated on Energy Conservation/Work Simplification Techniques w/ handout provided. Education: Patient instructed on home safety, body mechanics for optimal respiratory effort, Energy Conservation/Work Simplification Techniques, adaptive strategies and adaptive dressing techniques including clothing modifications with patient  verbalizing understanding at this time. Skilled Occupational Therapy is not indicated at this time. Discharge Recommendations: Home Health Further Equipment Recommendations for Discharge: N/A   
 
SUBJECTIVE:  
Patient stated I've been getting up a lot.  OBJECTIVE DATA SUMMARY:  
History reviewed. No pertinent past medical history. History reviewed. No pertinent surgical history. Barriers to Learning/Limitations: yes;  physical 
Compensate with: visual, verbal, tactile, kinesthetic cues/model Home Situation: (Daughter and sister at bedside) Home Situation Home Environment: Private residence # Steps to Enter: 3 Rails to Enter: Yes Hand Rails : Right One/Two Story Residence: One story Living Alone: No 
Support Systems: Child(woody) Patient Expects to be Discharged to[de-identified] Private residence Current DME Used/Available at Home: Cane, quad Tub or Shower Type: Tub/Shower combination ?     Right hand dominant   ? Left hand dominant Cognitive/Behavioral Status: 
Neurologic State: Alert; Appropriate for age Orientation Level: Appropriate for age;Oriented X4 Cognition: Appropriate decision making; Appropriate for age attention/concentration; Appropriate safety awareness; Follows commands Safety/Judgement: Awareness of environment; Fall prevention; Insight into deficits Skin: appears grossly intact Edema: No significant edema noted Vision/Perceptual:   
  Wears bifocals; appears [facility] Coordination: BUE Fine Motor Skills-Upper: Left Intact; Right Intact Gross Motor Skills-Upper: Left Intact; Right Intact Balance: 
Sitting: Intact Standing: Intact; With support Strength: BUE Strength: Generally decreased, functional 
 
Tone & Sensation: BUE Sensation: Intact Range of Motion: BUE 
AROM: Generally decreased, functional 
PROM: Generally decreased, functional 
 
Functional Mobility and Transfers for ADLs: 
Bed Mobility: 
Supine to Sit: Supervision Sit to Supine: Supervision Transfers: 
Sit to Stand: Supervision;Modified independent; Adaptive equipment Bed to Chair: Supervision;Modified independent; Adaptive equipment Toilet Transfer : Supervision;Modified independent; Adaptive equipment Bathroom Mobility: Supervision/set up;Modified independent ADL Assessment: 
Feeding: Independent Oral Facial Hygiene/Grooming: Modified Independent(standing sinkside) Bathing: Setup Upper Body Dressing: Setup Lower Body Dressing: Setup Toileting: Supervision;Modified independent ADL Intervention: 
Grooming Washing Hands: Supervision;Modified independent(standing sinkside) Lower Body Dressing Assistance Socks: Set-up(seated EOB) Position Performed: Seated edge of bed Cognitive Retraining Safety/Judgement: Awareness of environment; Fall prevention; Insight into deficits Pain: 
Pain level pre-treatment: 0/10 Pain level post-treatment: 0/10 Pain Intervention(s): Medication provided by Nursing (see MAR); Rest, Repositioning Response to intervention: Nurse notified, See doc flow sheet Activity Tolerance:  
Fair. Patient able to stand 3-4 minute(s). Patient able to complete ADLs with intermittent rest breaks. Patient limited by short stature, balance, ROM d/t advanced age. Please refer to the flowsheet for vital signs taken during this treatment. After treatment:  
?  Patient left in no apparent distress sitting up in chair ? Patient left in no apparent distress in bed 
? Call bell left within reach ? Nursing notified ? Caregiver present/daughter & sister present ? Bed alarm activated COMMUNICATION/EDUCATION:  
?      Role of Occupational Therapy in the acute care setting 
? Home safety education was provided and the patient/caregiver indicated understanding. ? Patient/family have participated as able and agree with findings and recommendations. ?      Patient is unable to participate in plan of care at this time. Thank you for this referral. 
Raleigh Owens Time Calculation: 18 mins Eval Complexity: History: HIGH Complexity : Extensive review of history including physical, cognitive and psychosocial history ; Examination: MEDIUM Complexity : 3-5 performance deficits relating to physical, cognitive , or psychosocial skils that result in activity limitations and / or participation restrictions; Decision Making:MEDIUM Complexity : Patient may present with comorbidities that affect occupational performnce. Miniml to moderate modification of tasks or assistance (eg, physical or verbal ) with assesment(s) is necessary to enable patient to complete evaluation

## 2019-10-28 NOTE — PROGRESS NOTES
Reason for Admission:   Marie Stapleton is a 80 y.o. female with PMHX of chronic heart failure mostly diastolic, CKD 4, essential hypertension who presents to the emergency department C/O respiratory difficulty. Paramedics were called for sudden onset of shortness of breath and arrived in her house to discover her sitting upright having difficulty breathing and sats in the 70% range. She does not use home O2. They will put her on CPAP and started nitro tablets sublingual and noticing her blood pressure was 210/110. She denies chest pain then arrives without complaints of chest pain. She does acknowledge leg edema which is an ongoing symptom with her associated with \"some heart problems \". Paramedics started an IV gave her with CPAP but no other medications were added. 
  
Her symptoms started insidiously yesterday when today she went to bed. However she woke up this morning and felt more significantly short of breath she denies any fevers chills or ill contact. She has no cough at this timeframe. She felt like she did have a cough yesterday but was never really productive. She denies any nausea vomiting or diarrhea. No sinus symptoms no significant headache she denies any fevers or chills. She also denies any known ill contacts. She does have a history of CHF and kidney problems but her historical revelation of the symptoms was somewhat slow. She seems quite winded in responding to all her questions however the acuity of the condition in the lab machine seem to be confounding her initial history. CHF CKD and hypertension more mostly extracted from her other hospital records from [facility]. Records indicate she is never been here at this hospital.Pt denies any other sxs or complaints.  
  
PCP: Hong Arora MD  
               
RRAT Score: Do you (patient/family) have any concerns for transition/discharge?       
           
Plan for utilizing home health:    
 
 Current Advanced Directive/Advance Care Plan:  Not on file. Please consider placing a consult to palliataive/pastoral care to address ACP Transition of Care Plan:   Met with patient at bedside. Patient is alert and oriented. States she was staying in [address] [place] [address] with her daughter but currently living with her son address  Confirmed on chart. Patent is IADL's. . Patient has medicare fo insurance. Patient uses a RW and cane. Family would like for her to have a follow up with Dr. Workman when she is d/c. Patient has Dr. Chris Berrios for nephrologist. They would like for her to d/c home when medically cleared. PT recommendations for d/c is home with [facility]. Cm will provide with a list of agencies Care Management Interventions PCP Verified by CM: Yes Transition of Care Consult (CM Consult): Discharge Planning Current Support Network: Relative's Home Confirm Follow Up Transport: Family Plan discussed with Pt/Family/Caregiver: Yes Freedom of Choice Offered: Yes Discharge Location Discharge Placement: Home with family assistance

## 2019-10-28 NOTE — PROGRESS NOTES
2030  Pt is resting in bed, watching TV and doing cross word puzzle. In good spirits, denies pain. BP low normal. Given Lasix as ordered. Instructed pt to call when OOB, and pt assisted now to bathroom. Voiding without diff. Attempted to locate another IV site without success. Pt states she really prefers not to have another IV site while here. Asked ER tech to attempt to find a site, tech unable to insert IV.  
 
2249  Up to bathroom with steady gait, voiding large amount of urine. NAD. In good spirits. 0140  Resting quietly in bed with eyes closed. 0420  Up to bathroom with steady gait, voiding without difficulties. No c/o voiced. States she has had a good night. 
 
0650  Quietly in bed with eyes closed. {BSI BEDSIDE_VERBAL_Bedside shift change report given to 2000 Kailash Jennings RN (oncoming nurse) by RAOUL Del Angel RN (offgoing nurse). Report included the following information SBAR, Kardex, Intake/Output, MAR and Cardiac Rhythm RAMSES.

## 2019-10-28 NOTE — PROGRESS NOTES
Problem: Patient Education: Go to Patient Education Activity Goal: Patient/Family Education Outcome: Progressing Towards Goal 
  
Problem: Patient Education: Go to Patient Education Activity Goal: Patient/Family Education Outcome: Progressing Towards Goal 
 The care plan was initiated and reviewed with pt  Care plan options were discussed and questions answered. The {pt  understand instructions and will follow up as directed.

## 2019-10-28 NOTE — PROGRESS NOTES
1917-Bedside verbal report received from Osacr Christianson RN. Man, mar, labs, kardex and patient status reviewed. Assumed care of patient. 2015-Assessment completed. No changes from off-going report. No complaints at this time. 0000-Assessment completed. No changes. 0400-Assessment completed. No changes. 0720-Bedside verbal report given to Rose Somers RN. Man, mar, labs, kardex and patient status reviewed.

## 2019-10-28 NOTE — ROUTINE PROCESS
1917 
Bedside in verbal shift change report given to Michelle Barajas RN (oncoming nurse) by Genevieve Carrero RN (off going nurse). Report included the following information: SBAR, KARDEX, MAR and recent results.

## 2019-10-28 NOTE — PROGRESS NOTES
Pulmonary Specialists Pulmonary, Critical Care, and Sleep Medicine Name: German Moe MRN: [id number] : 1935 Hospital: [facility] [place] Date: 10/28/2019  Room: [facility]/ PCC Note Consult requesting physician: Dr. Floresita Handley Reason for Consult: Acute hypoxemic respiratory failure, congestive heart failure, non-ST elevation MI, hypertensive urgency, IMPRESSION:  
· Acute hypoxemic respiratory failure/suspect related to congestive heart failure cannot exclude right lower lobe pneumonia less likely. Repeat chest x-ray after diuresis · Acute respiratory failure requiring noninvasive ventilation. Rapid improvement with BiPAP. Continue BiPAP as needed. No history of COPD asthma or obstructive sleep apnea. Might require nocturnal noninvasive as needed. Otherwise daytime oxygen · Congestive heart failure documented at [facility]. 
· Questionable compliance with medications. · Hypertensive heart. · Start diuresis resume home medications. · Diet adjustment. Follow echo troponin non-ST elevation MI. Positive troponin. Most likely related to demand ischemia · Should be a right lower lobe pneumonia. Suspect this is related to congestive heart failure. We will repeat chest x-ray tomorrow monitor white blood cells. No significant cough · Code status:   
  
RECOMMENDATIONS:  
Respiratory:  
Respiratory status normalized. On RA resting. No respi distress. Protecting airway. precautions. Incentive spirometry. No active pulmonary issues now. PCCM will sing off. Call us with any questions. Will defer respective systems problem management to primary and other respective consultant . D/w patient, hospitalist team. 
 
  
 
 
 
Subjective/History of Present Illness:  
 
Patient is a 80 y.o. female with PMHx significant for known congestive heart failure mostly diastolic dysfunction.   Records evaluated from [facility].  Patient is supposed to be on Lasix. Daily. Occasional skipped Lasix and is noncompliant with diet. Noticed previous multiple admissions to [facility] for congestive heart failure hypertension. Non-smoker. No history of asthma COPD. No history of hypercarbia. Acute shortness of breath and acute hypoxemia. No home oxygen previously. Immediately placed on the BiPAP given Lasix and blood pressure control. Gradual improvement. Chest x-ray suggestive of congestive heart failure. First lactic acid was elevated but repeat was normal.  Repeat chest x-ray to make sure all resolves. Has received 1 dose of antibiotic. Follow-up chest x-ray if normal will stop. Patient has known chronic renal insufficiency. 10/28/2019: On room air resting. Denies cough, SOB, CERVANTES, wheezing. Ambulating well in the room. No chest pain. No overnight any acute respiratory distress or events. I/O last 24 hrs: Intake/Output Summary (Last 24 hours) at 10/28/2019 1336 Last data filed at 10/27/2019 2216 Gross per 24 hour Intake 250 ml Output  Net 250 ml Allergies Allergen Reactions  Penicillins Hives History reviewed. No pertinent past medical history. History reviewed. No pertinent surgical history. Social History Tobacco Use  Smoking status: Never Smoker  Smokeless tobacco: Never Used Substance Use Topics  Alcohol use: Not on file History reviewed. No pertinent family history. Prior to Admission medications Medication Sig Start Date End Date Taking? Authorizing Provider  
ergocalciferol (VITAMIN D2) 50,000 unit capsule Take 50,000 Units by mouth every seven (7) days. Yes Jesus, MD Napoleon  
carvedilol (COREG) 12.5 mg tablet Take 12.5 mg by mouth two (2) times daily (with meals). Yes Other, MD Napoleon  
spironolactone (ALDACTONE) 25 mg tablet Take 25 mg by mouth daily.    Yes Napoleon Lee MD  
 PARoxetine (PAXIL) 20 mg tablet Take 20 mg by mouth daily. Yes Other, MD Napoleon  
furosemide (LASIX) 40 mg tablet Take 40 mg by mouth every other day. Yes Other, MD Napoleon  
 
Current Facility-Administered Medications Medication Dose Route Frequency  influenza vaccine  (6 mos+)(PF) (FLUARIX/FLULAVAL/FLUZONE QUAD) injection 0.5 mL  0.5 mL IntraMUSCular PRIOR TO DISCHARGE  
 furosemide (LASIX) tablet 40 mg  40 mg Oral BID  ipratropium (ATROVENT) 0.02 % nebulizer solution 0.5 mg  0.5 mg Nebulization TID RT  
 azithromycin (ZITHROMAX) 500 mg in 0.9% sodium chloride 250 mL (VIAL-MATE)  500 mg IntraVENous Q24H  cefTRIAXone (ROCEPHIN) 2 g in sterile water (preservative free) 20 mL IV syringe  2 g IntraVENous Q24H  
 aspirin tablet 325 mg  325 mg Oral DAILY  atorvastatin (LIPITOR) tablet 40 mg  40 mg Oral DAILY  carvedilol (COREG) tablet 12.5 mg  12.5 mg Oral BID WITH MEALS  
 PARoxetine (PAXIL) tablet 20 mg  20 mg Oral DAILY Objective:  
Vital Signs:   
Visit Vitals /46 (BP 1 Location: Right arm, BP Patient Position: At rest;Supine) Pulse 64 Temp 98 °F (36.7 °C) Resp 18 Wt 64 kg (141 lb 1.5 oz) SpO2 95% Breastfeeding? No  
   
O2 Device: Room air O2 Flow Rate (L/min): 2 l/min Temp (24hrs), Av.3 °F (36.8 °C), Min:98 °F (36.7 °C), Max:98.5 °F (36.9 °C) Intake/Output:  
Last shift:      No intake/output data recorded. Last 3 shifts: 10/26 1901 - 10/28 0700 In: 520 [P.O.:250; I.V.:270] Out: 1550 [OMKI] Intake/Output Summary (Last 24 hours) at 10/28/2019 1336 Last data filed at 10/27/2019 2216 Gross per 24 hour Intake 250 ml Output  Net 250 ml Last 3 Recorded Weights in this Encounter 10/26/19 0604 10/27/19 1351 10/28/19 0325 Weight: 63.5 kg (140 lb 1.6 oz) 63.5 kg (140 lb) 64 kg (141 lb 1.5 oz) bipap  Recent Labs 10/26/19 
9260 PHI 7.330* PCO2I 45.8*  
PO2I 142* HCO3I 24.1 FIO2I 35  
 
 
 Physical Exam:  
 
General/Neurology: Alert, Awake, NAD Head:   Normocephalic, without obvious abnormality, atraumatic. Eye:   EOM intact no scleral icterus, no pallor, no cyanosis. Throat:  Lips, mucosa, and tongue normal. No oral thrush. Neck:   Supple, symmetric. No lymphadenopathy. Trachea midline Lung:   Bilateral crackles at the bases  
heart:   Regular rate & rhythm. S1 S2 present. No murmur. No JVD. Abdomen:  Soft. NT. ND. Extremities:  No pedal edema. No cyanosis. No clubbing. Pulses: 2+ and symmetric in DP. Capillary refill: normal 
 
 
Data:  
   
Recent Results (from the past 24 hour(s)) ECHO ADULT COMPLETE Collection Time: 10/27/19  1:53 PM  
Result Value Ref Range Right Atrial Area 4C 13.15 cm2 AO ASC D 2.34 cm Aortic Valve Systolic Peak Velocity 799.31 cm/s AoV VTI 28.65 cm Aortic Valve Area by Continuity of Peak Velocity 1.7 cm2 Aortic Valve Area by Continuity of VTI 1.7 cm2 AoV PG 7.3 mmHg LVIDd 3.89 (A) 3.9 - 5.3 cm  
 LVPWd 1.25 (A) 0.6 - 0.9 cm LVIDs 2.55 cm IVSd 1.00 (A) 0.6 - 0.9 cm  
 LV ED Vol A2C 42.1 mL  
 LV ES Vol A4C 17.0 mL  
 LV ES Vol BP 17.0 (A) 19 - 49 mL  
 LVOT d 1.92 cm  
 LVOT Peak Velocity 77.93 cm/s LVOT Peak Gradient 2.4 mmHg LVOT VTI 16.43 cm LV E' Septal Velocity 8.00 cm/s LV E' Lateral Velocity 8.00 cm/s  
 MVA (PHT) 1.8 cm2  
 MV A Mian 136.11 cm/s  
 MV E Mian 180.24 cm/s  
 MV E/A 1.32   
 MV Mean Gradient 3.1 mmHg Mitral Valve Annulus Velocity Time Integral 49.48 cm RVIDd 3.37 cm Aortic Valve Systolic Mean Gradient 4.1 mmHg BP EF 62.6 55 - 100 % LV Ejection Fraction MOD 4C 63 % LV Ejection Fraction MOD 2C 64 % LA Vol 4C 48.46 22 - 52 mL  
 LA Area 4C 18.8 cm2 LV Mass .8 (A) 67 - 162 g  
 LVPWs 0.00 cm  
 E/E' lateral 22.53   
 E/E' septal 22.53   
 TAPSV 2.0 cm/s  
 MV Peak Gradient 10.8 mmHg E/E' ratio (averaged) 22.53  LV ES Vol A2C 15.2 mL  
 LV ED Vol A4C 45.5 mL  
 Mitral Valve E Wave Deceleration Time 432.9 ms  
 Mitral Valve Pressure Half-time 119.1 ms Left Atrium Major Axis 4.51 cm Triscuspid Valve Regurgitation Peak Gradient 34.8 mmHg Mitral Valve Max Velocity 163.99 cm/s MVA VTI 1.0 cm2 LV ED Vol BP 45.3 (A) 56 - 104 ml  
 TR Max Velocity 294.89 cm/s Right Atrial Volume 34.7 ml Left Ventricular Fractional Shortening by 2D 62.855337192 % Left Ventricular Outflow Tract Mean Gradient 1.60968770177747 mmHg Left Ventricular Outflow Tract Mean Velocity 6.83813193749976 cm/s Mitral Valve Deceleration [place] 2.293466 Mitral valve mean inflow velocity 0.6364534 m/s AV Velocity Ratio 0.58   
 AV VTI Ratio 0.6 Aortic valve mean velocity 8.68321786949935 m/s IVC proximal 1.46 cm METABOLIC PANEL, COMPREHENSIVE Collection Time: 10/28/19  2:50 AM  
Result Value Ref Range Sodium 136 136 - 145 mmol/L Potassium 3.8 3.5 - 5.5 mmol/L Chloride 97 (L) 100 - 111 mmol/L  
 CO2 30 21 - 32 mmol/L Anion gap 9 3.0 - 18 mmol/L Glucose 90 74 - 99 mg/dL BUN 32 (H) 7.0 - 18 MG/DL Creatinine 1.91 (H) 0.6 - 1.3 MG/DL  
 BUN/Creatinine ratio 17 12 - 20 GFR est AA 30 (L) >60 ml/min/1.73m2 GFR est non-AA 25 (L) >60 ml/min/1.73m2 Calcium 8.5 8.5 - 10.1 MG/DL Bilirubin, total 0.5 0.2 - 1.0 MG/DL  
 ALT (SGPT) 11 (L) 13 - 56 U/L  
 AST (SGOT) 16 10 - 38 U/L Alk. phosphatase 93 45 - 117 U/L Protein, total 6.3 (L) 6.4 - 8.2 g/dL Albumin 3.2 (L) 3.4 - 5.0 g/dL Globulin 3.1 2.0 - 4.0 g/dL A-G Ratio 1.0 0.8 - 1.7    
CBC WITH AUTOMATED DIFF Collection Time: 10/28/19  2:50 AM  
Result Value Ref Range WBC 6.6 4.6 - 13.2 K/uL  
 RBC 3.41 (L) 4.20 - 5.30 M/uL  
 HGB 10.3 (L) 12.0 - 16.0 g/dL HCT 31.4 (L) 35.0 - 45.0 % MCV 92.1 74.0 - 97.0 FL  
 MCH 30.2 24.0 - 34.0 PG  
 MCHC 32.8 31.0 - 37.0 g/dL  
 RDW 13.5 11.6 - 14.5 % PLATELET 283 018 - 312 K/uL MPV 10.5 9.2 - 11.8 FL  
 NEUTROPHILS 56 40 - 73 % LYMPHOCYTES 26 21 - 52 % MONOCYTES 12 (H) 3 - 10 % EOSINOPHILS 5 0 - 5 % BASOPHILS 1 0 - 2 %  
 ABS. NEUTROPHILS 3.8 1.8 - 8.0 K/UL  
 ABS. LYMPHOCYTES 1.7 0.9 - 3.6 K/UL  
 ABS. MONOCYTES 0.8 0.05 - 1.2 K/UL  
 ABS. EOSINOPHILS 0.3 0.0 - 0.4 K/UL  
 ABS. BASOPHILS 0.0 0.0 - 0.1 K/UL  
 DF AUTOMATED MAGNESIUM Collection Time: 10/28/19  2:50 AM  
Result Value Ref Range Magnesium 1.9 1.6 - 2.6 mg/dL Chemistry Recent Labs 10/28/19 
0250 10/27/19 
1020 10/26/19 
9054 GLU 90 113* 217*  137 139  
K 3.8 4.1 5.0  
CL 97* 100 105 CO2 30 30 24 BUN 32* 28* 22* CREA 1.91* 1.84* 1.70* CA 8.5 8.4* 7.9*  
MG 1.9 1.9 2.3 AGAP 9 7 10 BUCR 17 15 13 AP 93 93 97  
TP 6.3* 6.3* 6.1* ALB 3.2* 3.2* 3.1*  
GLOB 3.1 3.1 3.0 AGRAT 1.0 1.0 1.0 Lactic Acid Lactic acid Date Value Ref Range Status 10/26/2019 0.8 0.4 - 2.0 MMOL/L Final  
 
Recent Labs 10/26/19 
1116 LAC 0.8 Liver Enzymes Protein, total  
Date Value Ref Range Status 10/28/2019 6.3 (L) 6.4 - 8.2 g/dL Final  
 
Albumin Date Value Ref Range Status 10/28/2019 3.2 (L) 3.4 - 5.0 g/dL Final  
 
Globulin Date Value Ref Range Status 10/28/2019 3.1 2.0 - 4.0 g/dL Final  
 
A-G Ratio Date Value Ref Range Status 10/28/2019 1.0 0.8 - 1.7   Final  
 
AST (SGOT) Date Value Ref Range Status 10/28/2019 16 10 - 38 U/L Final  
 
Alk. phosphatase Date Value Ref Range Status 10/28/2019 93 45 - 117 U/L Final  
 
Recent Labs 10/28/19 
0250 10/27/19 
1020 10/26/19 
6740 TP 6.3* 6.3* 6.1* ALB 3.2* 3.2* 3.1*  
GLOB 3.1 3.1 3.0 AGRAT 1.0 1.0 1.0 SGOT 16 17 21 AP 93 93 97 CBC w/Diff Recent Labs 10/28/19 
0250 10/27/19 
1020 10/26/19 
2268 WBC 6.6 5.4 6.3  
RBC 3.41* 3.29* 3.20* HGB 10.3* 9.9* 9.6* HCT 31.4* 30.7* 30.5*  215 247 GRANS 56 69 49 LYMPH 26 19* 35 EOS 5 3 5 Cardiac Enzymes No results found for: CPK, CK, CKMMB, CKMB, RCK3, CKMBT, CKNDX, CKND1, CUONG, TROPT, TROIQ, REENA, TROPT, TNIPOC, BNP, BNPP  
 
BNP No results found for: BNP, BNPP, XBNPT Coagulation No results for input(s): PTP, INR, APTT, INREXT, INREXT in the last 72 hours. Thyroid  Lab Results Component Value Date/Time TSH 2.48 10/26/2019 11:16 AM  
   
No results found for: T4  
 
Urinalysis Lab Results Component Value Date/Time Color YELLOW 10/26/2019 09:20 AM  
 Appearance CLEAR 10/26/2019 09:20 AM  
 Specific gravity 1.008 10/26/2019 09:20 AM  
 pH (UA) 6.0 10/26/2019 09:20 AM  
 Protein NEGATIVE  10/26/2019 09:20 AM  
 Glucose NEGATIVE  10/26/2019 09:20 AM  
 Ketone NEGATIVE  10/26/2019 09:20 AM  
 Bilirubin NEGATIVE  10/26/2019 09:20 AM  
 Urobilinogen 0.2 10/26/2019 09:20 AM  
 Nitrites NEGATIVE  10/26/2019 09:20 AM  
 Leukocyte Esterase NEGATIVE  10/26/2019 09:20 AM  
  
 
Micro  Recent Labs 10/26/19 
0920 10/26/19 
0745 10/26/19 
8001 CULT 49560 COLONIES/mL MIXED GRAM POSITIVE TODD, PROBABLE SKIN/GENITAL CONTAMINATION. NO GROWTH 2 DAYS NO GROWTH 2 DAYS Recent Labs 10/26/19 
0920 10/26/19 
0745 10/26/19 
7742 CULT 73029 COLONIES/mL MIXED GRAM POSITIVE TODD, PROBABLE SKIN/GENITAL CONTAMINATION. NO GROWTH 2 DAYS NO GROWTH 2 DAYS Culture data during this hospitalization. All Micro Results Procedure Component Value Units Date/Time CULTURE, URINE [[id number]] Collected:  10/26/19 0920 Order Status:  Completed Specimen:  Cath Urine Updated:  10/28/19 1004 Special Requests: NO SPECIAL REQUESTS Culture result:    
  [id number] COLONIES/mL MIXED GRAM POSITIVE TODD, PROBABLE SKIN/GENITAL CONTAMINATION. CULTURE, BLOOD [[id number]] Collected:  10/26/19 0719 Order Status:  Completed Specimen:  Blood Updated:  10/28/19 6454 Special Requests: NO SPECIAL REQUESTS Culture result: NO GROWTH 2 DAYS     
 CULTURE, BLOOD [[id number]] Collected:  10/26/19 0745 Order Status:  Completed Specimen:  Blood Updated:  10/28/19 4839 Special Requests: NO SPECIAL REQUESTS Culture result: NO GROWTH 2 DAYS Echo 10/27/19: 
· Left Ventricle: Normal cavity size and systolic function (ejection fraction normal). Mild concentric hypertrophy. Estimated left ventricular ejection fraction is 61 - 65%. Left ventricular diastolic dysfunction E/E' ratio is 22.53. · Left Atrium: Dilated left atrium. · Mitral Valve: Mitral valve thickening. Moderate mitral annular calcification. Trivial mitral valve prolapse of the posterior leaflet. Moderate mitral valve regurgitation is present. · Tricuspid Valve: Non-specific thickening of the tricuspid valve. Mild tricuspid valve regurgitation is present. · Pulmonary Artery: Mild to moderate pulmonary hypertension. Pulmonary arterial systolic pressure is 45 mmHg. Images report reviewed by me: 
CT (Most Recent) (CT chest reviewed by me) No results found for this or any previous visit. CXR reviewed by me: 
XR (Most Recent). CXR  reviewed by me and compared with previous CXR Results from [facility] Encounter encounter on 10/26/19 XR CHEST PA LAT Narrative EXAM: PA and lateral views of the chest 
 
INDICATION: Chest pain COMPARISON: October 26, 2019 
 
_______________ FINDINGS: 
 
The heart is enlarged. The mediastinum is within normal limits. Pulmonary 
vascularity is within normal limits. No consolidation. Small bilateral pleural 
effusions are present. No acute osseous abnormalities. Prior lower cervical 
anterior fusion. _______________ Impression IMPRESSION: 
 
Cardiomegaly and small bilateral pleural effusions Moraima Lindsey MD 
10/28/2019

## 2019-10-28 NOTE — PROGRESS NOTES
[address] care of patient at this time, assessment complete. Patient alert and oriented x4. Denies SOB and chest pain. Patient lungs clear, diminished bilaterally. Cap refilled  less than 3 seconds. Patient denies numbness and tingling to all extremities. Stated pain 0/10. Patient encouraged to use IS. Patient verbalized understanding. Ice pack in place, call light and personal items in reach, bed in low position and locked, will continue to monitor patient. Fall risk arm band in place. Family at bedside. [address] Patient ambulating hallway with physical therapist at this time. [address] [facility] Patient sitting up in chair, family at bedside. [facility] Patient sitting up in chair, family at bedside. 1903 Patient had uneventful shift. No signs or symptoms of distress. Patient ambulating and voiding sufficient amounts. Plan for patient to d/c home

## 2019-10-29 NOTE — PROGRESS NOTES
Discussed with ron Herr to be discharged. Need to follow-up with primary care physician for renal function

## 2019-10-29 NOTE — PROGRESS NOTES
Problem: Mobility Impaired (Adult and Pediatric) Goal: *Acute Goals and Plan of Care (Insert Text) Description Physical Therapy Goals Initiated 10/27/2019 and to be accomplished within 2-4 day(s) 1. Patient will move from supine <> sit with mod I in prep for out of bed activity and change of position. 2.  Patient will perform sit<> stand with mod I/LRAD in prep for transfers/ambulation. 3.  Patient will transfer from bed <> chair with mod I/LRAD for time up in chair for completion of ADL activity. 4.  Patient will ambulate 150 feet with mod I/LRAD for improved functional mobility/safe discharge. 5.  Patient will ascend/descend 3-5 stairs with handrail(s) with minimal assistance/contact guard assist for home re-entry as needed. Note: PHYSICAL THERAPY TREATMENT Patient: Julio Singh (32 y.o. female) Date: 10/29/2019 Diagnosis: CHF (congestive heart failure) ([facility] [address] [I50.9] Respiratory failure ([address] [J96.90] Respiratory failure ([facility] [address] Precautions: Fall Chart, physical therapy assessment, plan of care and goals were reviewed. ASSESSMENT: 
Pt increased ambulation distance with no c/o discomfort or lightheadedness, mild fatigue noted after amb. Pt tolerated seated there ex with no c/o pain. Will continue to progress as pt tolerates. Progression toward goals: 
?      Improving appropriately and progressing toward goals ? Improving slowly and progressing toward goals ? Not making progress toward goals and plan of care will be adjusted PLAN: 
Patient continues to benefit from skilled intervention to address the above impairments. Continue treatment per established plan of care. Discharge Recommendations:  Home Health Further Equipment Recommendations for Discharge:  N/A  
 
SUBJECTIVE:  
Patient stated I am feeling really good.  OBJECTIVE DATA SUMMARY:  
Critical Behavior: 
Neurologic State: Alert Orientation Level: Appropriate for age Cognition: Appropriate for age attention/concentration Safety/Judgement: Awareness of environment, Fall prevention, Insight into deficits Functional Mobility Training: 
Bed Mobility: 
Supine to Sit: Supervision Sit to Supine: Supervision Transfers: 
Sit to Stand: Supervision Stand to Sit: Supervision Balance: 
Sitting: Intact Standing: Intact; With support Ambulation/Gait Training: 
Distance (ft): 300 Feet (ft) Assistive Device: Gait belt;Walker, rolling Ambulation - Level of Assistance: Supervision Gait Abnormalities: Decreased step clearance Speed/Shara: Accelerated Therapeutic Exercises:  
Seated there ex: ankle pumps, LAQ, marches, x10 reps Pain: 
Pain Scale 1: Numeric (0 - 10) Pain Intensity 1: 0 Activity Tolerance:  
Good Please refer to the flowsheet for vital signs taken during this treatment. After treatment:  
? Patient left in no apparent distress sitting up in chair ? Patient left in no apparent distress in bed 
? Call bell left within reach ? Nursing notified ? Caregiver present ? Bed alarm activated Tiburcio Bhatia Time Calculation: 24 mins

## 2019-10-29 NOTE — PROGRESS NOTES
NUTRITION UPDATE 
 
- Gave pt diet education for renal diet. Pt and family expressed understanding. All questions and concerns were addressed. Marci Syed, BOUBACAR 
PAGER:  [phone]

## 2019-10-29 NOTE — DISCHARGE SUMMARY
Discharge Summary Patient: Erin Reyes MRN: [id number]  CSN: [id number] YOB: 1935  Age: 80 y.o. Sex: female DOA: 10/26/2019 LOS:  LOS: 3 days   Discharge Date:   
 
Primary Care Provider:  Letitia Hernandez MD 
 
Admission Diagnoses: CHF (congestive heart failure) ([facility] [I50.9] Respiratory failure ([facility] [J96.90] Discharge Diagnoses:   
Hospital Problems  Never Reviewed Codes Class Noted POA * (Principal) Respiratory failure ([facility] ICD-10-CM: J96.90 ICD-9-CM: 518.81  10/26/2019 Yes Chronic diastolic congestive heart failure (HCC) ICD-10-CM: I50.32 
ICD-9-CM: 428.32, 428.0  4/15/2019 Yes Overview Signed 10/26/2019  8:03 AM by Smooth Calderon MD  
  Last Assessment & Plan:  
Appears to be well compensated presently. Her weight is now down to 133 on our scales and the patient tells me that she currently weighs 130 lb on her scale. I believe that this is probably a good dry weight for her so she may continue to rely on her scale and shoot for a dry weight of 130 lb as her baseline. If she starts to drift upward with her weight or notices puffiness or swelling in her legs or shortness of breath she is to start taking her furosemide every day and call us. Otherwise continue current dose of furosemide 40 mg every other day. Continue Aldactone 25 mg daily, continue Coreg 12.5 mg b.i.d. continue low sodium diet. Will check metabolic profile today to reassess serum electrolytes and renal function given that she is on diuretic therapy. Iron deficiency anemia ICD-10-CM: D50.9 ICD-9-CM: 280.9  11/6/2018 Yes Overview Signed 10/26/2019  8:03 AM by Smooth Calderon MD  
  Last Assessment & Plan: Will check iron profile with today's labs. Will also check ferritin level. Check CBC. Currently on iron sulfate supplement every 3rd day but may be able to come off of that. Stage 4 chronic kidney disease ([facility] ICD-10-CM: N18.4 ICD-9-CM: 585.4  8/23/2018 Yes Overview Signed 10/26/2019  8:03 AM by Arturo Long MD  
  Last Assessment & Plan:  
Last GFR on record was 25 on 04/15/2019. Will check chemistry profile with today's labs. She is still followed by Nephrology but unfortunately missed her last appointment with them on 06/26/2019. I told her that it was important that she be seen on a regular basis by Nephrology. Currently her rescheduled appointment is on 12/26/2019. I am not certain that that long of weight will be appropriate for her but will wait and see what current labs look like. If they are stable with regards to renal function I suppose she can wait till December for her next visit. Continue current antihypertensive regimen. Hopefully with the every other day dosage Ng of Lasix she will be maintaining stable BUN and creatinine. Heart failure ([facility] ICD-10-CM: I50.9 ICD-9-CM: 428.9  7/9/2018 Yes Overview Signed 10/26/2019  8:03 AM by Arturo Long MD  
  Last Assessment & Plan:  
She has biventricular congestive heart failure. Currently she seems well compensated with clear lungs and with excellent blood pressure. Continue with current medications as listed on the chart including Lasix 40 mg daily, Coreg 12.5 mg b.i.d. and Aldactone 25 mg daily. Low-sodium diet. Benign essential hypertension ICD-10-CM: I10 
ICD-9-CM: 401.1  10/25/2012 Yes Overview Signed 10/26/2019  8:03 AM by Arturo Long MD  
  Last Assessment & Plan:  
Blood pressure controlled on current regimen of Coreg 12.5 mg twice daily, furosemide 40 mg every other day, Aldactone 25 mg daily. Continue low-sodium diet. Will check electrolytes and renal function with today's labs. Hypothyroidism ICD-10-CM: E03.9 ICD-9-CM: 244.9  10/25/2012 Yes Overview Signed 10/26/2019  8:03 AM by Arturo Long MD  
  Last Assessment & Plan: Most recent thyroid function studies done on 04/15/2019 show TSH and free T4 of 3.52 and 0.93 respectively. Notably she is not on thyroid supplement so there is no current evidence that she is truly hypothyroid. Will continue to follow this periodically. Hypertensive heart disease ICD-10-CM: I11.9 ICD-9-CM: 402.90  10/5/2011 Yes Discharge Condition: stable Discharge Medications:    
Current Discharge Medication List  
  
START taking these medications Details  
atorvastatin (LIPITOR) 40 mg tablet Take 1 Tab by mouth daily. Qty: 30 Tab, Refills: 0  
  
aspirin delayed-release 81 mg tablet Take 1 Tab by mouth daily. Qty: 30 Tab, Refills: 0 CONTINUE these medications which have NOT CHANGED Details  
ergocalciferol (VITAMIN D2) 50,000 unit capsule Take 50,000 Units by mouth every seven (7) days. carvedilol (COREG) 12.5 mg tablet Take 12.5 mg by mouth two (2) times daily (with meals). PARoxetine (PAXIL) 20 mg tablet Take 20 mg by mouth daily. furosemide (LASIX) 40 mg tablet Take 40 mg by mouth every other day. STOP taking these medications  
  
 spironolactone (ALDACTONE) 25 mg tablet Comments:  
Reason for Stopping:   
   
  
 
 
Procedures : none Consults: Cardiology PHYSICAL EXAM  
Visit Vitals /57 (BP 1 Location: Right arm, BP Patient Position: At rest) Pulse 64 Temp 97.5 °F (36.4 °C) Resp 18 Wt 61.8 kg (136 lb 4.8 oz) SpO2 97% Breastfeeding? No  
 
General: Awake, cooperative, no acute distress   
HEENT: NC, Atraumatic. PERRLA, EOMI. Anicteric sclerae. Lungs:  CTA Bilaterally. No Wheezing/Rhonchi/Rales. Heart:  Regular  rhythm,  No murmur, No Rubs, No Gallops Abdomen: Soft, Non distended, Non tender. +Bowel sounds, Extremities: No c/c/e Psych:   Not anxious or agitated. Neurologic:  No acute neurological deficits.      
                          
 
 
Admission HPI :  
 Edel Aviles is a 80 y.o.  female who 
Kidney disease CHF presents to the emergency room with shortness of breath that woke her up from her sleep at about 5:30 in the morning her son called EMS at that time she was found to have hypoxemia with sats in 70% range they did give her CPAP with some improvement of her edema and shortness of breath however when she arrived into the emergency room she was quickly transitioned to BiPAP she was given IV Lasix 40 mg with some diuresis and ABG was done which showed some mild hypercapnia patient states that she weighs herself daily she did note that she is gained about 5 pounds this week she did not double her Lasix her last echo was done about a year ago she mostly had diastolic dysfunction Hospital Course :  
 
80-year-old female was admitted due to CHF exacerbation. On admission she was admitted to ICU which needed BiPAP. IV Lasix were started, she went off BiPAP by her quick and off [place] oxygen. Cardiology was on board. IV Lasix was decreased due to renal function. She has CKD 3, her baseline creatinine around 2. On discharge, she has no shortness of breath. She also received IV antibiotics for possible pneumonia. IV antibiotics was hold due to pneumonia has been ruled out. On discharge, will continue home dose Lasix, need to follow-up with primary care physician and her nephrologist for her renal function. Discharge planning discussed with patient and family, agree with the plan and no questions and concerns at this point. Activity: Activity as tolerated Diet: Cardiac Diet Follow-up: PCP , Dr. Ewa Decker Disposition: home with home health Minutes spent on discharge: 45 min Labs: Results:  
   
Chemistry Recent Labs 10/29/19 
0439 10/28/19 
0250 10/27/19 
1020 GLU 98 90 113* * 136 137  
K 3.7 3.8 4.1 CL 97* 97* 100 CO2 29 30 30 BUN 37* 32* 28* CREA 2.12* 1.91* 1.84* CA 8.8 8.5 8.4* AGAP 9 9 7 BUCR 17 17 15 AP 96 93 93  
TP 6.3* 6.3* 6.3* ALB 3.3* 3.2* 3.2*  
GLOB 3.0 3.1 3.1 AGRAT 1.1 1.0 1.0  
  
CBC w/Diff Recent Labs 10/29/19 
0439 10/28/19 
0250 10/27/19 
1020 WBC 5.7 6.6 5.4  
RBC 3.52* 3.41* 3.29* HGB 10.5* 10.3* 9.9*  
HCT 32.2* 31.4* 30.7*  231 215 GRANS 52 56 69 LYMPH 28 26 19* EOS 7* 5 3 Cardiac Enzymes Recent Labs 10/26/19 
2325 10/26/19 
1705 CPK 70 70 CKND1 2.6 3.4 Coagulation No results for input(s): PTP, INR, APTT, INREXT in the last 72 hours. Lipid Panel No results found for: CHOL, CHOLPOCT, CHOLX, CHLST, CHOLV, 652798, HDL, HDLP, LDL, LDLC, DLDLP, 320550, VLDLC, VLDL, TGLX, TRIGL, TRIGP, TGLPOCT, CHHD, CHHDX  
BNP No results for input(s): BNPP in the last 72 hours. Liver Enzymes Recent Labs 10/29/19 
1073 TP 6.3* ALB 3.3* AP 96 SGOT 16 Thyroid Studies Lab Results Component Value Date/Time TSH 2.48 10/26/2019 11:16 AM  
    
 
 
Significant Diagnostic Studies: Xr Chest Pa Lat Result Date: 10/27/2019 EXAM: PA and lateral views of the chest INDICATION: Chest pain COMPARISON: October 26, 2019 _______________ FINDINGS: The heart is enlarged. The mediastinum is within normal limits. Pulmonary vascularity is within normal limits. No consolidation. Small bilateral pleural effusions are present. No acute osseous abnormalities. Prior lower cervical anterior fusion. _______________ IMPRESSION: Cardiomegaly and small bilateral pleural effusions Xr Chest [facility] Result Date: 10/26/2019 EXAM: CHEST RADIOGRAPH CLINICAL INDICATION/HISTORY: SOB -Additional: None COMPARISON: None TECHNIQUE: Portable frontal view of the chest _______________ FINDINGS: SUPPORT DEVICES: None. HEART AND MEDIASTINUM: No appreciable cardiomegaly. Remaining mediastinal contours within normal limits. LUNGS AND PLEURAL SPACES: No consolidation, pleural effusion, or pneumothorax.  BONY THORAX AND SOFT TISSUES: Prior lower cervical anterior fusion. Unremarkable. _______________ IMPRESSION: No active cardiopulmonary disease. [facility] CC: Trudi Jon MD

## 2019-10-29 NOTE — PROGRESS NOTES
Met with patient at bedside. Patient is alert and oriented. States she was staying in [address] [place] [address] with her daughter but currently living with her son address  Confirmed on chart. Patent is [employer]. . Patient has medicare fo insurance. Patient uses a RW and cane. Family would like for her to have a follow up with Dr. Nicolette Mccoy when she is d/c. Patient has Dr. Anahi Harmon for nephrologist. They would like for her to d/c home when medically cleared. PT recommendations for d/c is home with [facility]. Cm will provide with a list of agencies Care Management Interventions PCP Verified by CM: Yes Transition of Care Consult (CM Consult): Discharge Planning Current Support Network: Relative's Home Confirm Follow Up Transport: Family Plan discussed with Pt/Family/Caregiver: Yes Freedom of Choice Offered: Yes Discharge Location Discharge Placement: Home with family assistance. [facility] Care Management Interventions PCP Verified by CM: Yes Transition of Care Consult (CM Consult): Discharge Planning Physical Therapy Consult: Yes Occupational Therapy Consult: Yes Current Support Network: Relative's Home Confirm Follow Up Transport: Family Plan discussed with Pt/Family/Caregiver: Yes Freedom of Choice Offered: Yes Discharge Location Discharge Placement: Home with home health

## 2019-10-29 NOTE — PROGRESS NOTES
6381 Received report from Gia Sanabria RN. Assumed pt care at this time. 1005 Assessment completed at this time. 1538 Gave pt Flu shot in left deltoid. Pt tolerated well. Pt educated on effects, and verbalized understanding. Pt sitting on side of bed with call bell within reach and gripper socks on. 
 
0095 Discharge instructions reviewed with patient at this time. Opportunity for questions and clarification was provided. Patient has verbalized understanding. Patient was provided with care notes to include side effects of RX's. AVS reviewed with Diana Abdi RN. FLU screening has been completed and verified. Vaccine during this admission: yes

## 2019-10-30 NOTE — PROGRESS NOTES
Cardiology Progress Note Patient: Noah Neal        Sex: female          DOA: 10/26/2019 YOB: 1935      Age:  80 y.o.        LOS:  LOS: 3 days Patient seen and examined, chart reviewed. Assessment/Plan Patient Active Problem List  
Diagnosis Code  Respiratory failure (HCC) J96.90  
 Heart failure (HCC) I50.9  Benign essential hypertension I10  Chronic diastolic congestive heart failure (HCC) I50.32  
 Cobalamin deficiency E53.8  Hypercholesterolemia E78.00  Hypertensive heart disease I11.9  Hypothyroidism E03.9  Iron deficiency anemia D50.9  Stage 4 chronic kidney disease ([facility] [address] N18.4 Abnormal troponin - no evidence of ACS due to demand ischemia Echocardiogram revealed no wall motion abnormality, Normal LVEF Plan: 
 
Continue aspirin, carvedilol, statin. Continue Lasix 40 mg by mouth once a day. Discontinue Aldactone. Advised about salt restriction up to 2 g per day and fluid restriction up to 1.5 L per day. Plan discussed with patient and family member at bedside. Follow-up in cardiology clinic in 2-4 weeks. Plan discussed with          
 
Subjective:  
 cc: My breathing is much better REVIEW OF SYSTEMS:  
 
General: No fevers or chills. Cardiovascular: No chest pain,No palpitations, No orthopnea, No PND, No leg swelling, No claudication Pulmonary: No dyspnea Gastrointestinal: No nausea, vomiting, bleeding Neurology: No Dizziness Objective:  
  
Visit Vitals /49 (BP 1 Location: Right arm, BP Patient Position: At rest) Pulse 62 Temp 97.7 °F (36.5 °C) Resp 18 Wt 61.8 kg (136 lb 4.8 oz) SpO2 95% Breastfeeding? No  
 
There is no height or weight on file to calculate BMI. Physical Exam: 
General Appearance: Comfortable, not using accessory muscles of respiration. HEENT: XANDER. HEAD: Atraumatic NECK: No JVD, no thyroidomeglay. CAROTIDS: No bruit LUNGS: Clear bilaterally. HEART: S1+S2 audible, no murmur, no pericardial rub. ABD: Non-tender, BS Audible EXT: No edema, and no cyanosis. VASCULAR EXAM: Pulses are intact. PSYCHIATRIC EXAM: Mood is appropriate. MUSCULOSKELETAL: Grossly no joint deformity. NEUROLOGICAL: AAO times 3, follows verbal commands Medication: No current facility-administered medications for this encounter. Current Outpatient Medications Medication Sig  [START ON 10/30/2019] atorvastatin (LIPITOR) 40 mg tablet Take 1 Tab by mouth daily.  aspirin delayed-release 81 mg tablet Take 1 Tab by mouth daily.  ergocalciferol (VITAMIN D2) 50,000 unit capsule Take 50,000 Units by mouth every seven (7) days.  carvedilol (COREG) 12.5 mg tablet Take 12.5 mg by mouth two (2) times daily (with meals).  PARoxetine (PAXIL) 20 mg tablet Take 20 mg by mouth daily.  furosemide (LASIX) 40 mg tablet Take 40 mg by mouth every other day. Lab/Data Reviewed: 
 
  
Recent Labs 10/29/19 
0439 10/28/19 
0250 10/27/19 
1020 WBC 5.7 6.6 5.4 HGB 10.5* 10.3* 9.9*  
HCT 32.2* 31.4* 30.7*  231 215 Recent Labs 10/29/19 
0439 10/28/19 
0250 10/27/19 
1020 * 136 137  
K 3.7 3.8 4.1 CL 97* 97* 100 CO2 29 30 30 GLU 98 90 113* BUN 37* 32* 28* CREA 2.12* 1.91* 1.84* CA 8.8 8.5 8.4* Signed By: Gabriella Melchor MD   
 October 29, 2019

## 2019-11-08 PROBLEM — I48.91 A-FIB (HCC): Status: ACTIVE | Noted: 2019-01-01

## 2019-11-08 NOTE — PROGRESS NOTES
41 [facility] report from Tanner De La Cruz RN. Pt in bed resting. 1806 Received call from [facility], monitor tech, pt HR in 120s-140s with max of 152. Talked to Dr. Heidi Hawkins, no orders given, may need Tx to ICU if afib continues sustaining. 1920 Bedside and Verbal shift change report given to OWEN Galindo RN (oncoming nurse) by Finley Dakins. Ita Mcnamara RN (offgoing nurse). Report included the following information SBAR, Kardex, Intake/Output and MAR.

## 2019-11-08 NOTE — PROGRESS NOTES
Problem: Afib Pathway: Day 1 Goal: Activity/Safety Outcome: Progressing Towards Goal 
  
Problem: Falls - Risk of 
Goal: *Absence of Falls Description Document Prudencio Wadsworth Fall Risk and appropriate interventions in the flowsheet. Outcome: Progressing Towards Goal 
Note: No falls during shift. Fall Risk Interventions: 
Mobility Interventions: Communicate number of staff needed for ambulation/transfer Medication Interventions: Teach patient to arise slowly History of Falls Interventions: Investigate reason for fall

## 2019-11-08 NOTE — CONSULTS
[facility] Consult Note Patient: Zahraa Hernandez MRN: [id number]  SSN: [id number] YOB: 1935  Age: 80 y.o. Sex: female Date of Consultation: 11/8/2019 Referring Physician: Dr. Debra Lock Reason for Consultation: Afib 
 
 
HPI: 
I was asked by Dr. Debra Lock to see this patient for Afib with RVR. Zahraa Hernandez is a 78-year-old female with past medical history of diastolic heart failure, COPD, chronic kidney disease admitted in the hospital with acute new onset A. Fib with RVR, acute on chronic renal failure, electrolyte imbalance/hyponatremia, acute on chronic diastolic heart failure. Patient was seen by Dr. Ciera Bee, her primary care physician and patient was found in A. Fib with RVR and shortness of breath.patient was transferred to hospital for further management. Past Medical History:  
Diagnosis Date  Chronic kidney disease  Congestive heart failure (CHF) ([address]  COPD (chronic obstructive pulmonary disease) ([facility]) History reviewed. No pertinent surgical history. Current Facility-Administered Medications Medication Dose Route Frequency  heparin 25,000 units in D5W 250 ml infusion  12-25 Units/kg/hr IntraVENous TITRATE Allergies and Intolerances: Allergies Allergen Reactions  Penicillins Hives  Valium [Diazepam] Other (comments) It made me cry more Family History:  
History reviewed. No pertinent family history. Social History: She  reports that she has quit smoking. She has never used smokeless tobacco.  She  has no alcohol history on file. Review of Systems:  
 
Review of Systems Gen: No fever, chills, malaise, weight loss/gain. Heent: No headache, rhinorrhea, epistaxis, ear pain, hearing loss, sinus pain, neck pain/stiffness, sore throat. Heart: No chest pain, palpitations, +CERVANTES, pnd, or orthopnea. Resp: No cough, hemoptysis, wheezing and +shortness of breath. GI: No nausea, vomiting, diarrhea, constipation, melena or hematochezia. : No urinary obstruction, dysuria or hematuria. Derm: No rash, new skin lesion or pruritis. Musc/skeletal: no bone or joint complains. Vasc: + edema, cyanosis or claudication. Endo: No heat/cold intolerance, no polyuria,polydipsia or polyphagia. Neuro: No unilateral weakness, numbness, tingling. No seizures. Heme: No easy bruising or bleeding. Physical:  
Patient Vitals for the past 6 hrs: 
 Temp Pulse Resp BP SpO2  
11/08/19 1603 97.6 °F (36.4 °C) (!) 124 20 110/83 99 % 11/08/19 1454  (!) 132  97/57   
11/08/19 1342     99 % 11/08/19 1250 97.2 °F (36.2 °C)  20 105/79  Exam:  
General Appearance: Comfortable, not using accessory muscles of respiration. HEENT: XANDER. HEAD: Atraumatic NECK: No JVD, no thyroidomeglay. LUNGS: Clear bilaterally. HEART: S1 irregular & variable +S2 ABD: Non-tender, BS Audible EXT: 1+ edema, and no cysnosis. VASCULAR EXAM: Pulses are intact. PSYCHIATRIC EXAM: Mood is appropriate. MUSCULOSKELETAL: Grossly no joint deformity. NEUROLOGICAL: Motor and sensory sytem intact and Cranial nerves II-XII intact. Review of Data:  
LABS:  
Lab Results Component Value Date/Time WBC 7.8 11/08/2019 01:25 PM  
 HGB 9.7 (L) 11/08/2019 01:25 PM  
 HCT 29.0 (L) 11/08/2019 01:25 PM  
 PLATELET 002 57/00/6857 01:25 PM  
 
Lab Results Component Value Date/Time Sodium 121 (L) 11/08/2019 01:25 PM  
 Potassium 4.3 11/08/2019 01:25 PM  
 Chloride 86 (L) 11/08/2019 01:25 PM  
 CO2 24 11/08/2019 01:25 PM  
 Glucose 135 (H) 11/08/2019 01:25 PM  
 BUN 88 (H) 11/08/2019 01:25 PM  
 Creatinine 3.00 (H) 11/08/2019 01:25 PM  
 
No results found for: CHOL, CHOLX, CHLST, CHOLV, HDL, HDLP, LDL, LDLC, DLDLP, TGLX, TRIGL, TRIGP No results found for: GPT No results found for: HBA1C, HGBE8, [id number], [id number] Cardiology Procedures: Results for orders placed or performed during the hospital encounter of 11/08/19 EKG, 12 LEAD, INITIAL Result Value Ref Range Ventricular Rate 138 BPM  
 Atrial Rate 159 BPM  
 QRS Duration 86 ms  
 Q-T Interval 294 ms QTC Calculation (Bezet) 445 ms Calculated R Axis 35 degrees Calculated T Axis 158 degrees Diagnosis Atrial fibrillation with rapid ventricular response Nonspecific ST and T wave abnormality , probably digitalis effect Abnormal ECG When compared with ECG of 26-OCT-2019 18:44, Atrial fibrillation has replaced Sinus rhythm Vent. rate has increased BY  75 BPM 
Nonspecific T wave abnormality now evident in Inferior leads Nonspecific T wave abnormality now evident in Lateral leads Impression / Plan:   
Patient Active Problem List  
Diagnosis Code  Respiratory failure (HCC) J96.90  
 Heart failure ([facility]) I50.9  Benign essential hypertension I10  Chronic diastolic congestive heart failure ([facility]) I50.32  
 Cobalamin deficiency E53.8  Hypercholesterolemia E78.00  Hypertensive heart disease I11.9  Hypothyroidism E03.9  Iron deficiency anemia D50.9  Stage 4 chronic kidney disease ([address] N18.4  A-fib ([facility] I48.91 A/P Afib with RVR Acute on chronic diastolic heart failure Pulmonary HTN Acute on chronic anemia 
electrolyte imbalance/hyponatremia Plan Start  Heparin drip - [id number] VASc score 5 Start amiodarone bolus and infusion Start low-dose beta blockers, metoprolol Discussed with the patient and family Signed By: Laith Lara MD   
 November 8, 2019

## 2019-11-08 NOTE — ED TRIAGE NOTES
Per pt sister, pt was at Dr. Jeniffer Coe office for follow up from discharge from [facility] last week. Per sister, Dr Grey Cruz wanted her to be sent here d/t audible irregular heartbeat and EKG was done showing new onset Afib.

## 2019-11-08 NOTE — ED PROVIDER NOTES
EMERGENCY DEPARTMENT HISTORY AND PHYSICAL EXAM 
 
Date: 11/8/2019 Patient Name: Danny Quesada History of Presenting Illness Chief Complaint Patient presents with  Irregular Heart Beat History Provided By: [address] Danny Quesada is a 80 y.o. female with PMHX of CHF (diastolic dysfunction with preserved EF), CKD, COPD who presents to the emergency department C/O A. fib with RVR. Patient sent to ER by primary care provider Dr. Ferne Fleischer for newly diagnosed A. fib. No prior history of A. fib. Patient states she feels tired but otherwise okay. Does say she has had decreased p.o. intake and increased fatigue lately. He has no chest pain or trouble breathing. Patient did not take any medications this morning. She was discharged from hospital on 1026 after being admitted for respiratory failure due to CHF exacerbation requiring BiPAP. PCP: Lorraine Reagan MD 
 
Current Facility-Administered Medications Medication Dose Route Frequency Provider Last Rate Last Dose  heparin 25,000 units in D5W 250 ml infusion  12-25 Units/kg/hr IntraVENous TITRATE Sharmaine Pruitt MD 7.5 mL/hr at 11/08/19 1913 12 Units/kg/hr at 11/08/19 1913 Past History Past Medical History: 
Past Medical History:  
Diagnosis Date  Chronic kidney disease  Congestive heart failure (CHF) ([address]  COPD (chronic obstructive pulmonary disease) (HCC) Past Surgical History: 
History reviewed. No pertinent surgical history. Family History: 
History reviewed. No pertinent family history. Social History: 
Social History Tobacco Use  Smoking status: Former Smoker  Smokeless tobacco: Never Used Substance Use Topics  Alcohol use: Not on file  Drug use: Not Currently Allergies: Allergies Allergen Reactions  Penicillins Hives  Valium [Diazepam] Other (comments) It made me cry more Review of Systems Review of Systems Constitutional: Positive for activity change, appetite change and fatigue. Respiratory: Negative for shortness of breath. Cardiovascular: Negative for chest pain. Physical Exam  
 
Vitals:  
 11/08/19 1342 11/08/19 1454 11/08/19 1603 11/08/19 1923 BP:  97/57 110/83 106/65 Pulse:  (!) 132 (!) 124 93 Resp:   20 20 Temp:   97.6 °F (36.4 °C) 97.8 °F (36.6 °C) SpO2: 99%  99% 100% Weight:      
Height:      
 
Physical Exam 
 
Nursing notes and vital signs reviewed Constitutional: Elderly and frail female not in acute distress Head: Normocephalic, Atraumatic Eyes: Pupils are equal, round Neck: Supple Cardiovascular: Irregularly irregular Chest: Normal work of breathing and chest excursion bilaterally Lungs: Clear to ausculation bilaterally Abdomen: Soft, non tender,  
Back: No evidence of trauma or deformity Extremities: No evidence of trauma or deformity, bilateral 2+ lower extremity edema nonpitting Skin: Warm and dry, normal cap refill Neuro: Alert and appropriate, CN intact, normal speech Psychiatric: Normal mood and affect Diagnostic Study Results Labs - Recent Results (from the past 12 hour(s)) EKG, 12 LEAD, INITIAL Collection Time: 11/08/19  1:08 PM  
Result Value Ref Range Ventricular Rate 138 BPM  
 Atrial Rate 159 BPM  
 QRS Duration 86 ms  
 Q-T Interval 294 ms QTC Calculation (Bezet) 445 ms Calculated R Axis 35 degrees Calculated T Axis 158 degrees Diagnosis Atrial fibrillation with rapid ventricular response Nonspecific ST and T wave abnormality , probably digitalis effect Abnormal ECG When compared with ECG of 26-OCT-2019 18:44, Atrial fibrillation has replaced Sinus rhythm Vent. rate has increased BY  75 BPM 
Nonspecific T wave abnormality now evident in Inferior leads Nonspecific T wave abnormality now evident in Lateral leads CBC WITH AUTOMATED DIFF Collection Time: 11/08/19  1:25 PM  
Result Value Ref Range WBC 7.8 4.6 - 13.2 K/uL  
 RBC 3.25 (L) 4.20 - 5.30 M/uL HGB 9.7 (L) 12.0 - 16.0 g/dL HCT 29.0 (L) 35.0 - 45.0 % MCV 89.2 74.0 - 97.0 FL  
 MCH 29.8 24.0 - 34.0 PG  
 MCHC 33.4 31.0 - 37.0 g/dL  
 RDW 13.7 11.6 - 14.5 % PLATELET 296 454 - 975 K/uL MPV 11.6 9.2 - 11.8 FL  
 NEUTROPHILS 81 (H) 40 - 73 % LYMPHOCYTES 10 (L) 21 - 52 % MONOCYTES 9 3 - 10 % EOSINOPHILS 0 0 - 5 % BASOPHILS 0 0 - 2 %  
 ABS. NEUTROPHILS 6.2 1.8 - 8.0 K/UL  
 ABS. LYMPHOCYTES 0.8 (L) 0.9 - 3.6 K/UL  
 ABS. MONOCYTES 0.7 0.05 - 1.2 K/UL  
 ABS. EOSINOPHILS 0.0 0.0 - 0.4 K/UL  
 ABS. BASOPHILS 0.0 0.0 - 0.1 K/UL  
 DF AUTOMATED PROTHROMBIN TIME + INR Collection Time: 11/08/19  1:25 PM  
Result Value Ref Range Prothrombin time 14.2 11.5 - 15.2 sec INR 1.1 0.8 - 1.2 METABOLIC PANEL, COMPREHENSIVE Collection Time: 11/08/19  1:25 PM  
Result Value Ref Range Sodium 121 (L) 136 - 145 mmol/L Potassium 4.3 3.5 - 5.5 mmol/L Chloride 86 (L) 100 - 111 mmol/L  
 CO2 24 21 - 32 mmol/L Anion gap 11 3.0 - 18 mmol/L Glucose 135 (H) 74 - 99 mg/dL BUN 88 (H) 7.0 - 18 MG/DL Creatinine 3.00 (H) 0.6 - 1.3 MG/DL  
 BUN/Creatinine ratio 29 (H) 12 - 20 GFR est AA 18 (L) >60 ml/min/1.73m2 GFR est non-AA 15 (L) >60 ml/min/1.73m2 Calcium 8.8 8.5 - 10.1 MG/DL Bilirubin, total 0.7 0.2 - 1.0 MG/DL  
 ALT (SGPT) 72 (H) 13 - 56 U/L  
 AST (SGOT) 79 (H) 10 - 38 U/L Alk. phosphatase 146 (H) 45 - 117 U/L Protein, total 6.8 6.4 - 8.2 g/dL Albumin 3.7 3.4 - 5.0 g/dL Globulin 3.1 2.0 - 4.0 g/dL A-G Ratio 1.2 0.8 - 1.7 CARDIAC PANEL,(CK, CKMB & TROPONIN) Collection Time: 11/08/19  1:25 PM  
Result Value Ref Range CK 61 26 - 192 U/L  
 CK - MB 2.0 <3.6 ng/ml CK-MB Index 3.3 0.0 - 4.0 % Troponin-I, QT 0.26 (H) 0.0 - 0.045 NG/ML  
NT-PRO BNP Collection Time: 11/08/19  1:25 PM  
Result Value Ref Range  NT pro-BNP 31,164 (H) 0 - 1,800 PG/ML  
 URINALYSIS W/ RFLX MICROSCOPIC Collection Time: 11/08/19  2:30 PM  
Result Value Ref Range Color YELLOW Appearance CLEAR Specific gravity 1.015 1.005 - 1.030    
 pH (UA) 5.0 5.0 - 8.0 Protein NEGATIVE  NEG mg/dL Glucose NEGATIVE  NEG mg/dL Ketone NEGATIVE  NEG mg/dL Bilirubin NEGATIVE  NEG Blood NEGATIVE  NEG Urobilinogen 1.0 0.2 - 1.0 EU/dL Nitrites NEGATIVE  NEG Leukocyte Esterase MODERATE (A) NEG URINE MICROSCOPIC ONLY Collection Time: 11/08/19  2:30 PM  
Result Value Ref Range WBC 2 to 4 0 - 5 /hpf  
 RBC 0 to 3 0 - 5 /hpf Epithelial cells 1+ 0 - 5 /lpf Bacteria FEW (A) NEG /hpf Radiologic Studies -  
XR CHEST PORT Final Result IMPRESSION:  
  
  
1. Cardiomegaly, central vascular congestion, and small bilateral pleural  
effusions. CT Results  (Last 48 hours) None CXR Results  (Last 48 hours) 11/08/19 1344  XR CHEST PORT Final result Impression:  IMPRESSION:  
   
   
1. Cardiomegaly, central vascular congestion, and small bilateral pleural  
effusions. Narrative:  EXAM: XR CHEST PORT  
   
CLINICAL INDICATION/HISTORY: Atrial fibrillation, rapid ventricular response  
-Additional: None COMPARISON: Several prior exams, most recently 10/27/2019 TECHNIQUE: Frontal view of the chest  
   
_______________ FINDINGS:  
   
HEART AND MEDIASTINUM: Enlarged appearing cardiac silhouette with stable  
mediastinal contours. Mild tortuosity of the thoracic aorta as previously. LUNGS AND PLEURAL SPACES: Central vascular congestion is increased in the  
interval from comparison exam. There are small bilateral pleural effusions  
present. No pneumothorax or pneumonic opacity. BONY THORAX AND SOFT TISSUES: No acute osseous abnormality. Partial  
visualization of lower cervical fusion instrumentation. Advanced bilateral  
rotator cuff arthropathy. _______________ Medications given in the ED- Medications  
heparin 25,000 units in D5W 250 ml infusion (12 Units/kg/hr × 62.6 kg IntraVENous Rate Verify 11/8/19 1913)  
sodium chloride 0.9 % bolus infusion 500 mL (0 mL IntraVENous IV Completed 11/8/19 1510) aspirin tablet 325 mg (325 mg Oral Given 11/8/19 1420)  
sodium chloride 0.9 % bolus infusion 500 mL (0 mL IntraVENous IV Completed 11/8/19 1421) digoxin (LANOXIN) injection 250 mcg (250 mcg IntraVENous Given 11/8/19 1454)  
heparin (porcine) 1,000 unit/mL injection 3,760 Units (3,760 Units IntraVENous Given 11/8/19 1459)  
amiodarone (CORDARONE) 150 mg in dextrose 5% 100 ml bolus premix 150 mg (150 mg IntraVENous Given 11/8/19 1617) Medical Decision Making I am the first provider for this patient. I reviewed the vital signs, available nursing notes, past medical history, past surgical history, family history and social history. Vital Signs-Reviewed the patient's vital signs. Pulse Oximetry Analysis - 100% on ra Cardiac Monitor: 
Rate: 132 bpm 
Rhythm: Fib RVR EKG interpretation: (Preliminary) EKG read by Dr. Kirill ROBERTS. fib RVR rate 138 Records Reviewed: Nursing Notes, Old Medical Records, Previous electrocardiograms, Previous Radiology Studies and Previous Laboratory Studies Provider Notes (Medical Decision Making): Radha Baird is a 80 y.o. female with new onset A. fib RVR otherwise hemodynamically stable. Patient has history of hypotension. Her daughter has recorded daily blood pressure and weight which has been consistent. Will rule out additional causes of A. fib such as UTI, pneumonia, sepsis. Will also discussed case with cardiology as patient is borderline hypotensive and may not tolerate Cardizem. Procedures: 
Procedures ED Course:  
CONSULT NOTE:  
2:26 PM  
I discussed care with Dr. Real Skiff  It was a standard discussion, including history of patients chief complaint, available diagnostic results, and treatment course. Discussed case. Recommends digoxin 0.25 IV due to hypotension and CHF, can consider amnio for further rate control. Will start patient on heparin drip as no contraindications to anticoagulation at this time. 2:27 PM 
To have multiple laboratory abnormalities, hyponatremic at 121 down from 135 on October 29. Her creatinine has increased from 2.12-3. BUN has also increased to 88.  proBNP is markedly elevated in the setting of worsening renal function. Chest x-ray does not reveal volume overload. Troponin is also mildly elevated which I think is demand ischemia. She has a transaminitis which is likely due to hypoperfusion. Sodium Correction Rate: 
442 mL/hr Fluid rate to increase Na by 0.5 mmol/L/hr with NS Patient has 2nd 500cc bolus hanging which I will d/c 
 
CONSULT NOTE:  
3:30 PM  
I discussed care with Dr. Polo Phillips  It was a standard discussion, including history of patients chief complaint, available diagnostic results, and treatment course. Discussed case. 3:31 PM 
Patient remains in RVR despite dig load. As per my discussion with cardiology will now give dose of amiodarone. I discussed case with hospitalist and if patient remains in A. fib RVR or decomensates will require ICU evaluation. Patient remains hemodynamically stable. Diagnosis and Disposition 3:33 PM 
I have spent 90  minutes of critical care time involved in lab review, consultations with specialist, family decision-making, and documentation. During this entire length of time I was immediately available to the patient. Critical Care: The reason for providing this level of medical care for this critically ill patient was due a critical illness that impaired one or more vital organ systems such that there was a high probability of imminent or life threatening deterioration in the patients condition.  This care involved high complexity decision making to assess, manipulate, and support vital system functions, to treat this degreee vital organ system failure and to prevent further life threatening deterioration of the patients condition. Core Measures: 
For Hospitalized Patients: 
 
1. Hospitalization Decision Time: The decision to hospitalize the patient was made by Dr Kiki Wheatley at 9240 on 11/8/2019 2. Aspirin: Aspirin was given 3:33 PM  Patient is being admitted to the hospital by Dr. Za Beltran. The results of their tests and reasons for their admission have been discussed with them and/or available family. They convey agreement and understanding for the need to be admitted and for their admission diagnosis. CONDITIONS ON ADMISSION: 
Sepsis is not present at the time of admission. Deep Vein Thrombosis is not present at the time of admission. Thrombosis is not present at the time of admission. Urinary Tract Infection is not present at the time of admission. Pneumonia is not present at the time of admission. MRSA is not present at the time of admission. Wound infection is not present at the time of admission. Pressure Ulcer is not present at the time of admission. CLINICAL IMPRESSION: 
 
1. Atrial fibrillation, unspecified type ([address] 2. Elevated troponin 3. Hyponatremia 4. Acute renal failure, unspecified acute renal failure type ([address]   
 
 
_______________________________ Please note that this dictation was completed with [employer], the computer voice recognition software. Quite often unanticipated grammatical, syntax, homophones, and other interpretive errors are inadvertently transcribed by the computer software. Please disregard these errors. Please excuse any errors that have escaped final proofreading.

## 2019-11-08 NOTE — PROGRESS NOTES
Potential Readmission within 30 days:  
 
 
Reason for ED Visit:   Irregular heartbeat  And new onset of Afib Previous DC Plan:  Pt was recently discharged from [facility] 10/29/19  For resp failure and CHF Did Patient go to Follow-up appts after discharge,  Y/N: Y Current ED Plan: currently pt is being observed, chest x ray and labs ordered, cm will remain available if needed.

## 2019-11-09 NOTE — PROGRESS NOTES
Problem: Afib Pathway: Day 1 Goal: Off Pathway (Use only if patient is Off Pathway) Outcome: Progressing Towards Goal 
Goal: Activity/Safety Outcome: Progressing Towards Goal 
Goal: Consults, if ordered Outcome: Progressing Towards Goal 
Goal: Diagnostic Test/Procedures Outcome: Progressing Towards Goal 
Goal: Nutrition/Diet Outcome: Progressing Towards Goal 
Goal: Discharge Planning Outcome: Progressing Towards Goal 
Goal: Medications Outcome: Progressing Towards Goal 
Goal: Respiratory Outcome: Progressing Towards Goal 
Goal: Treatments/Interventions/Procedures Outcome: Progressing Towards Goal 
Goal: Psychosocial 
Outcome: Progressing Towards Goal 
Goal: *Optimal pain control at patient's stated goal 
Outcome: Progressing Towards Goal 
Goal: *Hemodynamically stable Outcome: Progressing Towards Goal 
Goal: *Stable cardiac rhythm Outcome: Progressing Towards Goal 
Goal: *Lungs clear or at baseline Outcome: Progressing Towards Goal 
Goal: *Labs within defined limits Outcome: Progressing Towards Goal 
Goal: *Describes available resources and support systems Outcome: Progressing Towards Goal 
  
Problem: Afib Pathway: Day 2 Goal: Off Pathway (Use only if patient is Off Pathway) Outcome: Progressing Towards Goal 
Goal: Activity/Safety Outcome: Progressing Towards Goal 
Goal: Consults, if ordered Outcome: Progressing Towards Goal 
Goal: Diagnostic Test/Procedures Outcome: Progressing Towards Goal 
Goal: Nutrition/Diet Outcome: Progressing Towards Goal 
Goal: Discharge Planning Outcome: Progressing Towards Goal 
Goal: Medications Outcome: Progressing Towards Goal 
Goal: Respiratory Outcome: Progressing Towards Goal 
Goal: Treatments/Interventions/Procedures Outcome: Progressing Towards Goal 
Goal: Psychosocial 
Outcome: Progressing Towards Goal 
Goal: *Hemodynamically stable Outcome: Progressing Towards Goal 
Goal: *Optimal pain control at patient's stated goal 
 Outcome: Progressing Towards Goal 
Goal: *Stable cardiac rhythm Outcome: Progressing Towards Goal 
Goal: *Lungs clear or at baseline Outcome: Progressing Towards Goal 
Goal: *Describes available resources and support systems Outcome: Progressing Towards Goal 
  
Problem: Afib Pathway: Day 3 Goal: Off Pathway (Use only if patient is Off Pathway) Outcome: Progressing Towards Goal 
Goal: Activity/Safety Outcome: Progressing Towards Goal 
Goal: Diagnostic Test/Procedures Outcome: Progressing Towards Goal 
Goal: Nutrition/Diet Outcome: Progressing Towards Goal 
Goal: Discharge Planning Outcome: Progressing Towards Goal 
Goal: Medications Outcome: Progressing Towards Goal 
Goal: Respiratory Outcome: Progressing Towards Goal 
Goal: Treatments/Interventions/Procedures Outcome: Progressing Towards Goal 
Goal: Psychosocial 
Outcome: Progressing Towards Goal 
  
Problem: Afib: Discharge Outcomes (not in CAT) Goal: *Hemodynamically stable Outcome: Progressing Towards Goal 
Goal: *Stable cardiac rhythm Outcome: Progressing Towards Goal 
Goal: *Lungs clear or at baseline Outcome: Progressing Towards Goal 
Goal: *Optimal pain control at patient's stated goal 
Outcome: Progressing Towards Goal 
Goal: *Identifies cardiac risk factors Outcome: Progressing Towards Goal 
Goal: *Verbalizes home exercise program, activity guidelines, cardiac precautions Outcome: Progressing Towards Goal 
Goal: *Verbalizes understanding and describes prescribed diet Outcome: Progressing Towards Goal 
Goal: *Verbalizes understanding and describes medication purposes and frequencies Outcome: Progressing Towards Goal 
Goal: *Anxiety reduced or absent Outcome: Progressing Towards Goal 
Goal: *Understands and describes signs and symptoms to report to providers(Stroke Metric) Outcome: Progressing Towards Goal 
Goal: *Describes follow-up/return visits to physicians Outcome: Progressing Towards Goal 
 Goal: *Describes available resources and support systems Outcome: Progressing Towards Goal 
Goal: *Influenza immunization Outcome: Progressing Towards Goal 
Goal: *Pneumococcal immunization Outcome: Progressing Towards Goal 
Goal: *Describes smoking cessation resources Outcome: Progressing Towards Goal 
  
Problem: Falls - Risk of 
Goal: *Absence of Falls Description Document Laxmicindy Garcia Fall Risk and appropriate interventions in the flowsheet. Outcome: Progressing Towards Goal 
Note:  
Fall Risk Interventions: 
Mobility Interventions: Communicate number of staff needed for ambulation/transfer, Bed/chair exit alarm, Patient to call before getting OOB, Utilize walker, cane, or other assistive device Medication Interventions: Bed/chair exit alarm, Patient to call before getting OOB, Teach patient to arise slowly History of Falls Interventions: Door open when patient unattended, Investigate reason for fall, Bed/chair exit alarm Problem: Pain Goal: *Control of Pain Outcome: Progressing Towards Goal 
Goal: *PALLIATIVE CARE:  Alleviation of Pain Outcome: Progressing Towards Goal 
  
Problem: Patient Education: Go to Patient Education Activity Goal: Patient/Family Education Outcome: Progressing Towards Goal 
  
Problem: Gas Exchange - Impaired Goal: *Absence of hypoxia Outcome: Progressing Towards Goal 
  
Problem: Patient Education: Go to Patient Education Activity Goal: Patient/Family Education Outcome: Progressing Towards Goal 
  
Problem: Pressure Injury - Risk of 
Goal: *Prevention of pressure injury Description Document Joe Scale and appropriate interventions in the flowsheet. Outcome: Progressing Towards Goal 
Note:  
Pressure Injury Interventions: Activity Interventions: Pressure redistribution bed/mattress(bed type), Increase time out of bed Problem: Patient Education: Go to Patient Education Activity Goal: Patient/Family Education Outcome: Progressing Towards Goal

## 2019-11-09 NOTE — ROUTINE PROCESS
Assume care of patient from [facility], [address] [place] [address]. Patient received in bed awake up eating and talking with family member present at bedside. Patient A&Ox4, denies pain, chest pain, SOB and discomfort. No distress noted. Frequently use items within reach. Bed locked in low position. Call bell within reach and patient verbalized understanding of use for assistance and needs. Heparin drip rate has been verified with off-going nurse. 26- Called and spoke with Dr. Patricia Fam regarding patient's HR and A-fib. HR has sustained 130's since shift change and admission onto unit. Also noted that HR has been charted incorrectly at 1923 for 93, on monitor HR has been 130's. Informed MD, stated to order STAT EKG and Amiodarone drip at 1 mg/kg. RBV.  
 
2257- Noted MEWS of 5 from HR and BP. MD is aware, will continue Amiodarone drip started at 2210 and continue to monitor HR and BP. At bedside patient is awake and alert speaking with family members, no distress noted. 0330- Called and informed Dr. Patricia Fam of patient;s HR that is still sustained in high 120's to low 140's and of MEW of 4 due to HR. MD stated to extend Amiodarone drip at 1 mg for 3 more hours, given 250 of digoxin, and order STAT Mg. RBV. Patient is resting calmly in bed, asymptomatic.  
 
3225- Was informed by charge nurse, Keara Rainey RN who was called by lab at this time of patient's critical result of aPTT > 180. Result is in result review for 0230 but was not posted until now, will stop Heparin drip per protocol. 0710- Bedside and Verbal shift change report given to RICK Watkins (oncoming nurse) by Stephie Morocho RN (offgoing nurse). Report included the following information SBAR, Kardex, Intake/Output, MAR and Recent Results.

## 2019-11-09 NOTE — PROGRESS NOTES
Problem: Afib: Discharge Outcomes (not in CAT) Goal: *Hemodynamically stable Outcome: Progressing Towards Goal 
Goal: *Stable cardiac rhythm Outcome: Progressing Towards Goal 
Goal: *Lungs clear or at baseline Outcome: Progressing Towards Goal 
Goal: *Optimal pain control at patient's stated goal 
Outcome: Progressing Towards Goal 
Goal: *Identifies cardiac risk factors Outcome: Progressing Towards Goal 
Goal: *Verbalizes home exercise program, activity guidelines, cardiac precautions Outcome: Progressing Towards Goal 
Goal: *Verbalizes understanding and describes prescribed diet Outcome: Progressing Towards Goal 
Goal: *Verbalizes understanding and describes medication purposes and frequencies Outcome: Progressing Towards Goal 
Goal: *Anxiety reduced or absent Outcome: Progressing Towards Goal 
Goal: *Understands and describes signs and symptoms to report to providers(Stroke Metric) Outcome: Progressing Towards Goal 
Goal: *Describes follow-up/return visits to physicians Outcome: Progressing Towards Goal 
Goal: *Describes available resources and support systems Outcome: Progressing Towards Goal 
Goal: *Influenza immunization Outcome: Progressing Towards Goal 
Goal: *Pneumococcal immunization Outcome: Progressing Towards Goal 
Goal: *Describes smoking cessation resources Outcome: Progressing Towards Goal 
  
Problem: Falls - Risk of 
Goal: *Absence of Falls Description Document Elayne Soto Fall Risk and appropriate interventions in the flowsheet. Outcome: Progressing Towards Goal 
Note:  
Fall Risk Interventions: 
Mobility Interventions: Assess mobility with egress test, Bed/chair exit alarm, Patient to call before getting OOB, Utilize walker, cane, or other assistive device Medication Interventions: Assess postural VS orthostatic hypotension, Bed/chair exit alarm, Patient to call before getting OOB, Teach patient to arise slowly History of Falls Interventions: Bed/chair exit alarm, Vital signs minimum Q4HRs X 24 hrs (comment for end date), Door open when patient unattended Problem: Pain Goal: *Control of Pain Outcome: Progressing Towards Goal 
Goal: *PALLIATIVE CARE:  Alleviation of Pain Outcome: Progressing Towards Goal 
  
Problem: Patient Education: Go to Patient Education Activity Goal: Patient/Family Education Outcome: Progressing Towards Goal 
  
Problem: Gas Exchange - Impaired Goal: *Absence of hypoxia Outcome: Progressing Towards Goal 
  
Problem: Patient Education: Go to Patient Education Activity Goal: Patient/Family Education Outcome: Progressing Towards Goal 
  
Problem: Pressure Injury - Risk of 
Goal: *Prevention of pressure injury Description Document Joe Scale and appropriate interventions in the flowsheet. Outcome: Progressing Towards Goal 
Note:  
Pressure Injury Interventions: Activity Interventions: Increase time out of bed, Pressure redistribution bed/mattress(bed type), PT/OT evaluation Mobility Interventions: Pressure redistribution bed/mattress (bed type), PT/OT evaluation, Turn and reposition approx. every two hours(pillow and wedges) Problem: Patient Education: Go to Patient Education Activity Goal: Patient/Family Education Outcome: Progressing Towards Goal 
 
Titi Dejesus RN

## 2019-11-09 NOTE — PROGRESS NOTES
Pt on amiodarone gtt MD was made aware MEWS is improving. 1427: Spoke with  regarding heparin gtts. Dr. Ligia Lehman he will look at it. 1455: Pt was given 2.5 of lopressor for elevated HR.  
1550: pt B/P 97/66

## 2019-11-09 NOTE — PROGRESS NOTES
Hospitalist Progress Note Patient: Wes Marquis MRN: [id number]  CSN: [id number] YOB: 1935  Age: 80 y.o. Sex: female DOA: 11/8/2019 LOS:  LOS: 1 day Chief Complaint: 
A FIb Assessment/Plan  
 
80 y. o. female with PMHX of CHF (diastolic dysfunction with preserved EF), CKD, COPD. Admit to telemetry for new onset atrial fibrillation with RVR.  
  
New onset atrial fibrillation with RVR Dehydration due to combination of diuresis and decreased p.o. Intake, improved with gentle hydration and increase p.o. intake REZA due to decreased renal perfusion secondary to hypovolemia, creatinine improved slightly from 3-2.73 Hyponatremia, sodium 121> sodium slowly increasing Elevated troponin, 0.26, likely demand ischemia> troponin stay flat and did not trend up Diastolic heart failure, pBNP 31,164, normal is less than 1800 Urinary tract infection, abnormal UA 
  
Patient placed on amiodarone IV Cardiology has been consulted: Advised increasing metoprolol tartrate from 12.5mg to 25 mg p.o. twice daily, while inpatient give patient metoprolol 2.5 mg p.o. twice daily IV, will consider warfarin or Eliquis for anticoagulation 
echocardiogram pending Continue heparin drip Nephrology consulted: Checking urine analysis but specifically urine sodium urine creatinine and urine nitrogen, restart diuretics Lasix IV, hold Aldactone secondary to renal failure, avoid ACE inhibitors, avoid ARBs, avoid NSAIDs or IV contrast, maintain fluid restriction and closely monitor urine output and daily weight Follow BMP 
DVT prophylaxis covered by heparin drip Prophylaxis p.o. Heparin Disposition : Home Patient Active Problem List  
Diagnosis Code  Respiratory failure (HCC) J96.90  
 Heart failure (HCC) I50.9  Benign essential hypertension I10  Chronic diastolic congestive heart failure (HCC) I50.32  
 Cobalamin deficiency E53.8  Hypercholesterolemia E78.00  
  Hypertensive heart disease I11.9  Hypothyroidism E03.9  Iron deficiency anemia D50.9  Stage 4 chronic kidney disease ([facility] N18.4  A-fib ([facility] I48.91 Subjective: No events overnight. Able to avoid ICU, did reasonable on Amiodarone IV Review of systems: 
 
Constitutional: denies fevers, chills, myalgias Respiratory: denies SOB, cough Cardiovascular: denies chest pain, palpitations Gastrointestinal: denies nausea, vomiting, diarrhea Vital signs/Intake and Output: 
Visit Vitals BP 93/70 (BP 1 Location: Left arm, BP Patient Position: Supine) Pulse (!) 128 Temp 97.5 °F (36.4 °C) Resp 16 Ht 4' 9\" (1.448 m) Wt 62.6 kg (138 lb) SpO2 100% BMI 29.86 kg/m² Current Shift:  No intake/output data recorded. Last three shifts:  11/07 1901 - 11/09 0700 In: 1752.8 [P.O.:360; I.V.:1392.8] Out: 200 [Urine:200] Exam: 
 
General: Well developed, alert, NAD, OX3 Head/Neck: NCAT, supple, No masses, No lymphadenopathy CVS: Irregularly irregular rhythm and tachycardic rate, no M/R/G, S1/S2 heard, no thrill Lungs:Clear to auscultation bilaterally, no wheezes, rhonchi, or rales Abdomen: Soft, Nontender, No distention, Normal Bowel sounds, No hepatomegaly Extremities: No C/C/E, pulses palpable 2+ Skin:normal texture and turgor, no rashes, no lesions Neuro:grossly normal , follows commands Psych:appropriate Labs: Results:  
   
Chemistry Recent Labs 11/09/19 
0230 11/08/19 
2125 11/08/19 
1325 * 103* 135* * 126* 121*  
K 4.0 3.9 4.3 CL 89* 91* 86* CO2 22 24 24 BUN 87* 85* 88* CREA 2.77* 2.73* 3.00* CA 8.4* 8.1* 8.8 AGAP 13 11 11 BUCR 31* 31* 29* AP  --   --  146* TP  --   --  6.8 ALB  --   --  3.7 GLOB  --   --  3.1 AGRAT  --   --  1.2  
  
CBC w/Diff Recent Labs 11/09/19 
0230 11/08/19 
2125 11/08/19 
1325 WBC 6.8 6.8 7.8  
RBC 3.06* 3.05* 3.25* HGB 9.3* 9.1* 9.7* HCT 27.4* 27.1* 29.0*  
 275 252 GRANS  --   --  81* LYMPH  --   --  10* EOS  --   --  0 Cardiac Enzymes Recent Labs 11/09/19 0230 11/08/19 2125 CPK 48 47 CKND1 4.8* 4.9* Coagulation Recent Labs 11/09/19 
0230 11/08/19 2125 11/08/19 
1325 PTP  --   --  14.2 INR  --   --  1.1 APTT >180.0* 110.8*  --   
   
Lipid Panel No results found for: CHOL, CHOLPOCT, CHOLX, CHLST, CHOLV, 875117, HDL, HDLP, LDL, LDLC, DLDLP, 686049, VLDLC, VLDL, TGLX, TRIGL, TRIGP, TGLPOCT, CHHD, CHHDX  
BNP No results for input(s): BNPP in the last 72 hours. Liver Enzymes Recent Labs 11/08/19 
1325 TP 6.8 ALB 3.7 * SGOT 79* Thyroid Studies Lab Results Component Value Date/Time TSH 2.48 10/26/2019 11:16 AM  
    
Procedures/imaging: see electronic medical records for all procedures/Xrays and details which were not copied into this note but were reviewed prior to creation of Plan Ryan Whitley MD

## 2019-11-09 NOTE — ROUTINE PROCESS
0700: received bedside shift report from Bret Parks RN and assumed care of the pt, updated white board, assessed all current needs, bed in lowest position and call light within reach. 1100:  Pt sitting up in bed with visitors at bedside, all current needs assessed, bed in lowest position and call light within reach. 1500: Pharmacy called and spoke with Hannah Del Rio RN about the heparin gtt protocol, pt was being dosed based on the AMI/ACS protocol, worked with Dr. Brian Somers and pharmacy to switch her to afib protocol. Will start pt at the lowest dosing according to the afib protocol (18 units/kg/hr) will retime the next PTT draw for 6 hours from the onset of the rate change. 1900: shift summary, shift uneventful, no complaints of chest pain or shortness of breath.  
 
Ling Garcia RN

## 2019-11-09 NOTE — ROUTINE PROCESS
1900: Bedside shift change report given to Israel Laureano RN (oncoming nurse) by Alma Saeed RN (offgoing nurse). Report included the following information SBAR, Kardex, Intake/Output, MAR and Cardiac Rhythm Afib. Visit Vitals BP 97/66 Pulse (!) 127 Temp 97.8 °F (36.6 °C) Resp 16 Ht 4' 9\" (1.448 m) Wt 62.6 kg (138 lb) SpO2 100% BMI 29.86 kg/m² Alma Saeed RN

## 2019-11-09 NOTE — PROGRESS NOTES
Cardiology Progress Note Patient: Zohaib Cooney        Sex: female          DOA: 11/8/2019 YOB: 1935      Age:  80 y.o.        LOS:  LOS: 1 day Assessment/Plan Active Problems: 
  A-fib ([address] (11/8/2019) Plan: 
Increase metoprolol tartrate from 12.5 mg to 25 mg PO BID Give IV metoprolol 2.5 mg po BID Discussed with patient and daughter about long term anticoagulation, pt willing to try that Will consider warfarin or Eliquis Subjective:  
 cc: Afib with RVR 
CHF- diastolic HF Acute renal failure with electrolytes imablance REVIEW OF SYSTEMS:  
 
General: No fevers or chills. Cardiovascular: No chest pain or pressure. No palpitations. No ankle swelling Pulmonary: No SOB, orthopnea, PND Gastrointestinal: No nausea, vomiting or diarrhea Objective:  
  
Visit Vitals /66 Pulse (!) 124 Temp 97.8 °F (36.6 °C) Resp 16 Ht 4' 9\" (1.448 m) Wt 62.6 kg (138 lb) SpO2 100% BMI 29.86 kg/m² Body mass index is 29.86 kg/m². Physical Exam: 
General Appearance: Comfortable, not using accessory muscles of respiration. NECK: No JVD, no thyroidomeglay. LUNGS: Clear bilaterally. HEART: S1 irregular & variable +S2 audible, ABD: Non-tender, BS Audible EXT: No edema, and no cysnosis. Bruise on left shin VASCULAR EXAM: Pulses are intact. PSYCHIATRIC EXAM: Mood is appropriate. Medication: 
Current Facility-Administered Medications Medication Dose Route Frequency  aspirin delayed-release tablet 81 mg  81 mg Oral DAILY  atorvastatin (LIPITOR) tablet 40 mg  40 mg Oral DAILY  ergocalciferol capsule 50,000 Units  50,000 Units Oral Q7D  
 PARoxetine (PAXIL) tablet 20 mg  20 mg Oral DAILY  metoprolol tartrate (LOPRESSOR) tablet 25 mg  25 mg Oral Q12H  
 heparin 25,000 units in D5W 250 ml infusion  12-25 Units/kg/hr IntraVENous TITRATE  sodium chloride (NS) flush 5-40 mL  5-40 mL IntraVENous Q8H  
 sodium chloride (NS) flush 5-40 mL  5-40 mL IntraVENous PRN  
 acetaminophen (TYLENOL) tablet 650 mg  650 mg Oral Q4H PRN  
 HYDROcodone-acetaminophen (NORCO) 5-325 mg per tablet 1 Tab  1 Tab Oral Q4H PRN  
 morphine injection 2 mg  2 mg IntraVENous Q4H PRN  
 naloxone (NARCAN) injection 0.4 mg  0.4 mg IntraVENous PRN  
 amiodarone (NEXTERONE) 360 mg in dextrose 200 mL (1.8 mg/mL) infusion  0.5-1 mg/min IntraVENous TITRATE Lab/Data Reviewed: 
Procedures/imaging: see electronic medical records for all procedures/Xrays  
and details which were not copied into this note but were reviewed prior to creation of Plan 
  
 
All lab results for the last 24 hours reviewed. Recent Labs 11/09/19 
0230 11/08/19 2125 11/08/19 
1325 WBC 6.8 6.8 7.8 HGB 9.3* 9.1* 9.7* HCT 27.4* 27.1* 29.0*  
 239 271 Recent Labs 11/09/19 
0230 11/08/19 2125 11/08/19 
1325 * 126* 121*  
K 4.0 3.9 4.3 CL 89* 91* 86* CO2 22 24 24 * 103* 135* BUN 87* 85* 88* CREA 2.77* 2.73* 3.00* CA 8.4* 8.1* 8.8 Signed By: Terry Elias MD   
 November 9, 2019

## 2019-11-09 NOTE — CONSULTS
Consult Note Consult requested by: Elsa Roach MD  
 
Sp Barnett is a 80 y.o. female [facility] who is being seen on consult for acute kidney injury . Chief Complaint Patient presents with  Irregular Heart Beat Impression: 1. Acute kidney Injury on stage 4 CKD 2. Acute on chronic biventricular heart failure and history of cardiorenal syndrome 3. New onset atrial fibrillation RVR 4. Hyponatremia consistent with total body fluid retention /CHF Recommendations: 1. Check urine analysis , urine sodium, cr and urea nitrogen 2. Restart diuretic : Lasix IV 3. Hold Aldactone due to renal failure 4. Avoid ACEI/ARBs , NSAIDs or IV contrast 
5. Fluid restriction 6. Close monitor UOP daily weight , Repeat BMP 0400 HPI:  Sp Barnett is a 80 y.o. WF with PMHX of CHF, COPD who presents to the emergency department for newly Dx 's AFibs send in by PCP Dr. Rosita Cuello . Patient was discharged 10/26 for CHF and went home with lasox 40 mg every other day . Patient has chronic kidney disease and follows  . Patient was last seen Dec 2018. Per records patient has stage 4 chronic kidney disease. Baseline cr was 1.77-1.9. Patient has chronic diastolic heart failure prone to biventricular decompensation, mitral regurgitation, pulmonary hypertension. Patient was admitted yesterday. Cardiology seen and placed on Heparin gtt. Patient has SOB with minimal exertion such as eating food . She reports urinated only once today . Patient reports follow low salt diet and denied use  NSAID exposure. She did not take Lasix yesterday  . CXR last night :Central vascular congestion is increased in the interval from comparison exam. There are small bilateral pleural effusions 
present. No pneumothorax or pneumonic opacity. Past Medical History:  
Diagnosis Date  Chronic kidney disease  Congestive heart failure (CHF) ([address]  COPD (chronic obstructive pulmonary disease) ([facility]) History reviewed. No pertinent surgical history. Social History Socioeconomic History  Marital status:  Spouse name: Not on file  Number of children: Not on file  Years of education: Not on file  Highest education level: Not on file Occupational History  Not on file Social Needs  Financial resource strain: Not on file  Food insecurity:  
  Worry: Not on file Inability: Not on file  Transportation needs:  
  Medical: Not on file Non-medical: Not on file Tobacco Use  Smoking status: Former Smoker  Smokeless tobacco: Never Used Substance and Sexual Activity  Alcohol use: Not on file  Drug use: Not Currently  Sexual activity: Not on file Lifestyle  Physical activity:  
  Days per week: Not on file Minutes per session: Not on file  Stress: Not on file Relationships  Social connections:  
  Talks on phone: Not on file Gets together: Not on file Attends [employer] service: Not on file Active member of club or organization: Not on file Attends meetings of clubs or organizations: Not on file Relationship status: Not on file  Intimate partner violence:  
  Fear of current or ex partner: Not on file Emotionally abused: Not on file Physically abused: Not on file Forced sexual activity: Not on file Other Topics Concern  Not on file Social History Narrative  Not on file Allergies Allergen Reactions  Penicillins Hives  Valium [Diazepam] Other (comments) It made me cry more Medications:  
 
Current Facility-Administered Medications Medication Dose Route Frequency  aspirin delayed-release tablet 81 mg  81 mg Oral DAILY  atorvastatin (LIPITOR) tablet 40 mg  40 mg Oral DAILY  ergocalciferol capsule 50,000 Units  50,000 Units Oral Q7D  
 PARoxetine (PAXIL) tablet 20 mg  20 mg Oral DAILY  metoprolol tartrate (LOPRESSOR) tablet 25 mg  25 mg Oral Q12H  heparin 25,000 units in D5W 250 ml infusion  18-36 Units/kg/hr IntraVENous TITRATE  sodium chloride (NS) flush 5-40 mL  5-40 mL IntraVENous Q8H  
 sodium chloride (NS) flush 5-40 mL  5-40 mL IntraVENous PRN  
 acetaminophen (TYLENOL) tablet 650 mg  650 mg Oral Q4H PRN  
 HYDROcodone-acetaminophen (NORCO) 5-325 mg per tablet 1 Tab  1 Tab Oral Q4H PRN  
 morphine injection 2 mg  2 mg IntraVENous Q4H PRN  
 naloxone (NARCAN) injection 0.4 mg  0.4 mg IntraVENous PRN  
 amiodarone (NEXTERONE) 360 mg in dextrose 200 mL (1.8 mg/mL) infusion  0.5-1 mg/min IntraVENous TITRATE No current facility-administered medications on file prior to encounter. Current Outpatient Medications on File Prior to Encounter Medication Sig Dispense Refill  atorvastatin (LIPITOR) 40 mg tablet Take 1 Tab by mouth daily. 30 Tab 0  
 aspirin delayed-release 81 mg tablet Take 1 Tab by mouth daily. 30 Tab 0  
 ergocalciferol (VITAMIN D2) 50,000 unit capsule Take 50,000 Units by mouth every seven (7) days.  carvedilol (COREG) 12.5 mg tablet Take 12.5 mg by mouth two (2) times daily (with meals).  PARoxetine (PAXIL) 20 mg tablet Take 20 mg by mouth daily.  furosemide (LASIX) 40 mg tablet Take 40 mg by mouth every other day. Review of Systems:  
Review of System is negative except per HPI Physical Assessment:  
 
Visit Vitals BP 97/66 Pulse (!) 127 Temp 97.8 °F (36.6 °C) Resp 16 Ht 4' 9\" (1.448 m) Wt 62.6 kg (138 lb) SpO2 100% BMI 29.86 kg/m² Intake/Output Summary (Last 24 hours) at 11/9/2019 1724 Last data filed at 11/9/2019 0425 Gross per 24 hour Intake 752.79 ml Output 200 ml Net 552.79 ml Last 3 Recorded Weights in this Encounter 11/08/19 1250 Weight: 62.6 kg (138 lb) General Appearance: no pain, no distress Head: atraumatic HEENT: no jaundice, moist oral mucosa Neck: (+)  JVD noted Chest: decreased bs through out Heart: irregular rate Adbomen: obese non tender , BS active Skin: intact, no rash Extremity: 1+ edema extended to thigh bilateral  
MS: No joint deformity or tenderness Neuro: conscious, alert, oriented x 3, no focal deficit BMP Lab Results Component Value Date/Time Sodium 124 (L) 11/09/2019 02:30 AM  
 Potassium 4.0 11/09/2019 02:30 AM  
 Chloride 89 (L) 11/09/2019 02:30 AM  
 CO2 22 11/09/2019 02:30 AM  
 Anion gap 13 11/09/2019 02:30 AM  
 Glucose 121 (H) 11/09/2019 02:30 AM  
 BUN 87 (H) 11/09/2019 02:30 AM  
 Creatinine 2.77 (H) 11/09/2019 02:30 AM  
 BUN/Creatinine ratio 31 (H) 11/09/2019 02:30 AM  
 GFR est AA 20 (L) 11/09/2019 02:30 AM  
 GFR est non-AA 16 (L) 11/09/2019 02:30 AM  
 Calcium 8.4 (L) 11/09/2019 02:30 AM  
 
 
CBC Lab Results Component Value Date/Time WBC 6.8 11/09/2019 02:30 AM  
 HGB 9.3 (L) 11/09/2019 02:30 AM  
 HCT 27.4 (L) 11/09/2019 02:30 AM  
 PLATELET 575 02/99/0782 02:30 AM  
 MCV 89.5 11/09/2019 02:30 AM  
 
 
No components found for: PTHINT No results found for: TIMOTHY Kaur MD 
 
Pager: [phone]

## 2019-11-09 NOTE — H&P
History & Physical 
 
Patient: Stef Acosta MRN: [id number]  CSN: [id number] YOB: 1935  Age: 80 y.o. Sex: female DOA: 11/8/2019 Primary Care Provider:  Suki Moon MD 
 
 
Assessment/Plan  
80 y.o. female with PMHX of CHF (diastolic dysfunction with preserved EF), CKD, COPD. Admit to telemetry for new onset atrial fibrillation with RVR. New onset atrial fibrillation with RVR Dehydration due to combination of diuresis and decreased p.o. Intake REZA due to decreased renal perfusion secondary to hypovolemia Hyponatremia, sodium 121 Elevated troponin, 0.26, likely demand ischemia Diastolic heart failure, pBNP 31,164, normal is less than 1800 Urinary tract infection, abnormal UA Admit to telemetry unit Patient received digoxin was not effective for lowering heart rate, patient received amiodarone and eventually appeared to be effective Cardiology has been consulted Will obtain echocardiogram in a.m. Continue heparin drip Encourage p.o. Intake Hold diuretic overnight even though pBNP is elevated Recheck BMP in a.m., nephrology consulted Trend cardiac enzymes, initial troponin 0.26 DVT prophylaxis covered by heparin drip Prophylaxis p.o. heparin Patient Active Problem List  
Diagnosis Code  Respiratory failure (HCC) J96.90  
 Heart failure (HCC) I50.9  Benign essential hypertension I10  Chronic diastolic congestive heart failure (HCC) I50.32  
 Cobalamin deficiency E53.8  Hypercholesterolemia E78.00  Hypertensive heart disease I11.9  Hypothyroidism E03.9  Iron deficiency anemia D50.9  Stage 4 chronic kidney disease ([address] N18.4  A-fib ([facility] [address] I48.91 Estimated length of stay : 2-3 days CC: AFib HPI:  
 
Stef Acosta is a 80 y.o. female who Stef Acosta is a 80 y.o. female with PMHX of CHF (diastolic dysfunction with preserved EF), CKD, COPD who presents to the emergency department C/O A. fib with RVR.   Patient sent to ER by primary care provider Dr. Katerin Padilla for newly diagnosed A. fib. No prior history of A. fib. Patient states she feels tired but otherwise okay. Does say she has had decreased p.o. intake and increased fatigue lately. He has no chest pain or trouble breathing. Patient did not take any medications this morning. 
  
She was discharged from hospital on 10/26/19 after being admitted for respiratory failure due to CHF exacerbation requiring BiPAP. 
  
 
Past Medical History:  
Diagnosis Date  Chronic kidney disease  Congestive heart failure (CHF) ([address]  COPD (chronic obstructive pulmonary disease) ([facility]) History reviewed. No pertinent surgical history. History reviewed. No pertinent family history. Social History Socioeconomic History  Marital status:  Spouse name: Not on file  Number of children: Not on file  Years of education: Not on file  Highest education level: Not on file Tobacco Use  Smoking status: Former Smoker  Smokeless tobacco: Never Used Substance and Sexual Activity  Drug use: Not Currently Prior to Admission medications Medication Sig Start Date End Date Taking? Authorizing Provider  
atorvastatin (LIPITOR) 40 mg tablet Take 1 Tab by mouth daily. 10/30/19  Yes Reece Espinoza MD  
aspirin delayed-release 81 mg tablet Take 1 Tab by mouth daily. 10/29/19  Yes Reece Espinoza MD  
ergocalciferol (VITAMIN D2) 50,000 unit capsule Take 50,000 Units by mouth every seven (7) days. Yes Jesus, MD Napoleon  
carvedilol (COREG) 12.5 mg tablet Take 12.5 mg by mouth two (2) times daily (with meals). Yes Jesus, MD Napoleon  
PARoxetine (PAXIL) 20 mg tablet Take 20 mg by mouth daily. Yes Jesus, MD Napoleon  
furosemide (LASIX) 40 mg tablet Take 40 mg by mouth every other day. Yes Jesus, MD Napoleon  
 
 
Allergies Allergen Reactions  Penicillins Hives  Valium [Diazepam] Other (comments) It made me cry more Review of Systems Gen: No fever, chills, malaise, weight loss/gain. Heent: No headache, rhinorrhea, epistaxis, ear pain, hearing loss, sinus pain, neck pain/stiffness, sore throat. Heart: No chest pain, palpitations, CERVANTES, pnd, or orthopnea. Resp: No cough, hemoptysis, wheezing and shortness of breath. GI: No nausea, vomiting, diarrhea, constipation, melena or hematochezia. : No urinary obstruction, dysuria or hematuria. Derm: No rash, new skin lesion or pruritis. Musc/skeletal: no bone or joint complains. Vasc: No edema, cyanosis or claudication. Endo: No heat/cold intolerance, no polyuria,polydipsia or polyphagia. Neuro: No unilateral weakness, numbness, tingling. No seizures. Heme: No easy bruising or bleeding. Physical Exam:  
 
Physical Exam: 
Visit Vitals /65 (BP 1 Location: Left arm, BP Patient Position: At rest;Supine) Pulse 93 Temp 97.8 °F (36.6 °C) Resp 20 Ht 4' 9\" (1.448 m) Wt 62.6 kg (138 lb) SpO2 100% BMI 29.86 kg/m² O2 Device: Room air Temp (24hrs), Av.5 °F (36.4 °C), Min:97.2 °F (36.2 °C), Max:97.8 °F (36.6 °C) No intake/output data recorded.  0701 -  1900 In: 1000 [I.V.:1000] Out: - General:  Awake, cooperative, no distress. Head:  Normocephalic, without obvious abnormality, atraumatic. Eyes:  Conjunctivae/corneas clear, sclera anicteric, PERRL, EOMs intact. Nose: Nares normal. No drainage or sinus tenderness. Throat: Lips, mucosa, and tongue normal.   
Neck: Supple, symmetrical, trachea midline, no adenopathy. Lungs:   Clear to auscultation bilaterally. Heart:  Iregular rate and rhythm, S1, S2 normal, no murmur, click, rub or gallop. Abdomen: Soft, non-tender. Bowel sounds normal. No masses,  No organomegaly. Extremities: Extremities normal, atraumatic, no cyanosis or edema. Capillary refill normal.  
Pulses: 2+ and symmetric all extremities. Skin: Skin color pink/pale/mottled/flushed, turgor normal. No rashes or lesions Neurologic: CNII-XII intact. No focal motor or sensory deficit. Labs Reviewed: 
Recent Results (from the past 24 hour(s)) EKG, 12 LEAD, INITIAL Collection Time: 11/08/19  1:08 PM  
Result Value Ref Range Ventricular Rate 138 BPM  
 Atrial Rate 159 BPM  
 QRS Duration 86 ms  
 Q-T Interval 294 ms QTC Calculation (Bezet) 445 ms Calculated R Axis 35 degrees Calculated T Axis 158 degrees Diagnosis Atrial fibrillation with rapid ventricular response Nonspecific ST and T wave abnormality , probably digitalis effect Abnormal ECG When compared with ECG of 26-OCT-2019 18:44, Atrial fibrillation has replaced Sinus rhythm Vent. rate has increased BY  75 BPM 
Nonspecific T wave abnormality now evident in Inferior leads Nonspecific T wave abnormality now evident in Lateral leads CBC WITH AUTOMATED DIFF Collection Time: 11/08/19  1:25 PM  
Result Value Ref Range WBC 7.8 4.6 - 13.2 K/uL  
 RBC 3.25 (L) 4.20 - 5.30 M/uL HGB 9.7 (L) 12.0 - 16.0 g/dL HCT 29.0 (L) 35.0 - 45.0 % MCV 89.2 74.0 - 97.0 FL  
 MCH 29.8 24.0 - 34.0 PG  
 MCHC 33.4 31.0 - 37.0 g/dL  
 RDW 13.7 11.6 - 14.5 % PLATELET 897 962 - 610 K/uL MPV 11.6 9.2 - 11.8 FL  
 NEUTROPHILS 81 (H) 40 - 73 % LYMPHOCYTES 10 (L) 21 - 52 % MONOCYTES 9 3 - 10 % EOSINOPHILS 0 0 - 5 % BASOPHILS 0 0 - 2 %  
 ABS. NEUTROPHILS 6.2 1.8 - 8.0 K/UL  
 ABS. LYMPHOCYTES 0.8 (L) 0.9 - 3.6 K/UL  
 ABS. MONOCYTES 0.7 0.05 - 1.2 K/UL  
 ABS. EOSINOPHILS 0.0 0.0 - 0.4 K/UL  
 ABS. BASOPHILS 0.0 0.0 - 0.1 K/UL  
 DF AUTOMATED PROTHROMBIN TIME + INR Collection Time: 11/08/19  1:25 PM  
Result Value Ref Range Prothrombin time 14.2 11.5 - 15.2 sec INR 1.1 0.8 - 1.2 METABOLIC PANEL, COMPREHENSIVE Collection Time: 11/08/19  1:25 PM  
Result Value Ref Range Sodium 121 (L) 136 - 145 mmol/L  Potassium 4.3 3.5 - 5.5 mmol/L  
 Chloride 86 (L) 100 - 111 mmol/L  
 CO2 24 21 - 32 mmol/L Anion gap 11 3.0 - 18 mmol/L Glucose 135 (H) 74 - 99 mg/dL BUN 88 (H) 7.0 - 18 MG/DL Creatinine 3.00 (H) 0.6 - 1.3 MG/DL  
 BUN/Creatinine ratio 29 (H) 12 - 20 GFR est AA 18 (L) >60 ml/min/1.73m2 GFR est non-AA 15 (L) >60 ml/min/1.73m2 Calcium 8.8 8.5 - 10.1 MG/DL Bilirubin, total 0.7 0.2 - 1.0 MG/DL  
 ALT (SGPT) 72 (H) 13 - 56 U/L  
 AST (SGOT) 79 (H) 10 - 38 U/L Alk. phosphatase 146 (H) 45 - 117 U/L Protein, total 6.8 6.4 - 8.2 g/dL Albumin 3.7 3.4 - 5.0 g/dL Globulin 3.1 2.0 - 4.0 g/dL A-G Ratio 1.2 0.8 - 1.7 CARDIAC PANEL,(CK, CKMB & TROPONIN) Collection Time: 11/08/19  1:25 PM  
Result Value Ref Range CK 61 26 - 192 U/L  
 CK - MB 2.0 <3.6 ng/ml CK-MB Index 3.3 0.0 - 4.0 % Troponin-I, QT 0.26 (H) 0.0 - 0.045 NG/ML  
NT-PRO BNP Collection Time: 11/08/19  1:25 PM  
Result Value Ref Range NT pro-BNP 31,164 (H) 0 - 1,800 PG/ML  
URINALYSIS W/ RFLX MICROSCOPIC Collection Time: 11/08/19  2:30 PM  
Result Value Ref Range Color YELLOW Appearance CLEAR Specific gravity 1.015 1.005 - 1.030    
 pH (UA) 5.0 5.0 - 8.0 Protein NEGATIVE  NEG mg/dL Glucose NEGATIVE  NEG mg/dL Ketone NEGATIVE  NEG mg/dL Bilirubin NEGATIVE  NEG Blood NEGATIVE  NEG Urobilinogen 1.0 0.2 - 1.0 EU/dL Nitrites NEGATIVE  NEG Leukocyte Esterase MODERATE (A) NEG URINE MICROSCOPIC ONLY Collection Time: 11/08/19  2:30 PM  
Result Value Ref Range WBC 2 to 4 0 - 5 /hpf  
 RBC 0 to 3 0 - 5 /hpf Epithelial cells 1+ 0 - 5 /lpf Bacteria FEW (A) NEG /hpf  
 
 
Procedures/imaging: see electronic medical records for all procedures/Xrays and details which were not copied into this note but were reviewed prior to creation of Plan CC: Hector Rangel MD

## 2019-11-10 PROBLEM — N39.0 UTI (URINARY TRACT INFECTION): Status: ACTIVE | Noted: 2019-01-01

## 2019-11-10 NOTE — PROGRESS NOTES
Hospitalist Progress Note Patient: Wes Marquis MRN: [id number]  [facility]: [id number] YOB: 1935  Age: 80 y.o. Sex: female DOA: 11/8/2019 LOS:  LOS: 2 days Chief Complaint: 
A FIb Assessment/Plan  
 
80 y. o. female with PMHX of CHF (diastolic dysfunction with preserved EF), CKD, COPD. Admit to telemetry for new onset atrial fibrillation with RVR.  
  
New onset atrial fibrillation with RVR Dehydration due to combination of diuresis and decreased p.o. Intake, improved with gentle hydration and increase p.o. intake REZA due to decreased renal perfusion secondary to hypovolemia, creatinine improved slightly from 3>2.73>2.51 Hyponatremia, sodium 121>123 sodium slowly increasing Elevated troponin, 0.26, likely demand ischemia> troponin stay flat and did not trend up Diastolic heart failure, pBNP 31,164 Urinary tract infection, abnormal UA 
  
Patient placed on amiodarone IV Cardiology has been consulted: Advised increasing metoprolol tartrate from 12.5mg to 25 mg p.o. twice daily, while inpatient give patient metoprolol 2.5 mg p.o. twice daily IV, will consider warfarin or Eliquis for anticoagulation 
echocardiogram pending Continue heparin drip Nephrology consulted: Checking urine analysis but specifically urine sodium urine creatinine and urine nitrogen, restart diuretics Lasix IV, hold Aldactone secondary to renal failure, avoid ACE inhibitors, avoid ARBs, avoid NSAIDs or IV contrast, maintain fluid restriction and closely monitor urine output and daily weight Start Rocephin and obtain urine cultures prior to administering 1st antibiotic dose Follow BMP 
DVT prophylaxis covered by heparin drip Prophylaxis p.o. Heparin Disposition : Home Patient Active Problem List  
Diagnosis Code  Respiratory failure ([facility]) J96.90  
 Heart failure ([facility]) I50.9  Benign essential hypertension I10  Chronic diastolic congestive heart failure (HCC) I50.32  
  Cobalamin deficiency E53.8  Hypercholesterolemia E78.00  Hypertensive heart disease I11.9  Hypothyroidism E03.9  Iron deficiency anemia D50.9  Stage 4 chronic kidney disease ([facility] [address] N18.4  A-fib ([address] I48.91 Subjective: No events overnight. No complaints Review of systems: 
 
Constitutional: denies fevers, chills, myalgias Respiratory: denies SOB, cough Cardiovascular: denies chest pain, palpitations Gastrointestinal: denies nausea, vomiting, diarrhea Vital signs/Intake and Output: 
Visit Vitals /68 (BP 1 Location: Left arm, BP Patient Position: At rest) Pulse (!) 136 Temp 97.8 °F (36.6 °C) Resp 16 Ht 4' 9\" (1.448 m) Wt 66.7 kg (147 lb 0.8 oz) SpO2 99% BMI 31.82 kg/m² Current Shift:  No intake/output data recorded. Last three shifts:  11/08 1901 - 11/10 0700 In: 1232.8 [P.O.:840; I.V.:392.8] Out: 500 [Urine:500] Exam: 
 
General: Well developed, alert, NAD, OX3 Head/Neck: NCAT, supple, No masses, No lymphadenopathy CVS: Irregularly irregular rhythm and tachycardic rate, no M/R/G, S1/S2 heard, no thrill Lungs:Clear to auscultation bilaterally, no wheezes, rhonchi, or rales Abdomen: Soft, Nontender, No distention, Normal Bowel sounds, No hepatomegaly Extremities: No C/C/E, pulses palpable 2+ Skin:normal texture and turgor, no rashes, no lesions Neuro:grossly normal , follows commands Psych:appropriate Labs: Results:  
   
Chemistry Recent Labs 11/10/19 
0103 11/09/19 
2145 11/09/19 
0230  11/08/19 
1325 * 150* 121*   < > 135* * 125* 124*   < > 121*  
K 3.6 3.9 4.0   < > 4.3 CL 90* 90* 89*   < > 86* CO2 22 23 22   < > 24 BUN 77* 77* 87*   < > 88* CREA 2.51* 2.61* 2.77*   < > 3.00* CA 8.3* 8.5 8.4*   < > 8.8 AGAP 11 12 13   < > 11 BUCR 31* 30* 31*   < > 29* AP  --   --   --   --  146* TP  --   --   --   --  6.8 ALB  --   --   --   --  3.7 GLOB  --   --   --   --  3.1 AGRAT  --   --   --   --  1.2  
 < > = values in this interval not displayed. CBC w/Diff Recent Labs 11/10/19 
0103 11/09/19 0230 11/08/19 2125 11/08/19 
1325 WBC 7.5 6.8 6.8 7.8  
RBC 3.02* 3.06* 3.05* 3.25* HGB 9.1* 9.3* 9.1* 9.7* HCT 27.2* 27.4* 27.1* 29.0*  
 244 239 271 GRANS 73  --   --  81* LYMPH 12*  --   --  10* EOS 2  --   --  0 Cardiac Enzymes Recent Labs 11/09/19 
0230 11/08/19 2125 CPK 48 47 CKND1 4.8* 4.9* Coagulation Recent Labs 11/10/19 
0555 11/09/19 2145 11/08/19 
1325 PTP  --   --   --  14.2 INR  --   --   --  1.1 APTT >180.0* >180.0*   < >  --   
 < > = values in this interval not displayed. Lipid Panel No results found for: CHOL, CHOLPOCT, CHOLX, CHLST, CHOLV, 894475, HDL, HDLP, LDL, LDLC, DLDLP, 288495, VLDLC, VLDL, TGLX, TRIGL, TRIGP, TGLPOCT, CHHD, CHHDX  
BNP No results for input(s): BNPP in the last 72 hours. Liver Enzymes Recent Labs 11/08/19 
1325 TP 6.8 ALB 3.7 * SGOT 79* Thyroid Studies Lab Results Component Value Date/Time TSH 2.53 11/08/2019 01:25 PM  
    
Procedures/imaging: see electronic medical records for all procedures/Xrays and details which were not copied into this note but were reviewed prior to creation of Plan Jairon Corral MD

## 2019-11-10 NOTE — PROGRESS NOTES
Cardiology Progress Note Patient: Roselyn Mcleod        Sex: female          DOA: 11/8/2019 YOB: 1935      Age:  80 y.o.        LOS:  LOS: 2 days Assessment/Plan Active Problems: 
  A-fib ([address] (11/8/2019) Plan: Afib CHF 
SOB 
CERVANTES 
 
CERVANTES and Afib suboptimals rate Increase metoprolol tartrate 25 mg to 50 mg po BID 
xanax 0.25 mg po daily Subjective:  
 cc: Afib CHF 
SOB 
CERVANTES REVIEW OF SYSTEMS:  
 
General: No fevers or chills. Cardiovascular: No chest pain or pressure. No palpitations. No ankle swelling Pulmonary: No SOB, orthopnea, PND Gastrointestinal: No nausea, vomiting or diarrhea Objective:  
  
Visit Vitals BP 92/67 Pulse (!) 117 Temp 97.2 °F (36.2 °C) Resp 17 Ht 4' 11\" (1.499 m) Wt 66.7 kg (147 lb) SpO2 100% BMI 29.69 kg/m² Body mass index is 29.69 kg/m². Physical Exam: 
General Appearance: Comfortable, not using accessory muscles of respiration. NECK: No JVD, no thyroidomeglay. LUNGS: Clear bilaterally. HEART: S1 irregular & variable +S2 audible, ABD: Non-tender, BS Audible EXT: No edema, and no cysnosis. VASCULAR EXAM: Pulses are intact. PSYCHIATRIC EXAM: Mood is appropriate. Medication: 
Current Facility-Administered Medications Medication Dose Route Frequency  metoprolol tartrate (LOPRESSOR) tablet 50 mg  50 mg Oral Q12H  aspirin delayed-release tablet 81 mg  81 mg Oral DAILY  atorvastatin (LIPITOR) tablet 40 mg  40 mg Oral DAILY  ergocalciferol capsule 50,000 Units  50,000 Units Oral Q7D  
 PARoxetine (PAXIL) tablet 20 mg  20 mg Oral DAILY  heparin 25,000 units in D5W 250 ml infusion  18-36 Units/kg/hr IntraVENous TITRATE  furosemide (LASIX) injection 40 mg  40 mg IntraVENous BID  sodium chloride (NS) flush 5-40 mL  5-40 mL IntraVENous Q8H  
 sodium chloride (NS) flush 5-40 mL  5-40 mL IntraVENous PRN  
  acetaminophen (TYLENOL) tablet 650 mg  650 mg Oral Q4H PRN  
 HYDROcodone-acetaminophen (NORCO) 5-325 mg per tablet 1 Tab  1 Tab Oral Q4H PRN  
 morphine injection 2 mg  2 mg IntraVENous Q4H PRN  
 naloxone (NARCAN) injection 0.4 mg  0.4 mg IntraVENous PRN  
 amiodarone (NEXTERONE) 360 mg in dextrose 200 mL (1.8 mg/mL) infusion  0.5-1 mg/min IntraVENous TITRATE Lab/Data Reviewed: 
Procedures/imaging: see electronic medical records for all procedures/Xrays  
and details which were not copied into this note but were reviewed prior to creation of Plan 
  
 
All lab results for the last 24 hours reviewed. Recent Labs 11/10/19 
0103 11/09/19 
0230 11/08/19 2125 WBC 7.5 6.8 6.8 HGB 9.1* 9.3* 9.1*  
HCT 27.2* 27.4* 27.1*  
 244 239 Recent Labs 11/10/19 
0103 11/09/19 
2145 11/09/19 0230 * 125* 124* K 3.6 3.9 4.0  
CL 90* 90* 89* CO2 22 23 22 * 150* 121* BUN 77* 77* 87* CREA 2.51* 2.61* 2.77* CA 8.3* 8.5 8.4* Signed By: Aishwarya Malone MD   
 November 10, 2019

## 2019-11-10 NOTE — PROGRESS NOTES
1930: Assumed care of the patient from Cristel Amaral RN. Patient in bed eating dinner. Family at bedside. Patient heparin drip at 18 Unit/kg/hr and amiodarone at 0.5mg/min. 2200: Purposeful rounding completed. Patient resting comfortably in bed in no acute distress. Call light is within reach. 2315: aPTT greater than 180. Heparin drip held. 0010: Purposeful rounding completed. Patient resting comfortably in bed in no acute distress. Call light is within reach. 8380: aPTT greater than 180. Heparin drip held. 0720: Bedside and Verbal shift change report given to Cristel Amaral RN (oncoming nurse) by Joya Tovar RN (offgoing nurse). Report included the following information SBAR, Kardex, MAR, Accordion, Recent Results and Med Rec Status.

## 2019-11-10 NOTE — PROGRESS NOTES
Physical Therapy Screening: 
Services are indicated and therapy order is required. An InBasket screening referral was triggered for physical therapy based on results obtained during the nursing admission assessment. The patients chart was reviewed and the patient is appropriate for a skilled therapy evaluation. Please order a consult for physical therapy if you are in agreement and would like an evaluation to be completed. Thank you.  
 
Marko Dempsey, JULIO

## 2019-11-10 NOTE — ACP (ADVANCE CARE PLANNING)
AMD Reviewed  provided education on Advance Medical Directives and left a copy with the patient. Patient was encouraged to call the  if it is completed while here or if more clarification was needed.  completed visit with patient and offered Pastoral care. Chaplains will continue to follow and will provide pastoral care as needed or requested. [facility] [employer] [phone] - Office Pt presents with sinus pressure x a few days. Pt states \"there's just a lot of pressure, it's giving me a headache.\" When asked if pt took any OTC decongestants or cold medicine pt stated, \"I didn't have anything at home and I don't get paid till Friday.\" pt reports hx of deviated septum. Denies cough or fever at home

## 2019-11-10 NOTE — PROGRESS NOTES
In patient nephrology progress note Admit Date:    2019 Name:  Michaela Mathew Age :  80 y.o. Impression: 1. Acute kidney Injury on stage 4 CKD , non oliguric , no worsening renal function 2. Acute on chronic biventricular heart failure 3. Hyponatremia consistent with total body fluid retention /CHF . Urine sodium <15 
4. Anemia of chronic disease 5.  New onset atrial fibrillation RVR  , on heparin gtt follow cardiology in house Recommendations: 1. Continue IV lasix bid 2. Hold Aldactone due to renal failure 3. Avoid ACEI/ARBs , NSAIDs or IV contrast 
4. Fluid restriction 5. Close monitor UOP daily weight , Repeat BMP 0400 Subjective: No acute event over night Patient denied CP/SOB/N/Vor pain Objective:  
Temp (24hrs), Av.6 °F (36.4 °C), Min:97.2 °F (36.2 °C), Max:98 °F (36.7 °C) -124 /57 Intake/Output Summary (Last 24 hours) at 11/10/2019 1655 Last data filed at 11/10/2019 1217 Gross per 24 hour Intake 480 ml Output 550 ml Net -70 ml Last 3 Recorded Weights in this Encounter 19 1250 11/10/19 0345 11/10/19 1149 Weight: 62.6 kg (138 lb) 66.7 kg (147 lb 0.8 oz) 66.7 kg (147 lb) Physical Exam:  
General Appearance: no pain, no distress Head: atraumatic HEENT: no jaundice, moist oral mucosa Neck: (+)  JVD noted Chest: decreased bs through out Heart: irregular rate Adbomen: obese non tender , BS active Skin: intact, no rash Extremity: 1+ edema extended to thigh bilateral  
MS: No joint deformity or tenderness Neuro: conscious, alert, oriented x 3, no focal deficit 
  
 Data Review: BMP Lab Results Component Value Date/Time  Sodium 123 (L) 11/10/2019 01:03 AM  
 Potassium 3.6 11/10/2019 01:03 AM  
 Chloride 90 (L) 11/10/2019 01:03 AM  
 CO2 22 11/10/2019 01:03 AM  
 Anion gap 11 11/10/2019 01:03 AM  
 Glucose 128 (H) 11/10/2019 01:03 AM  
 BUN 77 (H) 11/10/2019 01:03 AM  
 Creatinine 2.51 (H) 11/10/2019 01:03 AM  
 BUN/Creatinine ratio 31 (H) 11/10/2019 01:03 AM  
 GFR est AA 22 (L) 11/10/2019 01:03 AM  
 GFR est non-AA 18 (L) 11/10/2019 01:03 AM  
 Calcium 8.3 (L) 11/10/2019 01:03 AM  
 
 
CBC Lab Results Component Value Date/Time WBC 7.5 11/10/2019 01:03 AM  
 HGB 9.1 (L) 11/10/2019 01:03 AM  
 HCT 27.2 (L) 11/10/2019 01:03 AM  
 PLATELET 974 82/26/2752 01:03 AM  
 MCV 90.1 11/10/2019 01:03 AM  
 
 
No components found for: PTHINT No results found for: TIMOTHY Pérez MD 
VIA [facility] Office [phone] Pager [phone]

## 2019-11-10 NOTE — ROUTINE PROCESS
0700: received bedside shift report from Eric Soler RN (offgoing nurse) and assumed care of the pt. Verfied amiodarone gtt, and will be back in to restart heparin gtt at 0740. Updated white board, assessed all current needs, bed in lowest position and call light within reach. 1100: pt's urine came back positive for bacteria too numerous to count and WBC, will discuss with Dr. Emilee King about starting her on medication for a UTI, WBCs in blood are WNL. 1400: pt's PTT came back greater than 180, holding for an hour and then decreasing the rate by 3units/kg/hr will place her at 9 units/kg/hr. Will continue to monitor. 1600: pt received a dose of metoprolol IV and xanax PO, education provided to pt and her family. 1800: purposeful hourly rounding, all current needs assessed, reminded pt that when she needs to pee we will help her clean and then collect a urine specimen. Bed in lowest position, family at bedside and call light within reach. 1900: Shift summary, shift uneventful, no complaints of chest pain or shortness of breath.   
 
Vj Kapoor RN

## 2019-11-10 NOTE — ROUTINE PROCESS
1900: Bedside shift change report given to Dilip Garsia RN (oncoming nurse) by America Salas RN (offgoing nurse). Report included the following information SBAR, Kardex, Intake/Output, MAR and Cardiac Rhythm Afib. Visit Vitals /57 (BP 1 Location: Left arm, BP Patient Position: At rest) Pulse (!) 124 Temp 97.3 °F (36.3 °C) Resp 17 Ht 4' 11\" (1.499 m) Wt 66.7 kg (147 lb) SpO2 98% BMI 29.69 kg/m² America Salas RN

## 2019-11-10 NOTE — PROGRESS NOTES
Problem: Afib Pathway: Day 1 Goal: Off Pathway (Use only if patient is Off Pathway) Outcome: Progressing Towards Goal 
Goal: Activity/Safety Outcome: Progressing Towards Goal 
Goal: Consults, if ordered Outcome: Progressing Towards Goal 
Goal: Diagnostic Test/Procedures Outcome: Progressing Towards Goal 
Goal: Nutrition/Diet Outcome: Progressing Towards Goal 
Goal: Discharge Planning Outcome: Progressing Towards Goal 
Goal: Medications Outcome: Progressing Towards Goal 
Goal: Respiratory Outcome: Progressing Towards Goal 
Goal: Treatments/Interventions/Procedures Outcome: Progressing Towards Goal 
Goal: Psychosocial 
Outcome: Progressing Towards Goal 
Goal: *Optimal pain control at patient's stated goal 
Outcome: Progressing Towards Goal 
Goal: *Hemodynamically stable Outcome: Progressing Towards Goal 
Goal: *Stable cardiac rhythm Outcome: Progressing Towards Goal 
Goal: *Lungs clear or at baseline Outcome: Progressing Towards Goal 
Goal: *Labs within defined limits Outcome: Progressing Towards Goal 
Goal: *Describes available resources and support systems Outcome: Progressing Towards Goal 
  
Problem: Falls - Risk of 
Goal: *Absence of Falls Description Document [facility] Fall Risk and appropriate interventions in the flowsheet. Outcome: Progressing Towards Goal 
Note:  
Fall Risk Interventions: 
Mobility Interventions: Patient to call before getting OOB, Communicate number of staff needed for ambulation/transfer, Utilize walker, cane, or other assistive device Medication Interventions: Teach patient to arise slowly, Patient to call before getting OOB Elimination Interventions: Call light in reach, Toileting schedule/hourly rounds, Patient to call for help with toileting needs History of Falls Interventions: Bed/chair exit alarm, Vital signs minimum Q4HRs X 24 hrs (comment for end date), Door open when patient unattended Problem: Pain Goal: *Control of Pain Outcome: Progressing Towards Goal 
  
Problem: Pressure Injury - Risk of 
Goal: *Prevention of pressure injury Description Document Joe Scale and appropriate interventions in the flowsheet. Outcome: Progressing Towards Goal 
Note:  
Pressure Injury Interventions: Activity Interventions: Pressure redistribution bed/mattress(bed type), Increase time out of bed Mobility Interventions: Pressure redistribution bed/mattress (bed type), HOB 30 degrees or less, Assess need for specialty bed Nutrition Interventions: Document food/fluid/supplement intake, Offer support with meals,snacks and hydration

## 2019-11-10 NOTE — PROGRESS NOTES
Problem: Afib: Discharge Outcomes (not in CAT) Goal: *Hemodynamically stable Outcome: Progressing Towards Goal 
Goal: *Stable cardiac rhythm Outcome: Progressing Towards Goal 
Goal: *Lungs clear or at baseline Outcome: Progressing Towards Goal 
Goal: *Optimal pain control at patient's stated goal 
Outcome: Progressing Towards Goal 
Goal: *Identifies cardiac risk factors Outcome: Progressing Towards Goal 
Goal: *Verbalizes home exercise program, activity guidelines, cardiac precautions Outcome: Progressing Towards Goal 
Goal: *Verbalizes understanding and describes prescribed diet Outcome: Progressing Towards Goal 
Goal: *Verbalizes understanding and describes medication purposes and frequencies Outcome: Progressing Towards Goal 
Goal: *Anxiety reduced or absent Outcome: Progressing Towards Goal 
Goal: *Understands and describes signs and symptoms to report to providers(Stroke Metric) Outcome: Progressing Towards Goal 
Goal: *Describes follow-up/return visits to physicians Outcome: Progressing Towards Goal 
Goal: *Describes available resources and support systems Outcome: Progressing Towards Goal 
Goal: *Influenza immunization Outcome: Progressing Towards Goal 
Goal: *Pneumococcal immunization Outcome: Progressing Towards Goal 
Goal: *Describes smoking cessation resources Outcome: Progressing Towards Goal 
  
Problem: Falls - Risk of 
Goal: *Absence of Falls Description Document [facility] Fall Risk and appropriate interventions in the flowsheet. Outcome: Progressing Towards Goal 
Note:  
Fall Risk Interventions: 
Mobility Interventions: Assess mobility with egress test, Bed/chair exit alarm, Utilize walker, cane, or other assistive device Medication Interventions: Teach patient to arise slowly, Patient to call before getting OOB, Bed/chair exit alarm, Assess postural VS orthostatic hypotension Elimination Interventions: Bed/chair exit alarm, Call light in reach, Patient to call for help with toileting needs, Stay With Me (per policy), Toileting schedule/hourly rounds History of Falls Interventions: Bed/chair exit alarm, Vital signs minimum Q4HRs X 24 hrs (comment for end date) Problem: Pain Goal: *Control of Pain Outcome: Progressing Towards Goal 
Goal: *PALLIATIVE CARE:  Alleviation of Pain Outcome: Progressing Towards Goal 
  
Problem: Patient Education: Go to Patient Education Activity Goal: Patient/Family Education Outcome: Progressing Towards Goal 
  
Problem: Gas Exchange - Impaired Goal: *Absence of hypoxia Outcome: Progressing Towards Goal 
  
Problem: Patient Education: Go to Patient Education Activity Goal: Patient/Family Education Outcome: Progressing Towards Goal 
  
Problem: Pressure Injury - Risk of 
Goal: *Prevention of pressure injury Description Document Joe Scale and appropriate interventions in the flowsheet. Outcome: Progressing Towards Goal 
Note:  
Pressure Injury Interventions: Activity Interventions: Pressure redistribution bed/mattress(bed type), Increase time out of bed, PT/OT evaluation Mobility Interventions: Pressure redistribution bed/mattress (bed type), HOB 30 degrees or less, PT/OT evaluation, Turn and reposition approx. every two hours(pillow and wedges) Nutrition Interventions: Document food/fluid/supplement intake, Offer support with meals,snacks and hydration Marcela Cardenas RN

## 2019-11-11 NOTE — PROGRESS NOTES
Cardiology Progress Note Patient: Santa Bentley        Sex: female          DOA: 11/8/2019 YOB: 1935      Age:  80 y.o.        LOS:  LOS: 3 days Assessment/Plan Active Problems: 
  A-fib ([address] (11/8/2019) UTI (urinary tract infection) (11/10/2019) Plan: 
 
afib with RVR 
 
DC IV amiodarone Start PO amiodarone 400 mg po BID Increase metoprolol tartrate from 50 mg to 75 mg po BID Will consider switching Heparin to eliquis 2.5 mg po BID or aspirin Subjective:  
 cc: 
afib with RVR REVIEW OF SYSTEMS:  
 
General: No fevers or chills. Cardiovascular: No chest pain or pressure. No palpitations. No ankle swelling Pulmonary: + SOB exertion, orthopnea, PND Gastrointestinal: No nausea, vomiting or diarrhea, + constipation Objective:  
  
Visit Vitals /83 (BP 1 Location: Right arm, BP Patient Position: At rest;Supine) Pulse (!) 104 Temp 97.9 °F (36.6 °C) Resp 19 Ht 4' 11\" (1.499 m) Wt 67 kg (147 lb 11.3 oz) SpO2 96% BMI 29.83 kg/m² Body mass index is 29.83 kg/m². Physical Exam: 
General Appearance: Comfortable, not using accessory muscles of respiration. NECK: No JVD, no thyroidomeglay. LUNGS: Clear bilaterally. HEART: S1 irregular, variable +S2 audible, ABD: Non-tender, BS Audible EXT: No edema, and no cysnosis. VASCULAR EXAM: Pulses are intact. PSYCHIATRIC EXAM: Mood is appropriate. Medication: 
Current Facility-Administered Medications Medication Dose Route Frequency  amiodarone (CORDARONE) tablet 400 mg  400 mg Oral Q12H  
 metoprolol tartrate (LOPRESSOR) tablet 50 mg  50 mg Oral Q12H  cefTRIAXone (ROCEPHIN) 1 g in sterile water (preservative free) 10 mL IV syringe  1 g IntraVENous Q24H  
 aspirin delayed-release tablet 81 mg  81 mg Oral DAILY  atorvastatin (LIPITOR) tablet 40 mg  40 mg Oral DAILY  ergocalciferol capsule 50,000 Units  50,000 Units Oral Q7D  
 PARoxetine (PAXIL) tablet 20 mg  20 mg Oral DAILY  heparin 25,000 units in D5W 250 ml infusion  18-36 Units/kg/hr IntraVENous TITRATE  furosemide (LASIX) injection 40 mg  40 mg IntraVENous BID  sodium chloride (NS) flush 5-40 mL  5-40 mL IntraVENous Q8H  
 sodium chloride (NS) flush 5-40 mL  5-40 mL IntraVENous PRN  
 acetaminophen (TYLENOL) tablet 650 mg  650 mg Oral Q4H PRN  
 HYDROcodone-acetaminophen (NORCO) 5-325 mg per tablet 1 Tab  1 Tab Oral Q4H PRN  
 morphine injection 2 mg  2 mg IntraVENous Q4H PRN  
 naloxone (NARCAN) injection 0.4 mg  0.4 mg IntraVENous PRN Lab/Data Reviewed: 
Procedures/imaging: see electronic medical records for all procedures/Xrays  
and details which were not copied into this note but were reviewed prior to creation of Plan 
  
 
All lab results for the last 24 hours reviewed. Recent Labs 11/10/19 
0103 11/09/19 
0230 11/08/19 
2125 WBC 7.5 6.8 6.8 HGB 9.1* 9.3* 9.1*  
HCT 27.2* 27.4* 27.1*  
 244 239 Recent Labs 11/11/19 
0505 11/10/19 
0103 11/09/19 
2145 * 123* 125* K 3.7 3.6 3.9 CL 90* 90* 90* CO2 24 22 23 * 128* 150* BUN 76* 77* 77* CREA 2.77* 2.51* 2.61* CA 8.3* 8.3* 8.5 Signed By: Era Woodward MD   
 November 11, 2019

## 2019-11-11 NOTE — PROGRESS NOTES
Problem: Falls - Risk of 
Goal: *Absence of Falls Description Document [facility] Fall Risk and appropriate interventions in the flowsheet. Outcome: Progressing Towards Goal 
Note:  
Fall Risk Interventions: 
Mobility Interventions: Assess mobility with egress test, Patient to call before getting OOB, Communicate number of staff needed for ambulation/transfer, Utilize walker, cane, or other assistive device Medication Interventions: Teach patient to arise slowly, Patient to call before getting OOB Elimination Interventions: Call light in reach, Patient to call for help with toileting needs, Toileting schedule/hourly rounds History of Falls Interventions: Bed/chair exit alarm, Vital signs minimum Q4HRs X 24 hrs (comment for end date) Problem: Pain Goal: *Control of Pain Outcome: Progressing Towards Goal 
  
Problem: Gas Exchange - Impaired Goal: *Absence of hypoxia Outcome: Progressing Towards Goal 
  
Problem: Pressure Injury - Risk of 
Goal: *Prevention of pressure injury Description Document Joe Scale and appropriate interventions in the flowsheet. Outcome: Progressing Towards Goal 
Note:  
Pressure Injury Interventions: Activity Interventions: Pressure redistribution bed/mattress(bed type), Increase time out of bed Mobility Interventions: Pressure redistribution bed/mattress (bed type), HOB 30 degrees or less Nutrition Interventions: Document food/fluid/supplement intake, Offer support with meals,snacks and hydration Problem: Falls - Risk of 
Goal: *Absence of Falls Description Document [facility] Fall Risk and appropriate interventions in the flowsheet. Outcome: Progressing Towards Goal 
Note:  
Fall Risk Interventions: 
Mobility Interventions: Assess mobility with egress test, Patient to call before getting OOB, Communicate number of staff needed for ambulation/transfer, Utilize walker, cane, or other assistive device Medication Interventions: Teach patient to arise slowly, Patient to call before getting OOB Elimination Interventions: Call light in reach, Patient to call for help with toileting needs, Toileting schedule/hourly rounds History of Falls Interventions: Bed/chair exit alarm, Vital signs minimum Q4HRs X 24 hrs (comment for end date) Problem: Pain Goal: *Control of Pain Outcome: Progressing Towards Goal 
  
Problem: Gas Exchange - Impaired Goal: *Absence of hypoxia Outcome: Progressing Towards Goal 
  
Problem: Pressure Injury - Risk of 
Goal: *Prevention of pressure injury Description Document Joe Scale and appropriate interventions in the flowsheet. Outcome: Progressing Towards Goal 
Note:  
Pressure Injury Interventions: Activity Interventions: Pressure redistribution bed/mattress(bed type), Increase time out of bed Mobility Interventions: Pressure redistribution bed/mattress (bed type), HOB 30 degrees or less Nutrition Interventions: Document food/fluid/supplement intake, Offer support with meals,snacks and hydration

## 2019-11-11 NOTE — PROGRESS NOTES
Problem: Mobility Impaired (Adult and Pediatric) Goal: *Acute Goals and Plan of Care (Insert Text) Description Physical Therapy Goals Initiated 19 to be met in 7 days ST. Sit to stand and stand to sit SBA/RW to improve functional I. 
2. Standing balance Good with RW to improve balance for ADLs. 3. Gait: Ambulate 75ft S/RW, O2 saturation > 90% on RA, for improved mobility in environment. 4. Patient Education: Independent with HEP and energy conservation techniques for improved endurance for home/facility discharge. Outcome: Progressing Towards Goal 
 
PHYSICAL THERAPY EVALUATION Patient: Alexandra Dejesus (3 y.o. female) Date: 2019 Primary Diagnosis: A-fib ([address] [I48.91] Precautions:Fall ASSESSMENT : 
Based on the objective data described below, the patient is an 81 yo F seen on telemetry unit. Pt recently discharged from [facility] 10/29/19 following admission for respiratory failure d/t CHF. Pt presents with generalized weakness, limitations in independence with functional mobility and with decreased activity tolerance. Pt reports amb with rollator and family supervision PTA and was receiving HHPT. Able to transition to sit EOB with CGA, transfer to stand with RW/min and take 4 lateral steps at side of bed with RW/min A.  Balance fair with decreased foot clearance. Returned to supine with min assist. Noted mild wheezing and SOB (chronic with activity per pt/family). Pt left sleeping with family present and all needs in reach. Pt left with HOB elevated and all needs in reach. Nurse Susana notified. Recommend HHPT vs SNF at discharge for follow up physical therapy. Pt Education: Role of physical therapy in acute care setting, fall prevention and safety/technique during functional mobility tasks Patient will benefit from skilled intervention to address the above impairments. Patients rehabilitation potential is considered to be Fair Factors which may influence rehabilitation potential include:  
? None noted ? Mental ability/status ? Medical condition ? Home/family situation and support systems ? Safety awareness 
? Pain tolerance/management 
? Other: PLAN : 
Recommendations and Planned Interventions: 
?           Bed Mobility Training             ? Neuromuscular Re-Education ? Transfer Training                   ? Orthotic/Prosthetic Training 
? Gait Training                          ? Modalities ? Therapeutic Exercises          ? Edema Management/Control ? Therapeutic Activities            ? Patient and Family Training/Education ? Other (comment): Frequency/Duration: Patient will be followed by physical therapy 1-2 times per day to address goals. Discharge [facility] Further Equipment Recommendations for Discharge: N/A  
 
SUBJECTIVE:  
Patient stated I just get short of breath.  OBJECTIVE DATA SUMMARY:  
 
Past Medical History:  
Diagnosis Date Chronic kidney disease Congestive heart failure (CHF) ([facility] COPD (chronic obstructive pulmonary disease) ([facility] History reviewed. No pertinent surgical history. Barriers to Learning/Limitations: yes;  physical 
Compensate with: Visual Cues, Verbal Cues and Tactile Cues Prior Level of Function/Home Situation: amb with rollator with family supervision Home Situation Home Environment: Private residence # Steps to Enter: 4 Rails to Enter: Yes Hand Rails : Bilateral 
One/Two Story Residence: One story Living Alone: No 
Support Systems: Child(woody) Patient Expects to be Discharged to[de-identified] Private residence Current DME Used/Available at Home: jacki Domingo Cinthia Barban, rollator Critical Behavior: 
Neurologic State: Alert; Appropriate for age Orientation Level: Oriented X4 
 Cognition: Follows commands; Appropriate safety awareness; Appropriate for age attention/concentration; Appropriate decision making Psychosocial 
Purposeful Interaction: Yes Pt Identified Daily Priority: Clinical issues (comment) Caritas Process: Nurture loving kindness; Attend basic human needs; Teaching/learning;Create healing environment Caring Interventions: Reassure; Therapeutic modalities Reassure: Instilling hilton and hope; Acceptance; Therapeutic listening Therapeutic Modalities: Intentional therapeutic touch Skin Condition/Temp: Dry;Warm 
Skin Integrity: Intact Skin Integumentary Skin Color: Appropriate for ethnicity Skin Condition/Temp: Dry;Warm 
Skin Integrity: Intact Turgor: Non-tenting Hair Growth: Present Varicosities: Absent Strength:   
Strength: Generally decreased, functional 
Tone & Sensation:  
Sensation: Intact Range Of Motion: 
AROM: Generally decreased, functional 
PROM: Generally decreased, functional 
Functional Mobility: 
Bed Mobility: 
Supine to Sit: Contact guard assistance Sit to Supine: Minimum assistance Transfers: 
Sit to Stand: Minimum assistance Stand to Sit: Minimum assistance Balance:  
Sitting: Intact Standing: With support; Impaired Standing - Static: Fair Standing - Dynamic : Fair Ambulation/Gait Training: 
Distance (ft): 2 Feet (ft) Assistive Device: Walker, rolling Ambulation - Level of Assistance: Contact guard assistance Gait Description (WDL): Exceptions to [facility] Gait Abnormalities: Decreased step clearance Speed/Shara: Slow Step Length: Right shortened;Left shortened Pain: 
Pain Scale 1: Numeric (0 - 10) Pain Intensity 1: 0 Activity Tolerance:  
Fair -: limited by SOB Please refer to the flowsheet for vital signs taken during this treatment. After treatment:  
?         Patient left in no apparent distress sitting up in chair ? Patient left in no apparent distress in bed 
? Call bell left within reach ? Nursing notified ?         Visitors present ? Bed alarm activated COMMUNICATION/EDUCATION:  
?         Fall prevention education was provided and the patient/caregiver indicated understanding. ? Patient/family have participated as able in goal setting and plan of care. ?         Patient/family agree to work toward stated goals and plan of care. ?         Patient understands intent and goals of therapy, but is neutral about his/her participation. ? Patient is unable to participate in goal setting and plan of care. Eval Complexity: History: HIGH Complexity :3+ comorbidities / personal factors will impact the outcome/ POC Exam:MEDIUM Complexity : 3 Standardized tests and measures addressing body structure, function, activity limitation and / or participation in recreation  Presentation: MEDIUM Complexity : Evolving with changing characteristics  Clinical Decision Making:Medium Complexity    Overall Complexity:MEDIUM Thank you for this referral. 
Estevan Macdonald, PT Time Calculation: 17 mins

## 2019-11-11 NOTE — PROGRESS NOTES
Transition of care anticipate d/c home in 24-48 hours Met with patient and her son at bedside. ptient lives wither her son. She has Dr. Elton De La Paz as PCP. Patient has a rollator and a cane. patient reports she is weak but usually if very IADL's. She was currently using [facility] orders for referral  placed wit tele health monitoring. Patient has [address] for insuance. She denies other needs. Cms will assist with follow up. Cm will continue to follow Care Management Interventions PCP Verified by CM: Yes Transition of Care Consult (CM Consult): Home Health [address]: Yes Physical Therapy Consult: Yes Occupational Therapy Consult: Yes Current Support Network: Relative's Home Confirm Follow Up Transport: Family Plan discussed with Pt/Family/Caregiver: Yes Freedom of Choice Offered: Yes Discharge Location Discharge Placement: Home with home health

## 2019-11-11 NOTE — PROGRESS NOTES
Called by RN for a consult on chart. Reviewed chart. Patient is already managed by cardiology for Afib. Spoke with Dr. Marina Guerrier. No pulmonary issues. No need for consult. She will call us if needed any assistance.  
 
Aubrie Reyes MD

## 2019-11-11 NOTE — PROGRESS NOTES
Assumed care of pt from [id number]: PTT came back low ordered protocol bolus will give bolus and increase gtt. 2912: gave bolus and completed rate change with FLETCHER CABRERA. 
1300: pt bathed and bed changed. 1530: pt resting no c/o pain. 1900: amiodarone D/c oral amiodarone given.

## 2019-11-11 NOTE — PROGRESS NOTES
Problem: Afib Pathway: Day 3 Goal: Off Pathway (Use only if patient is Off Pathway) Outcome: Progressing Towards Goal

## 2019-11-11 NOTE — PROGRESS NOTES
2148: Patient to be started on Rocephin. Patient has a urine culture that was collected on 11/9/2019 at 2140. No new collection done tonight. 0730: Bedside and Verbal shift change report given to Trina Hodgkins, RN (oncoming nurse) by Crispin Patel RN (offgoing nurse). Report included the following information SBAR, Kardex, Intake/Output, MAR, Accordion, Recent Results and Med Rec Status.

## 2019-11-11 NOTE — ACP (ADVANCE CARE PLANNING)
AMD Completed  provided education on Advance Medical Directives and assisted patient in completing the form. Patient chose Wes Rucker and Marilyn Toussaint first and Mali Mares second. A copy was placed in the chart for scanning. Original and extra copies were returned to the patient.  completed visit with patient and offered Pastoral care. Chaplains will continue to follow and will provide pastoral care as needed or requested. [facility] [place], M.Div. Board Certified [employer] [phone] - Office

## 2019-11-11 NOTE — PROGRESS NOTES
Hospitalist Progress Note Patient: Rachelle Mcclure MRN: [id number]  [facility]: [id number] YOB: 1935  Age: 80 y.o. Sex: female DOA: 11/8/2019 LOS:  LOS: 3 days Chief Complaint: 
 
Afib Assessment/Plan  
 
80 y. o. female with PMHX of CHF,  Admitted to telemetry for new onset atrial fibrillation with RVR.  
  
New onset atrial fibrillation with RVR-amio, heparin per cardiology, plus metoprolol REZA on CKD stage 4-off ARB and continue lasix as per nephrology Hyponatremia-persistent Elevated troponin-no angina Diastolic heart failure-lasix BID Urinary tract infection, abnormal UA-rocephin started and urine cx sent Anemia of CKD PT consult, if able to get up and HR stabilized Expect she can come off amio soon and [facility] , will defer to cardiology Plan home with [facility] once medically stable to d/c (1-2 days) Disposition : 
Patient Active Problem List  
Diagnosis Code  Respiratory failure (HCC) J96.90  
 Heart failure (HCC) I50.9  Benign essential hypertension I10  Chronic diastolic congestive heart failure (HCC) I50.32  
 Cobalamin deficiency E53.8  Hypercholesterolemia E78.00  Hypertensive heart disease I11.9  Hypothyroidism E03.9  Iron deficiency anemia D50.9  Stage 4 chronic kidney disease ([facility] [address] N18.4  A-fib (HCC) I48.91  
 UTI (urinary tract infection) N39.0 Subjective: 
 
Feels ok No complaints Eating pancakes this am 
Nurse noted  Urine foul smellimg Review of systems: 
 
Constitutional: denies fevers, chills Respiratory: denies SOB Cardiovascular: denies chest pain, palpitations Gastrointestinal: denies nausea Vital signs/Intake and Output: 
Visit Vitals /67 (BP 1 Location: Right arm, BP Patient Position: At rest;Supine) Pulse (!) 113 Temp 97.7 °F (36.5 °C) Resp 19 Ht 4' 11\" (1.499 m) Wt 67 kg (147 lb 11.3 oz) SpO2 97% BMI 29.83 kg/m² Current Shift:  No intake/output data recorded. Last three shifts:  11/09 1901 - 11/11 0700 In: 480 [P.O.:480] Out: 7402 [[id number]] Exam: 
 
General: elderly thin WF alert, NAD, OX3 Head/Neck: NCAT, supple, No masses, No lymphadenopathy CVS:irreg rhythm, reg rate Lungs:Clear to auscultation bilaterally, no wheezes, rhonchi, or rales Abdomen: Soft, Nontender, No distention, Normal Bowel sounds, No hepatomegaly Extremities: No C/C/E, pulses palpable 2+ Neuro:grossly normal , follows commands Psych:appropriate Labs: Results:  
   
Chemistry Recent Labs 11/11/19 
0505 11/10/19 
0103 11/09/19 
2145  11/08/19 
1325 * 128* 150*   < > 135* * 123* 125*   < > 121*  
K 3.7 3.6 3.9   < > 4.3 CL 90* 90* 90*   < > 86* CO2 24 22 23   < > 24 BUN 76* 77* 77*   < > 88* CREA 2.77* 2.51* 2.61*   < > 3.00* CA 8.3* 8.3* 8.5   < > 8.8 AGAP 10 11 12   < > 11 BUCR 27* 31* 30*   < > 29* AP  --   --   --   --  146* TP  --   --   --   --  6.8 ALB  --   --   --   --  3.7 GLOB  --   --   --   --  3.1 AGRAT  --   --   --   --  1.2  
 < > = values in this interval not displayed. CBC w/Diff Recent Labs 11/10/19 
0103 11/09/19 
0230 11/08/19 
2125 11/08/19 
1325 WBC 7.5 6.8 6.8 7.8  
RBC 3.02* 3.06* 3.05* 3.25* HGB 9.1* 9.3* 9.1* 9.7* HCT 27.2* 27.4* 27.1* 29.0*  
 244 239 271 GRANS 73  --   --  81* LYMPH 12*  --   --  10* EOS 2  --   --  0 Cardiac Enzymes Recent Labs 11/09/19 
0230 11/08/19 
2125 CPK 48 47 CKND1 4.8* 4.9* Coagulation Recent Labs 11/11/19 
0505 11/10/19 
2200  11/08/19 
1325 PTP  --   --   --  14.2 INR  --   --   --  1.1 APTT 66.1* 85.4*   < >  --   
 < > = values in this interval not displayed. Lipid Panel No results found for: CHOL, CHOLPOCT, CHOLX, CHLST, CHOLV, 878163, HDL, HDLP, LDL, LDLC, DLDLP, 658991, VLDLC, VLDL, TGLX, TRIGL, TRIGP, TGLPOCT, CHHD, CHHDX BNP No results for input(s): BNPP in the last 72 hours. Liver Enzymes Recent Labs 11/08/19 
1325 TP 6.8 ALB 3.7 * SGOT 79* Thyroid Studies Lab Results Component Value Date/Time TSH 2.53 11/08/2019 01:25 PM  
    
Procedures/imaging: see electronic medical records for all procedures/Xrays and details which were not copied into this note but were reviewed prior to creation of Plan Katlin Salcedo MD

## 2019-11-11 NOTE — PROGRESS NOTES
In patient nephrology progress note Admit Date:    2019 Name:  Bc Garcia Age :  80 y.o. Impression: 1. Acute kidney Injury on CKD, non oliguric, renal function lower but stable Cr 2.5-2.7 2. CKD stage 4 w/ baseline Cr of 1.7-2.1. 
3. Hyponatremia consistent with total body fluid retention /CHF. Urine sodium <15 
4. Rt sided HF/Pulm HTN  
5. Anemia of chronic disease 6. New onset atrial fibrillation RVR  , on heparin and amiodarone gtt Recommendations: 1. Continue IV lasix bid 2. Continue to hold Aldactone due to renal failure 3. Avoid ACEI/ARBs, NSAIDs or IV contrast 
4. Fluid restriction 5. Close monitor UOP daily weight , Repeat BMP 0400 Subjective: No new complaints. Has some wheezing at rest. Patient denies CP/SOB/N/Vor pain Objective:  
Temp (24hrs), Av.7 °F (36.5 °C), Min:97.2 °F (36.2 °C), Max:98.2 °F (36.8 °C) -124 /57 Intake/Output Summary (Last 24 hours) at 2019 0945 Last data filed at 11/10/2019 2159 Gross per 24 hour Intake  Output 750 ml Net -750 ml Last 3 Recorded Weights in this Encounter 11/10/19 0345 11/10/19 1149 19 0351 Weight: 66.7 kg (147 lb 0.8 oz) 66.7 kg (147 lb) 67 kg (147 lb 11.3 oz) Physical Exam:  
General Appearance: no pain, no distress Head: atraumatic HEENT: no jaundice, moist oral mucosa Neck: (+)  JVD noted Chest: decreased bs through out  and wheezing Heart: irregular rate Adbomen: obese non tender , BS active Skin: intact, no rash Extremity: 1+ edema extended to thigh bilateral  
MS: No joint deformity or tenderness Neuro: conscious, alert, oriented x 3, no focal deficit 
  
 Data Review: [facility] Lab Results Component Value Date/Time  Sodium 124 (L) 2019 05:05 AM  
 Potassium 3.7 2019 05:05 AM  
 Chloride 90 (L) 2019 05:05 AM  
 CO2 24 2019 05:05 AM  
 Anion gap 10 2019 05:05 AM  
 Glucose 114 (H) 2019 05:05 AM  
 BUN 76 (H) 11/11/2019 05:05 AM  
 Creatinine 2.77 (H) 11/11/2019 05:05 AM  
 BUN/Creatinine ratio 27 (H) 11/11/2019 05:05 AM  
 GFR est AA 20 (L) 11/11/2019 05:05 AM  
 GFR est non-AA 16 (L) 11/11/2019 05:05 AM  
 Calcium 8.3 (L) 11/11/2019 05:05 AM  
 
 
CBC Lab Results Component Value Date/Time WBC 7.5 11/10/2019 01:03 AM  
 HGB 9.1 (L) 11/10/2019 01:03 AM  
 HCT 27.2 (L) 11/10/2019 01:03 AM  
 PLATELET 661 20/94/9466 01:03 AM  
 MCV 90.1 11/10/2019 01:03 AM  
 
 
No components found for: PTHINT No results found for: TIMOTHY Dugan MD 
[facility] Office [phone]

## 2019-11-12 NOTE — PROGRESS NOTES
Problem: Afib Pathway: Day 1 Goal: Off Pathway (Use only if patient is Off Pathway) Outcome: Progressing Towards Goal 
Goal: Activity/Safety Outcome: Progressing Towards Goal 
Goal: Consults, if ordered Outcome: Progressing Towards Goal 
Goal: Diagnostic Test/Procedures Outcome: Progressing Towards Goal 
Goal: Nutrition/Diet Outcome: Progressing Towards Goal 
Goal: Discharge Planning Outcome: Progressing Towards Goal 
Goal: Medications Outcome: Progressing Towards Goal 
Goal: Respiratory Outcome: Progressing Towards Goal 
Goal: Treatments/Interventions/Procedures Outcome: Progressing Towards Goal 
Goal: Psychosocial 
Outcome: Progressing Towards Goal 
Goal: *Optimal pain control at patient's stated goal 
Outcome: Progressing Towards Goal 
Goal: *Hemodynamically stable Outcome: Progressing Towards Goal 
Goal: *Stable cardiac rhythm Outcome: Progressing Towards Goal 
Goal: *Lungs clear or at baseline Outcome: Progressing Towards Goal 
Goal: *Labs within defined limits Outcome: Progressing Towards Goal 
Goal: *Describes available resources and support systems Outcome: Progressing Towards Goal 
  
Problem: Afib Pathway: Day 2 Goal: Off Pathway (Use only if patient is Off Pathway) Outcome: Progressing Towards Goal 
Goal: Activity/Safety Outcome: Progressing Towards Goal 
Goal: Consults, if ordered Outcome: Progressing Towards Goal 
Goal: Diagnostic Test/Procedures Outcome: Progressing Towards Goal 
Goal: Nutrition/Diet Outcome: Progressing Towards Goal 
Goal: Discharge Planning Outcome: Progressing Towards Goal 
Goal: Medications Outcome: Progressing Towards Goal 
Goal: Respiratory Outcome: Progressing Towards Goal 
Goal: Treatments/Interventions/Procedures Outcome: Progressing Towards Goal 
Goal: Psychosocial 
Outcome: Progressing Towards Goal 
Goal: *Hemodynamically stable Outcome: Progressing Towards Goal 
Goal: *Optimal pain control at patient's stated goal 
 Outcome: Progressing Towards Goal 
Goal: *Stable cardiac rhythm Outcome: Progressing Towards Goal 
Goal: *Lungs clear or at baseline Outcome: Progressing Towards Goal 
Goal: *Describes available resources and support systems Outcome: Progressing Towards Goal 
  
Problem: Afib Pathway: Day 3 Goal: Off Pathway (Use only if patient is Off Pathway) Outcome: Progressing Towards Goal 
Goal: Activity/Safety Outcome: Progressing Towards Goal 
Goal: Diagnostic Test/Procedures Outcome: Progressing Towards Goal 
Goal: Nutrition/Diet Outcome: Progressing Towards Goal 
Goal: Discharge Planning Outcome: Progressing Towards Goal 
Goal: Medications Outcome: Progressing Towards Goal 
Goal: Respiratory Outcome: Progressing Towards Goal 
Goal: Treatments/Interventions/Procedures Outcome: Progressing Towards Goal 
Goal: Psychosocial 
Outcome: Progressing Towards Goal 
  
Problem: Afib: Discharge Outcomes (not in CAT) Goal: *Hemodynamically stable Outcome: Progressing Towards Goal 
Goal: *Stable cardiac rhythm Outcome: Progressing Towards Goal 
Goal: *Lungs clear or at baseline Outcome: Progressing Towards Goal 
Goal: *Optimal pain control at patient's stated goal 
Outcome: Progressing Towards Goal 
Goal: *Identifies cardiac risk factors Outcome: Progressing Towards Goal 
Goal: *Verbalizes home exercise program, activity guidelines, cardiac precautions Outcome: Progressing Towards Goal 
Goal: *Verbalizes understanding and describes prescribed diet Outcome: Progressing Towards Goal 
Goal: *Verbalizes understanding and describes medication purposes and frequencies Outcome: Progressing Towards Goal 
Goal: *Anxiety reduced or absent Outcome: Progressing Towards Goal 
Goal: *Understands and describes signs and symptoms to report to providers(Stroke Metric) Outcome: Progressing Towards Goal 
Goal: *Describes follow-up/return visits to physicians Outcome: Progressing Towards Goal 
 Goal: *Describes available resources and support systems Outcome: Progressing Towards Goal 
Goal: *Influenza immunization Outcome: Progressing Towards Goal 
Goal: *Pneumococcal immunization Outcome: Progressing Towards Goal 
Goal: *Describes smoking cessation resources Outcome: Progressing Towards Goal 
  
Problem: Falls - Risk of 
Goal: *Absence of Falls Description Document True Addison Fall Risk and appropriate interventions in the flowsheet. Outcome: Progressing Towards Goal 
Note:  
Fall Risk Interventions: 
Mobility Interventions: Assess mobility with egress test, Communicate number of staff needed for ambulation/transfer, OT consult for ADLs, Patient to call before getting OOB, PT Consult for mobility concerns, PT Consult for assist device competence, Strengthening exercises (ROM-active/passive), Utilize walker, cane, or other assistive device Medication Interventions: Evaluate medications/consider consulting pharmacy, Patient to call before getting OOB, Teach patient to arise slowly Elimination Interventions: Call light in reach, Patient to call for help with toileting needs, Stay With Me (per policy), Toilet paper/wipes in reach, Toileting schedule/hourly rounds History of Falls Interventions: Consult care management for discharge planning, Evaluate medications/consider consulting pharmacy, Investigate reason for fall, Room close to nurse's station, Utilize gait belt for transfer/ambulation, Assess for delayed presentation/identification of injury for 48 hrs (comment for end date) Problem: Pain Goal: *Control of Pain Outcome: Progressing Towards Goal 
Goal: *PALLIATIVE CARE:  Alleviation of Pain Outcome: Progressing Towards Goal 
  
Problem: Gas Exchange - Impaired Goal: *Absence of hypoxia Outcome: Progressing Towards Goal 
  
Problem: Pressure Injury - Risk of 
Goal: *Prevention of pressure injury Description Document Joe Scale and appropriate interventions in the flowsheet. Outcome: Progressing Towards Goal 
Note:  
Pressure Injury Interventions: Activity Interventions: Increase time out of bed, Pressure redistribution bed/mattress(bed type), PT/OT evaluation Mobility Interventions: HOB 30 degrees or less, Pressure redistribution bed/mattress (bed type), PT/OT evaluation, Turn and reposition approx. every two hours(pillow and wedges) Nutrition Interventions: Document food/fluid/supplement intake, Offer support with meals,snacks and hydration

## 2019-11-12 NOTE — PROGRESS NOTES
Cardiology Progress Note Patient: Dusty Rudolph        Sex: female          DOA: 11/8/2019 YOB: 1935      Age:  80 y.o.        LOS:  LOS: 4 days Assessment/Plan Active Problems: 
  A-fib ([address] (11/8/2019) UTI (urinary tract infection) (11/10/2019) Plan: 
Diastolic HF 
AFib rate improving- still high will increase metoprolol tartrate from 75 mg po BID to 100 mg po BID daily Subjective:  
 cc: AFIB CHF REVIEW OF SYSTEMS:  
 
General: No fevers or chills. Cardiovascular: No chest pain or pressure. No palpitations. No ankle swelling Pulmonary: + improving SOB, orthopnea, PND Gastrointestinal: No nausea, vomiting or diarrhea Objective:  
  
Visit Vitals /59 (BP 1 Location: Left arm, BP Patient Position: At rest) Pulse (!) 109 Temp 97.3 °F (36.3 °C) Resp 18 Ht 4' 11\" (1.499 m) Wt 69.3 kg (152 lb 12.5 oz) SpO2 93% BMI 30.86 kg/m² Body mass index is 30.86 kg/m². Physical Exam: 
General Appearance: Comfortable, not using accessory muscles of respiration. NECK: No JVD, no thyroidomeglay. LUNGS: Clear bilaterally. HEART: S1+S2 audible, ABD: Non-tender, BS Audible EXT: No edema, and no cysnosis. VASCULAR EXAM: Pulses are intact. PSYCHIATRIC EXAM: Mood is appropriate. Medication: 
Current Facility-Administered Medications Medication Dose Route Frequency  [START ON 11/13/2019] zinc sulfate (ZINCATE) capsule 1 Cap  1 Cap Oral DAILY  metoprolol tartrate (LOPRESSOR) tablet 100 mg  100 mg Oral Q12H  
 amiodarone (CORDARONE) tablet 400 mg  400 mg Oral Q12H  polyethylene glycol (MIRALAX) packet 17 g  17 g Oral BID  cefTRIAXone (ROCEPHIN) 1 g in sterile water (preservative free) 10 mL IV syringe  1 g IntraVENous Q24H  
 aspirin delayed-release tablet 81 mg  81 mg Oral DAILY  atorvastatin (LIPITOR) tablet 40 mg  40 mg Oral DAILY  ergocalciferol capsule 50,000 Units  50,000 Units Oral Q7D  
 PARoxetine (PAXIL) tablet 20 mg  20 mg Oral DAILY  heparin 25,000 units in D5W 250 ml infusion  18-36 Units/kg/hr IntraVENous TITRATE  furosemide (LASIX) injection 40 mg  40 mg IntraVENous BID  sodium chloride (NS) flush 5-40 mL  5-40 mL IntraVENous Q8H  
 sodium chloride (NS) flush 5-40 mL  5-40 mL IntraVENous PRN  
 acetaminophen (TYLENOL) tablet 650 mg  650 mg Oral Q4H PRN  
 HYDROcodone-acetaminophen (NORCO) 5-325 mg per tablet 1 Tab  1 Tab Oral Q4H PRN  
 morphine injection 2 mg  2 mg IntraVENous Q4H PRN  
 naloxone (NARCAN) injection 0.4 mg  0.4 mg IntraVENous PRN Lab/Data Reviewed: 
Procedures/imaging: see electronic medical records for all procedures/Xrays  
and details which were not copied into this note but were reviewed prior to creation of Plan 
  
 
All lab results for the last 24 hours reviewed. Recent Labs 11/10/19 
0103 WBC 7.5 HGB 9.1*  
HCT 27.2*  
 Recent Labs 11/12/19 
0300 11/11/19 
0505 11/10/19 
0103 * 124* 123* K 3.9 3.7 3.6 CL 88* 90* 90* CO2 23 24 22 * 114* 128* BUN 77* 76* 77* CREA 3.19* 2.77* 2.51* CA 8.4* 8.3* 8.3* Signed By: Cristobal Favre, MD   
 November 12, 2019

## 2019-11-12 NOTE — PROGRESS NOTES
0700 Assumed patient care for off-going nurse Stella Pool RN. Whiteboard updated, bed wheels locked, bed in lowest position, and call bell within reach. 0800 Assessment complete. Patient resting comfortably in bed. Patient would like to discuss with DrMarcial About appetite and constipation. Dr. Nereyda Mcleod is aware. Dyspnea on exertion. No pain. Call bell within reach. 1200 Reassessment complete. Patient resting comfortably in bed. No complaint of pain. Dyspnea on exertion. Call bell within reach. 1600 Reassessment complete. Patient resting comfortably in bed. No complaints of pain. Dysnea on exertion. Call bell within reach. Beginning enema.

## 2019-11-12 NOTE — PROGRESS NOTES
In patient nephrology progress note Admit Date:    2019 Name:  Rachelle Mcclure Age :  80 y.o. Impression: 1. Acute kidney Injury on CKD, non oliguric, Cr 3.19 today 2. CKD stage 4 w/ baseline Cr of 1.7-2.1. 
3. Hyponatremia consistent with total body fluid retention /CHF. Urine sodium <15 
4. Rt sided HF/Pulm HTN  
5. Anemia of chronic disease 6. New onset atrial fibrillation RVR  , on heparin and amiodarone gtt Recommendations: 1. Continue IV lasix bid 2. Continue to hold Aldactone due to renal failure 3. Avoid ACEI/ARBs, NSAIDs or IV contrast 
4. Fluid restriction 5. Close monitor UOP daily weight 6. Monitor Serum chemistry daily Subjective:  
Patient denies CP/SOB/N/Vor pain Objective:  
Temp (24hrs), Av.9 °F (36.6 °C), Min:97.3 °F (36.3 °C), Max:98.3 °F (36.8 °C) -124 /57 Intake/Output Summary (Last 24 hours) at 2019 1442 Last data filed at 2019 1201 Gross per 24 hour Intake 20 ml Output 900 ml Net -880 ml Last 3 Recorded Weights in this Encounter 11/10/19 1149 19 0351 19 4677 Weight: 66.7 kg (147 lb) 67 kg (147 lb 11.3 oz) 69.3 kg (152 lb 12.5 oz) Physical Exam:  
General Appearance: NAD Head: atraumatic HEENT: Sclera clear. Neck: Supple. Chest: Clear Heart: irregular rate Abdomen: obese non tender , BS active Skin: intact, no rash Extremity: 1+ edema Neuro: No focal deficit 
  
 Data Review: BMP Lab Results Component Value Date/Time  Sodium 124 (L) 2019 03:00 AM  
 Potassium 3.9 2019 03:00 AM  
 Chloride 88 (L) 2019 03:00 AM  
 CO2 23 2019 03:00 AM  
 Anion gap 13 2019 03:00 AM  
 Glucose 145 (H) 2019 03:00 AM  
 BUN 77 (H) 2019 03:00 AM  
 Creatinine 3.19 (H) 2019 03:00 AM  
 BUN/Creatinine ratio 24 (H) 2019 03:00 AM  
 GFR est AA 17 (L) 2019 03:00 AM  
 GFR est non-AA 14 (L) 2019 03:00 AM  
 Calcium 8.4 (L) 11/12/2019 03:00 AM  
 
 
CBC Lab Results Component Value Date/Time WBC 7.5 11/10/2019 01:03 AM  
 HGB 9.1 (L) 11/10/2019 01:03 AM  
 HCT 27.2 (L) 11/10/2019 01:03 AM  
 PLATELET 829 17/68/2851 01:03 AM  
 MCV 90.1 11/10/2019 01:03 AM  
 
 
No components found for: PTHINT No results found for: IRON Paige Roach MD 
[facility] Office [phone]

## 2019-11-12 NOTE — ROUTINE PROCESS
Bedside and Verbal shift change report given to BUZZ Samuel RN (oncoming nurse) by Edwin Lorenzo RN (offgoing nurse). Report included the following information SBAR, Kardex, Intake/Output, MAR and Recent Results.

## 2019-11-12 NOTE — PROGRESS NOTES
Discussed elevated MEWS score of 5 with bedside Rn Leigh Perry. Pt not in distress. Will continue to monitor. No interventions or RRT needed at this time.

## 2019-11-12 NOTE — PROGRESS NOTES
Bedside and Verbal shift change report given to IRIS Olivera RN (oncoming nurse) by BUZZ Zuniga RN (offgoing nurse). Report included the following information SBAR, Kardex, Intake/Output, MAR and Recent Results.

## 2019-11-12 NOTE — PROGRESS NOTES
Transition of care: home when medically cleared Patient lives with her son. She has pcp Dr.; Rosita Cuello she does have a rollator and a cane but does not usually use it. [facility]. Order has been sent for referral she has [address] for insurance. She has no other needs but will need follow up. Address confirmed on EMR. Care Management Interventions PCP Verified by CM: Yes Transition of Care Consult (CM Consult): Home Health [address]: Yes Physical Therapy Consult: Yes Occupational Therapy Consult: Yes Current Support Network: Relative's Home Confirm Follow Up Transport: Family Plan discussed with Pt/Family/Caregiver: Yes Freedom of Choice Offered: Yes Discharge Location Discharge Placement: Home with home health

## 2019-11-12 NOTE — PROGRESS NOTES
Problem: Mobility Impaired (Adult and Pediatric) Goal: *Acute Goals and Plan of Care (Insert Text) Description Physical Therapy Goals Initiated 19 to be met in 7 days ST. Sit to stand and stand to sit SBA/RW to improve functional I. 
2. Standing balance Good with RW to improve balance for ADLs. 3. Gait: Ambulate 75ft S/RW, O2 saturation > 90% on RA, for improved mobility in environment. 4. Patient Education: Independent with HEP and energy conservation techniques for improved endurance for home/facility discharge. Outcome: Progressing Towards Goal 
 PHYSICAL THERAPY TREATMENT Patient: Dang Barillas (7 y.o. female) Date: 2019 Diagnosis: A-fib (HCC) [I48.91] <principal problem not specified> Precautions: Fall Chart, physical therapy assessment, plan of care and goals were reviewed. ASSESSMENT: 
Pt is very agreeable and pleasant throughout session. Pt reports no pain and require very little assist, mostly cuing. Moderate wheeze, but maintains 96-99% on RA. Very low endurance, with frequent rest breaks to complete activities listed below. Placement is suggest to increase activity tolerance, before return to home setting. Progression toward goals: 
?      Improving appropriately and progressing toward goals ? Improving slowly and progressing toward goals ? Not making progress toward goals and plan of care will be adjusted PLAN: 
Patient continues to benefit from skilled intervention to address the above impairments. Continue treatment per established plan of care. Discharge Recommendations:  SNF Further Equipment Recommendations for Discharge:  bedside commode and rolling walker SUBJECTIVE:  
Patient stated I guess I should.  OBJECTIVE DATA SUMMARY:  
Critical Behavior: 
Neurologic State: Alert Orientation Level: Oriented X4 Cognition: Appropriate decision making, Appropriate for age attention/concentration, Appropriate safety awareness, Follows commands Functional Mobility Training: 
Bed Mobility: 
Rolling: Contact guard assistance Supine to Sit: Contact guard assistance Sit to Supine: Minimum assistance Transfers: 
Sit to Stand: Minimum assistance Stand to Sit: Minimum assistance Balance: 
Sitting: Intact Standing: With support; Impaired Standing - Static: Fair Standing - Dynamic : Fair Ambulation/Gait Training: 
Distance (ft): 26 Feet (ft) Assistive Device: Walker, rolling;Gait belt Ambulation - Level of Assistance: Contact guard assistance Gait Abnormalities: Decreased step clearance Base of Support: Widened Speed/Shara: Slow Step Length: Right shortened;Left shortened Therapeutic Exercises:  
 
 
EXERCISE Sets Reps Active Active Assist  
Passive Self ROM Comments Ankle Pumps 2 15  ? ? ? ?   
Quad Sets/Glut Sets 1 10 ? ? ? ? Hamstring Sets 1 10 ? ? ? ? Short Arc Quads   ? ? ? ? Heel Slides 1 10 ? ? ? ? Straight Leg Raises   ? ? ? ? Hip Abd/Add 1 10 ? ? ? ? Long Arc Quads 1 10 ? ? ? ? Seated Marching 1 10 ? ? ? ? Standing Marching   ? ? ? ?   
   ? ? ? ? Pain: 
Pain Scale 1: Numeric (0 - 10) Pain Intensity 1: 0 Pain out: 0 Activity Tolerance:  
Fair+ Please refer to the flowsheet for vital signs taken during this treatment. After treatment:  
? Patient left in no apparent distress sitting up in chair ? Patient left in no apparent distress in bed 
? Call bell left within reach ? Nursing notified ? Caregiver present ? Bed alarm activated Nicolas Garcia PTA Time Calculation: 43 mins

## 2019-11-13 NOTE — PROGRESS NOTES
Pharmacy Dosing Services: Warfarin Consult for Warfarin Dosing by Pharmacy by Dr. Laila Krishnamurthy Consult provided for this 80 y.o.  female , for indication of Atrial Fibrillation. Day of Therapy 1 Dose to achieve an INR goal of 2-3 Order entered for  Warfarin 4 mg to be given today at 18:00. Pt is also on a Heparin drip. Significant drug interactions: amiodarone LABS   
PT/INR Lab Results Component Value Date/Time INR 1.2 11/13/2019 01:38 PM  
 
  
Platelets Lab Results Component Value Date/Time PLATELET 781 92/52/0611 05:00 AM  
 
  
H/H Lab Results Component Value Date/Time HGB 9.1 (L) 11/13/2019 05:00 AM  
 
  
 
Warfarin Administrations (last 168 hours) None Pharmacy to follow daily and will provide subsequent Warfarin dosing based on clinical status. ALBER Bae  Contact information [phone]

## 2019-11-13 NOTE — PROGRESS NOTES
Bedside and Verbal shift change report given to EDGARD Vasquez (oncoming nurse) by BUZZ Zuniga, RN (offgoing nurse). Report included the following information SBAR, Kardex, Intake/Output, MAR and Recent Results.

## 2019-11-13 NOTE — PROGRESS NOTES
Transition of care: anticipate rehab when medically cleared Met with patient at bedside informed her that she has been accepted at [facility] her first choice. Will need to start authorization due to she has humana. Patient states she really wants to go home but her family wants her to go to rehab. So she will go. Patient will need UAI completed. She lives with her son. She has Dr. Rae Chau for pcp. She has a daughter who lives nearby and she is supportive. Patent is IADL's ususally but due to weakness now needs assistance. Cm will continue to follow. 1000 cm met with Dr. Janene Fowler not ready for d/c today possibly will be ready for d/c. CM has called [facility] for authorization they are asking for clinicals and updates to be faxed to [phone] cms will fax as requested. Son updated on plan of cre and ad agrees will need UAI cm has asked med assist to screen patient [phone] telephone call from [facility] they are requesting  A peer to peer on patient for insurance authorization. MD is to call [phone] option 5 deadline 11/14/2019 bu=y 12n verito has informed [place] for md hoff this time. Care Management Interventions PCP Verified by CM: Yes Mode of Transport at Discharge: BLS Transition of Care Consult (CM Consult):  [address]: Yes 
Partner SNF: Yes Physical Therapy Consult: Yes Occupational Therapy Consult: Yes Current Support Network: Other Confirm Follow Up Transport: Family Plan discussed with Pt/Family/Caregiver: Yes Freedom of Choice Offered: Yes Discharge Location Discharge Placement: Skilled nursing facility

## 2019-11-13 NOTE — PROGRESS NOTES
In patient nephrology progress note Admit Date:    2019 Name:  Zahraa Hernandez Age :  80 y.o. Impression: 1. Acute kidney Injury on CKD, non oliguric, Cr increased to 4. 2 today 2. CKD stage 4 w/ baseline Cr of 1.7-2.1. 
3. Hyponatremia consistent with total body fluid retention /CHF. Urine sodium <15 
4. Rt sided HF/Pulm HTN  
5. Anemia of chronic disease 6. New onset atrial fibrillation RVR  , on heparin and amiodarone gtt Recommendations:  
1. Hold IV lasix for next 24 to 48hrs 2. Continue to hold Aldactone due to renal failure 3. Avoid ACEI/ARBs, NSAIDs or IV contrast 
4. Continue Fluid restriction 5. Close monitor UOP daily weight 6. Monitor Serum chemistry daily Subjective:  
Patient denies CP/SOB/N/V. Ambulating earlier today. Objective:  
Temp (24hrs), Av.8 °F (36.6 °C), Min:97.1 °F (36.2 °C), Max:98.3 °F (36.8 °C) -124 /57 Intake/Output Summary (Last 24 hours) at 2019 1713 Last data filed at 2019 1519 Gross per 24 hour Intake 120 ml Output 1000 ml Net -880 ml Last 3 Recorded Weights in this Encounter 19 0305 19 7196 19 5576 Weight: 67 kg (147 lb 11.3 oz) 69.3 kg (152 lb 12.5 oz) 70 kg (154 lb 5.2 oz) Physical Exam:  
General Appearance: NAD Head: atraumatic HEENT: Sclera clear. Neck: Supple, no JVD Chest: Clear Heart: irregular rate Abdomen: obese non tender , BS active Skin: intact, no rash Extremity:  No pedal edema Neuro: No focal deficit 
  
 Data Review: BMP Lab Results Component Value Date/Time  Sodium 124 (L) 2019 05:00 AM  
 Potassium 4.3 2019 05:00 AM  
 Chloride 88 (L) 2019 05:00 AM  
 CO2 21 2019 05:00 AM  
 Anion gap 15 2019 05:00 AM  
 Glucose 97 2019 05:00 AM  
 BUN 84 (H) 2019 05:00 AM  
 Creatinine 4.20 (H) 2019 05:00 AM  
 BUN/Creatinine ratio 20 2019 05:00 AM  
 GFR est AA 12 (L) 2019 05:00 AM  
 GFR est non-AA 10 (L) 11/13/2019 05:00 AM  
 Calcium 8.6 11/13/2019 05:00 AM  
 
 
CBC Lab Results Component Value Date/Time WBC 8.0 11/13/2019 05:00 AM  
 HGB 9.1 (L) 11/13/2019 05:00 AM  
 HCT 27.6 (L) 11/13/2019 05:00 AM  
 PLATELET 235 44/24/3759 05:00 AM  
 MCV 90.5 11/13/2019 05:00 AM  
 
 
No components found for: PTHINT No results found for: TIMOTHY Oviedo MD 
[facility] [phone]

## 2019-11-13 NOTE — PROGRESS NOTES
Hospitalist Progress Note Patient: Jitendra Garcia MRN: [id number]  CSN: [id number] YOB: 1935  Age: 80 y.o. Sex: female DOA: 11/8/2019 LOS:  LOS: 5 days Chief Complaint: 
 
afib with rvr Assessment/Plan  
 
80 y. o. female with PMHX of CHF,  Admitted to telemetry for new onset atrial fibrillation with RVR.  
  
New onset atrial fibrillation with RVR- PO amiodarone 400 mg po BID, plus metoprolol BID-will review with pharmacy if she is an eliquis candidate with her renal failure and age-and as she is not meeting criteria will start coumadin REZA on CKD stage 4-Nephrology plan: off ARB , fluid restriction, daily weights and BMP as per nephrology-reduce lasix dose this am as CR has worsened Hyponatremia-persistent and likely due to total body fluid retention /CHF. Elevated troponin-no angina Diastolic heart failure-improved clinically-lasix BID 
 
UTI looks like contaminated specimen Anemia of CKD-stable Weakness She will go to SNF when medically stable Will see what AC therapy we will use-may have to be coumadin if renal fxn prohibits eliquis, and see what nephrology recommends for her REZA and CKD plan Repeat BMP in am 
Discussed with son Disposition :SNF 1-3 days Patient Active Problem List  
Diagnosis Code  Respiratory failure ([facility]) J96.90  
 Heart failure ([facility]) I50.9  Benign essential hypertension I10  Chronic diastolic congestive heart failure (HCC) I50.32  
 Cobalamin deficiency E53.8  Hypercholesterolemia E78.00  Hypertensive heart disease I11.9  Hypothyroidism E03.9  Iron deficiency anemia D50.9  Stage 4 chronic kidney disease ([facility] [address] N18.4  A-fib (HCC) I48.91  
 UTI (urinary tract infection) N39.0 Subjective: 
 
Feels weak Denies new issue this am 
Eating her BF Son at bedside Review of systems: 
 
Constitutional: denies fevers, chills Respiratory: denies SOB Cardiovascular: denies chest pain, palpitations Gastrointestinal: denies nausea Vital signs/Intake and Output: 
Visit Vitals /71 (BP 1 Location: Left arm, BP Patient Position: At rest) Pulse (!) 119 Temp 97.1 °F (36.2 °C) Resp 20 Ht 4' 11\" (1.499 m) Wt 70 kg (154 lb 5.2 oz) SpO2 98% BMI 31.17 kg/m² Current Shift:  No intake/output data recorded. Last three shifts:  11/11 1901 - 11/13 0700 In: 21 [P.O.:20] Out: 800 [Urine:800] Exam: 
 
General: elderly WF alert, NAD, OX3 Head/Neck: NCAT, supple, No masses, No lymphadenopathy CVS:irreg rhythm, reg rate no M/R/G, S1/S2 heard, no thrill Lungs:Clear to auscultation bilaterally, no wheezes, rhonchi, or rales Abdomen: trace ankle edema LE Neuro:grossly normal , follows commands Psych:appropriate Labs: Results:  
   
Chemistry Recent Labs 11/13/19 
0500 11/12/19 
0300 11/11/19 
0505 GLU 97 145* 114* * 124* 124* K 4.3 3.9 3.7 CL 88* 88* 90* CO2 21 23 24 BUN 84* 77* 76* CREA 4.20* 3.19* 2.77* CA 8.6 8.4* 8.3* AGAP 15 13 10 BUCR 20 24* 27* CBC w/Diff Recent Labs 11/13/19 
0500 WBC 8.0  
RBC 3.05* HGB 9.1*  
HCT 27.6*  
 Cardiac Enzymes No results for input(s): CPK, CKND1, CUONG in the last 72 hours. No lab exists for component: Darinel Nguyen Coagulation Recent Labs 11/13/19 
0500 11/12/19 
0300 APTT 43.8* 89.8* Lipid Panel No results found for: CHOL, CHOLPOCT, CHOLX, CHLST, CHOLV, 453919, HDL, HDLP, LDL, LDLC, DLDLP, 728962, VLDLC, VLDL, TGLX, TRIGL, TRIGP, TGLPOCT, CHHD, CHHDX  
BNP No results for input(s): BNPP in the last 72 hours. Liver Enzymes No results for input(s): TP, ALB, TBIL, AP, SGOT, GPT in the last 72 hours. No lab exists for component: DBIL Thyroid Studies Lab Results Component Value Date/Time  TSH 2.53 11/08/2019 01:25 PM  
    
Procedures/imaging: see electronic medical records for all procedures/Xrays and details which were not copied into this note but were reviewed prior to creation of Plan Jenny Thakkar MD

## 2019-11-13 NOTE — PROGRESS NOTES
INITIAL NUTRITION ASSESSMENT 
  
RECOMMENDATIONS/PLAN:  
-Add daily Phos lab 
-Supplement: Ensure pudding TID 
-D/C nepro 
-Monitor labs/lytes, BM, PO intake, skin integrity, wt, fluid status REASON FOR ASSESSMENT:  
[x]  MD Consult:  
 [x] General Nutrition Management and Supplements 
 [] Management of Tube Feeding 
 [] Calorie Count  
 [] Diet Education NUTRITION ASSESSMENT:  
Client History: 80 yrs old Female admitted with new onset a-fib w/ RVR, REZA, UTI PMHx: CKD stage 4, CHF, COPD Cultural/Mu-ism Food Preferences: None Identified FOOD/NUTRITION HISTORY Diet History: patients appetite is very low. Picks at her food. Had some applesauce, two bites of oatmeal, and a bite of toast this morning. Food Allergies:  [x] NKFA     [] Yes Pertinent PTA Medications: vit D2, lasix, coreg NUTRITION INTAKE Diet Order:  Renal   
Average PO Intake:      
Patient Vitals for the past 100 hrs: 
 % Diet Eaten 11/12/19 1201 75 % 11/11/19 1153 50 % Pertinent Medications:  [x] Reviewed; lasix, lopressor, miralax Electrolyte Replacement Protocol: []K  []Mg  []PO4 Insulin:  [] SSI  [] Pre-meal   []  Basal   [] Drip  [x] None Pt expected to meet estimated nutrient needs through next review:          [x]  Yes     [] No;  ANTHROPOMETRICS Height: 4' 11\" (149.9 cm)       Weight: 70 kg (154 lb 5.2 oz) BMI: 31.2 kg/m^2  -  obese (30%-39.9% BMI) Weight change: no wt loss Comparison to Reference Standards: IBW: 98 lbs      %IBW: 157%      AdjBW: 50.9 kg NUTRITION-FOCUSED PHYSICAL ASSESSMENT Skin: no PU. GI: +BM 11/12 BIOCHEMICAL DATA & MEDICAL TESTS Pertinent Labs:  [x] Reviewed; sodiu,-124, BUN-84, GFR et non-AA-10, GFR est AA-12 NUTRITION PRESCRIPTION Calories:9525-4344 kcal/day based on 30-35 kcal/kg Protein: 42-56 g/day based on .6-.8 g/kg Fluid: 8591-8736 ml/day based on 1 kcal/ml NUTRITION DIAGNOSES:  
 1. At risk of inadequate oral intake related to increased energy needs as evidenced by CKD. NUTRITION INTERVENTIONS:  
INTERVENTIONS:        GOALS: 
1. -Add daily Phos lab 
-Supplement: Ensure pudding TID 
-D/C nepro 1. Encourage PO intake >75% by next review 5 days LEARNING NEEDS (Diet, Supplementation, Food/Nutrient-Drug Interaction):  
[] None Identified 
[] Inpatient education provided/documented   
[] Identified and patient:  [] Declined     [x] Was not appropriate/indicated NUTRITION MONITORING /EVALUATION:  
Follow PO intake Monitor wt Monitor renal labs, electrolytes, fluid status Monitor for additional supplement needs 
 
[] Participated in Interdisciplinary Rounds 
[x] [address] [place] [address] Reviewed/Documented DISCHARGE NUTRITION RECOMMENDATIONS ADDRESSED:  
   [x] To be determined closer to discharge NUTRITION RISK:     [x]  At risk                     []  Not currently at risk Will follow-up per policy. Nelson Saleem 9

## 2019-11-13 NOTE — PROGRESS NOTES
3203: this nurse reviews pt information on dx and tx via kardex, mar and telemetry monitor Emilie Jiang RN 
 
1140: jack notifies this nurse pt on fld restriction and request items off menu with fld, dietitian made aware item ok for pt to have and pt will be educated Emilie Jiang RN 
 
Pt and family aware of the importance of using call bell prior to getting OOB to prevent injury 1330: pt c/o nausea, MD Sabra Adkins paged d/t pt has no tx noted Emilie Jiang RN 
 
1400: MD Sabra Adkins made aware pt c/o nausea and no order noted, n/o noted Emilie Jiang RN 
 
1640: MD Ryan enquires with tele nurse in reference to pt, MD at the bedside to assess and discuss tx with family and pt Emilie Jiang RN 
 
1900: Bedside and Verbal shift change report given to JOJO Penny RN (oncoming nurse) by Emilie Jiang RN (offgoing nurse). Report included the following information MAR kardex telemetry afib.

## 2019-11-13 NOTE — PROGRESS NOTES
Bedside and Verbal shift change report given to Caleb Logan RN (oncoming nurse) by Sanchez Grayson RN (offgoing nurse). Report included the following information SBAR, Kardex, Intake/Output, MAR and Recent Results.

## 2019-11-13 NOTE — PROGRESS NOTES
Cardiology Progress Note Patient: Obed Palma        Sex: female          DOA: 11/8/2019 YOB: 1935      Age:  80 y.o.        LOS:  LOS: 5 days Assessment/Plan Active Problems: 
  A-fib ([address] (11/8/2019) UTI (urinary tract infection) (11/10/2019) Plan: Afib heart rate improving, warfarin started, may decreased amiodarone from 400 mg po BID to 400 mg from tomorrow Diastolic heart failure clinically improving with worsening renal function Lasix decreased from 40 to 20 mg, nephrology is following Subjective:  
 cc: Afib Diastolic heart failure REVIEW OF SYSTEMS:  
 
General: No fevers or chills. Cardiovascular: No chest pain or pressure. No palpitations. No ankle swelling Pulmonary: No SOB, orthopnea, PND Gastrointestinal: No nausea, vomiting or diarrhea Objective:  
  
Visit Vitals BP 93/47 (BP 1 Location: Left arm, BP Patient Position: Sitting) Pulse 79 Temp 98.3 °F (36.8 °C) Resp 18 Ht 4' 11\" (1.499 m) Wt 70 kg (154 lb 5.2 oz) SpO2 97% BMI 31.17 kg/m² Body mass index is 31.17 kg/m². Physical Exam: 
General Appearance: Comfortable, not using accessory muscles of respiration. NECK: No JVD, no thyroidomeglay. LUNGS: Clear bilaterally. HEART: S1 irregular, variable +S2 audible, ABD: Non-tender, BS Audible EXT: No edema, and no cysnosis. VASCULAR EXAM: Pulses are intact. PSYCHIATRIC EXAM: Mood is appropriate. Medication: 
Current Facility-Administered Medications Medication Dose Route Frequency  ondansetron (ZOFRAN) 4 mg/2 mL injection  furosemide (LASIX) injection 20 mg  20 mg IntraVENous BID  ondansetron (ZOFRAN) injection 4 mg  4 mg IntraVENous Q6H PRN  
 warfarin (COUMADIN) tablet 4 mg  4 mg Oral ONCE  Warfarin - Pharmacy to Dose  1 Each Other Rx Dosing/Monitoring  zinc sulfate (ZINCATE) capsule 1 Cap  1 Cap Oral DAILY  metoprolol tartrate (LOPRESSOR) tablet 100 mg  100 mg Oral Q12H  
 amiodarone (CORDARONE) tablet 400 mg  400 mg Oral Q12H  polyethylene glycol (MIRALAX) packet 17 g  17 g Oral BID  cefTRIAXone (ROCEPHIN) 1 g in sterile water (preservative free) 10 mL IV syringe  1 g IntraVENous Q24H  
 aspirin delayed-release tablet 81 mg  81 mg Oral DAILY  atorvastatin (LIPITOR) tablet 40 mg  40 mg Oral DAILY  ergocalciferol capsule 50,000 Units  50,000 Units Oral Q7D  
 PARoxetine (PAXIL) tablet 20 mg  20 mg Oral DAILY  heparin 25,000 units in D5W 250 ml infusion  18-36 Units/kg/hr IntraVENous TITRATE  sodium chloride (NS) flush 5-40 mL  5-40 mL IntraVENous Q8H  
 sodium chloride (NS) flush 5-40 mL  5-40 mL IntraVENous PRN  
 acetaminophen (TYLENOL) tablet 650 mg  650 mg Oral Q4H PRN  
 HYDROcodone-acetaminophen (NORCO) 5-325 mg per tablet 1 Tab  1 Tab Oral Q4H PRN  
 morphine injection 2 mg  2 mg IntraVENous Q4H PRN  
 naloxone (NARCAN) injection 0.4 mg  0.4 mg IntraVENous PRN Lab/Data Reviewed: 
Procedures/imaging: see electronic medical records for all procedures/Xrays  
and details which were not copied into this note but were reviewed prior to creation of Plan 
  
 
All lab results for the last 24 hours reviewed. Recent Labs 11/13/19 
0500 WBC 8.0 HGB 9.1*  
HCT 27.6*  
 Recent Labs 11/13/19 
0500 11/12/19 
0300 11/11/19 
0505 * 124* 124* K 4.3 3.9 3.7 CL 88* 88* 90* CO2 21 23 24 GLU 97 145* 114* BUN 84* 77* 76* CREA 4.20* 3.19* 2.77* CA 8.6 8.4* 8.3* Signed By: Clifford Chavez MD   
 November 13, 2019

## 2019-11-13 NOTE — PROGRESS NOTES
Problem: Mobility Impaired (Adult and Pediatric) Goal: *Acute Goals and Plan of Care (Insert Text) Description Physical Therapy Goals Initiated 19 to be met in 7 days ST. Sit to stand and stand to sit SBA/RW to improve functional I. 
2. Standing balance Good with RW to improve balance for ADLs. 3. Gait: Ambulate 75ft S/RW, O2 saturation > 90% on RA, for improved mobility in environment. 4. Patient Education: Independent with HEP and energy conservation techniques for improved endurance for home/facility discharge. Outcome: Progressing Towards Goal 
  
PHYSICAL THERAPY TREATMENT Patient: Refugio Bentley (48 y.o. female) Date: 2019 Diagnosis: A-fib (HCC) [I48.91] <principal problem not specified> Precautions: Fall Chart, physical therapy assessment, plan of care and goals were reviewed. ASSESSMENT: 
Pt found in bed, reporting that she has had an increase of LBP. Pt is able to slightly increase ambulation but continues to require numerous rest breaks (6) and has SOB. Pt has SPO2 range of 94-98% on RA. Pt sits in chair for 1 hr, before being assisted back to bed. Placement is still suggested for this pt. Progression toward goals: 
?      Improving appropriately and progressing toward goals ? Improving slowly and progressing toward goals ? Not making progress toward goals and plan of care will be adjusted PLAN: 
Patient continues to benefit from skilled intervention to address the above impairments. Continue treatment per established plan of care. Discharge Recommendations:  SNF Further Equipment Recommendations for Discharge:  Pediatric rolling walker SUBJECTIVE:  
Patient stated I'm ok.  OBJECTIVE DATA SUMMARY:  
Critical Behavior: 
Neurologic State: Appropriate for age Orientation Level: Appropriate for age Cognition: Appropriate decision making, Appropriate for age attention/concentration, Appropriate safety awareness, Follows commands Functional Mobility Training: 
Bed Mobility: 
Rolling: Contact guard assistance Supine to Sit: Contact guard assistance Sit to Supine: Contact guard assistance Transfers: 
Sit to Stand: Minimum assistance Stand to Sit: Minimum assistance Balance: 
Sitting: Intact Standing: With support Standing - Static: Fair Standing - Dynamic : Fair Ambulation/Gait Training: 
Distance (ft): 40 Feet (ft) Assistive Device: Walker, rolling;Gait belt Ambulation - Level of Assistance: Contact guard assistance Gait Abnormalities: Decreased step clearance Base of Support: Widened Speed/Shara: Slow Step Length: Right shortened;Left shortened Pain: 
Pain Scale 1: Numeric (0 - 10) Pain Intensity 1: 4 (LBP) Pain out: 4 Activity Tolerance:  
Fair Please refer to the flowsheet for vital signs taken during this treatment. After treatment:  
? Patient left in no apparent distress sitting up in chair ? Patient left in no apparent distress in bed 
? Call bell left within reach ? Nursing notified ? Caregiver present ? Bed alarm activated Bryan Moulton PTA Time Calculation: 40 mins

## 2019-11-13 NOTE — CDMP QUERY
Pt admitted with new onset of afib. Pt noted to have UTI 11/12. On 11/13 it is noted \"UTI looks like contaminated specimen\", but UTI remains on the Active Problem List.  If possible, please document in the progress notes and d/c summary to clarify if you are evaluating and / or treating any of the following: 
 
? Urinary Tract Infection (UTI) ? UTI ruled out 
? Other, please specify ? Clinically unable to determine The medical record reflects the following: 
 
  Risk Factors: Advanced age Clinical Indicators: Urine culture= Culture result:      Final 
73[address] COLONIES/mL MIXED GRAM POSITIVE TODD, PROBABLE SKIN/GENITAL CONTAMINATION Treatment: received urine culture 11/9 Thank you, Breanna Horner, RN, BSN, [address] [place] [address] 
[phone]

## 2019-11-14 NOTE — PROGRESS NOTES
5341 Received bedside verbal report from Dar Mckay RN. Pt in bed resting, call light in reach. 0945 Held metoprolol due to BP of 98/49. 
 
1015 Dong catheter inserted, per Dr. Deonna Kimball orders. Bear Munoz RN second assist.  
 
1967 Bladder scanned, 0ml in multiple areas. 1945 Bedside and Verbal shift change report given to ANTONY Steiner RN (oncoming nurse) by Trent Stratton. Ravi Mancuso RN (offgoing nurse). Report included the following information SBAR, Kardex, Intake/Output and MAR.

## 2019-11-14 NOTE — PROGRESS NOTES
In patient nephrology progress note Admit Date:    11/8/2019 Name:  Rachelle Mcclure Age :  80 y.o. Impression: 1. REZA on CKD, non oliguric, Cr continues to get worsen today. However still non oliguric. 2. CKD stage 3/4 w/ baseline Cr of 1.7-2.1. 
3. Hyponatremia consistent with total body fluid retention /CHF. Urine sodium <15. Na at 124 today, unchanged 4. Rt sided HF with Pulm HTN  
5. Anemia of chronic disease 6. New onset atrial fibrillation RVR, on heparin and amiodarone gtt Recommendations: 1. Cont to hold off on iv lasix 2. Continue to hold off on Aldactone due to renal failure 3. Avoid ACEI/ARBs, NSAIDs or IV contrast, and other nephrotoxic meds 4. Continue Fluid restriction 5. Close monitor UOP daily weight 6. Monitor Serum chemistry daily 7. D/w pt and family in room, regarding her worsening renal function and the possible need for dialysis. Undecided at the moment. Subjective:  
Kasey Eden is a 80 y.o. WF with a PMHX of CHF, COPD who presents to the emergency department for newly Dx of afib, sent in by PCP Dr. Adam Gong . Patient was discharged 10/26 for CHF and went home with lasox 40 mg every other day . Patient has chronic kidney disease and follows /[facility]. Patient was last seen Dec 2018. Per records patient has stage 4 chronic kidney disease. Baseline cr was 1.77-1.9. Patient has chronic diastolic heart failure prone to biventricular decompensation, mitral regurgitation, pulmonary hypertension. Patient was admitted yesterday. Cardiology seen and placed on Heparin gtt. Patient has SOB with minimal exertion such as eating food . She reports follow low salt diet and denied use  NSAID exposure. Admission CXR showed central vascular congestion, increased in the interval from comparison exam. There are small bilateral pleural effusions present but no pneumothorax or pneumonic opacity.  
 
Patient denies CP/SOB/N/V this AM.  Eating breakfast.  UOP over 1L yesterday but cr up to 5.3 now. Objective:  
Temp (24hrs), Av.9 °F (36.6 °C), Min:97.7 °F (36.5 °C), Max:98.3 °F (36.8 °C) -124 /57 Intake/Output Summary (Last 24 hours) at 2019 7711 Last data filed at 2019 3876 Gross per 24 hour Intake 255.5 ml Output 800 ml Net -544.5 ml Last 3 Recorded Weights in this Encounter 19 1725 19 4771 19 7476 Weight: 69.3 kg (152 lb 12.5 oz) 70 kg (154 lb 5.2 oz) 69.6 kg (153 lb 8 oz) Physical Exam:  
General Appearance: NAD Head: atraumatic HEENT: Sclera clear. Neck: Supple, no JVD or mass Chest: CTA jere Heart: irregular rate Abdomen: obese non tender , BS active Skin: intact, no rash Extremity:  No pedal edema Neuro: No focal deficit, AAOx4 
  
 Data Review: BMP Lab Results Component Value Date/Time Sodium 124 (L) 2019 04:23 AM  
 Potassium 4.5 2019 04:23 AM  
 Chloride 86 (L) 2019 04:23 AM  
 CO2 23 2019 04:23 AM  
 Anion gap 15 2019 04:23 AM  
 Glucose 113 (H) 2019 04:23 AM  
 BUN 96 (H) 2019 04:23 AM  
 Creatinine 5.32 (H) 2019 04:23 AM  
 BUN/Creatinine ratio 18 2019 04:23 AM  
 GFR est AA 9 (L) 2019 04:23 AM  
 GFR est non-AA 8 (L) 2019 04:23 AM  
 Calcium 8.3 (L) 2019 04:23 AM  
 
 
CBC Lab Results Component Value Date/Time WBC 8.2 2019 04:23 AM  
 HGB 9.1 (L) 2019 04:23 AM  
 HCT 27.5 (L) 2019 04:23 AM  
 PLATELET 011  04:23 AM  
 MCV 91.4 2019 04:23 AM  
 
 
No components found for: PTHINT No results found for: TIMOTHY Brito MD 
VIA [facility] Office [phone]

## 2019-11-14 NOTE — PROGRESS NOTES
Pharmacy Dosing Services: Warfarin Consult for Warfarin Dosing by Pharmacy by Dr. Annabelle Gilford Consult provided for this 80 y.o.  female , for indication of Atrial Fibrillation. Day of Therapy 2 Dose to achieve an INR goal of 2-3 Order entered for  Warfarin  4 (mg) ordered to be given today at 18:00. Significant drug interactions: amiodarone PT/INR Lab Results Component Value Date/Time INR 1.2 11/14/2019 04:23 AM  
 
  
Platelets Lab Results Component Value Date/Time PLATELET 878 71/94/8808 04:23 AM  
 
  
H/H Lab Results Component Value Date/Time HGB 9.1 (L) 11/14/2019 04:23 AM  
 
  
 
Warfarin Administrations (last 168 hours) Date/Time Action Medication Dose 11/13/19 1709 Given  
 warfarin (COUMADIN) tablet 4 mg 4 mg Pharmacy to follow daily and will provide subsequent Warfarin dosing based on clinical status. Varinder Zheng, [facility])  Contact information [phone] patient

## 2019-11-14 NOTE — PROGRESS NOTES
Problem: Falls - Risk of 
Goal: *Absence of Falls Description Document [place] Fall Risk and appropriate interventions in the flowsheet. Outcome: Progressing Towards Goal 
Note: No falls during shift. Pt calls for assistance, uses commode and altman. Fall Risk Interventions: 
Mobility Interventions: Communicate number of staff needed for ambulation/transfer Medication Interventions: Teach patient to arise slowly Elimination Interventions: Call light in reach History of Falls Interventions: Investigate reason for fall Problem: Pain Goal: *Control of Pain Outcome: Progressing Towards Goal 
Note: No reports of pain during shift. Problem: Pressure Injury - Risk of 
Goal: *Prevention of pressure injury Description Document Joe Scale and appropriate interventions in the flowsheet. Outcome: Progressing Towards Goal 
Note:  
Pressure Injury Interventions: 
  
 
Moisture Interventions: Absorbent underpads Activity Interventions: Pressure redistribution bed/mattress(bed type) Mobility Interventions: Pressure redistribution bed/mattress (bed type) Nutrition Interventions: Document food/fluid/supplement intake, Offer support with meals,snacks and hydration Friction and Shear Interventions: HOB 30 degrees or less

## 2019-11-14 NOTE — PROGRESS NOTES
Transition of care Waiting on final decisions of peer to peer for authorization. However she is not ready for d/c due worsening renal failure and weakness Patient lives with her son and has applied for rehab but waiting for above results. Patient has UAI completed. She has Dr. Kaleigh Velázquez for pcp. her daughter lives nearby and is supportive. Patient reports she is usually very independent but has become so weak now  Per family 
 per Dr. Sabra Adkins Not yet able to be discharged due to worsening renal; failure, weakness Transition of Care Plan: patient is not ready for d/c today. When she is ready will have to start new authorization 1230 telephone call from [employer] they have denied patients auth for rehab informed her that matt is not ready tody for d/c due to worsening renal failure. They informed cm if patient is d/c and needs authorization ifnormed cm would need a stronger dx. Patient and family acuna aware of denial 
 
Communication to Patient/Family: Met with patient and family, and they are agreeable to the transition plan. SNF/Rehab Transition: 
Patient has been accepted to [facility] at [address] [place] [address] for SNF and meets criteria for admission. Patient will transported by and expected to leave at Communication to SNF/Rehab: 
Bedside RN,  has been notified to update the transition plan to the facility and call report [phone]. Discharge information has been updated on the AVS and communicated through [facility] or [facility]. Discharge instructions to be fax'd to facility at [phone] Please include all hard scripts for controlled substances, med rec and dc summary, and AVS in packet. Please medicate for pain prior to dc if possible and needed to help offset delay when patient first arrives to facility. Reviewed and confirmed with facility, [facility] can manage the patient care needs for the following:  
 
Colin with (X) only those applicable: Medication: 
[]Medications are available at the facility []IV Antibiotics []Controlled Substance  hard copies available sent. []Weekly Labs Equipment: 
[]CPAP/BiPAP []Wound Vacuum []Dong or Urinary Device []PICC/Central Line []Nebulizer []Ventilator Treatment: 
[]Isolation (for MRSA, VRE, etc.) []Surgical Drain Management []Tracheostomy Care 
[]Dressing Changes []Dialysis with transportation []PEG Care []Oxygen []Daily Weights for Heart Failure Dietary: 
[]Any diet limitations []Tube Feedings []Total Parenteral Management (TPN) Financial Resources: 
[]Medicaid Application Completed [x]UAI Completed and copy given to pt/family. []A screening has previously been completed. []Level II Completed 
 
[] Private pay individual who will not become  
financially eligible for Medicaid within 6 months from admission to a [address] [place] [address] facility. [] Individual refused to have screening conducted. []Medicaid Application Completed []The screening denied because it was determined individual did not need/did not qualify for nursing facility level of care. [] Out of state residents seeking direct admission to a [address] facility. [] Individuals who are inpatients of an out of state hospital, or in state or out of state veterans/ hospital and seek direct admission to a [address] facility [] Individuals who are pateints or residents of a state owned/operated facility that is licensed by [employer] ([employer]) and seek direct admission to [address] facility [] A screening not required for enrollment in [facility] services as set out in 12 VAC 30- [] [facility]) staff shall perform screenings of the [facility] clients. Advanced Care Plan: 
[]Surrogate Decision Maker of Care 
[]POA []Communicated Code Status and copy sent. Other:  
   
 
 
Care Management Interventions PCP Verified by CM:  Yes 
 Mode of Transport at Discharge: BLS Transition of Care Consult (CM Consult): SNF [facility] - INPATIENT: Yes 
Partner SNF: Yes Physical Therapy Consult: Yes Occupational Therapy Consult: Yes Current Support Network: Other Confirm Follow Up Transport: Family Plan discussed with Pt/Family/Caregiver: Yes Freedom of Choice Offered: Yes Discharge Location Discharge Placement: Skilled nursing facility

## 2019-11-14 NOTE — PROGRESS NOTES
Verbal bedside received from Marilyn Del Rio off going nurse. Assumed patient care, bed in low position, call light within reach, white board updated. Patient systolic BP in the 90, rechecked 102/46. Paged Dr. Jordan Aguilera to hold BP meds. Orders given to administer amiodarone. Metoprolol discontinued and new dose prescribed for am.   
 
0600 Patient had uneventful night. Bedside and Verbal shift change report given to Faby Cody RN (oncoming nurse) by Cassandra Muñiz (offgoing nurse). Report included the following information SBAR, Kardex and MAR.

## 2019-11-14 NOTE — PROGRESS NOTES
Hospitalist Progress Note Patient: Adi Richardson MRN: [id number]  CSN: [id number] YOB: 1935  Age: 80 y.o. Sex: female DOA: 11/8/2019 LOS:  LOS: 6 days Chief Complaint: 
 
afib with RVR Assessment/Plan  
 
80 y. o. female with PMHX of CHF,  Admitted to telemetry for new onset atrial fibrillation with RVR.  
  
New onset atrial fibrillation with RVR- PO amiodarone 400 mg po BID, plus metoprolol BID-not eliquis candidate with renal failure-coumadin with heparin bridge planned 
  
Worsened REZA on CKD stage 4-Cr higher this am, off lasix now, no NSAIDS, no ARB-repeat UA, check renal US 
  
Hyponatremia-persistent and likely due to total body fluid retention /CHF. But fluid is better 
  
Elevated troponin-no angina 
  
Diastolic heart failure-improved clinically 
  
UTI looks like contaminated specimen 
  
Anemia of CKD-stable 
  
Weakness Not yet able to be discharged due to worsening renal; failure, weakness 
  
Disposition : 
Patient Active Problem List  
Diagnosis Code  Respiratory failure ([facility]) J96.90  
 Heart failure ([facility]) I50.9  Benign essential hypertension I10  Chronic diastolic congestive heart failure ([facility]) I50.32  
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) I48.91  
 UTI (urinary tract infection) N39.0 Subjective: She feels ok Slept a little better But very weak Walked a little yesterday Review of systems: 
 
Constitutional: denies fevers, chills Respiratory: denies SOB, cough Cardiovascular: denies chest pain, palpitations Gastrointestinal: denies nausea Vital signs/Intake and Output: 
Visit Vitals /55 (BP 1 Location: Left arm, BP Patient Position: At rest) Pulse (!) 106 Temp 97.8 °F (36.6 °C) Resp 16 Ht 4' 11\" (1.499 m) Wt 69.6 kg (153 lb 8 oz) SpO2 97% BMI 31.00 kg/m² Current Shift:  No intake/output data recorded. Last three shifts:  11/12 1901 - 11/14 0700 In: 255.5 [P.O.:180; I.V.:75.5] Out: 1000 [Urine:1000] Exam: 
 
General: weak elderly WF, alert, NAD, OX3 Head/Neck: NCAT, supple, No masses, No lymphadenopathy CVS:irreg rhythm, reg rate no M/R/G, S1/S2 heard, no thrill Lungs:Clear to auscultation bilaterally, no wheezes, rhonchi, or rales Abdomen: Soft, Nontender, No distention, Normal Bowel sounds, No hepatomegaly Extremities: No C/C/E, pulses palpable 2+ Neuro:grossly normal , follows commands Psych:appropriate Labs: Results:  
   
Chemistry Recent Labs 11/14/19 0423 11/13/19 
0500 11/12/19 
0300 * 97 145* * 124* 124* K 4.5 4.3 3.9 CL 86* 88* 88* CO2 23 21 23 BUN 96* 84* 77* CREA 5.32* 4.20* 3.19* CA 8.3* 8.6 8.4* AGAP 15 15 13 BUCR 18 20 24* CBC w/Diff Recent Labs 11/14/19 
0423 11/13/19 
0500 WBC 8.2 8.0  
RBC 3.01* 3.05* HGB 9.1* 9.1*  
HCT 27.5* 27.6*  
 208 Cardiac Enzymes No results for input(s): CPK, CKND1, CUONG in the last 72 hours. No lab exists for component: Cleta Both Coagulation Recent Labs 11/14/19 
0423 11/13/19 
1540 11/13/19 
1338 PTP 15.0  --  15.0 INR 1.2  --  1.2 APTT 175.2* 98.7*  --   
   
Lipid Panel No results found for: CHOL, CHOLPOCT, CHOLX, CHLST, CHOLV, 544355, HDL, HDLP, LDL, LDLC, DLDLP, 218943, VLDLC, VLDL, TGLX, TRIGL, TRIGP, TGLPOCT, CHHD, CHHDX  
BNP No results for input(s): BNPP in the last 72 hours. Liver Enzymes No results for input(s): TP, ALB, TBIL, AP, SGOT, GPT in the last 72 hours. No lab exists for component: DBIL Thyroid Studies Lab Results Component Value Date/Time  TSH 2.53 11/08/2019 01:25 PM  
    
Procedures/imaging: see electronic medical records for all procedures/Xrays and details which were not copied into this note but were reviewed prior to creation of Plan Davi Olivier MD

## 2019-11-14 NOTE — PROGRESS NOTES
Problem: Mobility Impaired (Adult and Pediatric) Goal: *Acute Goals and Plan of Care (Insert Text) Description Physical Therapy Goals Initiated 19 to be met in 7 days ST. Sit to stand and stand to sit SBA/RW to improve functional I. 
2. Standing balance Good with RW to improve balance for ADLs. 3. Gait: Ambulate 75ft S/RW, O2 saturation > 90% on RA, for improved mobility in environment. 4. Patient Education: Independent with HEP and energy conservation techniques for improved endurance for home/facility discharge. Outcome: Progressing Towards Goal 
  
PHYSICAL THERAPY TREATMENT Patient: Dusty Rudolph (67 y.o. female) Date: 2019 Diagnosis: A-fib (HCC) [I48.91] <principal problem not specified> Precautions: Fall Chart, physical therapy assessment, plan of care and goals were reviewed. ASSESSMENT: 
Pt initially refuses treatment, noting fatigue from urinary cath and imagine. Pt is agreeable to participation in exercise and transfer to EOB. Post seated ex, pt agrees to standing marches. Pt completes 3 standing trials with sides, when bowel incontinence occurred. 2 CNA entered to assist with clean up and linen change, after transitioning to [facility]. Progression toward goals: 
?      Goals met ? Improving appropriately and progressing toward goals ? Improving slowly and progressing toward goals ? Not making progress toward goals and plan of care will be adjusted PLAN: 
Patient will be discharged from physical therapy at this time. Rationale for discharge: 
? Goals Achieved ? Seth Zimmerman ? Patient not participating in therapy ? Other: 
Discharge Recommendations:  Eduardo Morrow Further Equipment Recommendations for Discharge:  rolling walker SUBJECTIVE:  
Patient stated Are you kidding me? Not today!  OBJECTIVE DATA SUMMARY:  
Critical Behavior: 
Neurologic State: Alert Orientation Level: Oriented X4 
 Cognition: Appropriate decision making, Appropriate for age attention/concentration, Appropriate safety awareness, Follows commands Functional Mobility Training: 
Bed Mobility: 
Rolling: Contact guard assistance Supine to Sit: Contact guard assistance Transfers: 
Sit to Stand: Contact guard assistance;Minimum assistance Stand to Sit: Contact guard assistance;Minimum assistance Balance: 
Sitting: Intact Standing: With support Standing - Static: Fair Standing - Dynamic : Fair Ambulation/Gait Training: 
Distance (ft): 10 Feet (ft) Assistive Device: Walker, rolling;Gait belt Ambulation - Level of Assistance: Contact guard assistance Gait Abnormalities: Decreased step clearance Base of Support: Widened Speed/Shara: Slow Step Length: Right shortened;Left shortened Therapeutic Exercises:  
 
 
EXERCISE Sets Reps Active Active Assist  
Passive Self ROM Comments Ankle Pumps 1 30  ? ? ? ?   
Quad Sets/Glut Sets 1 10 ? ? ? ? Hamstring Sets   ? ? ? ? Short Arc Quads 1 10 ? ? ? ? Heel Slides 1 10 ? ? ? ? Straight Leg Raises   ? ? ? ? Hip Abd/Add 1 15 ? ? ? ? Long Arc Quads 1 10 ? ? ? ? Seated Marching   ? ? ? ? Standing Marching 2 5 ? ? ? ?   
   ? ? ? ? Pain: 
Pain Scale 1: Numeric (0 - 10) Pain Intensity 1: 0 Pain out: 0 Activity Tolerance:  
Fair- 
Please refer to the flowsheet for vital signs taken during this treatment. After treatment:  
? Patient left in no apparent distress sitting  on BSC 
? Patient left in no apparent distress in bed 
? Call bell left within reach ? Nursing notified ? Caregiver present ? Bed alarm activated Indiana Tinsley PTA Time Calculation: 52 mins

## 2019-11-14 NOTE — PROGRESS NOTES
Cardiology Progress Note Patient: Michaela Mathew        Sex: female          DOA: 11/8/2019 YOB: 1935      Age:  80 y.o.        LOS:  LOS: 6 days Assessment/Plan Active Problems: 
  A-fib ([address] (11/8/2019) UTI (urinary tract infection) (11/10/2019) Plan: Afib RVR - heart rate better, continue with metoprolol 100 mg po BID, decrease amiodarone 400 mg po daily, on heparin/warfarin Discussed with pt and family Acute renal failure - worsening function nephrology following Subjective:  
 cc: Afib RVR 
diastolic heart failure Acute renal failure REVIEW OF SYSTEMS:  
 
General: No fevers or chills. Cardiovascular: No chest pain or pressure. No palpitations. No ankle swelling Pulmonary: No SOB, orthopnea, PND Gastrointestinal: No nausea, vomiting or diarrhea Objective:  
  
Visit Vitals /40 Pulse 89 Temp 98.5 °F (36.9 °C) Resp 16 Ht 4' 11\" (1.499 m) Wt 69.6 kg (153 lb 8 oz) SpO2 97% BMI 31.00 kg/m² Body mass index is 31 kg/m². Physical Exam: 
General Appearance: Comfortable, not using accessory muscles of respiration. NECK: No JVD, no thyroidomeglay. LUNGS: Clear bilaterally. HEART: S1 irregular, variable +S2 audible, ABD: Non-tender, BS Audible EXT: No edema, and no cysnosis. VASCULAR EXAM: Pulses are intact. PSYCHIATRIC EXAM: Mood is appropriate. Medication: 
Current Facility-Administered Medications Medication Dose Route Frequency  [START ON 11/15/2019] polyethylene glycol (MIRALAX) packet 17 g  17 g Oral DAILY  warfarin (COUMADIN) tablet 4 mg  4 mg Oral ONCE  
 [START ON 11/15/2019] amiodarone (CORDARONE) tablet 400 mg  400 mg Oral DAILY  ondansetron (ZOFRAN) injection 4 mg  4 mg IntraVENous Q6H PRN  
 Warfarin - Pharmacy to Dose  1 Each Other Rx Dosing/Monitoring  metoprolol tartrate (LOPRESSOR) tablet 12.5 mg  12.5 mg Oral Q12H  zinc sulfate (ZINCATE) capsule 1 Cap  1 Cap Oral DAILY  cefTRIAXone (ROCEPHIN) 1 g in sterile water (preservative free) 10 mL IV syringe  1 g IntraVENous Q24H  
 aspirin delayed-release tablet 81 mg  81 mg Oral DAILY  atorvastatin (LIPITOR) tablet 40 mg  40 mg Oral DAILY  ergocalciferol capsule 50,000 Units  50,000 Units Oral Q7D  
 PARoxetine (PAXIL) tablet 20 mg  20 mg Oral DAILY  heparin 25,000 units in D5W 250 ml infusion  18-36 Units/kg/hr IntraVENous TITRATE  sodium chloride (NS) flush 5-40 mL  5-40 mL IntraVENous Q8H  
 sodium chloride (NS) flush 5-40 mL  5-40 mL IntraVENous PRN  
 acetaminophen (TYLENOL) tablet 650 mg  650 mg Oral Q4H PRN  
 HYDROcodone-acetaminophen (NORCO) 5-325 mg per tablet 1 Tab  1 Tab Oral Q4H PRN  
 morphine injection 2 mg  2 mg IntraVENous Q4H PRN  
 naloxone (NARCAN) injection 0.4 mg  0.4 mg IntraVENous PRN Lab/Data Reviewed: 
Procedures/imaging: see electronic medical records for all procedures/Xrays  
and details which were not copied into this note but were reviewed prior to creation of Plan 
  
 
All lab results for the last 24 hours reviewed. Recent Labs 11/14/19 
0423 11/13/19 
0500 WBC 8.2 8.0 HGB 9.1* 9.1*  
HCT 27.5* 27.6*  
 208 Recent Labs 11/14/19 
0423 11/13/19 
0500 11/12/19 
0300 * 124* 124* K 4.5 4.3 3.9 CL 86* 88* 88* CO2 23 21 23 * 97 145* BUN 96* 84* 77* CREA 5.32* 4.20* 3.19* CA 8.3* 8.6 8.4* Signed By: Jacky Navarrete MD   
 November 14, 2019

## 2019-11-15 PROBLEM — E87.1 HYPONATREMIA: Status: ACTIVE | Noted: 2019-01-01

## 2019-11-15 NOTE — PROGRESS NOTES
Transition of care: Anticipate rehab when medically cleared Patient will need to have [address] and start HD. No plans for d/c over the weekend. Patient sleeping when cm went to the room. Patient lives with her son and he had wanted her to go to rehab when she is d/c however, d/c has been placed on hold at this time. Will have to resubmit for rehab when she is medically cleared for d/c. Patient has Dr Elton De La Paz for pcp she has [email or web address] for insurance. She was IADL's prior to arrival but reports her weakness has become worse per patient. Cm will continue to follow no plans for d/c over the weekend Care Management Interventions PCP Verified by CM: Yes Mode of Transport at Discharge: BLS Transition of Care Consult (CM Consult): SNF [address]: Yes 
Partner SNF: Yes Physical Therapy Consult: Yes Occupational Therapy Consult: Yes Current Support Network: Other Confirm Follow Up Transport: Family Plan discussed with Pt/Family/Caregiver: Yes Freedom of Choice Offered: Yes Discharge Location Discharge Placement: Skilled nursing facility

## 2019-11-15 NOTE — PROGRESS NOTES
Hospitalist Progress Note Patient: Marilyn Salgado MRN: [id number]  CSN: [id number] YOB: 1935  Age: 80 y.o. Sex: female DOA: 11/8/2019 LOS:  LOS: 7 days Chief Complaint: 
 
ARF Assessment/Plan  
 
80 y. o. female with PMHX of CHF,  initially admitted to telemetry for new onset atrial fibrillation with RVR.  
  
New onset atrial fibrillation with RVR- PO amiodarone 400 mg po BID, plus metoprolol BID-not eliquis candidate with renal failure-coumadin now on hold due to worsening renal failure Stop heparin 
  
Worsened ARF on CKD stage 4-discussed with nephrology, family wants to start dialysis as she has reached critical level and and is making minimal urine output-will need dialysis catheter-K is WNL still Renal US unremarkable except for medical renal disease, no obstruction Bladder scans without retention 
  
Hyponatremia-persistent and likely due to total body fluid retention /CHF-lasi is stopped 2 days 
  
Elevated troponin-no angina 
  
Diastolic heart failure-improved clinically 
  
UTI looks like contaminated specimen 
  
Anemia of CKD-stable 
  
Weakness 
 
lond discussion updating patient and family regarding next steps in renla failure, dialysis, and catheter needed to start dialysis Hold further anticoagulation 
  
 
Disposition : 
Patient Active Problem List  
Diagnosis Code  Respiratory failure ([facility]) J96.90  
 Heart failure (HCC) I50.9  Benign essential hypertension I10  Chronic diastolic congestive heart failure ([facility]) I50.32  
 Cobalamin deficiency E53.8  Hypercholesterolemia E78.00  Hypertensive heart disease I11.9  Hypothyroidism E03.9  Iron deficiency anemia D50.9  Stage 4 chronic kidney disease ([facility] [address] N18.4  A-fib (HCC) I48.91  
 UTI (urinary tract infection) N39.0  Hyponatremia E87.1 Subjective: 
 
Feeling weak Tired Denies SOB or CP Sister thinks she is becoming delerious Review of systems: Constitutional: denies fevers, chills Respiratory: denies SOB Cardiovascular: denies chest pain, palpitations Gastrointestinal: denies nausea, vomiting Vital signs/Intake and Output: 
Visit Vitals /60 Pulse 94 Temp 98.6 °F (37 °C) Resp 16 Ht 4' 11\" (1.499 m) Wt 70.9 kg (156 lb 4.9 oz) SpO2 96% BMI 31.57 kg/m² Current Shift:  11/15 0701 - 11/15 1900 In: 100 Out: 100 [Urine:100] Last three shifts:  11/13 1901 - 11/15 0700 In: 457.1 [P.O.:240; I.V.:217.1] Out: 215 [Urine:215] Exam: 
 
General: elderly weakened WF alert, NAD, OX3 Head/Neck: NCAT, supple, No masses, No lymphadenopathy CVS:irreg rhythm, reg rate Lungs:Clear to auscultation bilaterally, no wheezes, rhonchi, or rales Abdomen: Soft, Nontender, No distention, Normal Bowel sounds, No hepatomegaly Extremities: No C/C/E, pulses palpable 2+ Neuro:grossly normal , follows commands Psych:appropriate Labs: Results:  
   
Chemistry Recent Labs 11/15/19 
0425 11/14/19 
0423 11/13/19 
0500 * 113* 97 * 124* 124* K 4.7 4.5 4.3 CL 87* 86* 88* CO2 23 23 21 * 96* 84* CREA 6.27* 5.32* 4.20* CA 8.3* 8.3* 8.6 AGAP 14 15 15 BUCR 16 18 20 CBC w/Diff Recent Labs 11/15/19 
0425 11/14/19 
0423 11/13/19 
0500 WBC 7.9 8.2 8.0  
RBC 3.05* 3.01* 3.05* HGB 9.2* 9.1* 9.1*  
HCT 28.1* 27.5* 27.6*  
 202 208 Cardiac Enzymes No results for input(s): CPK, CKND1, CUONG in the last 72 hours. No lab exists for component: Mort Player Coagulation Recent Labs 11/15/19 
0425 11/14/19 33 64 74 11/14/19 
0423 PTP 22.5*  --  15.0 INR 2.0*  --  1.2 APTT 121.8* 95.3* 175.2* Lipid Panel No results found for: CHOL, CHOLPOCT, CHOLX, CHLST, CHOLV, 931247, HDL, HDLP, LDL, LDLC, DLDLP, 634825, VLDLC, VLDL, TGLX, TRIGL, TRIGP, TGLPOCT, CHHD, CHHDX  
BNP No results for input(s): BNPP in the last 72 hours. Liver Enzymes No results for input(s): TP, ALB, TBIL, AP, SGOT, GPT in the last 72 hours. No lab exists for component: DBIL Thyroid Studies Lab Results Component Value Date/Time TSH 2.53 11/08/2019 01:25 PM  
    
Procedures/imaging: see electronic medical records for all procedures/Xrays and details which were not copied into this note but were reviewed prior to creation of Plan Manuel Dubois MD

## 2019-11-15 NOTE — PROGRESS NOTES
Problem: Falls - Risk of 
Goal: *Absence of Falls Description Document [place] Fall Risk and appropriate interventions in the flowsheet. Outcome: Progressing Towards Goal 
Note:  
Fall Risk Interventions: 
Mobility Interventions: Patient to call before getting OOB, Communicate number of staff needed for ambulation/transfer, Assess mobility with egress test 
 
  
 
Medication Interventions: Teach patient to arise slowly, Patient to call before getting OOB Elimination Interventions: Call light in reach, Patient to call for help with toileting needs, Toileting schedule/hourly rounds History of Falls Interventions: Investigate reason for fall Problem: Pain Goal: *Control of Pain Outcome: Progressing Towards Goal 
  
Problem: Gas Exchange - Impaired Goal: *Absence of hypoxia Outcome: Progressing Towards Goal 
  
Problem: Pressure Injury - Risk of 
Goal: *Prevention of pressure injury Description Document Joe Scale and appropriate interventions in the flowsheet. Outcome: Progressing Towards Goal 
Note:  
Pressure Injury Interventions: 
  
 
Moisture Interventions: Absorbent underpads, Internal/External urinary devices, Check for incontinence Q2 hours and as needed Activity Interventions: Pressure redistribution bed/mattress(bed type), Increase time out of bed Mobility Interventions: Pressure redistribution bed/mattress (bed type), HOB 30 degrees or less Nutrition Interventions: Document food/fluid/supplement intake, Offer support with meals,snacks and hydration Friction and Shear Interventions: HOB 30 degrees or less

## 2019-11-15 NOTE — WOUND CARE
Attempted to see pt for length of stay/ skin breakdown assessment, pt receiving dialysis. Please place wound consult if any wound care needs arise.

## 2019-11-15 NOTE — PROGRESS NOTES
TRANSFER - OUT REPORT: 
 
Verbal report given to RICK Ramirez(name) on Wes Marquis  being transferred to room 340(unit) for routine progression of care Report consisted of patients Situation, Background, Assessment and  
Recommendations(SBAR). Information from the following report(s) SBAR, Kardex and MAR was reviewed with the receiving nurse. Lines:  
Peripheral IV 11/08/19 Left Antecubital (Active) Site Assessment Clean, dry, & intact 11/15/2019  4:34 AM  
Phlebitis Assessment 0 11/15/2019  4:34 AM  
Infiltration Assessment 0 11/15/2019  4:34 AM  
Dressing Status Clean, dry, & intact 11/15/2019  4:34 AM  
Dressing Type Tape;Transparent 11/15/2019  4:34 AM  
Hub Color/Line Status Blue; Infusing 11/15/2019  4:34 AM  
Action Taken Open ports on tubing capped 11/15/2019  4:34 AM  
Alcohol Cap Used Yes 11/15/2019  4:34 AM  
  
 
Opportunity for questions and clarification was provided. Patient transported with: 
 Registered Nurse Pt transporter

## 2019-11-15 NOTE — PROGRESS NOTES
Cardiology Progress Note Patient: Jensen Flor        Sex: female          DOA: 11/8/2019 YOB: 1935      Age:  80 y.o.        LOS:  LOS: 7 days Assessment/Plan Active Problems: 
  A-fib ([address] (11/8/2019) UTI (urinary tract infection) (11/10/2019) Hyponatremia (11/15/2019) Plan: 
afib with RVR- heart rate improved on warfarin, metoprolol tartratete 100 mg BID and amiodarone 400 mg po daily for 10 days then 200 mg po daily CHF Acute renal failure started on dialysis Subjective:  
 cc: 
afib with RVR 
CHF Acute renal failure REVIEW OF SYSTEMS:  
 
General: No fevers or chills. Cardiovascular: No chest pain or pressure. No palpitations. No ankle swelling Pulmonary: + SOB, orthopnea, PND Gastrointestinal: No nausea, vomiting or diarrhea Objective:  
  
Visit Vitals /84 Pulse 72 Temp 97.7 °F (36.5 °C) (Oral) Resp 14 Ht 4' 11\" (1.499 m) Wt 70.9 kg (156 lb 4.9 oz) SpO2 100% BMI 31.57 kg/m² Body mass index is 31.57 kg/m². Physical Exam: 
General Appearance: Comfortable, not using accessory muscles of respiration. NECK: No JVD, no thyroidomeglay. LUNGS: Clear bilaterally. HEART: S1 variable +S2 audible, ABD: Non-tender, BS Audible EXT: No edema, and no cysnosis. VASCULAR EXAM: Pulses are intact. PSYCHIATRIC EXAM: Mood is appropriate. Medication: 
Current Facility-Administered Medications Medication Dose Route Frequency  albuterol-ipratropium (DUO-NEB) 2.5 MG-0.5 MG/3 ML  3 mL Nebulization Q4H PRN  
 guaiFENesin-dextromethorphan (ROBITUSSIN DM) 100-10 mg/5 mL syrup 5 mL  5 mL Oral Q6H PRN  
 nystatin (MYCOSTATIN) 100,000 unit/mL oral suspension 500,000 Units  500,000 Units Oral QID  polyethylene glycol (MIRALAX) packet 17 g  17 g Oral DAILY  amiodarone (CORDARONE) tablet 400 mg  400 mg Oral DAILY  ondansetron (ZOFRAN) injection 4 mg  4 mg IntraVENous Q6H PRN  
 metoprolol tartrate (LOPRESSOR) tablet 12.5 mg  12.5 mg Oral Q12H  
 zinc sulfate (ZINCATE) capsule 1 Cap  1 Cap Oral DAILY  aspirin delayed-release tablet 81 mg  81 mg Oral DAILY  atorvastatin (LIPITOR) tablet 40 mg  40 mg Oral DAILY  ergocalciferol capsule 50,000 Units  50,000 Units Oral Q7D  
 PARoxetine (PAXIL) tablet 20 mg  20 mg Oral DAILY  sodium chloride (NS) flush 5-40 mL  5-40 mL IntraVENous Q8H  
 sodium chloride (NS) flush 5-40 mL  5-40 mL IntraVENous PRN  
 acetaminophen (TYLENOL) tablet 650 mg  650 mg Oral Q4H PRN  
 HYDROcodone-acetaminophen (NORCO) 5-325 mg per tablet 1 Tab  1 Tab Oral Q4H PRN  
 morphine injection 2 mg  2 mg IntraVENous Q4H PRN  
 naloxone (NARCAN) injection 0.4 mg  0.4 mg IntraVENous PRN Lab/Data Reviewed: 
Procedures/imaging: see electronic medical records for all procedures/Xrays  
and details which were not copied into this note but were reviewed prior to creation of Plan 
  
 
All lab results for the last 24 hours reviewed. Recent Labs 11/15/19 
0425 11/14/19 
0423 11/13/19 
0500 WBC 7.9 8.2 8.0 HGB 9.2* 9.1* 9.1*  
HCT 28.1* 27.5* 27.6*  
 202 208 Recent Labs 11/15/19 
0425 11/14/19 
0423 11/13/19 
0500 * 124* 124* K 4.7 4.5 4.3 CL 87* 86* 88* CO2 23 23 21 * 113* 97 * 96* 84* CREA 6.27* 5.32* 4.20* CA 8.3* 8.3* 8.6 Signed By: Mandy Lam MD   
 November 15, 2019

## 2019-11-15 NOTE — PROGRESS NOTES
TRANSFER - IN REPORT: 
 
Verbal report received from [facility]  on [address]  being received from surg  for routine progression of care Report consisted of patients Situation, Background, Assessment and  
Recommendations(SBAR). Information from the following report(s) SBAR, Kardex, Intake/Output, MAR, Recent Results, Med Rec Status, Alarm Parameters  and Quality Measures was reviewed with the receiving nurse. Opportunity for questions and clarification was provided. Assessment completed upon patients arrival to unit and care assumed. Patient sedated, not verbal, opens eyes and closes them, responds to voice,lethargic, 113/54,89,10,100%o2 sat with 2 L nc Family at bedside.

## 2019-11-15 NOTE — PROGRESS NOTES
In patient nephrology progress note Admit Date:    2019 Name:  Wes Marquis Age :  80 y.o. Subjective:  
Kasey Eden is a 80 y.o. WF with a PMHX of CHF, COPD who presents to the emergency department for newly Dx of afib, sent in by PCP Dr. Rae Chau . Patient was discharged 10/26 for CHF and went home with lasox 40 mg every other day . Patient has chronic kidney disease and follows /[facility]. Patient was last seen Dec 2018. Per records patient has stage 4 chronic kidney disease. Baseline cr was 1.77-1.9. Patient has chronic diastolic heart failure prone to biventricular decompensation, mitral regurgitation, pulmonary hypertension. Patient was admitted yesterday. Cardiology seen and placed on Heparin gtt. Patient has SOB with minimal exertion such as eating food . She reports follow low salt diet and denied use  NSAID exposure. Admission CXR showed central vascular congestion, increased in the interval from comparison exam. There are small bilateral pleural effusions present but no pneumothorax or pneumonic opacity. 
 
-Patient getting slow to communicate but oriented fully, denies CP/SOB/N/V this AM. She wants to try dialysis Impression:  
-REZA on CKD, likely ATN, Cr trending up 6.2 today and UOP decreasing. -CKD stage 3/4 w/ baseline Cr of 1.7-2.1. 
-Hyponatremia consistent with total body fluid retention /CHF. 
-Rt sided HF with Pulm HTN  
-Anemia of chronic disease 
-New onset atrial fibrillation RVR, Rate controlled, on heparin/coumadione and amiodarone gtt Recommendations:  
- Will initiate pt on HD today - Will consult Vascular for line placement - Avoid ACEI/ARBs, NSAIDs or IV contrast, and other nephrotoxic meds 
- Continue Fluid restriction  
- Continue to monitor UOP and Serum chemistry daily - D/w pt and family in room, all in agreement for HD initiation Objective:  
Temp (24hrs), Av.5 °F (36.9 °C), Min:98.4 °F (36.9 °C), Max:98.7 °F (37.1 °C) -124 /57 Intake/Output Summary (Last 24 hours) at 11/15/2019 0932 Last data filed at 11/15/2019 8601 Gross per 24 hour Intake 557.12 ml Output 100 ml Net 457.12 ml Last 3 Recorded Weights in this Encounter 11/13/19 7351 11/14/19 0343 11/15/19 3166 Weight: 70 kg (154 lb 5.2 oz) 69.6 kg (153 lb 8 oz) 70.9 kg (156 lb 4.9 oz) Physical Exam:  
General Appearance: NAD Head: atraumatic HEENT: Sclera clear. Neck: Supple, no JVD or mass Chest: CTA jere Heart: irregular rate Abdomen: obese non tender , BS active Skin: intact, no rash Extremity:  No pedal edema Neuro: No focal deficit, AAOx4 
  
 Data Review: BMP Lab Results Component Value Date/Time Sodium 124 (L) 11/15/2019 04:25 AM  
 Potassium 4.7 11/15/2019 04:25 AM  
 Chloride 87 (L) 11/15/2019 04:25 AM  
 CO2 23 11/15/2019 04:25 AM  
 Anion gap 14 11/15/2019 04:25 AM  
 Glucose 103 (H) 11/15/2019 04:25 AM  
  (H) 11/15/2019 04:25 AM  
 Creatinine 6.27 (H) 11/15/2019 04:25 AM  
 BUN/Creatinine ratio 16 11/15/2019 04:25 AM  
 GFR est AA 8 (L) 11/15/2019 04:25 AM  
 GFR est non-AA 6 (L) 11/15/2019 04:25 AM  
 Calcium 8.3 (L) 11/15/2019 04:25 AM  
 
 
CBC Lab Results Component Value Date/Time WBC 7.9 11/15/2019 04:25 AM  
 HGB 9.2 (L) 11/15/2019 04:25 AM  
 HCT 28.1 (L) 11/15/2019 04:25 AM  
 PLATELET 231 63/48/1009 04:25 AM  
 MCV 92.1 11/15/2019 04:25 AM  
 
 
No components found for: PTHINT No results found for: TIMOTHY Harrington MD 
[facility] Office [phone]

## 2019-11-15 NOTE — PROGRESS NOTES
2100: Patient has a altman, but the bag has no output. Bladder scan done at 1900 showed 0 ml. 
 
0025: Patient wheezing and coughing. Dr Brodie Cuenca paged. 0030: Call back from Dr Brodie Cuenca. MD will put orders in. MD is aware that patient has no urine output so far. 0630: Patient has no urine output overnight. Bladder scanned this morning at 0630 was 0 ml. 0730: Patient resting quietly in bed in no acute distress. She was a-fib on the monitor all night. Bedside and Verbal shift change report given to Marsha Bhatt RN (oncoming nurse) by Masha Conn RN (offgoing nurse). Report included the following information SBAR, Kardex, Intake/Output, MAR, Accordion, Recent Results and Med Rec Status.

## 2019-11-15 NOTE — PROGRESS NOTES
Bedside shift change report received from [address] [place] [address]). Report included the following information SBAR, Kardex, ED Summary, Intake/Output, MAR, Accordion, Recent Results, Med Rec Status, Cardiac Rhythm afib and Quality Measures Recent Results (from the past 12 hour(s)) PHOSPHORUS Collection Time: 11/15/19  4:25 AM  
Result Value Ref Range Phosphorus 7.9 (H) 2.5 - 4.9 MG/DL PROTHROMBIN TIME + INR Collection Time: 11/15/19  4:25 AM  
Result Value Ref Range Prothrombin time 22.5 (H) 11.5 - 15.2 sec INR 2.0 (H) 0.8 - 1.2 METABOLIC PANEL, BASIC Collection Time: 11/15/19  4:25 AM  
Result Value Ref Range Sodium 124 (L) 136 - 145 mmol/L Potassium 4.7 3.5 - 5.5 mmol/L Chloride 87 (L) 100 - 111 mmol/L  
 CO2 23 21 - 32 mmol/L Anion gap 14 3.0 - 18 mmol/L Glucose 103 (H) 74 - 99 mg/dL  (H) 7.0 - 18 MG/DL Creatinine 6.27 (H) 0.6 - 1.3 MG/DL  
 BUN/Creatinine ratio 16 12 - 20 GFR est AA 8 (L) >60 ml/min/1.73m2 GFR est non-AA 6 (L) >60 ml/min/1.73m2 Calcium 8.3 (L) 8.5 - 10.1 MG/DL  
CBC W/O DIFF Collection Time: 11/15/19  4:25 AM  
Result Value Ref Range WBC 7.9 4.6 - 13.2 K/uL  
 RBC 3.05 (L) 4.20 - 5.30 M/uL HGB 9.2 (L) 12.0 - 16.0 g/dL HCT 28.1 (L) 35.0 - 45.0 % MCV 92.1 74.0 - 97.0 FL  
 MCH 30.2 24.0 - 34.0 PG  
 MCHC 32.7 31.0 - 37.0 g/dL  
 RDW 14.5 11.6 - 14.5 % PLATELET 927 964 - 351 K/uL MPV 11.3 9.2 - 11.8 FL  
PTT Collection Time: 11/15/19  4:25 AM  
Result Value Ref Range aPTT 121.8 (H) 23.0 - 36.4 SEC Intake/Output Summary (Last 24 hours) at 11/15/2019 4233 Last data filed at 11/15/2019 8459 Gross per 24 hour Intake 350 ml Output 100 ml Net 250 ml

## 2019-11-15 NOTE — PROGRESS NOTES
Problem: Chronic Renal Failure Goal: *Fluid and electrolytes stabilized Outcome: Progressing Towards Goal 
  
Problem: Patient Education: Go to Patient Education Activity Goal: Patient/Family Education Outcome: Progressing Towards Goal 
Intervention: Monitor/Manage Fluid Electrolyte/Acid Base Balance 
  
PROBLEM: HEMODIALYSIS ADULT 
  
INTERVENTION: Hemodynamic stabilization Monitor and maintain BP WNL for this patient while on hemodialysis. 
  
INTERVENTION: Fluid Management UF goal 1500 ml Net fluid removal as tolerated. 
  
INTERVENTION: Metabolic / Electrolyte Imbalance Management 
3K+/2Ca++ Dialysate today per Dr. Mary Segura.   
  
GOAL: Signs and symptoms of listed potential problems will be absent or manageable 
(reference Hemodialysis ADULT CPG)

## 2019-11-15 NOTE — PROGRESS NOTES
[facility] [place] ACUTE HEMODIALYSIS FLOW SHEET  
 
PATIENT INFORMATION Hospital:    [facility]           Dr. Allison Ruiz MD         Room# 320 [x]1st Time Acute    [x] Routine   [x]Bedside Isolation Precautions: [x]Dialysis         [x]  Standard Special Considerations: N/A   [] Blood Consent Verified  [x]N/A Allergies:[x] YES Penicillins Valium [Diazepam] Code Status [x]Full Code [] DNR  [] Other_____ Diet: [x] Renal  Diabetic: [x]Yes []No  
[x]Signed Treatment Consent Verified  [x] Time Out/ Safety Check HEMODIALYSIS INPATIENT Duration (hr): 3; Dialyzer: Revaclear; Dialysate Bath:  K: 3; Dialysate Bath:  CA: 2.0; Dialysate Bath: Bicarb: 38; Sodium Profiling/Modeling: No; Target Fluid Removal (mL): 1,500; Access: Central Line; Blood Flow Rate (mL/m. .. Kathleen Garcia (Order [id number]) Duration (hr) 3 Dialyzer Revaclear Dialysate Bath:  K 3 Dialysate Bath:  CA 2.0 Dialysate Bath: Bicarb 38 Sodium Profiling/Modeling No   
Target Fluid Removal (mL) 1,500 Q-Sensei Blood Flow Rate (mL/min) 350 Dialysate Flow Rate (mL/min) 600 PRIMARY NURSE REPORT: FIRST INITIAL/ LAST NAME/TITLE 
                                                                 PRE DIALYSIS:    Danae Sandhu RN             TIME: 13:50pm  
ACCESS CATHETER ACCESS: [] N/A  [x] RIGHT  [] LEFT  [x] IJ  [] SUBCL [] FEM [x] First use X-ray  [x] Tunnel     [] Non-Tunneled [x] No S/S infection  [] Redness [] Drainage  [] Cultured [] Swelling [] Pain  
                 [x] Medical Aseptic Prep               [x]  Dressing Changed New site today 11/15/2019 [] Clotted [x] Patent []      Flows: [x] Good [] Poor [] Reversed              If Access Problem Dr. Streeter Conn: [] Yes [] No    Date:   [x] N/A   
GRAFT/FISTULA ACCESS:  [x] N/A  [] RIGHT  [] LEFT  [] UE   [] LE   
   [] AVG  [] AVF [] BUTTONHOLE 
  [] +BRUIT/THRILL [] MEDICAL ASEPTIC PREP  
 [] No S/S infection  [] Redness [] Drainage  [] Cultured [] Swelling [] Pain If Access Problem Dr. Hedy Pike: [] Yes [] No    Date:______ [] N/A  
GENERAL ASSESSMENT  
LUNGS:  SaO2% 90   [] Clear [] Coarse [] Crackles [] Wheezing  
                                      [x] Diminished Location: [] RLL [] LLL [] RUL [] RML [] NITA   
COUGH:  [] Productive [] Dry [x] N/A  RESPIRATIONS: [x] Easy [] Labored THERAPY: [] RA   [x] NC  4. L/min    Mask: [] NRB [] Venti  _____O2%    
             [] Ventilator [] Intubated [] Trach [] BiPap [] CPap [] HI Flow CARDIAC: [] Regular [x] Irregular [] Pericardial Rub [] JVD Monitored Rhythm:  A-Fib     [] N/A  
EDEMA: [] None [] Generalized [] Facial [x] Pedal [] UE [x] LE 
           [x] Pitting [] 1 [x] 2 [] 3 [] 4    [] Right [] Left [x] Bilateral  
SKIN:    [] Warm [] Hot [x] Cold  [x] Dry [x] Pale [] Diaphoretic  Poor turgor    [] Flushed [] Jaundiced [] Cyanotic [] Rash [] Weeping LOC:    [] Alert  Oriented to: [] Person [] Place [] Time  
          [] Confused [x] Lethargic [x] Medicated [] Non-responsive   Patient somnolent post-procedure. GI/ABDOMEN: [] Flat [] Distended [x] Soft [] Firm [] Diarrhea [x] Bowel Sounds Present [] Nausea [] Vomiting PAIN: [x] 0 [] 1 [] 2 [] 3 [] 4 [] 5 [] 6 [] 7 [] 8 [] 9 [] 10 Scale 1-10 Action/Follow Up MOBILITY: [] Amb [] Amb/Assist [x] Bed  [] Wheelchair CURRENT LABS HBsAg ONLY: Date Drawn: 11/15/2019            [] Negative [] Positive [x] Unknown. HBsAb: Date Drawn:            [x] Unknown Obtained/Reviewed Critical Results Called   Date when labs were drawn- 
Hgb-   
HGB Date Value Ref Range Status 11/15/2019 9.2 (L) 12.0 - 16.0 g/dL Final  
 
K-   
Potassium Date Value Ref Range Status 11/15/2019 4.7 3.5 - 5.5 mmol/L Final  
 
Ca-  
Calcium Date Value Ref Range Status 11/15/2019 8.3 (L) 8.5 - 10.1 MG/DL Final  
 
Bun-  
BUN Date Value Ref Range Status 11/15/2019 101 (H) 7.0 - 18 MG/DL Final  
 
Creat-  
Creatinine Date Value Ref Range Status 11/15/2019 6.27 (H) 0.6 - 1.3 MG/DL Final  
 
  
  
 
EDUCATION Person Educated: [x] Patient [x] Other         Family members at the bedside. Knowledge base: [] None [x] Minimal [] Substantial   
Barriers to learning  [x] None _______________ Preferred method of learning: [] Written [x] Oral [] Visual [] Hands on Topic: [] Access Care [] S&S of infection [x] Fluid Management 
 [] K+  [x] Procedural  [] Albumin [] Medications [] Tx Options [] Transplant [] Diet [] Other Teaching Tools: [x] Explain [] Demonstration [] Handout_____ [] Video______  
RO/HEMODIAYLSIS MACHINE SAFETY CHECKS- BEFORE EACH TREATMENT [x] THE [facility]: Machine Serial #1:  [id number]   RO Serial #[id number] [] [facility]: Machine Serial #2:  [id number]   RO Serial #[id number] Alarm Test: [x] Pass  Time      14:33pm 
[x] RO/Machine Log Complete    [x] Extracorporeal circuit Tested for integrity Dialyzer Revaclear 300 Lot# [id number] exp 7/18/2022  Tubing Nipro Lot# [id number] exp 1/31/2024   0.9% NS Lot# *[id number] exp 6/30/2021 Dialysate: pH 7.4  Temp. 36.0 Conductivity: Meter 13.8  HD Machine 13.9   TCD 13.7 CHLORINE TESTING- BEFORE EACH TREATMENT AND EVERY 4 HOURS Total Chlorine: [x] Less than 0.1 ppm Time: 14:30pm         2nd Check Time:    N/A 
(If greater than 0.1 ppm from Primary then every 30 minutes from Secondary) TREATMENT INIATION-WITH DIALYSIS PRECAUTIONS [x] All Connections Secured   [x] Saline Line Double Clamped    [x] Venous Parameters Set [x] Arterial Parameters Set    [x] Prime Given 200 ml [x] Air Foam Detector Engaged PRE-TREATMENT  
UF Calculations: Wt to lose: 1500 ml(+) Oral: 0ml(+)IV Meds/Fluids/Blood prods 0ml(+) Prime/Rinse 500ml(=)Total UF Goal 2000mL Scale Type:  [x] Bed scale     70.3 [x] kg Tx Initiation Note: Patient s/p TDC placement and remains very somnolent. Family members at the bedside since patient returned from IR. Each catheter limb disinfected for 60 seconds per limb with alcohol swabs. Caps removed, dialysis CVC hub scrubbed with Prevantics for 5 seconds, followed by a 5 second dry time per policy. First hemodialysis ever. Treatment initiated per orders, initial goal set for 1,500ml as tolerated. Patient has an indwelling Dong cath with small amt darren urine noted. Lower extremities somewhat edematous. Vitals   PreDialysis Temp  Temp: 97.7 °F (36.5 °C) (11/15/19 1435) HR   Pulse (Heart Rate):  82  
B/P   BP:              101/61 Resp   Resp Rate: 14                   Spo2 90% on 4 lpm/NC. Pain level  Pain Intensity 1: 0 (11/15/19 1200) [x] Time Out/Safety Check  Time:   14:00pm  
INTRADIALYTIC MONITORING 
(SEE ATTACHED FLOWSHEET) POST TREATMENT Time Medication Dose Volume Route Initials None [facility] Signatures Title Initials Time Néstor Feliciano RN AT 14:45pm  
     
     
Dialyzer cleared: [] Good [x] Fair [] Poor Blood Volume Processed  60.1L Net UF Removed  1100 mL Post Tx Access:   
Catheter: Locking Solution  [] Heparin 1 ml/1000 units [] Normal Saline [x] New caps applied Art. 2.1 ml Adolfo. 2.2 ml 
Post Assessment:   
          Skin: [x]Warm [x]Dry []Diaphoretic []Flushed [x] Pale []Cyanotic Lungs: []Clear []Coarse []Crackles [x] Diminished Cardiac: []Regular [x]Irregular  []Monitored rhythm____ []N/A Edema: []None []General []Facial []Pedal  []UE [x]LE [x]RIGHT [x]LEFT Pain: [x]0 []1 []2 []3 []4 []5 []6 []7 []8 []9 []10 Scale Type:  [x] Bed scale     68.9 [x] kg POST Tx Note: 
Each catheter limb disinfected for 60 seconds per limb with alcohol swabs. Dialysis CVC hubs scrubbed with Prevantics for 5 seconds, followed by a 5 second dry time per policy, red dialysis caps applied to ports. Patient tolerated full 3-hour first dialysis without acute complications. No acute distress. Net UF removed= 1,100 ml. Patient stable post-dialysis. No other change in assessment. Vitals   Post-Dialysis Temp  Temp: 98.0   °F   
HR   Pulse (Heart Rate): 88  
B/P   BP:  155/63 Resp   Resp Rate: 16 Pain level  Pain Intensity 1: 0 Primary Nurse Report: First initial/Last name/Title Post Dialysis:   Lowell Pruitt RN         Time:  18:00pm  
Abbreviations: AVG-arterial venous graft, AVF-arterial venous fistula, IJ-Internal Jugular, Subcl-Subclavian, Fem-Femoral, Tx-treatment, AP/HR-apical heart rate, DFR-dialysate flow rate, BFR-blood flow rate, AP-arterial pressure, -venous pressure, UF-ultrafiltrate, TMP-transmembrane pressure, Adolfo-Venous, Art-Arterial, RO-Reverse Osmosis [x] Renal Failure (Adult) Interdisciplinary · Fluid and electrolytes stabilized ? Interventions · Dehydration signs and symptoms (eg: Weight/lab monitoring; vomiting/diarrhea/urine; tenting; mucous membranes;  
· dizziness/lethargy/irritability/confusion; weak pulse; tachycardia; blood pressure; I&O) · Fluid overload signs and symptoms assessment (eg: Body weight increased; dyspnea; edema; hypertension;  
· respiratory crackles/wheezing; JVD; lab monitoring; mental status changes; I&O) · Monitor appropriate lab values · COMPLIANCE WITH PRESCRIBED THERAPY · ARTERIAL ACCESS SITE ASSESSMENT 
· NUTRITION SCREENING 
· Vital signs monitoring per assessed patient condition or unit standard · Cardiac monitoring · Hydration management · Intake and output measurement · Body weight monitoring · Skin care · DIALYSIS 
· Nutrition Care Process per nutrition screen · Oral hygiene care every 2 hours · Pain management 
  
 · Outcome [x] Progressing Towards Goal 
? [] Not Progressing Towards Goals ? [] Goals Met/Resolved ? [] Goals Not Met/ Resolved

## 2019-11-15 NOTE — OP NOTES
OPERATIVE NOTE  
 
DATE OF PROCEDURE: 11.15.19 SURGEON: Severiano Smoke, MD 
 
ASSISTANT(S): none PREOPERATIVE DIAGNOSIS: ESRD POSTOPERATIVE DIAGNOSIS: same PROCEDURE PERFORMED:  
1. Insertion of tunneled dialysis catheter right internal jugular vein 2. Ultrasound for vessel access ANESTHESIA: Local/No Anesthesia EBL: Minimal 
 
FLUIDS: see flow sheet BLOOD ADMINISTERED: none DRAINS/TUBES: none IMPLANTS: 23 cm TDC COMPLICATIONS: none FINDINGS: normal anatomy TOTAL RADIATION: 5 mGy TOTAL CONTRAST ADMINISTERED: none OPERATIVE INDICATIONS: 79 y/o female with progressive renal insufficiency. Now with fluid overload in need of urgent dialysis. DESCRIPTION OF PROCEDURE: The patient was brought to the angio suite, properly identified and placed in a supine position. Prepped and draped in a sterile fashion. Time out performed including  laterality. Moderate sedation was administered. The patient was constantly monitored by Latasha Basurto RN under my direct supervision from  until 1133 hrs. Total of fentanyl 25 mcg administered. Total of Versed 0 mg administered. Ultrasound was used to visualize the right internal jugular vein. It was found to be patent and compressible. Lidocaine infiltrated into skin and subcutaneous tissues. Using realtime ultrasound guidance, an 18 gauge needle was introduced into the jugular vein with good venous return. J tipped guidewire was advanced and found to be in right atrium. After additional lidocaine infiltration for counter incision and subcutaneous tunnel, counter incision on anterior chest wall made. 23 cm tunneled dialysis catheter tunneled between the two incisions. Dilators then introduced into jugular vein followed by peel away introducer. Tip of the catheter was found to be in good position. Both ports aspirated and flushed without difficulty.   Full strength heparin (1000U per cc) instilled into ports. Catheter secured to the skin with 3-0 Nylon. Neck incision closed with 4-0 Monocryl. Sterile CHG dressing applied to skin exit site. Patient tolerated the procedure well without apparent complications.  
 
 
Jerardo Jimenes MD

## 2019-11-16 NOTE — PROGRESS NOTES
In patient nephrology progress note Admit Date:    11/8/2019 Name:  Alexandra Dejesus Age :  80 y.o. Subjective:  
Kasey Eden is a 80 y.o. WF with a PMHX of CHF, COPD who presents to the emergency department for newly Dx of afib, sent in by PCP Dr. Chris Wakefield . Patient was discharged 10/26 for CHF and went home with lasox 40 mg every other day . Patient has chronic kidney disease and follows /[facility]. Patient was last seen Dec 2018. Per records patient has stage 4 chronic kidney disease. Baseline cr was 1.77-1.9. Patient has chronic diastolic heart failure prone to biventricular decompensation, mitral regurgitation, pulmonary hypertension. Patient was admitted yesterday. Cardiology seen and placed on Heparin gtt. Patient has SOB with minimal exertion such as eating food . She reports follow low salt diet and denied use  NSAID exposure. Admission CXR showed central vascular congestion, increased in the interval from comparison exam. There are small bilateral pleural effusions present but no pneumothorax or pneumonic opacity. 
 
-Patient getting slow to communicate but oriented fully, denies CP/SOB/N/V this AM. S/p first HD yesterday 11/15. Feels slightly better today but still quite weak, appetite poor. Not making much urine Impression:  
-REZA on CKD, likely ATN, Cr trending up 6.2 today and UOP decreasing, initiated on HD 11/15.  
-CKD stage 3/4 w/ baseline Cr of 1.7-2.1. 
-Hyponatremia consistent with total body fluid retention /CHF. 
-Rt sided HF with Pulm HTN  
-Anemia of chronic disease 
-New onset atrial fibrillation RVR, Rate controlled, on heparin/coumadione and amiodarone gtt Recommendations:  
- HD today - Epo w/ HD, fes tudies - Avoid ACEI/ARBs, NSAIDs or IV contrast, and other nephrotoxic meds 
- Continue Fluid restriction  
- Continue to monitor UOP and Serum chemistry daily - D/w pt and family as well as Dr Deonna Kimball Objective: Temp (24hrs), Av.1 °F (36.7 °C), Min:97.3 °F (36.3 °C), Max:98.9 °F (37.2 °C) -124 /57 Intake/Output Summary (Last 24 hours) at 2019 1001 Last data filed at 2019 5693 Gross per 24 hour Intake 160 ml Output 1450 ml Net -1290 ml Last 3 Recorded Weights in this Encounter 19 1881 11/15/19 5202 19 0404 Weight: 69.6 kg (153 lb 8 oz) 70.9 kg (156 lb 4.9 oz) 70.8 kg (156 lb) Physical Exam:  
General Appearance: NAD Head: atraumatic HEENT: Sclera clear. Neck: Supple, no JVD or mass Chest: CTA jere Heart: irregular rate Abdomen: obese non tender , BS active Skin: intact, no rash Extremity:  No pedal edema Neuro: No focal deficit, AAOx4 
  
 Data Review: BMP Lab Results Component Value Date/Time Sodium 135 (L) 2019 01:57 AM  
 Potassium 3.9 2019 01:57 AM  
 Chloride 96 (L) 2019 01:57 AM  
 CO2 26 2019 01:57 AM  
 Anion gap 13 2019 01:57 AM  
 Glucose 93 2019 01:57 AM  
 BUN 46 (H) 2019 01:57 AM  
 Creatinine 3.89 (H) 2019 01:57 AM  
 BUN/Creatinine ratio 12 2019 01:57 AM  
 GFR est AA 13 (L) 2019 01:57 AM  
 GFR est non-AA 11 (L) 2019 01:57 AM  
 Calcium 8.0 (L) 2019 01:57 AM  
 
 
CBC Lab Results Component Value Date/Time WBC 10.1 2019 01:57 AM  
 HGB 8.4 (L) 2019 01:57 AM  
 HCT 26.1 (L) 2019 01:57 AM  
 PLATELET 678  01:57 AM  
 MCV 92.2 2019 01:57 AM  
 
 
No components found for: PTHINT No results found for: TIMOTHY Mann MD 
VIA [facility] Office [phone]

## 2019-11-16 NOTE — PROGRESS NOTES
Problem: Falls - Risk of 
Goal: *Absence of Falls Description Document Arabella Meriden Fall Risk and appropriate interventions in the flowsheet. Outcome: Progressing Towards Goal 
Note: No falls during shift. Fall Risk Interventions: 
Mobility Interventions: Communicate number of staff needed for ambulation/transfer Medication Interventions: Bed/chair exit alarm Elimination Interventions: Call light in reach History of Falls Interventions: Door open when patient unattended Problem: Pain Goal: *Control of Pain Outcome: Progressing Towards Goal 
Note: No pain during shift.

## 2019-11-16 NOTE — PROGRESS NOTES
Hospitalist Progress Note Patient: Shawna Morelos MRN: [id number]  CSN: [id number] YOB: 1935  Age: 80 y.o. Sex: female DOA: 11/8/2019 LOS:  LOS: 8 days Chief Complaint: 
 
 
 
 
Assessment/Plan  
 
afib with RVR stabilized Progressive acute renal failure to now starting dialysis-first session 11/15 Coagulopathy from coumadin-INR meets criteria to monitor and stay off warfarin as no bleeding noted Anemia of CKD CKD stage 4 POA No UTI-contaminants on cx Chronic diastolic CHF Hyponatremia, now improved after dialysis She is weak and frail Mouth sores causing pain-add magic mouthwash Discussed with daughter and sister code status and chances of not progressing well on dialysis as well as DNR-they will discuss this Dialysis again today per Dr Manny Max Hold blood thinners Plan daily INR and CBC Diet as tolerated Disposition : 
Patient Active Problem List  
Diagnosis Code  Respiratory failure (HCC) J96.90  
 Heart failure (HCC) I50.9  Benign essential hypertension I10  Chronic diastolic congestive heart failure (HCC) I50.32  
 Cobalamin deficiency E53.8  Hypercholesterolemia E78.00  Hypertensive heart disease I11.9  Hypothyroidism E03.9  Iron deficiency anemia D50.9  Stage 4 chronic kidney disease ([facility] [address] N18.4  A-fib (HCC) I48.91  
 UTI (urinary tract infection) N39.0  Hyponatremia E87.1 Subjective: 
 
Sores on tongue No appetite Ice even hurts to have in mouth Denies pain elsewhere 
weak Review of systems: 
 
Constitutional: denies fevers, chills Respiratory: denies SOB, cough Cardiovascular: denies chest pain Gastrointestinal: denies nausea, vomiting Vital signs/Intake and Output: 
Visit Vitals /45 Pulse (!) 106 Temp 98.1 °F (36.7 °C) Resp 16 Ht 4' 11\" (1.499 m) Wt 70.8 kg (156 lb) SpO2 100% BMI 31.51 kg/m² Current Shift:  No intake/output data recorded. Last three shifts:  11/14 1901 - 11/16 0700 In: 837.1 [P.O.:520; I.V.:217.1] Out: 1575 [Urine:475] Exam: 
 
General: weal pale elderly WF, alert, NAD, OX3 Head/Neck:small dry sores end of tongue CVS:irreg rhythm, reg rate, no M/R/G, S1/S2 heard, no thrill Lungs:Clear to auscultation bilaterally, no wheezes, rhonchi, or rales Abdomen: Soft, Nontender, No distention, Normal Bowel sounds, No hepatomegaly Extremities: No C/C/E, pulses palpable 2+ Neuro:grossly normal , follows commands Psych:appropriate Labs: Results:  
   
Chemistry Recent Labs 11/16/19 
0157 11/15/19 
0425 11/14/19 
0423 GLU 93 103* 113* * 124* 124* K 3.9 4.7 4.5  
CL 96* 87* 86* CO2 26 23 23 BUN 46* 101* 96* CREA 3.89* 6.27* 5.32* CA 8.0* 8.3* 8.3* AGAP 13 14 15 BUCR 12 16 18 CBC w/Diff Recent Labs 11/16/19 
0157 11/15/19 
0425 11/14/19 
0423 WBC 10.1 7.9 8.2  
RBC 2.83* 3.05* 3.01* HGB 8.4* 9.2* 9.1*  
HCT 26.1* 28.1* 27.5*  
 208 202 Cardiac Enzymes No results for input(s): CPK, CKND1, CUONG in the last 72 hours. No lab exists for component: Johnny Gunner Coagulation Recent Labs 11/16/19 
0905 11/15/19 
1330 11/15/19 
0425 PTP 48.7*  --  22.5* INR 5.4*  --  2.0* APTT  --  150.2* 121.8* Lipid Panel No results found for: CHOL, CHOLPOCT, CHOLX, CHLST, CHOLV, 319415, HDL, HDLP, LDL, LDLC, DLDLP, 976733, VLDLC, VLDL, TGLX, TRIGL, TRIGP, TGLPOCT, CHHD, CHHDX  
BNP No results for input(s): BNPP in the last 72 hours. Liver Enzymes No results for input(s): TP, ALB, TBIL, AP, SGOT, GPT in the last 72 hours. No lab exists for component: DBIL Thyroid Studies Lab Results Component Value Date/Time TSH 2.53 11/08/2019 01:25 PM  
    
Procedures/imaging: see electronic medical records for all procedures/Xrays and details which were not copied into this note but were reviewed prior to creation of Plan Wadie Cranker, MD

## 2019-11-16 NOTE — PROGRESS NOTES
0730 Received bedside report from CANDICE Ruggiero RN. Pt in bed, family at bedside. Pt receiving dialysis. 1724 Removed altman. Pt had green loose stool. Pt changed. 1915 Bedside and Verbal shift change report given to JOJO Hendrickson RN (oncoming nurse) by Rama Mane. Mihaela Lau RN (offgoing nurse). Report included the following information SBAR, Kardex, Intake/Output and MAR. Pt in bed, family at bedside.

## 2019-11-16 NOTE — PROGRESS NOTES
Problem: Mobility Impaired (Adult and Pediatric) Goal: *Acute Goals and Plan of Care (Insert Text) Description Physical Therapy Goals Initiated 19 to be met in 7 days ST. Sit to stand and stand to sit SBA/RW to improve functional I. 
2. Standing balance Good with RW to improve balance for ADLs. 3. Gait: Ambulate 75ft S/RW, O2 saturation > 90% on RA, for improved mobility in environment. 4. Patient Education: Independent with HEP and energy conservation techniques for improved endurance for home/facility discharge. 11/15/2019 1130 by Danny Cao PTA Outcome: Not Progressing Towards Goal 
  
PT session held due to: ? Off unit for [facility] (1st attempt) ? RN Communication/ suggestion ? Extreme Pain ? Dialysis treatment in progress (2nd) Will f/u tomorrow. Thank you.  
Stephanie Partida PTA

## 2019-11-16 NOTE — PROGRESS NOTES
Problem: Mobility Impaired (Adult and Pediatric) Goal: *Acute Goals and Plan of Care (Insert Text) Description Physical Therapy Goals Initiated 19 to be met in 7 days ST. Sit to stand and stand to sit SBA/RW to improve functional I. 
2. Standing balance Good with RW to improve balance for ADLs. 3. Gait: Ambulate 75ft S/RW, O2 saturation > 90% on RA, for improved mobility in environment. 4. Patient Education: Independent with HEP and energy conservation techniques for improved endurance for home/facility discharge. 2019 1139 by Helen Colby PTA Outcome: Not Progressing Towards Goal 
 Pt refused PT due to: 
? Nausea/vomiting 
? Eating 
? Pain ? Pt lethargic 
? Off Unit Will f/u later, as pt's schedule allows. Thank you. Chinedu Mcfarlane PTA'

## 2019-11-16 NOTE — PROGRESS NOTES
1900: Assumed care of the patient from Denia Colon RN. Patient is resting in bed, lethargic, but alert. 2047: Patient is still wheezing. Respiratory called for PRN breathing treatment. 2120: Purposeful rounding completed. Patient resting comfortably in bed in no acute distress. Call light is within reach. 0040: Purposeful rounding completed. Patient resting comfortably in bed in no acute distress. Call light is within reach. 0745: Bedside and Verbal shift change report given to Elsa Rangel RN (oncoming nurse) by Emma Mtz RN (offgoing nurse). Report included the following information SBAR, Kardex, Intake/Output, MAR, Accordion, Recent Results and Med Rec Status.

## 2019-11-16 NOTE — PROGRESS NOTES
Problem: Mobility Impaired (Adult and Pediatric) Goal: *Acute Goals and Plan of Care (Insert Text) Description Physical Therapy Goals Initiated 19 to be met in 7 days ST. Sit to stand and stand to sit SBA/RW to improve functional I. 
2. Standing balance Good with RW to improve balance for ADLs. 3. Gait: Ambulate 75ft S/RW, O2 saturation > 90% on RA, for improved mobility in environment. 4. Patient Education: Independent with HEP and energy conservation techniques for improved endurance for home/facility discharge. 2019 1416 by Maxwell Prabhakar PTA Outcome: Not Progressing Towards Goal 
  
PT session held due to: 
? Nausea/vomiting ? RN Communication/ suggestion ? Extreme Pain ? Dialysis treatment in progress. Will f/u tomorrow. Thank you.  
Monique Whelan PTA

## 2019-11-16 NOTE — PROGRESS NOTES
Problem: Falls - Risk of 
Goal: *Absence of Falls Description Document Caio Mejia Fall Risk and appropriate interventions in the flowsheet. Outcome: Progressing Towards Goal 
Note:  
Fall Risk Interventions: 
Mobility Interventions: Communicate number of staff needed for ambulation/transfer, Patient to call before getting OOB, Utilize walker, cane, or other assistive device Medication Interventions: Teach patient to arise slowly, Patient to call before getting OOB Elimination Interventions: Call light in reach, Patient to call for help with toileting needs, Toileting schedule/hourly rounds History of Falls Interventions: Investigate reason for fall Problem: Pain Goal: *Control of Pain Outcome: Progressing Towards Goal 
  
Problem: Patient Education: Go to Patient Education Activity Goal: Patient/Family Education Outcome: Progressing Towards Goal 
  
Problem: Gas Exchange - Impaired Goal: *Absence of hypoxia Outcome: Progressing Towards Goal 
  
Problem: Pressure Injury - Risk of 
Goal: *Prevention of pressure injury Description Document Joe Scale and appropriate interventions in the flowsheet. Outcome: Progressing Towards Goal 
Note:  
Pressure Injury Interventions: 
  
 
Moisture Interventions: Check for incontinence Q2 hours and as needed, Absorbent underpads, Internal/External urinary devices Activity Interventions: Pressure redistribution bed/mattress(bed type), Increase time out of bed Mobility Interventions: Pressure redistribution bed/mattress (bed type), HOB 30 degrees or less Nutrition Interventions: Document food/fluid/supplement intake, Offer support with meals,snacks and hydration Friction and Shear Interventions: HOB 30 degrees or less Problem: Patient Education: Go to Patient Education Activity Goal: Patient/Family Education Outcome: Progressing Towards Goal

## 2019-11-16 NOTE — DIALYSIS
TREATMENT SUMMARY Patient dialyzed in room 340 Tolerated treatment with asymptomatic hypotension. Albumin given RIJ TDC functioning well without complication of BFR or accessing   
1000 ml  removed via UF with a with a net removal 500 Report given to Elie Musa RN with all questions answered TREATMENT  NOTES  
   
1333 Patient hypotensive at start of treatment. UF turned off. Patient asymptomatic. SP >90 
 
1400  MD Lisa contacted about continued intradialytic hypotension. Orders given for Albumin and reduced UF 
 
 
                                                                                        
   
 
ACUTE HEMODIALYSIS FLOW SHEET 
  
 
PATIENT INFORMATION [facility]  [address] []1st Time Acute  []Stat[x] Routine []Urgent []Chronic Unit  
[]Acute Room [x]Bedside  []ICU/CCU []ER Isolation Precautions: [x]Dialysis[] Airborne []Contact []Droplet []Reverse Special Considerations:_______  [] Blood Consent Verified  []N/A Allergies:[] NKA Reaction Severity Reaction Type Noted Valid Until Allergies Penicillins Hives Not Specified  10/26/2019 Valium [Diazepam] Other (comments) Not Specified  11/8/2019 Code Status [x]Full Code [] DNR  [] Other_____ Diet: [x] Renal [] NPO [] Diabetic  
[] Enteral Feeding [] Cardiac Diabetic: []Yes [x]No 
  
[x]Signed Treatment Consent Verified  
[x] Time Out/ Safety Check PRIMARY NURSE REPORT: FIRST INITIAL/ LAST NAME/TITLE 
PRE DIALYSIS:  Elie Musa RN                                       TIME: 8109 ACCESS CATHETER ACCESS: [] N/A  [x] RIGHT  [] LEFT  [x] IJ  [] SUBCL [] FEM [] First use X-ray  [x] Tunnel     [] Non-Tunneled [x] No S/S infection  [] Redness [] Drainage  [] Cultured [] Swelling [] Pain  
                 [x] Medical Aseptic [] Prep Dressing Changed  
               [] Clotted [] Patent []      Flows: [] Good [] Poor [] Reversed If Access Problem Dr. Miguel Tovar: [] Yes [] No    Date:_____  [] N/A[]  
GRAFT/FISTULA ACCESS:  [x] N/A  [] RIGHT  [] LEFT  [] UE   [] LE   
   [] AVG  [] AVF [] BUTTONHOLE 
  [] +BRUIT/THRILL [] MEDICAL ASEPTIC PREP [] No S/S infection  [] Redness [] Drainage  [] Cultured [] Swelling [] Pain If Access Problem Dr. Miguel Tovar: [] Yes [] No    Date:______ [] N/A  
GENERAL ASSESSMENT  
LUNGS:  SaO2% ____ [x] Clear [] Coarse [] Crackles [] Wheezing  
                                      [] Diminished Location: [] RLL [] LLL [] RUL [] RML [] NITA   
COUGH:  [] Productive  [] Loose[] Dry [x] N/A  
RESPIRATIONS: [x] Easy []Labored THERAPY: [x] RA   [] NC _4____. L/min    Mask: [] NRB [] Venti  _____O2%    
             [] Ventilator [] Intubated [] Trach [] BiPap [] CPap [] HI Flow CARDIAC: [] Regular [x] Irregular [] Pericardial Rub [] JVD Monitored Rhythm:__AFIB____ [] N/A  
EDEMA: [] None [] Generalized [] Facial [] Pedal [] UE [x] LE 
           [] Pitting [x] 1 [] 2 [] 3 [] 4    [] Right [] Left [] Bilateral  
SKIN:    [x] Warm [] Hot [] Cold  [x] Dry [] Pale [] Diaphoretic  
           [] Flushed [] Jaundiced [] Cyanotic [] Rash [] Weeping LOC:    [x] Alert  Oriented to: [x] Person [x] Place [x] Time  
          [] Confused [] Lethargic [] Medicated [] Non-responsive GI/ABDOMEN: [] Flat [] Distended [x] Soft [] Firm [] Diarrhea [x] Bowel Sounds Present [] Nausea [] Vomiting PAIN: [x] 0 [] 1 [] 2 [] 3 [] 4 [] 5 [] 6 [] 7 [] 8 [] 9 [] 10 Scale 1-10 Action/Follow Up_____ MOBILITY: [] Amb [] Amb/Assist [x] Bed  [] Wheelchair CURRENT LABS HBsAg ONLY: Date Drawn:  11/15/2019          [x] Negative [] Positive [] Unknown. HBsAb: Date Drawn:   11/16/2019           [] Susceptible <10 [] Immune ? 10 [x] Unknown Date of Current Labs:  ATTACHED  
 
EDUCATION Person Educated: [x] Patient [] Other_________ Knowledge base: [] None [x] Minimal [] Substantial   
Barriers to learning  [x] None _______________ Preferred method of learning: [] Written [x] Oral [x] Visual [] Hands on Topic: [x] Access Care [] S&S of infection [x] Fluid Management 
 [] K+  [x] Procedural  [x] Albumin [] Medications [] Tx Options [] Transplant [] Diet [] Other Teaching Tools: [x] Explain [x] Demonstration [] Handout_____ [] Video______ 
CARE PLAN [x] Renal Failure (Adult) Interdisciplinary · Fluid and electrolytes stabilized ? Interventions · Dehydration signs and symptoms (eg: Weight/lab monitoring; vomiting/diarrhea/urine; tenting; mucous membranes; dizziness/lethargy/irritability/confusion; weak pulse; tachycardia; blood pressure; I&O) · Fluid overload signs and symptoms assessment (eg: Body weight increased; dyspnea; edema; hypertension; respiratory crackles/wheezing; JVD; lab monitoring; mental status changes; I&O) · Monitor appropriate lab values · COMPLIANCE WITH PRESCRIBED THERAPY · ARTERIAL ACCESS SITE ASSESSMENT 
· NUTRITION SCREENING 
· Vital signs monitoring per assessed patient condition or unit standard · Cardiac monitoring · Hydration management · Intake and output measurement · Body weight monitoring · Skin care · DIALYSIS 
· Nutrition Care Process per nutrition screen · Oral hygiene care every 2 hours · Pain management · Outcome ? [x] Progressing Towards Goal 
? [] Not Progressing Towards Goals ? [] Goals Met/Resolved ? [] Goals Not Met/ Resolved · Patient/ Family Education ? Progressing Towards Goals RO/HEMODIAYLSIS MACHINE SAFETY CHECKS- BEFORE EACH TREATMENT [x] [facility]: Machine Serial #1:  [id number]   RO Serial #[id number] [] [facility]: Machine Serial #2:  [id number]    Serial #[id number] Alarm Test: [x] Pass  Time__1329______ [x] RO/Machine Log Complete    [x] Extracorporeal circuit Tested for integrity Dialyzer_[id number]_________   Tubing___19B12-9_________ Dialysate: pH__7.4_  Temp.__37___Conductivity: Meter __14__ HD Machine__13.8_ CHLORINE TESTING- BEFORE EACH TREATMENT AND EVERY 4 HOURS Total Chlorine: [x] Less than 0.1 ppm Time:_1330____2nd Check Time:__NA____ 
(If greater than 0.1 ppm from Primary then every 30 minutes from Secondary) TREATMENT INIATION-WITH DIALYSIS PRECAUTIONS [x] All Connections Secured   [x] Saline Line Double Clamped    [x] Venous Parameters Set [x] Arterial Parameters Set    [x] Prime Given 250 ml    
[x] Air Foam Detector Engaged PRE-TREATMENT  
UF Calculations: Wt to lose:__500__ml(+) Oral:_0_ml(+)IV Meds/Fluids/Blood prods_0__ml(+) Prime/Rinse__500_ml(=)Total UF Goal__1000__mL Scale Type:[x] Bed scale [] Sling Scale [] Wheel Chair Scale []  Not Ordered [] [] Unable to obtain pt on stretcher/ bed scale malfunctioning Tx Initiation Note: RECEIVED REPORT. PATIENT ALERT AND ORIENTED. VITAL SIGNS WNL. NAD. ALL QUESTIONS ANSWERED WITH PATIENT  SAFETY CHECKS COMPLETE. TIME OUT COMPLETE. TREATMENT INITIATED VIA _RIJ TDC____. NO CONCERNS NOTED. [x] Time Out/Safety Check  Time:__1330___ INTRADIALYTIC MONITORING 
(SEE ATTACHED FLOWSHEET) POST TREATMENT Time Medication Dose Volume Route Initials 1324 ALBUMIN 25G 100 ML TDC PAP [facility] Signatures Title Initials Time Christie Bajwa RN PAP 1710 Dialyzer cleared: [x] Good [] Fair [] Poor Blood Volume Processed _56.6__L Net UF Removed __500__mL Post Tx Access: AVF/AVG: Bleeding Stop Time      Art. NA_min Adolfo.__NA_min []+bruit/thrill Catheter: Locking Solution  [x] Heparin 1 ml/1000 units [] Normal Saline Art.__2.1___ ml Adolfo.__2.2___ml [x] New caps placed Post Assessment:   
          Skin: [x]Warm [x]Dry []Diaphoretic []Flushed [] Pale []Cyanotic Lungs: [x]Clear []Coarse []Crackles []Wheezing Cardiac: []Regular [x]Irregular  []Monitored rhythm_AFIB___ []N/A Edema: []None []General []Facial []Pedal  []UE [x]LE [x]RIGHT [x]LEFT Pain: [x]0 []1 []2 []3 []4 []5 []6 []7 []8 []9 []10  
POST Tx Note:TREATMENT COMPLETED. ALL POSSIBLE BLOOD RETURNED. PT TOLERATED WELL. VITAL SIGNS WNL  FOR PATIENT. NAD NOTED. DIALYSIS CATHETER DE ACCESSED, FLUSHED AND LOCKED. STERILE CAPS APPLIED. REPORT GIVEN WITH OPPORTUNITY TO ADDRESS ANY CONCERNS OR QUESTTIONS AND PATIENT STAYS ROOM IN BED WITHOUT DISTRESS OR ACUTE DISCOMFORT. BED LOWED, CALL BELL WITHIN REACH . Primary Nurse Report: First initial/Last name/Title Post Dialysis:___Ross, Annis Lundborg E RN________         Time:_____1700___________ Abbreviations: AVG-arterial venous graft, AVF-arterial venous fistula, IJ-Internal Jugular, Subcl-Subclavian, Fem-Femoral, Tx-treatment, AP/HR-apical heart rate, DFR-dialysate flow rate, BFR-blood flow rate, AP-arterial pressure, -venous pressure, UF-ultrafiltrate, TMP-transmembrane pressure, Adolfo-Venous, Art-Arterial, RO-Reverse Osmosis

## 2019-11-17 NOTE — PROGRESS NOTES
Pt refused PT due to: 
? Nausea/vomiting 
? Eating 
? Pain ? Pt lethargic \"I'm too tired. Some earlier tomorrow. \" 
? Off Unit Will f/u tomorrow. Thank you.  
Luis Antonio Ponce, PTA

## 2019-11-17 NOTE — PROGRESS NOTES
In patient nephrology progress note Admit Date:    2019 Name:  Sakshi Spencer Age :  80 y.o. Subjective:  
Kasey Eden is a 80 y.o. WF with a PMHX of CHF, COPD who presents to the emergency department for newly Dx of afib, sent in by PCP Dr. Jamshid Jimenez . Patient was discharged 10/26 for CHF and went home with lasox 40 mg every other day . Patient has chronic kidney disease and follows /[facility]. Patient was last seen Dec 2018. Per records patient has stage 4 chronic kidney disease. Baseline cr was 1.77-1.9. Patient has chronic diastolic heart failure prone to biventricular decompensation, mitral regurgitation, pulmonary hypertension. Patient was admitted yesterday. Cardiology seen and placed on Heparin gtt. Patient has SOB with minimal exertion such as eating food . She reports follow low salt diet and denied use  NSAID exposure. Admission CXR showed central vascular congestion, increased in the interval from comparison exam. There are small bilateral pleural effusions present but no pneumothorax or pneumonic opacity. 
 
-Patient denies CP/SOB/N/V this AM. S/p first HD 11/15 and again yesterday. Feeling a little stronger and appetite picking up Impression:  
-REZA on CKD, likely ATN, Cr trending up 6.2 today and UOP decreasing, initiated on HD 11/15.  
-CKD stage 3/4 w/ baseline Cr of 1.7-2.1. 
-Hyponatremia consistent with total body fluid retention /CHF. 
-Rt sided HF with Pulm HTN  
-Anemia of chronic disease, + Fe def 
-New onset atrial fibrillation RVR, Rate controlled, on heparin/coumadione and amiodarone gtt Recommendations:  
- No HD today, assess need in AM 
- Epo w/ HD 
-IV Venofer - Avoid ACEI/ARBs, NSAIDs or IV contrast, and other nephrotoxic meds 
- Continue Fluid restriction  
- Continue to monitor UOP and Serum chemistry daily - IV Venofer Objective:  
Temp (24hrs), Av.1 °F (36.7 °C), Min:97 °F (36.1 °C), Max:99.4 °F (37.4 °C) -124 /57 Intake/Output Summary (Last 24 hours) at 11/17/2019 0915 Last data filed at 11/16/2019 1803 Gross per 24 hour Intake 200 ml Output 500 ml Net -300 ml Last 3 Recorded Weights in this Encounter 11/15/19 5142 11/16/19 0404 11/17/19 0327 Weight: 70.9 kg (156 lb 4.9 oz) 70.8 kg (156 lb) 68.8 kg (151 lb 9.6 oz) Physical Exam:  
General Appearance: NAD Head: atraumatic HEENT: Sclera clear. Neck: Supple, no JVD or mass Chest: CTA jere Heart: irregular rate Abdomen: obese non tender , BS active Skin: intact, no rash Extremity:  No pedal edema Neuro: No focal deficit, AAOx4 
  
 Data Review: BMP Lab Results Component Value Date/Time Sodium 138 11/17/2019 04:35 AM  
 Potassium 4.0 11/17/2019 04:35 AM  
 Chloride 98 (L) 11/17/2019 04:35 AM  
 CO2 33 (H) 11/17/2019 04:35 AM  
 Anion gap 7 11/17/2019 04:35 AM  
 Glucose 137 (H) 11/17/2019 04:35 AM  
 BUN 27 (H) 11/17/2019 04:35 AM  
 Creatinine 2.99 (H) 11/17/2019 04:35 AM  
 BUN/Creatinine ratio 9 (L) 11/17/2019 04:35 AM  
 GFR est AA 18 (L) 11/17/2019 04:35 AM  
 GFR est non-AA 15 (L) 11/17/2019 04:35 AM  
 Calcium 8.2 (L) 11/17/2019 04:35 AM  
 
 
CBC Lab Results Component Value Date/Time WBC 8.0 11/17/2019 04:35 AM  
 HGB 8.2 (L) 11/17/2019 04:35 AM  
 HCT 26.7 (L) 11/17/2019 04:35 AM  
 PLATELET 972 31/15/6909 04:35 AM  
 MCV 95.7 11/17/2019 04:35 AM  
 
 
No components found for: PTHINT Lab Results Component Value Date IRON 11 (L) 11/16/2019 Michaela Roy MD 
[facility] Office [phone]

## 2019-11-17 NOTE — PROGRESS NOTES
1900 Verbal bedside received from [facility] off going nurse. Assumed patient care, bed in low position, call light within reach, white board updated. 2000 Patient assessment completed. Patient lethargic, AO x4, family at bedside. 2300 patient hr to lower 100s not sustaining.  
 
0400 Patient HR sustaining in 110 - 115, BP 96/55. Paged Dr. Dean Hogan and order received. Order received for albumin 25 mg once and Cardizem 5 mg/hr. Albumin administered. Cardizem to be administered after the albumin. 1018 Lab called with a critical result for INR 5.4. Paged Dr. Dean Hogan no call back. 0936 Paged Dr. Dean Hogan again, no order received.  
 
7202 Albumin administration complete. BP 92/59. Paged Dr. Dean Hogan order received to start Cardizem at 2.5mg/hr. 0700 Talked to Dr. Leif Juan. Order received to give digoxin 125 mcg IV and chest xray stat 
 
0720 Bedside and Verbal shift change report given to Calvin Peng RN (oncoming nurse) by Leana Brock (offgoing nurse). Report included the following information SBAR, Kardex and MAR.

## 2019-11-17 NOTE — PROGRESS NOTES
Hospitalist Progress Note Patient: Jitendra Garcia MRN: [id number]  CSN: [id number] YOB: 1935  Age: 80 y.o. Sex: female DOA: 11/8/2019 LOS:  LOS: 9 days Chief Complaint: 
 
ARF Assessment/Plan 81 yo female admitted for afib with RVR, progressive renal failure, diastolic CHF acute on chronic with persistent hyponatremia Started on dialysis late last week, Friday, second session, 11/16, and coumadin on hold due to elevated INR Early am she had afib with RVR again, and cardizem started IV 
 
afib with RVR Progressive acute renal failure to now starting dialysis-first session 11/15 Coagulopathy from coumadin-INR meets criteria to monitor and stay off warfarin as no bleeding noted Anemia of CKD CKD stage 4 POA No UTI-contaminants on cx Chronic diastolic CHF Hyponatremia, now improved after dialysis I spoke to family, confirmed DNR by her signed paperwork and their understanding We discussed this is unlikely to get better for Ms Isaiah Stratton and her poor appetite and weakness are likely to worsen-they have a low threshold for moving to comfort care for Ms Willow Bardales is a little sleepy this am so did not participate much in our conversation-daughter and sister present, DNR placed-all questions answered, they understand the prognosis is poor I think hospice would be best option for Ms Gerhardt Garrison will readdress tomorrow if she is not much better Disposition : 
Patient Active Problem List  
Diagnosis Code  Respiratory failure (HCC) J96.90  
 Heart failure (HCC) I50.9  Benign essential hypertension I10  Chronic diastolic congestive heart failure (HCC) I50.32  
 Cobalamin deficiency E53.8  Hypercholesterolemia E78.00  Hypertensive heart disease I11.9  Hypothyroidism E03.9  Iron deficiency anemia D50.9  Stage 4 chronic kidney disease ([facility] [address] N18.4  A-fib (HCC) I48.91  
 UTI (urinary tract infection) N39.0  Hyponatremia E87.1 Subjective: 
 
I am weak Ate few small bites this am 
No vomiting 
afib with RVR earlier this am 
Mouth sores present still Review of systems: 
 
Constitutional: denies fevers, chills Respiratory: denies SOB Cardiovascular: denies chest pain, palpitations Gastrointestinal: denies nausea Vital signs/Intake and Output: 
Visit Vitals /49 (BP 1 Location: Left arm, BP Patient Position: At rest;Supine) Pulse 68 Temp 97.4 °F (36.3 °C) Resp 14 Ht 4' 11\" (1.499 m) Wt 68.8 kg (151 lb 9.6 oz) SpO2 100% BMI 30.62 kg/m² Current Shift:  No intake/output data recorded. Last three shifts:  11/15 1901 - 11/17 0700 In: 200 [P.O.:200] Out: 650 [Urine:150] Exam: 
 
General: frail weakened pale WF, elderly, NAD Head/Neck: NCAT, supple, No masses, No lymphadenopathy CVS:irreg rhythm, reg rate, no M/R/G, S1/S2 heard, no thrill Lungs:Clear to auscultation bilaterally, no wheezes, rhonchi, or rales Abdomen: Soft, Nontender, No distention, Normal Bowel sounds, No hepatomegaly Extremities: No C/C/E, pulses palpable 2+ Neuro:grossly normal , follows commands Psych:appropriate Labs: Results:  
   
Chemistry Recent Labs 11/17/19 
0435 11/16/19 
0157 11/15/19 
0425 * 93 103*  135* 124* K 4.0 3.9 4.7 CL 98* 96* 87* CO2 33* 26 23 BUN 27* 46* 101* CREA 2.99* 3.89* 6.27* CA 8.2* 8.0* 8.3* AGAP 7 13 14 BUCR 9* 12 16 CBC w/Diff Recent Labs 11/17/19 
0435 11/16/19 
0157 11/15/19 
0425 WBC 8.0 10.1 7.9  
RBC 2.79* 2.83* 3.05* HGB 8.2* 8.4* 9.2* HCT 26.7* 26.1* 28.1*  
 165 208 Cardiac Enzymes No results for input(s): CPK, CKND1, CUONG in the last 72 hours. No lab exists for component: Reshma John Coagulation Recent Labs 11/17/19 
0435 11/16/19 
0905 11/15/19 
1330 11/15/19 
0425 PTP 49.3* 48.7*  --  22.5* INR 5.4* 5.4*  --  2.0* APTT  --   --  150.2* 121.8*  
   
 Lipid Panel No results found for: CHOL, CHOLPOCT, CHOLX, CHLST, CHOLV, 344083, HDL, HDLP, LDL, LDLC, DLDLP, 277712, VLDLC, VLDL, TGLX, TRIGL, TRIGP, TGLPOCT, CHHD, CHHDX  
BNP No results for input(s): BNPP in the last 72 hours. Liver Enzymes No results for input(s): TP, ALB, TBIL, AP, SGOT, GPT in the last 72 hours. No lab exists for component: DBIL Thyroid Studies Lab Results Component Value Date/Time TSH 2.53 11/08/2019 01:25 PM  
    
Procedures/imaging: see electronic medical records for all procedures/Xrays and details which were not copied into this note but were reviewed prior to creation of Plan Manuel Dubois MD

## 2019-11-17 NOTE — PROGRESS NOTES
Cardiology Progress Note Patient: Lukasz Escamilla        Sex: female          DOA: 11/8/2019 YOB: 1935      Age:  80 y.o.        LOS:  LOS: 8 days Patient seen and examined, chart reviewed. Assessment/Plan Patient Active Problem List  
Diagnosis Code  Respiratory failure (HCC) J96.90  
 Heart failure (HCC) I50.9  Benign essential hypertension I10  Chronic diastolic congestive heart failure (HCC) I50.32  
 Cobalamin deficiency E53.8  Hypercholesterolemia E78.00  Hypertensive heart disease I11.9  Hypothyroidism E03.9  Iron deficiency anemia D50.9  Stage 4 chronic kidney disease ([facility] [address] N18.4  A-fib (HCC) I48.91  
 UTI (urinary tract infection) N39.0  Hyponatremia E87.1 Atrial fibrillation with controlled ventricular response Coagulopathy Acute on chronic renal failure on hemodialysis. Plan: 
 
Continue Metoprolol and amiodarone. Coumadin is on Hold due to high INR Titrate beta blocker as per heart rate response. Continue management as per hospital medicine. Subjective:  
 cc: I feel tiered. Denies any chest pain or shortness of breath REVIEW OF SYSTEMS:  
 
General: No fevers or chills. Cardiovascular: No chest pain,No palpitations, No orthopnea, No PND, No leg swelling, No claudication Pulmonary: No dyspnea. Gastrointestinal: No nausea, vomiting, bleeding Neurology: No Dizziness Objective:  
  
Visit Vitals /51 Pulse 93 Temp 97.4 °F (36.3 °C) Resp 15 Ht 4' 11\" (1.499 m) Wt 70.8 kg (156 lb) SpO2 100% BMI 31.51 kg/m² Body mass index is 31.51 kg/m². Physical Exam: 
General Appearance: Comfortable, not using accessory muscles of respiration. HEENT: XANDER. HEAD: Atraumatic NECK: No JVD, no thyroidomeglay. CAROTIDS: No bruit LUNGS: Clear bilaterally. HEART: S1+S2 audible, irregularly irregular,  no murmur, no pericardial rub. ABD: Non-tender, BS Audible EXT: No edema, and no cyanosis. VASCULAR EXAM: Pulses are intact. PSYCHIATRIC EXAM: Mood is appropriate. MUSCULOSKELETAL: Grossly no joint deformity. NEUROLOGICAL: AAO times 3, No motor and sensory deficit Medication: 
Current Facility-Administered Medications Medication Dose Route Frequency  [START ON 11/19/2019] epoetin jonathan-epbx (RETACRIT) injection 10,000 Units  10,000 Units SubCUTAneous DIALYSIS TUE, THU & SAT  magic mouthwash (FIRST-MOUTHWASH BLM) oral suspension 5 mL  5 mL Oral AC&HS  
 albumin human 25% (BUMINATE) solution 25 g  25 g IntraVENous DIALYSIS ONCE  
 heparin (porcine) 1,000 unit/mL injection 4,300 Units  4,300 Units InterCATHeter DIALYSIS PRN  
 albuterol-ipratropium (DUO-NEB) 2.5 MG-0.5 MG/3 ML  3 mL Nebulization Q4H PRN  
 guaiFENesin-dextromethorphan (ROBITUSSIN DM) 100-10 mg/5 mL syrup 5 mL  5 mL Oral Q6H PRN  
 nystatin (MYCOSTATIN) 100,000 unit/mL oral suspension 500,000 Units  500,000 Units Oral QID  [START ON 11/26/2019] amiodarone (CORDARONE) tablet 200 mg  200 mg Oral DAILY  polyethylene glycol (MIRALAX) packet 17 g  17 g Oral DAILY  amiodarone (CORDARONE) tablet 400 mg  400 mg Oral DAILY  ondansetron (ZOFRAN) injection 4 mg  4 mg IntraVENous Q6H PRN  
 metoprolol tartrate (LOPRESSOR) tablet 12.5 mg  12.5 mg Oral Q12H  
 zinc sulfate (ZINCATE) capsule 1 Cap  1 Cap Oral DAILY  aspirin delayed-release tablet 81 mg  81 mg Oral DAILY  atorvastatin (LIPITOR) tablet 40 mg  40 mg Oral DAILY  ergocalciferol capsule 50,000 Units  50,000 Units Oral Q7D  
 PARoxetine (PAXIL) tablet 20 mg  20 mg Oral DAILY  sodium chloride (NS) flush 5-40 mL  5-40 mL IntraVENous Q8H  
 sodium chloride (NS) flush 5-40 mL  5-40 mL IntraVENous PRN  
 acetaminophen (TYLENOL) tablet 650 mg  650 mg Oral Q4H PRN  
 HYDROcodone-acetaminophen (NORCO) 5-325 mg per tablet 1 Tab  1 Tab Oral Q4H PRN  
 morphine injection 2 mg  2 mg IntraVENous Q4H PRN  
  naloxone (NARCAN) injection 0.4 mg  0.4 mg IntraVENous PRN Lab/Data Reviewed: 
 
  
Recent Labs 11/16/19 
0157 11/15/19 
0425 11/14/19 
0423 WBC 10.1 7.9 8.2 HGB 8.4* 9.2* 9.1*  
HCT 26.1* 28.1* 27.5*  
 208 202 Recent Labs 11/16/19 
0157 11/15/19 
0425 11/14/19 
0423 * 124* 124* K 3.9 4.7 4.5  
CL 96* 87* 86* CO2 26 23 23 GLU 93 103* 113* BUN 46* 101* 96* CREA 3.89* 6.27* 5.32* CA 8.0* 8.3* 8.3* Signed By: Fara Brooke MD   
 November 16, 2019

## 2019-11-17 NOTE — PROGRESS NOTES
Cardiology Progress Note Patient: Sakshi Spencer        Sex: female          DOA: 11/8/2019 YOB: 1935      Age:  80 y.o.        LOS:  LOS: 9 days Patient seen and examined, chart reviewed. Assessment/Plan Patient Active Problem List  
Diagnosis Code  Respiratory failure (HCC) J96.90  
 Heart failure (HCC) I50.9  Benign essential hypertension I10  Chronic diastolic congestive heart failure (HCC) I50.32  
 Cobalamin deficiency E53.8  Hypercholesterolemia E78.00  Hypertensive heart disease I11.9  Hypothyroidism E03.9  Iron deficiency anemia D50.9  Stage 4 chronic kidney disease ([facility] [address] N18.4  A-fib (HCC) I48.91  
 UTI (urinary tract infection) N39.0  Hyponatremia E87.1 Atrial fibrillation with rapid ventricular response started on Cardizem drip Coagulopathy Acute on chronic renal failure on hemodialysis. Plan: 
 
Continue amiodarone. Increase Metoprolol tartrate to 25 mg twice a day. Coumadin is on Hold due to high INR Titrate beta blocker as per heart rate response. Titrate and discontinue Cardizem drip Continue management as per hospital medicine. Plan discussed with patient and family members. Subjective:  
 cc: I feel tiered. Denies any chest pain or shortness of breath REVIEW OF SYSTEMS:  
 
General: No fevers or chills. Cardiovascular: No chest pain,No palpitations, No orthopnea, No PND, No leg swelling, No claudication Pulmonary: No dyspnea. Gastrointestinal: No nausea, vomiting, bleeding Neurology: No Dizziness Objective:  
  
Visit Vitals BP (!) 88/44 Pulse 89 Temp 97.1 °F (36.2 °C) Resp 15 Ht 4' 11\" (1.499 m) Wt 68.8 kg (151 lb 9.6 oz) SpO2 100% BMI 30.62 kg/m² Body mass index is 30.62 kg/m². Physical Exam: 
General Appearance: Comfortable, not using accessory muscles of respiration. HEENT: XANDER. HEAD: Atraumatic NECK: No JVD, no thyroidomeglay. CAROTIDS: No bruit LUNGS: Clear bilaterally. HEART: S1+S2 audible, irregularly irregular,  no murmur, no pericardial rub. ABD: Non-tender, BS Audible EXT: No edema, and no cyanosis. VASCULAR EXAM: Pulses are intact. PSYCHIATRIC EXAM: Mood is appropriate. MUSCULOSKELETAL: Grossly no joint deformity. NEUROLOGICAL: AAO times 3, No motor and sensory deficit Medication: 
Current Facility-Administered Medications Medication Dose Route Frequency  dilTIAZem (CARDIZEM) 125 mg in dextrose 5% 125 mL infusion  2.5 mg/hr IntraVENous CONTINUOUS  
 iron sucrose (VENOFER) injection 200 mg  200 mg IntraVENous Q24H  
 metoprolol tartrate (LOPRESSOR) tablet 25 mg  25 mg Oral Q12H  
 [START ON 11/19/2019] epoetin jonathan-epbx (RETACRIT) injection 10,000 Units  10,000 Units SubCUTAneous DIALYSIS TUE, THU & SAT  magic mouthwash (FIRST-MOUTHWASH BLM) oral suspension 5 mL  5 mL Oral AC&HS  
 albumin human 25% (BUMINATE) solution 25 g  25 g IntraVENous DIALYSIS ONCE  
 heparin (porcine) 1,000 unit/mL injection 4,300 Units  4,300 Units InterCATHeter DIALYSIS PRN  
 albuterol-ipratropium (DUO-NEB) 2.5 MG-0.5 MG/3 ML  3 mL Nebulization Q4H PRN  
 guaiFENesin-dextromethorphan (ROBITUSSIN DM) 100-10 mg/5 mL syrup 5 mL  5 mL Oral Q6H PRN  
 nystatin (MYCOSTATIN) 100,000 unit/mL oral suspension 500,000 Units  500,000 Units Oral QID  [START ON 11/26/2019] amiodarone (CORDARONE) tablet 200 mg  200 mg Oral DAILY  polyethylene glycol (MIRALAX) packet 17 g  17 g Oral DAILY  amiodarone (CORDARONE) tablet 400 mg  400 mg Oral DAILY  ondansetron (ZOFRAN) injection 4 mg  4 mg IntraVENous Q6H PRN  zinc sulfate (ZINCATE) capsule 1 Cap  1 Cap Oral DAILY  aspirin delayed-release tablet 81 mg  81 mg Oral DAILY  atorvastatin (LIPITOR) tablet 40 mg  40 mg Oral DAILY  ergocalciferol capsule 50,000 Units  50,000 Units Oral Q7D  
  PARoxetine (PAXIL) tablet 20 mg  20 mg Oral DAILY  sodium chloride (NS) flush 5-40 mL  5-40 mL IntraVENous Q8H  
 sodium chloride (NS) flush 5-40 mL  5-40 mL IntraVENous PRN  
 acetaminophen (TYLENOL) tablet 650 mg  650 mg Oral Q4H PRN  
 HYDROcodone-acetaminophen (NORCO) 5-325 mg per tablet 1 Tab  1 Tab Oral Q4H PRN  
 morphine injection 2 mg  2 mg IntraVENous Q4H PRN  
 naloxone (NARCAN) injection 0.4 mg  0.4 mg IntraVENous PRN Lab/Data Reviewed: 
 
  
Recent Labs 11/17/19 
0435 11/16/19 
0157 11/15/19 
0425 WBC 8.0 10.1 7.9 HGB 8.2* 8.4* 9.2* HCT 26.7* 26.1* 28.1*  
 165 208 Recent Labs 11/17/19 
0435 11/16/19 
0157 11/15/19 
0425  135* 124* K 4.0 3.9 4.7 CL 98* 96* 87* CO2 33* 26 23 * 93 103* BUN 27* 46* 101* CREA 2.99* 3.89* 6.27* CA 8.2* 8.0* 8.3* Signed By: Walker Bernal MD   
 November 17, 2019

## 2019-11-18 NOTE — PROGRESS NOTES
0720 Received bedside verbal report from Eugene Ibarra RN. Pt in bed resting. Call light in reach. Pt changed code status to DNR. 1934 Bedside and Verbal shift change report given to JOJO Bronson RN (oncoming nurse) by Nima lockwood. Amari Rm RN (offgoing nurse). Report included the following information SBAR, Kardex, Intake/Output and MAR. Call bell in reach.

## 2019-11-18 NOTE — PROGRESS NOTES
NUTRITION FOLLOW-UP 
 
RECOMMENDATIONS/PLAN:  
- Other: recc appetite supplement as pt has decrease appetite >1 week and is not meeting estimated needs - Monitor labs/lytes, BM, PO intake, skin integrity, wt, fluid status NUTRITION ASSESSMENT:  
Client Update: 80 yrs old Female with new onset a-fib w/ RVR, REZA, UTI, CKD stage 4, COPD, CHF. Patient likes the ensure puddings however appetite is very low. Ate a few bites of applesauce today. FOOD/NUTRITION INTAKE Diet Order: Renal  
Supplements: Ensure pudding Food Allergies: NKFA/ Average PO Intake:     
Patient Vitals for the past 100 hrs: 
 % Diet Eaten 11/17/19 1055 10 % 11/16/19 1803 15 % 11/15/19 1745 50 % 11/15/19 1600 50 % 11/15/19 0747 75 % Pertinent Medications:  [x] Reviewed; vit D2, lasix, coreg Electrolyte Replacement Protocol: []K []Mg []PO4 Insulin:  []SSI  []Pre-meal   []Basal    []Drip  [x]None Cultural/Mandaeism Food Preferences: None Identified BIOCHEMICAL DATA & MEDICAL TESTS Pertinent Labs:  [x] Reviewed; BUN-42, calcium-8.2, GFR est non-AA-10, GFR est AA-12  ANTHROPOMETRICS Height: 4' 11\" (149.9 cm)       Weight: 68.8 kg (151 lb 9.6 oz) BMI: 30.6 kg/m^2 obese (30%-39.9% BMI) Adm Weight: 154 lbs                Weight change: -3# Adjusted Body Weight: 50.9kg NUTRITION-FOCUSED PHYSICAL ASSESSMENT Skin: no PU  
GI: +BM 11/16 NUTRITION PRESCRIPTION Calories:4800-0200 kcal/day based on 30-35 kcal/kg Protein: 42-56 g/day based on .6-.8 g/kg Fluid: 8766-7156 ml/day based on 1 kcal/ml NUTRITION DIAGNOSES:  
1. Inadequate oral intake related to decrease in appetite as evidenced by pt report. NUTRITION INTERVENTIONS:  
INTERVENTIONS:        GOALS: 
1. Other: recc appetite supplement 1. Encourage PO intake >50%  by next review 4 days LEARNING NEEDS (Diet, Supplementation, Food/Nutrient-Drug Interaction): 
 [] None Identified   [] Education provided/documented      Identified and patient: [] Declined   [x] Was not appropriate/indicated NUTRITION MONITORING /EVALUATION:  
Follow PO intake Monitor wt Monitor renal labs, electrolytes, fluid status Monitor for additional supplement needs Previous Recommendations Implemented: yes Previous Goals Met:  yes - 
   
[x] Participated in Interdisciplinary Rounds   
[x] [address] [place] [address] Reviewed DISCHARGE NUTRITION RECOMMENDATIONS ADDRESSED:  
   [x] To be determined closer to discharge NUTRITION RISK:           [x] At risk                        [] Not currently at risk Will follow-up per policy. Nelson Zavala 9

## 2019-11-18 NOTE — PROGRESS NOTES
0730:Received verbal bedside report from off going nurse AMBER Jordan Patient care received. Patient alert and oriented x 4. Patient resting in bed denies pain. Patient stable. Call light with in reach bed in lowest position.

## 2019-11-18 NOTE — PROGRESS NOTES
Hospitalist Progress Note Patient: Ciara Anderson MRN: [id number]  CSN: [id number] YOB: 1935  Age: 80 y.o. Sex: female DOA: 11/8/2019 LOS:  LOS: 10 days Chief Complaint: 
 
ARF Assessment/Plan 81 yo female admitted for afib with RVR, progressive renal failure, diastolic CHF acute on chronic with persistent hyponatremia Started on dialysis late last week, Friday, second session, 11/16, and coumadin on hold due to elevated INR 
 
 
 
afib with RVR Progressive acute renal failure to now starting dialysis-first session 11/15 Coagulopathy from coumadin-INR meets criteria to monitor and stay off warfarin as no bleeding noted Anemia of CKD CKD stage 4 POA No own dialysis Chronic diastolic CHF Hyponatremia, now improved after dialysis I spoke to family, Reversed DNR as patient and family want to try aggressive management for short time she showed me paper that said no dialysis and DNR But want to reverse for now Patient had clear understanding that she was frail with likely poor out Will obtain palliative consult Disposition : 
Patient Active Problem List  
Diagnosis Code  Respiratory failure ([facility]) J96.90  
 Heart failure ([facility]) I50.9  Benign essential hypertension I10  Chronic diastolic congestive heart failure ([facility]) I50.32  
 Cobalamin deficiency E53.8  Hypercholesterolemia E78.00  Hypertensive heart disease I11.9  Hypothyroidism E03.9  Iron deficiency anemia D50.9  Stage 4 chronic kidney disease ([facility] [address] N18.4  A-fib ([facility]) I48.91  
 UTI (urinary tract infection) N39.0  Hyponatremia E87.1 Subjective: 
 
I am weak Ate few small bites this am 
No vomiting 
afib with RVR earlier this am 
Mouth sores present still Review of systems: 
 
Constitutional: denies fevers, chills Respiratory: denies SOB Cardiovascular: denies chest pain, palpitations Gastrointestinal: denies nausea Vital signs/Intake and Output: 
Visit Vitals BP 96/45 Pulse 97 Temp 97.8 °F (36.6 °C) Resp 16 Ht 4' 11\" (1.499 m) Wt 68.8 kg (151 lb 9.6 oz) SpO2 99% BMI 30.62 kg/m² Current Shift:  No intake/output data recorded. Last three shifts:  11/16 1901 - 11/18 0700 In: 61 [P.O.:60] Out: - Exam: 
 
General: frail weakened pale WF, elderly, NAD Head/Neck: NCAT, supple, No masses, No lymphadenopathy CVS:irreg rhythm, reg rate, no M/R/G, S1/S2 heard, no thrill Lungs:Clear to auscultation bilaterally, no wheezes, rhonchi, or rales Abdomen: Soft, Nontender, No distention, Normal Bowel sounds, No hepatomegaly Extremities: No C/C/E, pulses palpable 2+ Neuro:grossly normal , follows commands Psych:appropriate Labs: Results:  
   
Chemistry Recent Labs 11/18/19 0325 11/17/19 0435 11/16/19 0157 GLU 98 137* 93  138 135* K 4.0 4.0 3.9 CL 97* 98* 96* CO2 32 33* 26 BUN 42* 27* 46* CREA 4.25* 2.99* 3.89* CA 8.2* 8.2* 8.0* AGAP 7 7 13 BUCR 10* 9* 12  
  
CBC w/Diff Recent Labs 11/18/19 0325 11/17/19 0435 11/16/19 0157 WBC 10.1 8.0 10.1 RBC 2.67* 2.79* 2.83* HGB 7.8* 8.2* 8.4* HCT 26.2* 26.7* 26.1*  
* 148 165 Cardiac Enzymes No results for input(s): CPK, CKND1, CUONG in the last 72 hours. No lab exists for component: Johnny Jones Coagulation Recent Labs 11/18/19 
0325 11/17/19 
0435  11/15/19 
1330 PTP 51.9* 49.3*   < >  --   
INR 5.8* 5.4*   < >  --   
APTT  --   --   --  150.2*  
 < > = values in this interval not displayed. Lipid Panel No results found for: CHOL, CHOLPOCT, CHOLX, CHLST, CHOLV, 943561, HDL, HDLP, LDL, LDLC, DLDLP, 101782, VLDLC, VLDL, TGLX, TRIGL, TRIGP, TGLPOCT, CHHD, CHHDX  
BNP No results for input(s): BNPP in the last 72 hours. Liver Enzymes No results for input(s): TP, ALB, TBIL, AP, SGOT, GPT in the last 72 hours. No lab exists for component: DBIL Thyroid Studies Lab Results Component Value Date/Time TSH 2.53 11/08/2019 01:25 PM  
    
Procedures/imaging: see electronic medical records for all procedures/Xrays and details which were not copied into this note but were reviewed prior to creation of Plan Jesus Alberto Milian MD

## 2019-11-18 NOTE — PROGRESS NOTES
Lopressor keeps getting held and HR high. Off cardiazem gtt due to SBP of 80. Will give oral cardiazem to target HR <120 for now.

## 2019-11-18 NOTE — PROGRESS NOTES
Problem: Falls - Risk of 
Goal: *Absence of Falls Description Document True Gonzalezener Fall Risk and appropriate interventions in the flowsheet. Outcome: Progressing Towards Goal 
Note:  
Fall Risk Interventions: 
Mobility Interventions: Communicate number of staff needed for ambulation/transfer, Utilize walker, cane, or other assistive device Medication Interventions: Teach patient to arise slowly, Patient to call before getting OOB Elimination Interventions: Call light in reach History of Falls Interventions: Consult care management for discharge planning, Room close to nurse's station, Utilize gait belt for transfer/ambulation, Assess for delayed presentation/identification of injury for 48 hrs (comment for end date) Problem: Pain Goal: *Control of Pain Outcome: Progressing Towards Goal 
  
Problem: Patient Education: Go to Patient Education Activity Goal: Patient/Family Education Outcome: Progressing Towards Goal 
  
Problem: Gas Exchange - Impaired Goal: *Absence of hypoxia Outcome: Progressing Towards Goal 
  
Problem: Pressure Injury - Risk of 
Goal: *Prevention of pressure injury Description Document Joe Scale and appropriate interventions in the flowsheet. Outcome: Progressing Towards Goal 
Note:  
Pressure Injury Interventions: 
  
 
Moisture Interventions: Absorbent underpads Activity Interventions: Pressure redistribution bed/mattress(bed type), PT/OT evaluation Mobility Interventions: PT/OT evaluation, Pressure redistribution bed/mattress (bed type) Nutrition Interventions: Offer support with meals,snacks and hydration, Document food/fluid/supplement intake Friction and Shear Interventions: Transferring/repositioning devices, Apply protective barrier, creams and emollients Problem: Patient Education: Go to Patient Education Activity Goal: Patient/Family Education Outcome: Progressing Towards Goal 
  
Problem: Nutrition Deficit Goal: *Optimize nutritional status Outcome: Progressing Towards Goal

## 2019-11-18 NOTE — PROGRESS NOTES
Problem: Falls - Risk of 
Goal: *Absence of Falls Description Document Johanny Ochoa Fall Risk and appropriate interventions in the flowsheet. Outcome: Progressing Towards Goal 
Note: No falls during shift. Fall Risk Interventions: 
Mobility Interventions: PT Consult for assist device competence, PT Consult for mobility concerns, Strengthening exercises (ROM-active/passive) Medication Interventions: Patient to call before getting OOB, Teach patient to arise slowly, Utilize gait belt for transfers/ambulation Elimination Interventions: Call light in reach History of Falls Interventions: Bed/chair exit alarm, Utilize gait belt for transfer/ambulation, Assess for delayed presentation/identification of injury for 48 hrs (comment for end date) Problem: Pain Goal: *Control of Pain Outcome: Progressing Towards Goal 
Note: No reports of pain during shift.

## 2019-11-18 NOTE — PROGRESS NOTES
Cardiology Progress Note Patient: Marilyn Salgado        Sex: female          DOA: 11/8/2019 YOB: 1935      Age:  80 y.o.        LOS:  LOS: 10 days Assessment/Plan Active Problems: 
  A-fib ([address] (11/8/2019) UTI (urinary tract infection) (11/10/2019) Hyponatremia (11/15/2019) Plan: 
afib with RVR Diastolic heart failure Renal failure Hypotension 
coagulopathy 
 
 
symptomatically stable and comfortable Continue with amiodarone and cardizem Continue to hold warfarin Discussed with patient and family Subjective:  
 cc: 
afib with RVR Diastolic heart failure Renal failure Hypotension 
coagulopathy REVIEW OF SYSTEMS:  
 
General: No fevers or chills. Cardiovascular: No chest pain or pressure. No palpitations. No ankle swelling Pulmonary: No SOB, orthopnea, PND Gastrointestinal: No nausea, vomiting or diarrhea Objective:  
  
Visit Vitals BP 98/53 (BP 1 Location: Left arm, BP Patient Position: At rest) Pulse 84 Temp 97.8 °F (36.6 °C) Resp 15 Ht 4' 11\" (1.499 m) Wt 68.8 kg (151 lb 9.6 oz) SpO2 99% BMI 30.62 kg/m² Body mass index is 30.62 kg/m². Physical Exam: 
General Appearance: Comfortable, not using accessory muscles of respiration. NECK: No JVD, no thyroidomeglay. LUNGS: Clear bilaterally. HEART: S1 irregular and variable+S2 audible, ABD: Non-tender, BS Audible EXT: No edema, and no cysnosis. VASCULAR EXAM: Pulses are intact. PSYCHIATRIC EXAM: Mood is appropriate. Medication: 
Current Facility-Administered Medications Medication Dose Route Frequency  0.9% sodium chloride infusion 250 mL  250 mL IntraVENous PRN  
 iron sucrose (VENOFER) injection 200 mg  200 mg IntraVENous Q24H  
 dilTIAZem (CARDIZEM) IR tablet 30 mg  30 mg Oral TIDAC  
 [START ON 11/19/2019] epoetin jonathan-epbx (RETACRIT) injection 10,000 Units  10,000 Units SubCUTAneous DIALYSIS TUE, THU & SAT  
  magic mouthwash (FIRST-MOUTHWASH BLM) oral suspension 5 mL  5 mL Oral AC&HS  
 albumin human 25% (BUMINATE) solution 25 g  25 g IntraVENous DIALYSIS ONCE  
 heparin (porcine) 1,000 unit/mL injection 4,300 Units  4,300 Units InterCATHeter DIALYSIS PRN  
 albuterol-ipratropium (DUO-NEB) 2.5 MG-0.5 MG/3 ML  3 mL Nebulization Q4H PRN  
 guaiFENesin-dextromethorphan (ROBITUSSIN DM) 100-10 mg/5 mL syrup 5 mL  5 mL Oral Q6H PRN  
 nystatin (MYCOSTATIN) 100,000 unit/mL oral suspension 500,000 Units  500,000 Units Oral QID  [START ON 11/26/2019] amiodarone (CORDARONE) tablet 200 mg  200 mg Oral DAILY  polyethylene glycol (MIRALAX) packet 17 g  17 g Oral DAILY  amiodarone (CORDARONE) tablet 400 mg  400 mg Oral DAILY  ondansetron (ZOFRAN) injection 4 mg  4 mg IntraVENous Q6H PRN  zinc sulfate (ZINCATE) capsule 1 Cap  1 Cap Oral DAILY  aspirin delayed-release tablet 81 mg  81 mg Oral DAILY  atorvastatin (LIPITOR) tablet 40 mg  40 mg Oral DAILY  ergocalciferol capsule 50,000 Units  50,000 Units Oral Q7D  
 PARoxetine (PAXIL) tablet 20 mg  20 mg Oral DAILY  sodium chloride (NS) flush 5-40 mL  5-40 mL IntraVENous Q8H  
 sodium chloride (NS) flush 5-40 mL  5-40 mL IntraVENous PRN  
 acetaminophen (TYLENOL) tablet 650 mg  650 mg Oral Q4H PRN  
 HYDROcodone-acetaminophen (NORCO) 5-325 mg per tablet 1 Tab  1 Tab Oral Q4H PRN  
 morphine injection 2 mg  2 mg IntraVENous Q4H PRN  
 naloxone (NARCAN) injection 0.4 mg  0.4 mg IntraVENous PRN Lab/Data Reviewed: 
Procedures/imaging: see electronic medical records for all procedures/Xrays  
and details which were not copied into this note but were reviewed prior to creation of Plan 
  
 
All lab results for the last 24 hours reviewed. Recent Labs 11/18/19 
0325 11/17/19 
0435 11/16/19 
0157 WBC 10.1 8.0 10.1 HGB 7.8* 8.2* 8.4* HCT 26.2* 26.7* 26.1*  
* 148 165 Recent Labs 11/18/19 
0325 11/17/19 3163 11/16/19 
0157  138 135* K 4.0 4.0 3.9 CL 97* 98* 96* CO2 32 33* 26  
GLU 98 137* 93 BUN 42* 27* 46* CREA 4.25* 2.99* 3.89* CA 8.2* 8.2* 8.0* Signed By: Donna Leal MD   
 November 18, 2019

## 2019-11-18 NOTE — PROGRESS NOTES
Verbal bedside received from [facility] off going nurse. Assumed patient care, bed in low position, call light within reach, white board updated. 8094 Patient systolic BP 20/66- manually and automatic. Bladder scan 96 mL, patient AO x 4. No complains of SOB or light headed. Lethargic. Paged Dr. Roma Martinez order received to administer Albumin 25 mg once and D/C Cardizem. 2300 BP post albumin administration 90/40. Talked to Dr. Roma Martinez and order received for PO cardizem and monitor BP 2 hours after admin.  
 
0122 BP 88/43. Paged and updated Dr. Roma Martinez. No orders received. Patient denies lightheaded or dizziness. Damaris Nursing supervisor concerned about patient BP 82/41 and MEWs of 3. Updated her of interventions and conversations with MD. Wanted cardiologist to be contacted. 0400 Paged Dr. Nelson Lazaro about patient BP. No orders received. Patient BP rechecked 102/43.  
 
0423 Lab called with a critical result for INR 5.8. Paged Dr. Roma Martinez and order received for Vitamin K 10 mg Sub q. Med administered. Bedside and Verbal shift change report given to Pepper Nguyen RN (oncoming nurse) by Miranda Bernal (offgoing nurse). Report included the following information SBAR, Kardex and MAR.

## 2019-11-18 NOTE — PROGRESS NOTES
November 18, 2019 [address] [facility],[address] [facility] 
 
 
Dear Patient: The [facility] [place] [address] (Medicaid) requires that any individual seeking admission to a nursing facility, assisted living facility, or a home- and community-based waiver be evaluated to determine whether the individual requires that level of services, if the individual is financially Medicaid eligible, or expects to become Medicaid eligible within 180 days of the beginning date of services. If the individual is Medicaid eligible at the time of application or expects to become Medicaid eligible within 180 days of the beginning date of services, the screening must be completed. Medicaid contracts with several different agencies to perform pre-admission screening using the level-of-care criteria, assessment tool, and procedures established by Medicaid. The Pre-Admission Screening Team, in accordance with Medicaid policy and procedures, has determined that you do meet the criteria requirements for Medicaid-funded long-term care. This determination is based on the screening teams assessment of your functioning abilities, medical needs, and overall risk of needing institutional care. If you have been denied services, you may appeal this decision by writing to the Recipient Appeals Unit, [address] [place] [address], [address], [address], [facility], [address] [place] [address], of your desire to appeal within thirty (30) days of receipt of this decision letter. You have the right to represent yourself or use , a relative, a friend, or other spokesperson in the appeal. If you choose to appeal, please include a copy of the letter with your appeal request.  
 
Sincerely,  
 
Jenn Holman, MSN, RN 
[address]

## 2019-11-18 NOTE — PROGRESS NOTES
Problem: Mobility Impaired (Adult and Pediatric) Goal: *Acute Goals and Plan of Care (Insert Text) Description Physical Therapy Goals Initiated 19 to be met in 7 days ST. Sit to stand and stand to sit SBA/RW to improve functional I. 
2. Standing balance Good with RW to improve balance for ADLs. 3. Gait: Ambulate 75ft S/RW, O2 saturation > 90% on RA, for improved mobility in environment. 4. Patient Education: Independent with HEP and energy conservation techniques for improved endurance for home/facility discharge. 2019  PT treatment not completed due to: ? Off Unit for testing/procedure ? Pain ? Eating ? Patient too lethargic 
? Nausea/vomiting 
? Dialysis treatment in progress  
? Other: Pt agreeable to participate with PT session. O2 sat 100% on 4Lnc, HR 65. However pt noted to be soiled once linen pulled back. CNA's notified, then in to perform hygiene care. Will f/u at later time as pt schedule allows. Thank you.   
Justo Bardales, PT

## 2019-11-18 NOTE — PROGRESS NOTES
In patient nephrology progress note Admit Date:    2019 Name:  Rayna Edmond Age :  80 y.o. Subjective:  
Kasey Eden is a 80 y.o. WF with a PMHX of CHF, COPD who presents to the emergency department for newly Dx of afib, sent in by PCP Dr. Samm Em . Patient was discharged 10/26 for CHF and went home with lasox 40 mg every other day . Patient has chronic kidney disease and follows /[facility]. Patient was last seen Dec 2018. Per records patient has stage 4 chronic kidney disease. Baseline cr was 1.77-1.9. Patient has chronic diastolic heart failure prone to biventricular decompensation, mitral regurgitation, pulmonary hypertension. Patient was admitted yesterday. Cardiology seen and placed on Heparin gtt. Patient has SOB with minimal exertion such as eating food . She reports follow low salt diet and denied use  NSAID exposure. Admission CXR showed central vascular congestion, increased in the interval from comparison exam. There are small bilateral pleural effusions present but no pneumothorax or pneumonic opacity. 
 
-Patient denies CP/SOB/N/V this AM. S/p first HD 11/15 and again Saturday. Feeling a little stronger and appetite picking up Has been hypotensive overnight, BP better but still low Impression:  
-REZA on CKD, likely ATN, Cr trending up 6.2 today and UOP decreasing, initiated on HD 11/15.  
-CKD stage 3/4 w/ baseline Cr of 1.7-2.1. 
-Hyponatremia consistent with total body fluid retention /CHF. 
-Rt sided HF with Pulm HTN  
-Anemia of chronic disease, + Fe def 
-New onset atrial fibrillation RVR, Rate controlled, on heparin/coumadione and amiodarone gtt Recommendations:  
- HD today - Epo w/ HD 
-IV Venofer 
- transfuse - Avoid ACEI/ARBs, NSAIDs or IV contrast, and other nephrotoxic meds 
- Continue to monitor UOP and Serum chemistry daily 
- initiate outpt placement Objective:  
Temp (24hrs), Av.8 °F (36.6 °C), Min:97.1 °F (36.2 °C), Max:98.3 °F (36.8 °C) -124 /57 Intake/Output Summary (Last 24 hours) at 11/18/2019 1445 Last data filed at 11/17/2019 1055 Gross per 24 hour Intake 60 ml Output  Net 60 ml Last 3 Recorded Weights in this Encounter 11/15/19 5655 11/16/19 0404 11/17/19 0327 Weight: 70.9 kg (156 lb 4.9 oz) 70.8 kg (156 lb) 68.8 kg (151 lb 9.6 oz) Physical Exam:  
General Appearance: NAD Head: atraumatic HEENT: Sclera clear. Neck: Supple, no JVD or mass Chest: CTA jere Heart: irregular rate Abdomen: obese non tender , BS active Skin: intact, no rash Extremity:  No pedal edema Neuro: No focal deficit, AAOx4 
  
 Data Review: BMP Lab Results Component Value Date/Time Sodium 136 11/18/2019 03:25 AM  
 Potassium 4.0 11/18/2019 03:25 AM  
 Chloride 97 (L) 11/18/2019 03:25 AM  
 CO2 32 11/18/2019 03:25 AM  
 Anion gap 7 11/18/2019 03:25 AM  
 Glucose 98 11/18/2019 03:25 AM  
 BUN 42 (H) 11/18/2019 03:25 AM  
 Creatinine 4.25 (H) 11/18/2019 03:25 AM  
 BUN/Creatinine ratio 10 (L) 11/18/2019 03:25 AM  
 GFR est AA 12 (L) 11/18/2019 03:25 AM  
 GFR est non-AA 10 (L) 11/18/2019 03:25 AM  
 Calcium 8.2 (L) 11/18/2019 03:25 AM  
 
 
CBC Lab Results Component Value Date/Time WBC 10.1 11/18/2019 03:25 AM  
 HGB 7.8 (L) 11/18/2019 03:25 AM  
 HCT 26.2 (L) 11/18/2019 03:25 AM  
 PLATELET 491 (L) 26/12/1804 03:25 AM  
 MCV 98.1 (H) 11/18/2019 03:25 AM  
 
 
No components found for: PTHINT Lab Results Component Value Date IRON 11 (L) 11/16/2019 Edie Verma MD 
[facility] Office [phone]

## 2019-11-18 NOTE — PROGRESS NOTES
Problem: Falls - Risk of 
Goal: *Absence of Falls Description Document Osito Crow Fall Risk and appropriate interventions in the flowsheet. Outcome: Progressing Towards Goal 
Note:  
Fall Risk Interventions: 
Mobility Interventions: Patient to call before getting OOB, PT Consult for mobility concerns, PT Consult for assist device competence Medication Interventions: Patient to call before getting OOB, Teach patient to arise slowly Elimination Interventions: Call light in reach, Patient to call for help with toileting needs History of Falls Interventions: Investigate reason for fall, Room close to nurse's station Problem: Pain Goal: *Control of Pain Outcome: Progressing Towards Goal 
Goal: *PALLIATIVE CARE:  Alleviation of Pain Outcome: Progressing Towards Goal 
  
Problem: Patient Education: Go to Patient Education Activity Goal: Patient/Family Education Outcome: Progressing Towards Goal 
  
Problem: Pressure Injury - Risk of 
Goal: *Prevention of pressure injury Description Document Joe Scale and appropriate interventions in the flowsheet. Outcome: Progressing Towards Goal 
Note:  
Pressure Injury Interventions: 
  
 
Moisture Interventions: Apply protective barrier, creams and emollients, Absorbent underpads, Minimize layers Activity Interventions: Pressure redistribution bed/mattress(bed type), PT/OT evaluation Mobility Interventions: Pressure redistribution bed/mattress (bed type), PT/OT evaluation Nutrition Interventions: Document food/fluid/supplement intake Friction and Shear Interventions: Apply protective barrier, creams and emollients, Minimize layers Problem: Patient Education: Go to Patient Education Activity Goal: Patient/Family Education Outcome: Progressing Towards Goal

## 2019-11-18 NOTE — PROGRESS NOTES
Problem: Mobility Impaired (Adult and Pediatric) Goal: *Acute Goals and Plan of Care (Insert Text) Description Physical Therapy Goals Initiated 19 to be met in 7 days ST. Sit to stand and stand to sit SBA/RW to improve functional I. 
2. Standing balance Good with RW to improve balance for ADLs. 3. Gait: Ambulate 75ft S/RW, O2 saturation > 90% on RA, for improved mobility in environment. 4. Patient Education: Independent with HEP and energy conservation techniques for improved endurance for home/facility discharge. 2019  PT treatment not completed due to: ? Off Unit for testing/procedure ? Pain ? Eating ? Patient too lethargic 
? Nausea/vomiting 
? Dialysis treatment in progress  
? Other: Dr. Chilango Riddlears in with pt and family at this time. Will f/u at later time as pt schedule allows. Thank you.   
Atif Desai, PT

## 2019-11-18 NOTE — PROGRESS NOTES
Plan: [facility] pending medical stability, insurance authorization and pending outpt HD chair arrangement. Chart reviewed, attended rounds with MD, spoke with pt and family in room. Plan remains for SNF discharge to [address] [place] [address] pending medical stability and insurance auth when initiated. Pt now needs HD outpt clinic arranged, pt states she wants to become pt of Dr. Pa Leyva, received VM from her office regarding need for HD and switching from Dr. Juanjose Viramontes to Dr. Pa Leyva. CMS will contact [facility] for HD chair, they will let us know when arranged. Noted pt not ESRD at this time. Pt and family made aware that pt will need transportation to HD appointments, that [facility] [place] not [place]. Noting pt not ambulatory at this time. Pt scheduled for HD today. CM following to finalize plan, and any additional needs at discharge.

## 2019-11-19 PROBLEM — Z99.2 ESRD NEEDING DIALYSIS (HCC): Status: ACTIVE | Noted: 2019-01-01

## 2019-11-19 PROBLEM — I82.C19 THROMBOSIS OF INTERNAL JUGULAR VEIN (HCC): Status: ACTIVE | Noted: 2019-01-01

## 2019-11-19 PROBLEM — N39.0 UTI (URINARY TRACT INFECTION): Status: RESOLVED | Noted: 2019-01-01 | Resolved: 2019-01-01

## 2019-11-19 PROBLEM — N18.6 ESRD NEEDING DIALYSIS (HCC): Status: ACTIVE | Noted: 2019-01-01

## 2019-11-19 PROBLEM — R53.1 WEAKNESS GENERALIZED: Status: ACTIVE | Noted: 2019-01-01

## 2019-11-19 NOTE — PROGRESS NOTES
Hospitalist Progress Note Patient: Jose Bhatti MRN: [id number]  CSN: [id number] YOB: 1935  Age: 80 y.o. Sex: female DOA: 11/8/2019 LOS:  LOS: 11 days Chief Complaint: 
 
Renal failure Assessment/Plan ARF progressed to ESRD now on dilaysis-last session sat 11/16, session cancelled yesterday SOB-CXR shows small effusions and atelectasis Right Int Jug DVT noted on duplex this am, as INR under 2 now, resume coumadin and start heparin gtt 
afib with RVR-stabilized Severe iron deficiency Iron def anemia-transfused 11/18 Coagulopathy due to coumadin now INR under 2 Hyponatremia-improved after dialysis Weakness and debility Reversed her stated adv directives wishes to full code and continued aggressive measures including dialysis Diastolic CHF Long discussion with patient and family She is not eating much of anything She remains weak Had SOB this am 
Wants to try to continue dialysis but we have had long discussion of goals of care and where she is headed Comfort is her priority and fear of being left to suffer is on her mind We discussed how we would not alow that to happen regardless of whether she chooses comfort care or is blae to continue aggressive care 
 
prognosis remains grim High likelihood for short term mortality with advanced age and renal failure, debility Total time: 36 min Counseling / coordination time:  
> 50% counseling / coordination Disposition : 
Patient Active Problem List  
Diagnosis Code  Respiratory failure (HCC) J96.90  
 Heart failure (HCC) I50.9  Benign essential hypertension I10  Chronic diastolic congestive heart failure (HCC) I50.32  
 Cobalamin deficiency E53.8  Hypercholesterolemia E78.00  Hypertensive heart disease I11.9  Hypothyroidism E03.9  Iron deficiency anemia D50.9  Stage 4 chronic kidney disease ([facility] [address] N18.4  A-fib ([facility]) I48.91  
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  
 ESRD needing dialysis (HCC) N18.6, Z99.2 Subjective: She had SOB this am 
Denies chest pressure or pain No cough Ate one bite of a pancake for BF, nauseous eating Review of systems: 
 
Constitutional: denies fevers, chills Cardiovascular: denies chest pain, palpitations Gastrointestinal: denies omiting Vital signs/Intake and Output: 
Visit Vitals /62 Pulse (!) 115 Temp 97.9 °F (36.6 °C) Resp 14 Ht 4' 11\" (1.499 m) Wt 67.3 kg (148 lb 6.4 oz) SpO2 97% BMI 29.97 kg/m² Current Shift:  No intake/output data recorded. Last three shifts:  11/17 1901 - 11/19 0700 In: 543.8 [P.O.:240] Out: 0 Exam: 
 
Elderly debilitated alert WF, weak and pale Head/Neck: NCAT, supple, No masses, No lymphadenopathy CVS:Regular rate and rhythm, no M/R/G, S1/S2 heard, no thrill Lungs:dec BS bases, no wheezes or rales Abdomen: Soft, Nontender, No distention, Normal Bowel sounds, No hepatomegaly Extremities: No C/C/E, pulses palpable 2+ Neuro:grossly normal , follows commands Psych:appropriate Labs: Results:  
   
Chemistry Recent Labs 11/19/19 
4255 11/18/19 0325 11/17/19 
0435 GLU 89 98 137* * 136 138  
K 4.0 4.0 4.0  
CL 97* 97* 98* CO2 29 32 33* BUN 55* 42* 27* CREA 5.14* 4.25* 2.99* CA 8.5 8.2* 8.2* AGAP 7 7 7 BUCR 11* 10* 9*  
  
CBC w/Diff Recent Labs 11/19/19 
5217 11/18/19 
0325 11/17/19 
0435 WBC 11.8 10.1 8.0  
RBC 3.52* 2.67* 2.79* HGB 10.4* 7.8* 8.2* HCT 33.6* 26.2* 26.7*  
* 132* 148 Cardiac Enzymes No results for input(s): CPK, CKND1, CUONG in the last 72 hours. No lab exists for component: Oleta Roche Coagulation Recent Labs 11/19/19 
1022 11/19/19 
0426 PTP 17.6* 19.7* INR 1.5* 1.7* Lipid Panel No results found for: CHOL, CHOLPOCT, CHOLX, [address], [place], [id number], HDL, HDLP, LDL, LDLC, DLDLP, 314442, VLDLC, VLDL, TGLX, TRIGL, TRIGP, TGLPOCT, CHHD, CHHDX  
BNP No results for input(s): BNPP in the last 72 hours. Liver Enzymes No results for input(s): TP, ALB, TBIL, AP, SGOT, GPT in the last 72 hours. No lab exists for component: DBIL Thyroid Studies Lab Results Component Value Date/Time TSH 2.53 11/08/2019 01:25 PM  
    
Procedures/imaging: see electronic medical records for all procedures/Xrays and details which were not copied into this note but were reviewed prior to creation of Plan Daren Todd MD

## 2019-11-19 NOTE — PROGRESS NOTES
Problem: Dysphagia (Adult) Goal: *Acute Goals and Plan of Care (Insert Text) Description Recommendations: 
Diet: Pureed solids, thin liquids Meds: Crushed in puree Aspiration Precautions NO STRAWS Oral Care TID Goals:  Patient will: 1. Tolerate PO trials with 0 s/s overt distress in 4/5 trials 2. Utilize compensatory swallow strategies/maneuvers (decrease bite/sip, size/rate, alt. liq/sol) with min cues in 4/5 trials 3. Perform oral-motor/laryngeal exercises to increase oropharyngeal swallow function with min cues 4. Complete an objective swallow study (i.e., MBSS) to assess swallow integrity, r/o aspiration, and determine of safest LRD, min A Outcome: Progressing Towards Goal 
 
SPEECH LANGUAGE PATHOLOGY BEDSIDE SWALLOW EVALUATION Patient: Bc Garcia (89 y.o. female) Date: 11/19/2019 Primary Diagnosis: A-fib ([facility] [address] [I48.91] Precautions: Aspiration, Standard,  Fall PLOF: Family assistance ASSESSMENT : 
Based on the objective data described below, the patient presents with moderate oropharyngeal dysphagia. Patient seen this AM upright in bed with family and RN present. Patient A&Ox4. Patient reports difficulty swallowing pills, rice, bread, and meat at baseline with recent increase in swallowing difficulties noted. Patient reports occasional sticking at the pharyngeal and substernal level, cleared i'ly with liquid wash or coughing, per pt report. Oral mechanism exam revealed upper and lower dentures with mild lingual weakness. Difficulty initiating swallow at baseline d/t thrush. Patient seen following med pass with pureed solids, mech soft solids, mixed consistency, and thin liquids +/- straw. Immediate coughing episode x1 with successive sips of thin liquids via straw, delayed initiation of swallow and reduced hyolaryngeal elevation/excursion noted. Cup sips of thin liquids WNL across trials.  Patient with increased oral transit time noted across solid consistencies with decreased bolus cohesion/manipulation of mech soft solids resulting in immediate weak cough x2. Report of pharyngeal sticking with mech soft fruit, cleared with double swallow. Pureed solids cleared WNL. Recommend diet downgrade to pureed solids with thin liquids (NO STRAWS) and strict aspiration precautions (upright positioning during PO intake, reduced rate, small bites/sips, etc). Patient, Rn, and family educated and reported understanding. ST to continue to follow as indicated to assess diet tolerance, strengthen oropharyngeal function, and educate patient/family/staff. Patient will benefit from skilled intervention to address the above impairments. Patient's rehabilitation potential is considered to be Fair Factors which may influence rehabilitation potential include: ? None noted ? Mental ability/status ? Medical condition ? Home/family situation and support systems ? Safety awareness ? Pain tolerance/management ? Other: PLAN : 
Recommendations and Planned Interventions: 
Pureed solids/thin liquids with strict aspiration precautions Continued ST intervention May consider GI referral 
Frequency/Duration: Patient will be followed by speech-language pathology 1-2 times per day/4-7 days per week to address goals. Discharge Recommendations: Home Health SUBJECTIVE:  
Patient stated it feels like I have cotton balls in my mouth. OBJECTIVE:  
 
Past Medical History:  
Diagnosis Date Chronic kidney disease Congestive heart failure (CHF) ([facility] [address] COPD (chronic obstructive pulmonary disease) ([facility] [address] History reviewed. No pertinent surgical history. Home Situation:  
Home Situation Home Environment: Private residence # Steps to Enter: 4 Rails to Enter: Yes Hand Rails : Bilateral 
One/Two Story Residence: One story Living Alone: No 
 Support Systems: Child(woody) Patient Expects to be Discharged to[de-identified] Private residence Current DME Used/Available at Home: Carlo Ley, jacki, Nuvia Brewer, rollator [facility] Current Diet:  Pureed/thin liquids Cognitive and Communication Status: 
Neurologic State: Alert Orientation Level: Oriented X4 Cognition: Appropriate decision making, Follows commands Oral Assessment: 
Oral Assessment Labial: No impairment Dentition: Upper & lower dentures Oral Hygiene: Adequate Lingual: Decreased rate Velum: No impairment P.O. Trials: 
Patient Position: 45 Vocal quality prior to P.O.: No impairment Consistency Presented: Thin liquid;Puree;Mechanical soft How Presented: Self-fed/presented;Cup/sip;Straw;Spoon Bolus Acceptance: No impairment Bolus Formation/Control: Impaired Type of Impairment: Premature spillage; Incomplete Propulsion: Delayed (# of seconds) Oral Residue: Less than 10% of bolus Initiation of Swallow: Delayed (# of seconds) Laryngeal Elevation: Decreased;Weak Aspiration Signs/Symptoms: Clear throat;Weak cough; Watery eyes Pharyngeal Phase Characteristics: Feeling of discomfort;Multiple swallows; Suspected pharyngeal residue;Poor endurance Effective Modifications: Alternate liquids/solids;Small sips and bites;Cup/sip Cues for Modifications: Minimal 
  
 
Oral Phase Severity: Mild-moderate Pharyngeal Phase Severity : Moderate PAIN: 
Pain level pre-treatment: 0/10 Pain level post-treatment: 0/10 After treatment:  
?            Patient left in no apparent distress sitting up in chair ? Patient left in no apparent distress in bed ? Call bell left within reach ? Nursing notified ? Family present ? Caregiver present ? Bed alarm activated COMMUNICATION/EDUCATION:  
?            Aspiration precautions; swallow safety; compensatory techniques. ?            Patient/family have participated as able in goal setting and plan of care. ?            Patient/family agree to work toward stated goals and plan of care. ?            Patient understands intent and goals of therapy; neutral about participation. ? Patient unable to participate in goal setting/plan of care; educ ongoing with interdisciplinary staff ? Posted safety precautions in patient's room. Thank you for this referral, 
ARIELLA Grant Time Calculation: 21 mins

## 2019-11-19 NOTE — PROGRESS NOTES
No new event overnight. Patient had no urine/stool output overnight. Bedside and Verbal shift change report given to Chen Scott RN (oncoming nurse) by Brennan Curtis RN (offgoing nurse). Report included the following information SBAR, Kardex, Intake/Output, MAR, Accordion and Recent Results.

## 2019-11-19 NOTE — ACP (ADVANCE CARE PLANNING)
Advanced Steps Advance Care Planning Conversation [facility] (Physician Orders for Scope of Treatment) Date of conversation: 11/19/2019   Location: [facility] 
Length (minutes): 45 Patient status is DNR/DNI. Participants: 
 [x] Patient Other persons present: 
 Relationships to patient: Khang Laguna, sister Frances, and daughter Ivan Fonseca Advanced Steps® ACP Facilitator: JESUS BookerW; Jacqueline De Santiago NP Conversation Topics (If Patient does not have decision making capacity, Agent/Surrogate responds based on understanding of how patient would respond if capable) Understanding of Medical Condition/s AND Potential Complications: 
 
Patient response: Patient understands that she needs dialysis and would like to [place] it a try,\" but recognizes that she may not tolerate it well. She stated that if she got to that point, she would pursue a comfort approach. Identifies the following as important for living well: Spending time with family, walking, doing things for herself Hopes: That she will be able to walk and \"do things\" again Worries/Fears about Medical Condition: Patient expressed fear of being \"left to suffer. \"  
 
Sources of support/comfort described as: Large, supportive family Cultural, Muslim, spiritual, or personal beliefs described as: Patient does not wish to ever be on life support. Needs to discuss with spiritual/Muslim advisor: [] Yes  [x] No 
 
Needs more information about illness and complications:  [] Yes  [x] No 
 
 
Cardiopulmonary Resuscitation \"What do you understand about CPR? \" Response: Patient expressed fear that if she \"passed out,\" nothing would be done to help her. Explained that CPR only occurs when a patient's breathing/heart have stopped and that patient would always be checked for a pulse/breathing. Explained the benefits and burdens of CPR, intubation, and feeding tube.  Patient stated that she would not like to pursue any of these measures. Order Elected for CPR:  []  Attempt Resuscitation [x]  Do Not Attempt Resuscitation When NOT in Cardiopulmonary Arrest, Order Elected:   
 
[] Comfort Measures 
[x] Limited Additional Interventions (Other orders: Dialysis OKAY) [] Full Interventions Artificially Administered Nutrition, Order Elected: 
 
[x] No Feeding Tube  
[] Feeding Tube for a defined trial period 
[] Feeding Tube long-term if indicated The following was provided (check all that apply): Healthcare Decision Information Cards: 
 [] Help with Breathing Facts 
 [] Tube Feeding Facts [] CPR Facts [x] Review of existing Advance Directive 
[] Assistance with Completion of New Advance Directive [x] Review of [place] POST Form Meeting Outcomes: 
 [x] ACP discussion completed 
 [x] [place] POST form completed 
[x] Chad prepared for Provider review and signature 
 [x] Original placed on Chart, if in facility (form to be sent with patient at discharge) [x] Copy given to healthcare agent [x] Copy scanned to electronic medical record Follow-up plan:   
 [] Schedule follow-up conversation to continue planning 
 [x] Referred individual to Provider for additional questions/concerns [x] Advised patient/agent/surrogate to review completed POST form and update if needed with changes in condition, patient preferences or care setting  
 
[x] This note routed to one or more involved healthcare providers: via EMR 
 
D/w floor nurse POST form: Original and 2 copies provided to family for their records; copy placed on chart for scan into EMR. Patient has a [address] Directive on file naming the following medical decision-makers: 
 
Primary Healthcare Agent: Hans Small Relationship to patient: Son and Daughter Phone Number: [phone] Secondary Healthcare Agent: Fabien Valero Relationship to patient: Sister Phone Number: [phone]

## 2019-11-19 NOTE — PROGRESS NOTES
Pharmacy Dosing Services:  Warfarin Consult for Warfarin Dosing by Pharmacy by Dr. Travis Diaz Consult provided for this 80 y.o.  female , for indication of  Thromboembolism ( Prevent with Chronic A-Fib ) Dose to achieve an INR goal of 2-3 Order entered for  Warfarin  3 (mg) ordered to be given today at 18:00. Patient received Vitamin K 10 mg SQ at 06:10 11/18/19. Significant drug interactions: Amiodarone, Aspirin 81 mg 
 
LABS   
PT/INR Lab Results Component Value Date/Time INR 1.5 (H) 11/19/2019 10:22 AM  
  
Platelets Lab Results Component Value Date/Time PLATELET 095 (L) 18/50/3549 04:26 AM  
  
H/H Lab Results Component Value Date/Time HGB 10.4 (L) 11/19/2019 04:26 AM  
  
 
Warfarin Administrations (last 168 hours) Date/Time Action Medication Dose 11/14/19 1845 Given  
 warfarin (COUMADIN) tablet 4 mg 4 mg  
 11/13/19 1709 Given  
 warfarin (COUMADIN) tablet 4 mg 4 mg Pharmacy to follow daily and will provide subsequent Warfarin dosing based on clinical status. Tejas Peña, [facility]     Contact information   [phone]

## 2019-11-19 NOTE — CONSULTS
Palliative Medicine Consult [facility]: [phone] ([phone]) [facility]: [phone] [facility]: [phone] Patient Name: Ciara Anderson YOB: 1935 Date of Initial Consult: 11/19/2019 Reason for Consult: care decisions Requesting Provider: Dr Milind Corral Primary Care Physician: Zafar Zavala DO 
  
 SUMMARY:  
Ciara Anderson is a 80y.o. year old with a past history of diastolic heart failure with preserved EF, CKD, COPD , who was admitted on 11/8/2019 from primary care MD office  with a diagnosis of new onset of A fib with RVR. Current medical issues leading to Palliative Medicine involvement include: 80year old weak appearing female with new onset of a fib with RVR, ARF which has progressed to ESRD requiring HD. Palliative medicine is consulted for clarification of goals of care. PALLIATIVE DIAGNOSES:  
1. Advanced care plan discussion 2. ESRD requiring HD 3. A fib 4. Debility PLAN:  
1. Advanced care plan discussions seen today along with Ms Redmond Sandifer. Kasey is weak, but alert oriented x 3. Surrounded by her daughter Haley Ayala, son Dominga Major and sister. She has previously executed an AMD naming as co primary decision makers her daughter Haley Ayala and son Dominga Major. Sister Beau Adkins is secondary. Chart reviewed, patient had revered her goals of care decision from DNR to full code. Discussed with family prior to discussing with patient intent of discussion. They also would like to have goals of care clarified and present for discussion. Family and patient shared a large concern of patient's is if she has \" passed out \" if she is a DNR no one will help her\" . Discussed and supported but assured no matter her goals of care of care decisions, comfort and appropriate interventions were also going to be provided.  She did tell us \" I want to try dialysis and if it is to hard or painful then I will know\" Goals of care discussed at some length. Shared with patient and family decision for DNR/DNI does not mean she cannot try dialysis. Benefits and burdens discussed at length especially in chronic illness and advanced age. Kasey was able to tells us when her heart stops then \" that is my time\". Clarified several times this meant no Chest compressions or shock. Family was satisfied she understood,when her heart stopped  no chest compressions or shock. Kasey was also able to tell us she would not wish for intubation for any reason. POST form discussed. Patient signed for DNR/DNI. Limited interventions at present time, and no feeding tube. Family supportive of her goals. They are also aware care discussions will be ongoing and if she does not tolerate dialysis or treatments become to  burdensome, discussions can ensue on focusing on a more comfort directed approach. Discussed with RN who is aware of goals of care. 2. ESRD now requiring HD nephrology on board. Last HD on 11/16/2019. Planned for today but needs clearance from vascular to use access. Patient wishes to continue to try and she how she does 3. A fib RVR improved cards on board 4. Debility weak, poor appetite. Prior to hospital stay lived with her son, independent for all ADL's at that time per son. PT on board but have not worked with patient yet. Highly doubt she will return to her baseline level of functioning. 5. Initial consult note routed to primary continuity provider 6. Communicated plan of care with: Palliative IDT Patient/Health Care Proxy Stated Goals: Prolong life TREATMENT PREFERENCES:  
Code Status: DNR Advance Care Planning: 
[] The [facility] Interdisciplinary Team has updated the ACP Navigator with [address] and Patient Capacity Primary Decision [facility] (Health Care Agent):   Primary Decision Maker: Verna Brewer - Child - [phone] Secondary Decision Maker: Daryn Dhaliwal - [phone] Medical Interventions: Limited additional interventions Artificially Administered Nutrition: No feeding tube Other: As far as possible, the palliative care team has discussed with patient / health care proxy about goals of care / treatment preferences for patient. HISTORY:  
 
History obtained from: chart, patient and family CHIEF COMPLAINT: weakness ESRD  
 
HPI/SUBJECTIVE: The patient is:  
[x] Verbal and participatory [] Non-participatory due to:  
Please see summary Clinical Pain Assessment (nonverbal scale for nonverbal patients): Clinical Pain Assessment Severity: 0 Duration: for how long has pt been experiencing pain (e.g., 2 days, 1 month, years) Frequency: how often pain is an issue (e.g., several times per day, once every few days, constant) FUNCTIONAL ASSESSMENT:  
 
Palliative Performance Scale (PPS): PPS: 30 
 
ECOG 
ECOG Status : Completely disabled PSYCHOSOCIAL/SPIRITUAL SCREENING:  
  
Any spiritual / Mandaeism concerns: 
[] Yes /  [x] No 
 
Caregiver Burnout: 
[] Yes /  [x] No /  [] No Caregiver Present Anticipatory grief assessment:  
[x] Normal  / [] Maladaptive REVIEW OF SYSTEMS:  
 
Positive and pertinent negative findings in ROS are noted above in HPI. The following systems were [x] reviewed / [] unable to be reviewed as noted in HPI Other findings are noted below. Systems: constitutional, ears/nose/mouth/throat, respiratory, gastrointestinal, genitourinary, musculoskeletal, integumentary, neurologic, psychiatric, endocrine. Positive findings noted below. Modified ESAS Completed by: provider Fatigue: 8 Drowsiness: 0 Pain: 0 Anxiety: 0 Nausea: 0 Anorexia: 7 Dyspnea: 1 Stool Occurrence(s): 1 PHYSICAL EXAM:  
 
Wt Readings from Last 3 Encounters:  
11/19/19 67.3 kg (148 lb 6.4 oz) 11/04/19 62.4 kg (137 lb 8 oz) 10/29/19 61.8 kg (136 lb 4.8 oz) Blood pressure 126/54, pulse (!) 107, temperature 98.4 °F (36.9 °C), resp. rate 20, height 4' 11\" (1.499 m), weight 67.3 kg (148 lb 6.4 oz), SpO2 96 %. Pain: 
Pain Scale 1: Numeric (0 - 10) Pain Intensity 1: 0 Last bowel movement: x 1 yesterday Constitutional: weak frail appearing female who is lying in bed in NAD Eyes: pupils equal, anicteric Respiratory: breathing not labored, nasal cannula in place Skin: warm, dry Neurologic: weak but alert and oriented x 3, follows commands HISTORY:  
 
Active Problems: 
  Stage 4 chronic kidney disease ([facility] [address] (8/23/2018) Overview: Last Assessment & Plan:  
    Last GFR on record was 25 on 04/15/2019. Will check chemistry profile  
    with today's labs. She is still followed by Nephrology but unfortunately  
    missed her last appointment with them on 06/26/2019. I told her that it  
    was important that she be seen on a regular basis by Nephrology. Currently her rescheduled appointment is on 12/26/2019. I am not certain  
    that that long of weight will be appropriate for her but will wait and see  
    what current labs look like. If they are stable with regards to renal  
    function I suppose she can wait till December for her next visit. Continue current antihypertensive regimen. Hopefully with the every other  
    day dosage Ng of Lasix she will be maintaining stable BUN and creatinine. A-fib ([facility] [address] (11/8/2019) Hyponatremia (11/15/2019) Weakness generalized (11/19/2019) Thrombosis of internal jugular vein ([facility] [address] (11/19/2019) ESRD needing dialysis ([facility] [address] (11/19/2019) Past Medical History:  
Diagnosis Date  Chronic kidney disease  Congestive heart failure (CHF) ([address]  COPD (chronic obstructive pulmonary disease) (HCC) History reviewed. No pertinent surgical history. History reviewed. No pertinent family history. History reviewed, no pertinent family history. Social History Tobacco Use  Smoking status: Former Smoker  Smokeless tobacco: Never Used Substance Use Topics  Alcohol use: Not on file Allergies Allergen Reactions  Penicillins Hives  Valium [Diazepam] Other (comments) It made me cry more Current Facility-Administered Medications Medication Dose Route Frequency  Warfarin - Pharmacy to Dose  1 Each Other Rx Dosing/Monitoring  warfarin (COUMADIN) tablet 3 mg  3 mg Oral ONCE  
 heparin 25,000 units in D5W 250 ml infusion  18-36 Units/kg/hr IntraVENous TITRATE  
 0.9% sodium chloride infusion 250 mL  250 mL IntraVENous PRN  
 iron sucrose (VENOFER) injection 200 mg  200 mg IntraVENous Q24H  
 dilTIAZem (CARDIZEM) IR tablet 30 mg  30 mg Oral TIDAC  epoetin jonathan-epbx (RETACRIT) injection 10,000 Units  10,000 Units SubCUTAneous DIALYSIS TUE, THU & SAT  magic mouthwash (FIRST-MOUTHWASH BLM) oral suspension 5 mL  5 mL Oral AC&HS  
 albumin human 25% (BUMINATE) solution 25 g  25 g IntraVENous DIALYSIS ONCE  
 heparin (porcine) 1,000 unit/mL injection 4,300 Units  4,300 Units InterCATHeter DIALYSIS PRN  
 albuterol-ipratropium (DUO-NEB) 2.5 MG-0.5 MG/3 ML  3 mL Nebulization Q4H PRN  
 guaiFENesin-dextromethorphan (ROBITUSSIN DM) 100-10 mg/5 mL syrup 5 mL  5 mL Oral Q6H PRN  
 nystatin (MYCOSTATIN) 100,000 unit/mL oral suspension 500,000 Units  500,000 Units Oral QID  [START ON 11/26/2019] amiodarone (CORDARONE) tablet 200 mg  200 mg Oral DAILY  polyethylene glycol (MIRALAX) packet 17 g  17 g Oral DAILY  amiodarone (CORDARONE) tablet 400 mg  400 mg Oral DAILY  ondansetron (ZOFRAN) injection 4 mg  4 mg IntraVENous Q6H PRN  zinc sulfate (ZINCATE) capsule 1 Cap  1 Cap Oral DAILY  aspirin delayed-release tablet 81 mg  81 mg Oral DAILY  atorvastatin (LIPITOR) tablet 40 mg  40 mg Oral DAILY  ergocalciferol capsule 50,000 Units  50,000 Units Oral Q7D  
  PARoxetine (PAXIL) tablet 20 mg  20 mg Oral DAILY  sodium chloride (NS) flush 5-40 mL  5-40 mL IntraVENous Q8H  
 sodium chloride (NS) flush 5-40 mL  5-40 mL IntraVENous PRN  
 acetaminophen (TYLENOL) tablet 650 mg  650 mg Oral Q4H PRN  
 HYDROcodone-acetaminophen (NORCO) 5-325 mg per tablet 1 Tab  1 Tab Oral Q4H PRN  
 morphine injection 2 mg  2 mg IntraVENous Q4H PRN  
 naloxone (NARCAN) injection 0.4 mg  0.4 mg IntraVENous PRN  
 
 
 LAB AND IMAGING FINDINGS:  
 
Lab Results Component Value Date/Time WBC 13.4 (H) 11/19/2019 11:51 AM  
 HGB 11.0 (L) 11/19/2019 11:51 AM  
 PLATELET 273 (L) 40/47/1923 11:51 AM  
 
Lab Results Component Value Date/Time Sodium 133 (L) 11/19/2019 04:26 AM  
 Potassium 4.0 11/19/2019 04:26 AM  
 Chloride 97 (L) 11/19/2019 04:26 AM  
 CO2 29 11/19/2019 04:26 AM  
 BUN 55 (H) 11/19/2019 04:26 AM  
 Creatinine 5.14 (H) 11/19/2019 04:26 AM  
 Calcium 8.5 11/19/2019 04:26 AM  
 Magnesium 2.2 11/09/2019 02:30 AM  
 Phosphorus 3.2 11/19/2019 04:26 AM  
  
Lab Results Component Value Date/Time AST (SGOT) 79 (H) 11/08/2019 01:25 PM  
 Alk. phosphatase 146 (H) 11/08/2019 01:25 PM  
 Protein, total 6.8 11/08/2019 01:25 PM  
 Albumin 3.7 11/08/2019 01:25 PM  
 Globulin 3.1 11/08/2019 01:25 PM  
 
Lab Results Component Value Date/Time INR 1.5 (H) 11/19/2019 10:22 AM  
 Prothrombin time 17.6 (H) 11/19/2019 10:22 AM  
 aPTT 40.5 (H) 11/19/2019 11:51 AM  
  
Lab Results Component Value Date/Time Iron 11 (L) 11/16/2019 09:10 AM  
 TIBC 271 11/16/2019 09:10 AM  
 Iron % saturation 4 11/16/2019 09:10 AM  
 Ferritin 395 (H) 11/16/2019 09:10 AM  
  
No results found for: PH, PCO2, PO2 No components found for: [place] Lab Results Component Value Date/Time CK 48 11/09/2019 02:30 AM  
 CK - MB 2.3 11/09/2019 02:30 AM  
  
 
   
 
Total time: 70 minutes Counseling / coordination time, spent as noted above: 50 minutes > 50% counseling / coordination: yes with patient, son, daughter, sister Prolonged service was provided for  []30 min   []75 min in face to face time in the presence of the patient, spent as noted above. Time Start:  
Time End:  
Note: this can only be billed with 07203 (initial) or 37491 (follow up). If multiple start / stop times, list each separately.

## 2019-11-19 NOTE — PROGRESS NOTES
Pt refused PT due to: 
[]  Nausea/vomiting 
[]  Eating 
[]  Pain 
[]  Pt lethargic [x]  Other, dialysis entered and about to start process Will f/u later as schedule allows. Thank you.  
Tejas Henson, PT

## 2019-11-19 NOTE — PROGRESS NOTES
Problem: Falls - Risk of 
Goal: *Absence of Falls Description Document Hetal Deakelechi Fall Risk and appropriate interventions in the flowsheet. Outcome: Progressing Towards Goal 
Note:  
Fall Risk Interventions: 
Mobility Interventions: Patient to call before getting OOB, PT Consult for mobility concerns, PT Consult for assist device competence Medication Interventions: Teach patient to arise slowly, Patient to call before getting OOB Elimination Interventions: Call light in reach, Patient to call for help with toileting needs, Toileting schedule/hourly rounds History of Falls Interventions: Investigate reason for fall, Room close to nurse's station Problem: Pain Goal: *Control of Pain Outcome: Progressing Towards Goal 
  
Problem: Gas Exchange - Impaired Goal: *Absence of hypoxia Outcome: Progressing Towards Goal 
  
Problem: Pressure Injury - Risk of 
Goal: *Prevention of pressure injury Description Document Joe Scale and appropriate interventions in the flowsheet. Outcome: Progressing Towards Goal 
Note:  
Pressure Injury Interventions: 
  
 
Moisture Interventions: Apply protective barrier, creams and emollients, Absorbent underpads, Contain wound drainage, Maintain skin hydration (lotion/cream) Activity Interventions: Pressure redistribution bed/mattress(bed type), Increase time out of bed Mobility Interventions: Pressure redistribution bed/mattress (bed type) Nutrition Interventions: Document food/fluid/supplement intake, Offer support with meals,snacks and hydration Friction and Shear Interventions: Foam dressings/transparent film/skin sealants, Apply protective barrier, creams and emollients, Transferring/repositioning devices

## 2019-11-19 NOTE — PROGRESS NOTES
In patient nephrology progress note Admit Date:    11/8/2019 Name:  Jensen Flor Age :  80 y.o.  
 
 
HPI:  
[de-identified] HARINDER Eden is a 80 y.o. WF with a PMHX of CHF, COPD who presents to the emergency department for newly Dx of afib, sent in by PCP Dr. Israel Mcdonald . Patient was discharged 10/26 for CHF and went home with lasox 40 mg every other day . Patient has chronic kidney disease and follows /[facility]. Patient was last seen Dec 2018. Per records patient has stage 4 chronic kidney disease. Baseline cr was 1.77-1.9. Patient has chronic diastolic heart failure prone to biventricular decompensation, mitral regurgitation, pulmonary hypertension. Patient was admitted yesterday. Cardiology seen and placed on Heparin gtt. Patient has SOB with minimal exertion such as eating food . She reports follow low salt diet and denied use  NSAID exposure. Admission CXR showed central vascular congestion, increased in the interval from comparison exam. There are small bilateral pleural effusions present but no pneumothorax or pneumonic opacity. 
 
-Patient denies CP/SOB/N/V this AM. S/p first HD 11/15 and again Saturday. Feeling a little stronger and appetite picking up Subjective Pt diagnosed with acute Rt IJ DVT. Currently w/o SOB or CP. Impression:  
-REZA on CKD, likely ATN, Cr trending up 6.2 today and UOP decreasing, initiated on HD 11/15.  
-CKD stage 3/4 w/ baseline Cr of 1.7-2.1. 
-Hyponatremia consistent with total body fluid retention /CHF. 
-Rt sided HF with Pulm HTN  
-Anemia of chronic disease, + Fe def 
-New onset atrial fibrillation RVR, Rate controlled,  
-Coagulopathy/warfarin induced, improved 
-Rt IJ acute DVT, restarted on anticoagulation Recommendations:  
- HD today, after discussing with Vascular re: DVT on the [facility] - Epo w/ HD 
- transfuse PRN 
- Avoid ACEI/ARBs, NSAIDs or IV contrast, and other nephrotoxic meds 
- Continue to monitor UOP and Serum chemistry daily - initiated outpt placement Objective:  
Temp (24hrs), Av.2 °F (36.8 °C), Min:97.3 °F (36.3 °C), Max:99.3 °F (37.4 °C) -124 /57 Intake/Output Summary (Last 24 hours) at 2019 1202 Last data filed at 2019 7709 Gross per 24 hour Intake 543.8 ml Output 0 ml Net 543.8 ml Last 3 Recorded Weights in this Encounter 19 0404 19 0327 19 0107 Weight: 70.8 kg (156 lb) 68.8 kg (151 lb 9.6 oz) 67.3 kg (148 lb 6.4 oz) Physical Exam:  
General Appearance: NAD Head: atraumatic HEENT: Sclera clear. Neck: Supple, no JVD or mass Chest: CTA jere Heart: irregular rate Abdomen: obese non tender , BS active Skin: intact, no rash Extremity:  trace pedal edema Neuro: No focal deficit, AAOx4 HD access: Rt IJ TDC 
  
 Data Review: BMP Lab Results Component Value Date/Time Sodium 133 (L) 2019 04:26 AM  
 Potassium 4.0 2019 04:26 AM  
 Chloride 97 (L) 2019 04:26 AM  
 CO2 29 2019 04:26 AM  
 Anion gap 7 2019 04:26 AM  
 Glucose 89 2019 04:26 AM  
 BUN 55 (H) 2019 04:26 AM  
 Creatinine 5.14 (H) 2019 04:26 AM  
 BUN/Creatinine ratio 11 (L) 2019 04:26 AM  
 GFR est AA 10 (L) 2019 04:26 AM  
 GFR est non-AA 8 (L) 2019 04:26 AM  
 Calcium 8.5 2019 04:26 AM  
 
 
CBC Lab Results Component Value Date/Time WBC 11.8 2019 04:26 AM  
 HGB 10.4 (L) 2019 04:26 AM  
 HCT 33.6 (L) 2019 04:26 AM  
 PLATELET 333 (L)  04:26 AM  
 MCV 95.5 2019 04:26 AM  
 
 
No components found for: PTHINT Lab Results Component Value Date IRON 11 (L) 2019 Irma Almonte MD 
[facility] Office [phone]

## 2019-11-19 NOTE — ROUTINE PROCESS
Bedside and Verbal shift change report given to ANTONY Ruggiero R.N. (oncoming nurse) by ANTONY York R.N. (offgoing nurse). Report included the following information SBAR, Kardex, Intake/Output, MAR, Accordion, Recent Results and Med Rec Status.

## 2019-11-19 NOTE — PROGRESS NOTES
Transition of care Plan for oni is to see how she tolerates HD patient has informed that she understands that she would like  to give a it a try for hd and she may or may not tolerated however she will make any further decisions after the HD. Cm will continue to follow. Family at bedside

## 2019-11-19 NOTE — PROGRESS NOTES
0737:Received verbal bedside report from off going nurse ANTONY Ruggiero R.N. Patient care received. Patient alert and oriented x 4. Patient resting in bed denies pain. Patient stable. Call light with in reach bed in lowest position. 1007: Made  aware vascular reported that patient had a deep blood clot in her right jugular vein and a superficial one by her I.V. Site in her left AC. She said that the plan was for the patient to resume her Coumadin. No further orders received. 1015:Patient reported she was having trouble taking deep breaths. Patient respirations 30. Patient O2 saturation was 96%, Pt had expiratory wheezing bilaterally. Respiratory was called for a PRN breathing treatment. Family expressed that patient is worried about dialysis. 1019: Made  aware of patient's ineffective breathing pattern. Patient respirations 30. Patient O2 saturation was 96%. Pt had expiratory wheezing bilaterally. Informed her that respiratory was called for a PRN breathing treatment. Ernestineasif Elizabeth said that she would order a chest x-ray. 1620: Made  aware of patient's blood pressure reading at 1600 of 96/63 and at 1615 of 94/64. She said to hold her 1630 dose of Cardizem.

## 2019-11-19 NOTE — PROGRESS NOTES
Cardiology Progress Note Patient: Michaela Mathew        Sex: female          DOA: 11/8/2019 YOB: 1935      Age:  80 y.o.        LOS:  LOS: 11 days Assessment/Plan Active Problems: 
  Stage 4 chronic kidney disease ([facility] [address] (8/23/2018) Overview: Last Assessment & Plan:  
    Last GFR on record was 25 on 04/15/2019. Will check chemistry profile  
    with today's labs. She is still followed by Nephrology but unfortunately  
    missed her last appointment with them on 06/26/2019. I told her that it  
    was important that she be seen on a regular basis by Nephrology. Currently her rescheduled appointment is on 12/26/2019. I am not certain  
    that that long of weight will be appropriate for her but will wait and see  
    what current labs look like. If they are stable with regards to renal  
    function I suppose she can wait till December for her next visit. Continue current antihypertensive regimen. Hopefully with the every other  
    day dosage Ng of Lasix she will be maintaining stable BUN and creatinine. A-fib ([facility] [address] (11/8/2019) Hyponatremia (11/15/2019) Weakness generalized (11/19/2019) Thrombosis of internal jugular vein ([facility] [address] (11/19/2019) ESRD needing dialysis ([facility] [address] (11/19/2019) Plan: 
afib with RVR- heart rate stable with occasional spike Diastolic heart failure Acute renal failure/hyponatremia on HD 
DVT in spite of transient high INR now back on heparin/warfarin Prognosis overall poor. Discussed with patient. Subjective:  
 cc: 
afib with RVR Diastolic heart failure Acute renal failure/hyponatremia DVT REVIEW OF SYSTEMS:  
 
General: No fevers or chills. Cardiovascular: No chest pain or pressure. No palpitations. No ankle swelling Pulmonary: +SOB, orthopnea, PND Gastrointestinal: No nausea, vomiting or diarrhea Objective:  
  
Visit Vitals BP 94/64 Pulse (!) 109 Temp 97.2 °F (36.2 °C) (Oral) Resp 18 Ht 4' 11\" (1.499 m) Wt 67.3 kg (148 lb 6.4 oz) SpO2 96% BMI 29.97 kg/m² Body mass index is 29.97 kg/m². Physical Exam: 
General Appearance: Comfortable, not using accessory muscles of respiration. NECK: No JVD, no thyroidomeglay. LUNGS: Clear bilaterally. HEART: S1 irregular+S2 audible, ABD: Non-tender, BS Audible EXT: mild edema, and no cysnosis. VASCULAR EXAM: Pulses are intact. PSYCHIATRIC EXAM: Mood is appropriate. Medication: 
Current Facility-Administered Medications Medication Dose Route Frequency  Warfarin - Pharmacy to Dose  1 Each Other Rx Dosing/Monitoring  warfarin (COUMADIN) tablet 3 mg  3 mg Oral ONCE  
 heparin 25,000 units in D5W 250 ml infusion  18-36 Units/kg/hr IntraVENous TITRATE  
 0.9% sodium chloride infusion 250 mL  250 mL IntraVENous PRN  
 iron sucrose (VENOFER) injection 200 mg  200 mg IntraVENous Q24H  
 dilTIAZem (CARDIZEM) IR tablet 30 mg  30 mg Oral TIDAC  epoetin jonathan-epbx (RETACRIT) injection 10,000 Units  10,000 Units SubCUTAneous DIALYSIS TUE, THU & SAT  magic mouthwash (FIRST-MOUTHWASH BLM) oral suspension 5 mL  5 mL Oral AC&HS  
 albumin human 25% (BUMINATE) solution 25 g  25 g IntraVENous DIALYSIS ONCE  
 heparin (porcine) 1,000 unit/mL injection 4,300 Units  4,300 Units InterCATHeter DIALYSIS PRN  
 albuterol-ipratropium (DUO-NEB) 2.5 MG-0.5 MG/3 ML  3 mL Nebulization Q4H PRN  
 guaiFENesin-dextromethorphan (ROBITUSSIN DM) 100-10 mg/5 mL syrup 5 mL  5 mL Oral Q6H PRN  
 nystatin (MYCOSTATIN) 100,000 unit/mL oral suspension 500,000 Units  500,000 Units Oral QID  [START ON 11/26/2019] amiodarone (CORDARONE) tablet 200 mg  200 mg Oral DAILY  polyethylene glycol (MIRALAX) packet 17 g  17 g Oral DAILY  amiodarone (CORDARONE) tablet 400 mg  400 mg Oral DAILY  ondansetron (ZOFRAN) injection 4 mg  4 mg IntraVENous Q6H PRN  
  zinc sulfate (ZINCATE) capsule 1 Cap  1 Cap Oral DAILY  aspirin delayed-release tablet 81 mg  81 mg Oral DAILY  atorvastatin (LIPITOR) tablet 40 mg  40 mg Oral DAILY  ergocalciferol capsule 50,000 Units  50,000 Units Oral Q7D  
 PARoxetine (PAXIL) tablet 20 mg  20 mg Oral DAILY  sodium chloride (NS) flush 5-40 mL  5-40 mL IntraVENous Q8H  
 sodium chloride (NS) flush 5-40 mL  5-40 mL IntraVENous PRN  
 acetaminophen (TYLENOL) tablet 650 mg  650 mg Oral Q4H PRN  
 HYDROcodone-acetaminophen (NORCO) 5-325 mg per tablet 1 Tab  1 Tab Oral Q4H PRN  
 morphine injection 2 mg  2 mg IntraVENous Q4H PRN  
 naloxone (NARCAN) injection 0.4 mg  0.4 mg IntraVENous PRN Lab/Data Reviewed: 
Procedures/imaging: see electronic medical records for all procedures/Xrays  
and details which were not copied into this note but were reviewed prior to creation of Plan 
  
 
All lab results for the last 24 hours reviewed. Recent Labs 11/19/19 
1151 11/19/19 
0426 11/18/19 
0325 WBC 13.4* 11.8 10.1 HGB 11.0* 10.4* 7.8* HCT 35.3 33.6* 26.2*  
* 114* 132* Recent Labs 11/19/19 
6210 11/18/19 
0325 11/17/19 
0435 * 136 138  
K 4.0 4.0 4.0  
CL 97* 97* 98* CO2 29 32 33* GLU 89 98 137* BUN 55* 42* 27* CREA 5.14* 4.25* 2.99* CA 8.5 8.2* 8.2* Signed By: Nano Hernandez MD   
 November 19, 2019

## 2019-11-19 NOTE — PROGRESS NOTES
Pt refused PT due to: 
[]  Nausea/vomiting 
[]  Eating 
[]  Pain 
[]  Pt lethargic [x]  Other, pt on the bed pan. Will f/u later as schedule allows. Thank you.  
Yariel Espinoza, PT

## 2019-11-20 NOTE — PROGRESS NOTES
Cardiology Progress Note Patient: Mary Ellen Hobson        Sex: female          DOA: 11/8/2019 YOB: 1935      Age:  80 y.o.        LOS:  LOS: 12 days Assessment/Plan Active Problems: 
  Stage 4 chronic kidney disease ([facility] [address] (8/23/2018) Overview: Last Assessment & Plan:  
    Last GFR on record was 25 on 04/15/2019. Will check chemistry profile  
    with today's labs. She is still followed by Nephrology but unfortunately  
    missed her last appointment with them on 06/26/2019. I told her that it  
    was important that she be seen on a regular basis by Nephrology. Currently her rescheduled appointment is on 12/26/2019. I am not certain  
    that that long of weight will be appropriate for her but will wait and see  
    what current labs look like. If they are stable with regards to renal  
    function I suppose she can wait till December for her next visit. Continue current antihypertensive regimen. Hopefully with the every other  
    day dosage Ng of Lasix she will be maintaining stable BUN and creatinine. A-fib ([facility] [address] (11/8/2019) Hyponatremia (11/15/2019) Weakness generalized (11/19/2019) Thrombosis of internal jugular vein ([facility] [address] (11/19/2019) ESRD needing dialysis ([facility] [address] (11/19/2019) Plan: Afib - heart stable DHF - feeling better after dialysis Minimal blood in sputum- INR 1.6 and aPPT is persistently high > 180, will DC heparin and continue with warfarin titration dose. Discussed with family. Subjective:  
 cc: 
 afib DHF REVIEW OF SYSTEMS:  
 
General: No fevers or chills. Cardiovascular: No chest pain or pressure. No palpitations. No ankle swelling Pulmonary: No SOB, orthopnea, PND Gastrointestinal: No nausea, vomiting or diarrhea Objective:  
  
Visit Vitals BP 97/53 (BP 1 Location: Right arm) Pulse (!) 103 Temp 97.8 °F (36.6 °C) Resp 14 Ht 4' 11\" (1.499 m) Wt 66.1 kg (145 lb 11.6 oz) SpO2 100% BMI 29.43 kg/m² Body mass index is 29.43 kg/m². Physical Exam: 
General Appearance: Comfortable, not using accessory muscles of respiration. NECK: No JVD, no thyroidomeglay. LUNGS: Clear bilaterally. HEART: S1 variable irregular +S2 audible, ABD: Non-tender, BS Audible EXT: No edema, and no cysnosis. VASCULAR EXAM: Pulses are intact. PSYCHIATRIC EXAM: Mood is appropriate. Medication: 
Current Facility-Administered Medications Medication Dose Route Frequency  Warfarin - Pharmacy to Dose  1 Each Other Rx Dosing/Monitoring  heparin 25,000 units in D5W 250 ml infusion  18-36 Units/kg/hr IntraVENous TITRATE  
 0.9% sodium chloride infusion 250 mL  250 mL IntraVENous PRN  
 iron sucrose (VENOFER) injection 200 mg  200 mg IntraVENous Q24H  
 dilTIAZem (CARDIZEM) IR tablet 30 mg  30 mg Oral TIDAC  epoetin jonathan-epbx (RETACRIT) injection 10,000 Units  10,000 Units SubCUTAneous DIALYSIS TUE, THU & SAT  magic mouthwash (FIRST-MOUTHWASH BLM) oral suspension 5 mL  5 mL Oral AC&HS  
 albumin human 25% (BUMINATE) solution 25 g  25 g IntraVENous DIALYSIS ONCE  
 heparin (porcine) 1,000 unit/mL injection 4,300 Units  4,300 Units InterCATHeter DIALYSIS PRN  
 albuterol-ipratropium (DUO-NEB) 2.5 MG-0.5 MG/3 ML  3 mL Nebulization Q4H PRN  
 guaiFENesin-dextromethorphan (ROBITUSSIN DM) 100-10 mg/5 mL syrup 5 mL  5 mL Oral Q6H PRN  
 nystatin (MYCOSTATIN) 100,000 unit/mL oral suspension 500,000 Units  500,000 Units Oral QID  [START ON 11/26/2019] amiodarone (CORDARONE) tablet 200 mg  200 mg Oral DAILY  polyethylene glycol (MIRALAX) packet 17 g  17 g Oral DAILY  amiodarone (CORDARONE) tablet 400 mg  400 mg Oral DAILY  ondansetron (ZOFRAN) injection 4 mg  4 mg IntraVENous Q6H PRN  zinc sulfate (ZINCATE) capsule 1 Cap  1 Cap Oral DAILY  aspirin delayed-release tablet 81 mg  81 mg Oral DAILY  atorvastatin (LIPITOR) tablet 40 mg  40 mg Oral DAILY  ergocalciferol capsule 50,000 Units  50,000 Units Oral Q7D  
 PARoxetine (PAXIL) tablet 20 mg  20 mg Oral DAILY  sodium chloride (NS) flush 5-40 mL  5-40 mL IntraVENous Q8H  
 sodium chloride (NS) flush 5-40 mL  5-40 mL IntraVENous PRN  
 acetaminophen (TYLENOL) tablet 650 mg  650 mg Oral Q4H PRN  
 HYDROcodone-acetaminophen (NORCO) 5-325 mg per tablet 1 Tab  1 Tab Oral Q4H PRN  
 morphine injection 2 mg  2 mg IntraVENous Q4H PRN  
 naloxone (NARCAN) injection 0.4 mg  0.4 mg IntraVENous PRN Lab/Data Reviewed: 
Procedures/imaging: see electronic medical records for all procedures/Xrays  
and details which were not copied into this note but were reviewed prior to creation of Plan 
  
 
All lab results for the last 24 hours reviewed. Recent Labs  
  11/20/19 
0400 11/19/19 
1151 11/19/19 
0426 WBC 13.1 13.4* 11.8 HGB 10.2* 11.0* 10.4* HCT 33.4* 35.3 33.6*  
* 124* 114* Recent Labs 11/19/19 
0426 11/18/19 
0325 * 136  
K 4.0 4.0  
CL 97* 97* CO2 29 32 GLU 89 98 BUN 55* 42* CREA 5.14* 4.25* CA 8.5 8.2* Signed By: Marianna Martínez MD   
 November 20, 2019

## 2019-11-20 NOTE — PROGRESS NOTES
0730: Bedside and Verbal shift change report given to Sharri Mathew RN (oncoming nurse) by [employer] RN (offgoing nurse). Report included the following information Kardex, Intake/Output, MAR and Cardiac Rhythm a fib.  
 
1125: PT at the bedside to evaluate pt activity level, no complaints noted at this time Sharri Mathew RN 
 
1525: MD Jorge Posada made aware pt coughs up moderate thick green and brown mucus, MD makes this nurse aware matter will be addressed Sharri Mathew RN 
 
2400: MD Ryan called due to pt aPTT level message left with MD Ryan RN to have MD return call to this nurse in reference to pt aPTT Sharri Mathew RN 
 
Pt and family aware of fall preventions interventions and repeat back call bell usage and room well lit 
 
1900: Bedside and Verbal shift change report given to BUZZ Barrow RN (oncoming nurse) by Sharri Mathew RN (offgoing nurse). Report included the following information Kardex and Cardiac Rhythm a fib.

## 2019-11-20 NOTE — PROGRESS NOTES
Pharmacy Dosing Services: Arturo Espino The following medication: Arturo Espino was automatically dose-adjusted per THE [facility] P&T Committee Protocol, with respect to renal function (HD / RF<10 ml/min). Consult provided for this   80 y.o. , female , for the indication of URI. Pt Weight:  
Wt Readings from Last 1 Encounters:  
11/20/19 66.1 kg (145 lb 11.6 oz) Previous Regimen Merrem 1 gm IV q12 Serum Creatinine Lab Results Component Value Date/Time Creatinine 5.14 (H) 11/19/2019 04:26 AM  
   
Creatinine Clearance Estimated Creatinine Clearance: 7.2 mL/min (A) (based on SCr of 5.14 mg/dL (H)). BUN Lab Results Component Value Date/Time BUN 55 (H) 11/19/2019 04:26 AM  
   
 
Dosage changed to:  Merrem 500 mg IV q24 (give POST-HD on dialysis days) Pharmacy to continue to monitor patient daily. Will make dosage adjustments based upon changing renal function. Zeynep Mauricio, Pharm. D. Clinical Pharmacist 
[phone]

## 2019-11-20 NOTE — DIALYSIS
TREATMENT SUMMARY Patient dialyzed in room 340 Tolerated treatment well asymptomatic hypotension w/ SP remaining greater than 90 during. Pt was AOx3, eyes open and remained talkative throughout treatment w/o complaint or distress. RIJ TDC functioning well without complication of BFR or accessing   
2000 ml  removed via UF with a with a net removal 1500 ml Report given to Rosario Alanis RN with all questions answered TREATMENT  NOTES  
   
1600 MD Mckeon notified of patient asymptomatic hypotension w/ SP >90. Orders to continue HD w/ SP >90 while asymptomatic. ACUTE HEMODIALYSIS FLOW SHEET 
  
 
PATIENT INFORMATION Catina 40, M   
[]1st Time Acute  []Stat[x] Routine []Urgent []Chronic Unit  
[]Acute Room [x]Bedside  []ICU/CCU []ER Isolation Precautions: [x]Dialysis[] Airborne []Contact []Droplet []Reverse Special Considerations:_______  [] Blood Consent Verified  []N/A Allergies:[] NKA Reaction Severity Reaction Type Noted Valid Until Allergies Penicillins Hives Not Specified  10/26/2019 Valium [Diazepam] Other (comments) Not Specified  11/8/2019 Code Status [x]Full Code [] DNR  [] Other_____ Diet: [x] Renal [] NPO [] Diabetic  
[] Enteral Feeding [] Cardiac Diabetic: []Yes [x]No 
  
[x]Signed Treatment Consent Verified  
[x] Time Out/ Safety Check PRIMARY NURSE REPORT: FIRST INITIAL/ LAST NAME/TITLE 
PRE DIALYSIS:  Guru ROBERTS RN                                       TIME: 1500 ACCESS CATHETER ACCESS: [] N/A  [x] RIGHT  [] LEFT  [x] IJ  [] SUBCL [] FEM [] First use X-ray  [x] Tunnel     [] Non-Tunneled [x] No S/S infection  [] Redness [] Drainage  [] Cultured [] Swelling [] Pain  
                 [x] Medical Aseptic [] Prep Dressing Changed [] Clotted [] Patent []      Flows: [] Good [] Poor [] Reversed If Access Problem Dr. Sarmad Nicholson: [] Yes [] No    Date:_____  [] N/A[]  
GRAFT/FISTULA ACCESS:  [x] N/A  [] RIGHT  [] LEFT  [] UE   [] LE   
   [] AVG  [] AVF [] BUTTONHOLE 
  [] +BRUIT/THRILL [] MEDICAL ASEPTIC PREP [] No S/S infection  [] Redness [] Drainage  [] Cultured [] Swelling [] Pain If Access Problem Dr. Sarmad Nicholson: [] Yes [] No    Date:______ [] N/A  
GENERAL ASSESSMENT  
LUNGS:  SaO2% _96___ [x] Clear [] Coarse [] Crackles [] Wheezing  
                                      [] Diminished Location: [] RLL [] LLL [] RUL [] RML [] NITA   
COUGH:  [] Productive  [] Loose[] Dry [x] N/A  
RESPIRATIONS: [x] Easy []Labored THERAPY: [x] RA   [] NC _4____. L/min    Mask: [] NRB [] Venti  _____O2%    
             [] Ventilator [] Intubated [] Trach [] BiPap [] CPap [] HI Flow CARDIAC: [] Regular [x] Irregular [] Pericardial Rub [] JVD Monitored Rhythm:__AFIB____ [] N/A  
EDEMA: [] None [] Generalized [] Facial [] Pedal [] UE [x] LE 
           [] Pitting [] 1 [x] 2 [] 3 [] 4    [] Right [] Left [] Bilateral  
SKIN:    [x] Warm [] Hot [] Cold  [x] Dry [] Pale [] Diaphoretic  
           [] Flushed [] Jaundiced [] Cyanotic [] Rash [] Weeping LOC:    [x] Alert  Oriented to: [x] Person [x] Place [x] Time  
          [] Confused [] Lethargic [] Medicated [] Non-responsive GI/ABDOMEN: [] Flat [] Distended [x] Soft [] Firm [] Diarrhea [x] Bowel Sounds Present [] Nausea [] Vomiting PAIN: [x] 0 [] 1 [] 2 [] 3 [] 4 [] 5 [] 6 [] 7 [] 8 [] 9 [] 10 Scale 1-10 Action/Follow Up_____ MOBILITY: [] Amb [] Amb/Assist [x] Bed  [] Wheelchair CURRENT LABS HBsAg ONLY: Date Drawn:  11/15/2019          [x] Negative [] Positive [] Unknown. HBsAb: Date Drawn:   11/16/2019           [] Susceptible <10 [] Immune ? 10 [x] Unknown Date of Current Labs:  ATTACHED  
 
EDUCATION  
 Person Educated: [x] Patient [] Other_DAUGHTER________ Knowledge base: [] None [x] Minimal [] Substantial   
Barriers to learning  [x] None _______________ Preferred method of learning: [] Written [x] Oral [x] Visual [] Hands on Topic: [x] Access Care [x] S&S of infection [x] Fluid Management 
 [] K+  [x] Procedural  [] Albumin [] Medications [] Tx Options [] Transplant [] Diet [] Other Teaching Tools: [x] Explain [x] Demonstration [] Handout_____ [] Video______ 
CARE PLAN [x] Renal Failure (Adult) Interdisciplinary · Fluid and electrolytes stabilized ? Interventions · Dehydration signs and symptoms (eg: Weight/lab monitoring; vomiting/diarrhea/urine; tenting; mucous membranes; dizziness/lethargy/irritability/confusion; weak pulse; tachycardia; blood pressure; I&O) · Fluid overload signs and symptoms assessment (eg: Body weight increased; dyspnea; edema; hypertension; respiratory crackles/wheezing; JVD; lab monitoring; mental status changes; I&O) · Monitor appropriate lab values · COMPLIANCE WITH PRESCRIBED THERAPY · ARTERIAL ACCESS SITE ASSESSMENT 
· NUTRITION SCREENING 
· Vital signs monitoring per assessed patient condition or unit standard · Cardiac monitoring · Hydration management · Intake and output measurement · Body weight monitoring · Skin care · DIALYSIS 
· Nutrition Care Process per nutrition screen · Oral hygiene care every 2 hours · Pain management · Outcome ? [x] Progressing Towards Goal 
? [] Not Progressing Towards Goals ? [] Goals Met/Resolved ? [] Goals Not Met/ Resolved · Patient/ Family Education ? Progressing Towards Goals RO/HEMODIAYLSIS MACHINE SAFETY CHECKS- BEFORE EACH TREATMENT [x] [facility]: Machine Serial #1:  [id number]   RO Serial #[id number] [] [facility]: Machine Serial #2:  [id number]    Serial #[id number] Alarm Test: [x] Pass  Time__1329______ [x] RO/Machine Log Complete    [x] Extracorporeal circuit Tested for integrity Dialyzer_[id number]_________   Tubing___19B12-9_________ Dialysate: pH__7.4_  Temp.__37___Conductivity: Meter __14__ HD Machine__13.8_ CHLORINE TESTING- BEFORE EACH TREATMENT AND EVERY 4 HOURS Total Chlorine: [x] Less than 0.1 ppm Time:_1515____2nd Check Time:__NA____ 
(If greater than 0.1 ppm from Primary then every 30 minutes from Secondary) TREATMENT INIATION-WITH DIALYSIS PRECAUTIONS [x] All Connections Secured   [x] Saline Line Double Clamped    [x] Venous Parameters Set [x] Arterial Parameters Set    [x] Prime Given 250 ml    
[x] Air Foam Detector Engaged PRE-TREATMENT  
UF Calculations: Wt to lose:__1500__ml(+) Oral:_0_ml(+)IV Meds/Fluids/Blood prods_0__ml(+) Prime/Rinse__500_ml(=)Total UF Goal__2000__mL Scale Type:[x] Bed scale [] Sling Scale [] Wheel Chair Scale []  Not Ordered [] [] Unable to obtain pt on stretcher/ bed scale malfunctioning Tx Initiation Note: RECEIVED REPORT. PATIENT ALERT AND ORIENTED. VITAL SIGNS WNL. NAD. ALL QUESTIONS ANSWERED WITH PATIENT  SAFETY CHECKS COMPLETE. TIME OUT COMPLETE. TREATMENT INITIATED VIA _RIJ TDC____. NO CONCERNS NOTED. [x] Time Out/Safety Check  Time:__1515___ INTRADIALYTIC MONITORING 
(SEE ATTACHED FLOWSHEET) POST TREATMENT Time Medication Dose Volume Route Initials NA [employer] Signatures Title Initials Time 360 Sivan Hebert. RN PAP [id number] Dialyzer cleared: [x] Good [] Fair [] Poor Blood Volume Processed _58.5__L Net UF Removed __1500__mL Post Tx Access: AVF/AVG: Bleeding Stop Time      Art. NA_min Adolfo.__NA_min []+bruit/thrill Catheter: Locking Solution  [x] Heparin 1 ml/1000 units [] Normal Saline                                                          Art.__2.1___ ml Adolfo.__2.2___ml [x] New caps placed Post Assessment:   
          Skin: [x]Warm [x]Dry []Diaphoretic []Flushed [] Pale []Cyanotic Lungs: [x]Clear []Coarse []Crackles []Wheezing Cardiac: []Regular [x]Irregular  []Monitored rhythm_AFIB___ []N/A Edema: []None []General []Facial []Pedal  []UE [x]LE [x]RIGHT [x]LEFT Pain: [x]0 []1 []2 []3 []4 []5 []6 []7 []8 []9 []10  
POST Tx Note:TREATMENT COMPLETED. ALL POSSIBLE BLOOD RETURNED. PT TOLERATED WELL. VITAL SIGNS WNL  FOR PATIENT. NAD NOTED. DIALYSIS CATHETER DE ACCESSED, FLUSHED AND LOCKED. STERILE CAPS APPLIED. REPORT GIVEN WITH OPPORTUNITY TO ADDRESS ANY CONCERNS OR QUESTTIONS AND PATIENT STAYS ROOM IN BED WITHOUT DISTRESS OR ACUTE DISCOMFORT. BED LOWED, CALL BELL WITHIN REACH . Primary Nurse Report: First initial/Last name/Title Post Dialysis:___Tiffany York RN________         Time:_____1900___________ Abbreviations: AVG-arterial venous graft, AVF-arterial venous fistula, IJ-Internal Jugular, Subcl-Subclavian, Fem-Femoral, Tx-treatment, AP/HR-apical heart rate, DFR-dialysate flow rate, BFR-blood flow rate, AP-arterial pressure, -venous pressure, UF-ultrafiltrate, TMP-transmembrane pressure, Adolfo-Venous, Art-Arterial, RO-Reverse Osmosis

## 2019-11-20 NOTE — ROUTINE PROCESS
Bedside and Verbal shift change report given to AMBER Smith (oncoming nurse) by ANTONY York R.N. (offgoing nurse). Report included the following information SBAR, Kardex, Intake/Output, MAR, Accordion, Recent Results and Med Rec Status.

## 2019-11-20 NOTE — PROGRESS NOTES
Bedside and Verbal shift change report given to Marianne Sanchez RN (oncoming nurse) by BUZZ Zuniga RN (offgoing nurse). Report included the following information SBAR, Kardex, Intake/Output, MAR and Recent Results.

## 2019-11-20 NOTE — PROGRESS NOTES
NUTRITION UPDATE 
 
- pt has had poor appetite since admission would recc removal of renal diet restriction to help maximize pt choices Radha Zelaya, RD 
PAGER:  [phone]

## 2019-11-20 NOTE — PROGRESS NOTES
In patient nephrology progress note Admit Date:    2019 Name:  Jose Bhatti Age :  80 y.o.  
 
 
HPI:  
[de-identified] HARINDER Eden is a 80 y.o. WF with a PMHX of CHF, COPD who presents to the emergency department for newly Dx of afib, sent in by PCP Dr. Love Christiansen . Patient was discharged 10/26 for CHF and went home with lasox 40 mg every other day . Patient has chronic kidney disease and follows /[facility]. Patient was last seen Dec 2018. Per records patient has stage 4 chronic kidney disease. Baseline cr was 1.77-1.9. Patient has chronic diastolic heart failure prone to biventricular decompensation, mitral regurgitation, pulmonary hypertension. Patient was admitted yesterday. Cardiology seen and placed on Heparin gtt. Patient has SOB with minimal exertion such as eating food . She reports follow low salt diet and denied use  NSAID exposure. Admission CXR showed central vascular congestion, increased in the interval from comparison exam. There are small bilateral pleural effusions present but no pneumothorax or pneumonic opacity. 
 
-Patient started on HD 11/15 Subjective:  
Pt lethargic today. Currently w/o SOB or CP. Impression:  
-REZA on CKD, likely ATN, oliguric, initiated on HD 11/15.  
-CKD stage 3/4 w/ baseline Cr of 1.7-2.1. 
-Hyponatremia consistent with total body fluid retention /CHF. 
-Rt sided HF with Pulm HTN  
-Anemia of chronic disease, + Fe def 
-New onset atrial fibrillation RVR, Rate controlled,  
-Coagulopathy/warfarin induced, improved 
-Rt IJ acute DVT, restarted on anticoagulation Recommendations:  
- Next HD tomorrow - Epo w/ HD 
- transfuse PRN 
- Avoid ACEI/ARBs, NSAIDs or IV contrast, and other nephrotoxic meds 
- Continue to monitor UOP and Serum chemistry daily - Outpt placement in progress Objective:  
Temp (24hrs), Av.7 °F (36.5 °C), Min:97.4 °F (36.3 °C), Max:98 °F (36.7 °C) -124 /57 Intake/Output Summary (Last 24 hours) at 11/20/2019 1530 Last data filed at 11/19/2019 1830 Gross per 24 hour Intake  Output 1500 ml Net -1500 ml Last 3 Recorded Weights in this Encounter 11/17/19 0327 11/19/19 0316 11/20/19 8102 Weight: 68.8 kg (151 lb 9.6 oz) 67.3 kg (148 lb 6.4 oz) 66.1 kg (145 lb 11.6 oz) Physical Exam:  
General Appearance: NAD Head: atraumatic HEENT: Sclera clear. Neck: Supple, no JVD or mass Chest: CTA jere Heart: irregular rate Abdomen: obese non tender , BS active Skin: intact, no rash Extremity:  trace pedal edema Neuro: lethargic HD access: Rt IJ TDC 
  
 Data Review: [facility] Lab Results Component Value Date/Time Sodium 133 (L) 11/19/2019 04:26 AM  
 Potassium 4.0 11/19/2019 04:26 AM  
 Chloride 97 (L) 11/19/2019 04:26 AM  
 CO2 29 11/19/2019 04:26 AM  
 Anion gap 7 11/19/2019 04:26 AM  
 Glucose 89 11/19/2019 04:26 AM  
 BUN 55 (H) 11/19/2019 04:26 AM  
 Creatinine 5.14 (H) 11/19/2019 04:26 AM  
 BUN/Creatinine ratio 11 (L) 11/19/2019 04:26 AM  
 GFR est AA 10 (L) 11/19/2019 04:26 AM  
 GFR est non-AA 8 (L) 11/19/2019 04:26 AM  
 Calcium 8.5 11/19/2019 04:26 AM  
 
 
CBC Lab Results Component Value Date/Time WBC 13.1 11/20/2019 04:00 AM  
 HGB 10.2 (L) 11/20/2019 04:00 AM  
 HCT 33.4 (L) 11/20/2019 04:00 AM  
 PLATELET 903 (L) 04/05/7457 04:00 AM  
 MCV 96.5 11/20/2019 04:00 AM  
 
 
No components found for: PTHINT Lab Results Component Value Date IRON 11 (L) 11/16/2019 Benjamin Padgett MD 
[facility] [phone]

## 2019-11-20 NOTE — PROGRESS NOTES
Problem: Falls - Risk of 
Goal: *Absence of Falls Description Document Dionne Roach Fall Risk and appropriate interventions in the flowsheet. Outcome: Progressing Towards Goal 
Note:  
Fall Risk Interventions: 
Mobility Interventions: Patient to call before getting OOB, PT Consult for mobility concerns, PT Consult for assist device competence Medication Interventions: Patient to call before getting OOB, Teach patient to arise slowly Elimination Interventions: Call light in reach, Patient to call for help with toileting needs History of Falls Interventions: Room close to nurse's station, Door open when patient unattended Problem: Pain Goal: *Control of Pain Outcome: Progressing Towards Goal 
Goal: *PALLIATIVE CARE:  Alleviation of Pain Outcome: Progressing Towards Goal 
  
Problem: Patient Education: Go to Patient Education Activity Goal: Patient/Family Education Outcome: Progressing Towards Goal 
  
Problem: Pressure Injury - Risk of 
Goal: *Prevention of pressure injury Description Document Joe Scale and appropriate interventions in the flowsheet. Outcome: Progressing Towards Goal 
Note:  
Pressure Injury Interventions: 
  
 
Moisture Interventions: Absorbent underpads, Apply protective barrier, creams and emollients, Check for incontinence Q2 hours and as needed, Minimize layers Activity Interventions: Pressure redistribution bed/mattress(bed type), PT/OT evaluation Mobility Interventions: Pressure redistribution bed/mattress (bed type), PT/OT evaluation Nutrition Interventions: Document food/fluid/supplement intake Friction and Shear Interventions: Minimize layers Problem: Patient Education: Go to Patient Education Activity Goal: Patient/Family Education Outcome: Progressing Towards Goal

## 2019-11-20 NOTE — PROGRESS NOTES
The documentation for this period (0100-0500)is being entered following the guidelines as defined in the [facility] downtime policy by Xavier Crenshaw RN.

## 2019-11-20 NOTE — PROGRESS NOTES
Problem: Dysphagia (Adult) Goal: *Acute Goals and Plan of Care (Insert Text) Description Recommendations: 
Diet: Pureed solids, thin liquids Meds: Crushed in puree Aspiration Precautions Oral Care TID Goals:  Patient will: 1. Tolerate PO trials with 0 s/s overt distress in 4/5 trials 2. Utilize compensatory swallow strategies/maneuvers (decrease bite/sip, size/rate, alt. liq/sol) with min cues in 4/5 trials 3. Perform oral-motor/laryngeal exercises to increase oropharyngeal swallow function with min cues 4. Complete an objective swallow study (i.e., MBSS) to assess swallow integrity, r/o aspiration, and determine of safest LRD, min A Outcome: Progressing Towards Goal 
 
SPEECH LANGUAGE PATHOLOGY DYSPHAGIA TREATMENT Patient: Mary Ellen Hobson (60 y.o. female) Date: 11/20/2019 Diagnosis: A-fib (HCC) [I48.91]  
<principal problem not specified> Precautions: Aspiration Fall PLOF: Independent ASSESSMENT: 
Followed up with dysphagia intervention. A&Ox3. Family at bedside. Patient and family report + tolerance of AM meal, no evident choking/coughing. Accepted SLP-fed thin liquid + straw and puree trials with 0 overt s/sx of aspiration. Continues to demo mildly labored oral bolus manipulation due to edentulous status and general weakness. Recommend pt continue puree/thin liquid diet with aspiration precautions, feeding assistance. Educated pt on aspiration precautions and importance of compensatory swallow techniques to decrease aspiration risk (decrease rate of intake & sip/bite size, upright @HOB for all po intake and ~30 minutes after po); verbalized comprehension. SLP will continue to follow as indicated to assess diet tolerance, possible diet advancement and education. Progression toward goals: 
[]         Improving appropriately and progressing toward goals [x]         Improving slowly and progressing toward goals 
[]         Not making progress toward goals and plan of care will be adjusted PLAN: 
Recommendations and Planned Interventions: 
Puree/thin Patient continues to benefit from skilled intervention to address the above impairments. Continue treatment per established plan of care. Discharge Recommendations: To Be Determined SUBJECTIVE:  
Patient stated I don't mind the food like this. OBJECTIVE:  
Cognitive and Communication Status: 
Neurologic State: Alert Orientation Level: Oriented X4 Cognition: Appropriate decision making, Appropriate for age attention/concentration, Appropriate safety awareness, Follows commands Dysphagia Treatment: 
Oral Assessment: 
Oral Assessment Labial: No impairment Dentition: Upper & lower dentures Oral Hygiene: Adequate Lingual: Decreased rate Velum: No impairment P.O. Trials: 
 Patient Position: 45 Vocal quality prior to P.O.: No impairment Consistency Presented: Thin liquid, Puree, Mechanical soft How Presented: Self-fed/presented, Cup/sip, Straw, Spoon Bolus Acceptance: No impairment Bolus Formation/Control: Impaired Type of Impairment: Premature spillage, Incomplete Propulsion: Delayed (# of seconds) Oral Residue: Less than 10% of bolus Initiation of Swallow: Delayed (# of seconds) Laryngeal Elevation: Decreased, Weak Aspiration Signs/Symptoms: Clear throat, Weak cough, Watery eyes Pharyngeal Phase Characteristics: Feeling of discomfort, Multiple swallows, Suspected pharyngeal residue, Poor endurance Effective Modifications: Alternate liquids/solids, Small sips and bites, Cup/sip Cues for Modifications: Minimal 
   
 
 Oral Phase Severity: Mild-moderate Pharyngeal Phase Severity : Moderate PAIN: 
Pain level pre-treatment: 0/10 Pain level post-treatment: 0/10 After treatment:  
[]              Patient left in no apparent distress sitting up in chair 
[x]              Patient left in no apparent distress in bed 
[x]              Call bell left within reach [x]              Nursing notified 
[x]              Family present 
[]              Caregiver present 
[]              Bed alarm activated COMMUNICATION/EDUCATION:  
[x] Aspiration precautions; swallow safety; compensatory techniques []        Patient unable to participate in education; education ongoing with staff 
[]  Posted safety precautions in patient's room. [] Oral-motor/laryngeal strengthening exercises ARIELLA Foster Time Calculation: 11 mins

## 2019-11-20 NOTE — PROGRESS NOTES
Problem: Chronic Renal Failure Goal: *Fluid and electrolytes stabilized Outcome: Progressing Towards Goal 
  
Problem: Patient Education: Go to Patient Education Activity Goal: Patient/Family Education Outcome: Progressing Towards Goal

## 2019-11-20 NOTE — PROGRESS NOTES
Problem: Mobility Impaired (Adult and Pediatric) Goal: *Acute Goals and Plan of Care (Insert Text) Description Physical Therapy Goals Initiated 19 to be met in 7 days ST. Sit to stand and stand to sit SBA/RW to improve functional I. 
2. Standing balance Good with RW to improve balance for ADLs. 3. Gait: Ambulate 75ft S/RW, O2 saturation > 90% on RA, for improved mobility in environment. 4. Patient Education: Independent with HEP and energy conservation techniques for improved endurance for home/facility discharge. Outcome: Progressing Towards Goal 
 PHYSICAL THERAPY RE-EVALUATION AND TREATMENT Patient: Zahraa Hernandez (89 y.o. female) Date: 2019 Diagnosis: A-fib (HCC) [I48.91]  
<principal problem not specified> Precautions: Fall ASSESSMENT: 
Pt seen in supine prior to session w/ supplemental O2 donned and IV. Pt reported no pain at this time. Pt requires additional time to perform bed mobility task as pt has difficulty. Pt required 2 attempts to perform sit<>stand w/ RW w/ bed elevated for assistance. Pt demonstrates a posterior COG while standing despite VCs and MCs for upright posture. Pt  only able to take a few side steps w/ RW/GB towards the [facility]. Pt requires assistance and time to perform task. Pt left in supine after session, call bell and tray in reach, family present in the room, nurse notified after session. Patient's progression toward goals since last assessment: Please see flow sheet and note above PLAN: 
Goals have been updated based on progression since last assessment. Patient continues to benefit from skilled intervention to address the above impairments. Continue to follow the patient 1-2 times per day/4-7 days per week to address goals. Planned Interventions: 
[x]     Bed Mobility Training          [x]     Neuromuscular Re-Education 
[x]     Transfer Training                []    Orthotic/Prosthetic Training [x]     Gait Training                       []     Modalities [x]     Therapeutic Exercises       []     Edema Management/Control 
[x]     Therapeutic Activities         [x]     Patient and Family Training/Education 
[]     Other (comment): 
Discharge Recommendations: Rehab Further Equipment Recommendations for Discharge: rolling walker SUBJECTIVE:  
Patient stated I feel okay.  OBJECTIVE DATA SUMMARY:  
Critical Behavior: 
Neurologic State: Alert Orientation Level: Appropriate for age Cognition: Appropriate decision making, Appropriate for age attention/concentration, Appropriate safety awareness, Follows commands Functional Mobility Training: 
Bed Mobility: 
Rolling: Minimum assistance Supine to Sit: Minimum assistance Sit to Supine: Moderate assistance Transfers: 
Sit to Stand: Moderate assistance Stand to Sit: Contact guard assistance;Minimum assistance Balance: 
Sitting: Intact Standing: Impaired; With support Standing - Static: Fair Standing - Dynamic : Fair(-) Ambulation/Gait Training: 
Distance (ft): 2 Feet (ft) Assistive Device: Gait belt;Walker, rolling Ambulation - Level of Assistance: Contact guard assistance Gait Abnormalities: Decreased step clearance;Shuffling gait Base of Support: Widened Speed/Shara: Slow Step Length: Left shortened;Right shortened Therapeutic Exercises:  
 
 
EXERCISE Sets Reps Active Active Assist  
Passive Self ROM Comments Ankle Pumps 2 10  [x] [] [] [] Quad Sets/Glut Sets   [] [] [] [] Hamstring Sets   [] [] [] [] Short Arc Quads   [] [] [] [] Heel Slides   [] [] [] [] Straight Leg Raises   [] [] [] [] Hip Abd/Add 2 5 [x] [] [] [] Long Arc Quads 2 5 [x] [] [] [] Seated Marching   [] [] [] []   
Standing Marching   [] [] [] []   
   [] [] [] []   
 
Pain: 
Pain Scale 1: Numeric (0 - 10) Pain Intensity 1: 0 Activity Tolerance:  
Fair Please refer to the flowsheet for vital signs taken during this treatment. After treatment:  
[]  Patient left in no apparent distress sitting up in chair 
[x]  Patient left in no apparent distress in bed 
[x]  Call bell left within reach [x]  Nursing notified 
[]  Caregiver present 
[]  Bed alarm activated Shelly Giang, PT Time Calculation: 23 mins

## 2019-11-20 NOTE — PROGRESS NOTES
Hospitalist Progress Note Patient: Roselyn Mcleod MRN: [id number]  CSN: [id number] YOB: 1935  Age: 80 y.o. Sex: female DOA: 11/8/2019 LOS:  LOS: 12 days Chief Complaint: 
ARF Assessment/Plan ARF progressed to ESRD now on dilaysis-last session sat 11/19-tolerated UF and lower BP during session SOB-CXR shows small effusions and atelectasis Right Int Jug DVT noted on duplex-back on heparin and coumadin 
afib with RVR-stabilized Severe iron deficiency Iron def anemia-transfused 11/18 Coagulopathy due to coumadin-resolved Hyponatremia-improved after dialysis Weakness and debility Reversed code status twice now, has confirmed DNR with palliative care, chart updated Diastolic CHF, acute on chronic-better after dialysis Look for dialysis chair outpatient and plan for SNF placement Prognosis remains very poor, she is high risk for mortality with her age, debilitated status and renal failure Family full understands this Ms Ozzy Rodriguez wants to continue dialysis as tolerated Now DNR Disposition : 
Patient Active Problem List  
Diagnosis Code  Respiratory failure (HCC) J96.90  
 Heart failure (HCC) I50.9  Benign essential hypertension I10  Chronic diastolic congestive heart failure (HCC) I50.32  
 Cobalamin deficiency E53.8  Hypercholesterolemia E78.00  Hypertensive heart disease I11.9  Hypothyroidism E03.9  Iron deficiency anemia D50.9  Stage 4 chronic kidney disease ([facility] [address] N18.4  A-fib (HCC) I48.91  
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  
 ESRD needing dialysis (HCC) N18.6, Z99.2 Subjective: Ate a little more this am 
Weak Denies SOB or CP Review of systems: 
 
Constitutional: denies fevers, chills Gastrointestinal: denies nausea, vomiting, diarrhea Vital signs/Intake and Output: 
Visit Vitals /50 (BP 1 Location: Left arm, BP Patient Position: At rest) Pulse (!) 105 Temp 98 °F (36.7 °C) Resp 16 Ht 4' 11\" (1.499 m) Wt 66.1 kg (145 lb 11.6 oz) SpO2 98% BMI 29.43 kg/m² Current Shift:  No intake/output data recorded. Last three shifts:  11/18 1901 - 11/20 0700 In: 543.8 [P.O.:240] Out: 1500 Exam: 
 
General: frail elderly WF, NAD Head/Neck: NCAT, supple, No masses, No lymphadenopathy CVSirreg rhythm, reg rate no M/R/G, S1/S2 heard, no thrill Lungs:Clear to auscultation bilaterally, no wheezes, rhonchi, or rales Abdomen: Soft, Nontender, No distention, Normal Bowel sounds, No hepatomegaly Extremities: No C/C/E, pulses palpable 2+ Neuro:grossly normal , follows commands Psych:appropriate Labs: Results:  
   
Chemistry Recent Labs 11/19/19 
0426 11/18/19 
0325 GLU 89 98 * 136  
K 4.0 4.0  
CL 97* 97* CO2 29 32 BUN 55* 42* CREA 5.14* 4.25* CA 8.5 8.2* AGAP 7 7 BUCR 11* 10* CBC w/Diff Recent Labs  
  11/20/19 
0400 11/19/19 
1151 11/19/19 
0426 WBC 13.1 13.4* 11.8  
RBC 3.46* 3.68* 3.52* HGB 10.2* 11.0* 10.4* HCT 33.4* 35.3 33.6*  
* 124* 114* GRANS 88* 88*  --   
LYMPH 6* 6*  --   
EOS 0 1  --   
  
Cardiac Enzymes No results for input(s): CPK, CKND1, CUONG in the last 72 hours. No lab exists for component: Yumiko Biswas Coagulation Recent Labs  
  11/20/19 
0400 11/19/19 2010 11/19/19 
1022 PTP 19.2*  --   --  17.6* INR 1.6*  --   --  1.5* APTT >180.0* >180.0*   < >  --   
 < > = values in this interval not displayed. Lipid Panel No results found for: CHOL, CHOLPOCT, CHOLX, CHLST, CHOLV, 513509, HDL, HDLP, LDL, LDLC, DLDLP, 750656, VLDLC, VLDL, TGLX, TRIGL, TRIGP, TGLPOCT, CHHD, CHHDX  
BNP No results for input(s): BNPP in the last 72 hours. Liver Enzymes No results for input(s): TP, ALB, TBIL, AP, SGOT, GPT in the last 72 hours. No lab exists for component: DBIL Thyroid Studies Lab Results Component Value Date/Time TSH 2.53 11/08/2019 01:25 PM  
    
Procedures/imaging: see electronic medical records for all procedures/Xrays and details which were not copied into this note but were reviewed prior to creation of Plan Saulo Gastelum MD

## 2019-11-20 NOTE — PROGRESS NOTES
Transition of care; anticipate rehab when medically cleared Met with patient and family at bedside. Patient very drowsy is not participating in conversation. Daughter is aware tht cm has got a chair time for this patient for MWF at 1030 and first day would need to be there 30 minutes early. It is at [facility] [address]. Family would still like to go to [facility]. Cm has spoken with Hakeem Cummins LPN at [facility] she will let cm know if she is able to accommodate due to HD. She is aware family has been made aware of the transport and from HD would be up to family. Nelson Jamison will clet cm kow if they are able to accomodate. Consulted with Dr Zackary Payne once have accepting facility will start auth for Fri. Care Management Interventions PCP Verified by CM: Yes Mode of Transport at Discharge: BLS Transition of Care Consult (CM Consult):  [address]: Yes 
Partner SNF: Yes Physical Therapy Consult: Yes Occupational Therapy Consult: Yes Current Support Network: Other Confirm Follow Up Transport: Family Plan discussed with Pt/Family/Caregiver: Yes Freedom of Choice Offered: Yes Discharge Location Discharge Placement: Skilled nursing facility

## 2019-11-20 NOTE — PROGRESS NOTES
Pt refused PT due to: 
[]  Nausea/vomiting 
[]  Eating 
[]  Pain 
[]  Pt lethargic [x]  Other, pt is being cleaned by nursing staff. Will f/u later as schedule allows. Thank you.  
Tejas Henson, PT

## 2019-11-20 NOTE — CONSULTS
Palliative Medicine Consult [facility]: [phone] [facility]: [phone] [facility]: [phone] Patient Name: Sp Barnett YOB: 1935 Date of Initial Consult: 11/19/2019, 11/20/2019 Reason for Consult: care decisions Requesting Provider: Dr Imelda Felix Primary Care Physician: Babs Perdomo DO 
  
 SUMMARY:  
Sp Barnett is a 80y.o. year old with a past history of diastolic heart failure with preserved EF, CKD, COPD , who was admitted on 11/8/2019 from primary care MD office  with a diagnosis of new onset of A fib with RVR. Current medical issues leading to Palliative Medicine involvement include: 80year old weak appearing female with new onset of a fib with RVR, ARF which has progressed to ESRD requiring HD. Palliative medicine is consulted for clarification of goals of care. 11/19/2019 family at bedside. Ms Vikas Wagner resting comfortably in bed, eyes closed. Tolerated HD last night but increased sleep today. She is very weak and frail. Family is aware is not likely to do well in the long run. Patient wishes to continue to give HD a try. Goals of care DNR/DNI. PALLIATIVE DIAGNOSES:  
1. Advanced care plan discussion 2. ESRD requiring HD 3. A fib 4. Debility PLAN:  
1. 11/20/2019 follow up this am. Ms. Vikas Wagner was resting in bed. Daughter and son at bedside . \" Family tells me she did OK with HD but is now wiped out\". They are aware of how frail she is likely not to do well in the long term but are support her wish to trial HD and therapy at SNF. Goals of care have not changed DNR/DNI. POST completed by patient for DNR/DNI limited interventions, no feeding tubes. ( please below for previous conversations) 2. Advanced care plan discussions seen today along with Ms Olesya De Santiago. Kasey is weak, but alert oriented x 3.  Surrounded by her daughter Isauro Sexton, son Sheryl Can and sister. She has previously executed an AMD naming as co primary decision makers her daughter Timbo Cruz and son Sheryl Can. Sister Mindy Clark is secondary. Chart reviewed, patient had revered her goals of care decision from DNR to full code. Discussed with family prior to discussing with patient intent of discussion. They also would like to have goals of care clarified and present for discussion. Family and patient shared a large concern of patient's is if she has \" passed out \" if she is a DNR no one will help her\" . Discussed and supported but assured no matter her goals of care of care decisions, comfort and appropriate interventions were also going to be provided. She did tell us \" I want to try dialysis and if it is to hard or painful then I will know\" Goals of care discussed at some length. Shared with patient and family decision for DNR/DNI does not mean she cannot try dialysis. Benefits and burdens discussed at length especially in chronic illness and advanced age. Kasey was able to tells us when her heart stops then \" that is my time\". Clarified several times this meant no Chest compressions or shock. Family was satisfied she understood,when her heart stopped  no chest compressions or shock. Kasey was also able to tell us she would not wish for intubation for any reason. POST form discussed. Patient signed for DNR/DNI. Limited interventions at present time, and no feeding tube. Family supportive of her goals. They are also aware care discussions will be ongoing and if she does not tolerate dialysis or treatments become to  burdensome, discussions can ensue on focusing on a more comfort directed approach. Discussed with RN who is aware of goals of care. 3. ESRD now requiring HD nephrology on board. Last HD on 11/16/2019. Planned for today but needs clearance from vascular to use access. Patient wishes to continue to try and she how she does 4. A fib RVR improved cards on board 5. Debility weak, poor appetite. Prior to hospital stay lived with her son, independent for all ADL's at that time per son. PT on board but have not worked with patient yet. Highly doubt she will return to her baseline level of functioning. 6. Initial consult note routed to primary continuity provider 7. Communicated plan of care with: Palliative IDT Patient/Health Care Proxy Stated Goals: Prolong life TREATMENT PREFERENCES:  
Code Status: DNR Advance Care Planning: 
[] The [facility] Interdisciplinary Team has updated the ACP Navigator with [address] and Patient Capacity Primary Decision [facility] (Health Care Agent):   Primary Decision Maker: Regine Loco - [phone] Secondary Decision Maker: Leopold Cheek - [phone] Medical Interventions: Full interventions Artificially Administered Nutrition: No feeding tube Other: As far as possible, the palliative care team has discussed with patient / health care proxy about goals of care / treatment preferences for patient. HISTORY:  
 
History obtained from: chart, patient and family CHIEF COMPLAINT: weakness ESRD  
 
HPI/SUBJECTIVE: The patient is:  
[x] Verbal and participatory [] Non-participatory due to:  
Please see summary Clinical Pain Assessment (nonverbal scale for nonverbal patients): Clinical Pain Assessment Severity: 0 Duration: for how long has pt been experiencing pain (e.g., 2 days, 1 month, years) Frequency: how often pain is an issue (e.g., several times per day, once every few days, constant) FUNCTIONAL ASSESSMENT:  
 
Palliative Performance Scale (PPS): PPS: 30 
 
ECOG 
ECOG Status : Completely disabled PSYCHOSOCIAL/SPIRITUAL SCREENING:  
  
Any spiritual / Ep[employer]opalian concerns: 
[] Yes /  [x] No 
 
Caregiver Burnout: 
[] Yes /  [x] No /  [] No Caregiver Present Anticipatory grief assessment:  
[x] Normal  / [] Maladaptive  REVIEW OF SYSTEMS:  
 
 Positive and pertinent negative findings in ROS are noted above in HPI. The following systems were [x] reviewed / [] unable to be reviewed as noted in HPI Other findings are noted below. Systems: constitutional, ears/nose/mouth/throat, respiratory, gastrointestinal, genitourinary, musculoskeletal, integumentary, neurologic, psychiatric, endocrine. Positive findings noted below. Modified ESAS Completed by: provider Fatigue: 7 Drowsiness: 0 Pain: 0 Anxiety: 0 Nausea: 0 Anorexia: 5 Dyspnea: 0 Stool Occurrence(s): 1 PHYSICAL EXAM:  
 
Wt Readings from Last 3 Encounters:  
11/20/19 66.1 kg (145 lb 11.6 oz) 11/04/19 62.4 kg (137 lb 8 oz) 10/29/19 61.8 kg (136 lb 4.8 oz) Blood pressure 97/53, pulse (!) 103, temperature 97.8 °F (36.6 °C), resp. rate 14, height 4' 11\" (1.499 m), weight 66.1 kg (145 lb 11.6 oz), SpO2 100 %. Pain: 
Pain Scale 1: Numeric (0 - 10) Pain Intensity 1: 0 Last bowel movement: x 1 yesterday Constitutional: elderly weak although comfortable appearing female who is lying in bed in NAD Eyes: pupils equal, anicteric Respiratory: breathing not labored, nasal cannula in place Skin: warm, dry Neurologic: resting in bed. Alert but sleepy this am  
 
 
 HISTORY:  
 
Active Problems: 
  Stage 4 chronic kidney disease ([facility] [address] (8/23/2018) Overview: Last Assessment & Plan:  
    Last GFR on record was 25 on 04/15/2019. Will check chemistry profile  
    with today's labs. She is still followed by Nephrology but unfortunately  
    missed her last appointment with them on 06/26/2019. I told her that it  
    was important that she be seen on a regular basis by Nephrology. Currently her rescheduled appointment is on 12/26/2019. I am not certain  
    that that long of weight will be appropriate for her but will wait and see  
    what current labs look like.   If they are stable with regards to renal  
 function I suppose she can wait till December for her next visit. Continue current antihypertensive regimen. Hopefully with the every other  
    day dosage Ng of Lasix she will be maintaining stable BUN and creatinine. A-fib ([facility] (11/8/2019) Hyponatremia (11/15/2019) Weakness generalized (11/19/2019) Thrombosis of internal jugular vein ([facility] (11/19/2019) ESRD needing dialysis ([facility] (11/19/2019) Past Medical History:  
Diagnosis Date  Chronic kidney disease  Congestive heart failure (CHF) ([facility]  COPD (chronic obstructive pulmonary disease) (HCC) History reviewed. No pertinent surgical history. History reviewed. No pertinent family history. History reviewed, no pertinent family history. Social History Tobacco Use  Smoking status: Former Smoker  Smokeless tobacco: Never Used Substance Use Topics  Alcohol use: Not on file Allergies Allergen Reactions  Penicillins Hives  Valium [Diazepam] Other (comments) It made me cry more Current Facility-Administered Medications Medication Dose Route Frequency  Warfarin - Pharmacy to Dose  1 Each Other Rx Dosing/Monitoring  heparin 25,000 units in D5W 250 ml infusion  18-36 Units/kg/hr IntraVENous TITRATE  
 0.9% sodium chloride infusion 250 mL  250 mL IntraVENous PRN  
 iron sucrose (VENOFER) injection 200 mg  200 mg IntraVENous Q24H  
 dilTIAZem (CARDIZEM) IR tablet 30 mg  30 mg Oral TIDAC  epoetin jonathan-epbx (RETACRIT) injection 10,000 Units  10,000 Units SubCUTAneous DIALYSIS TUE, THU & SAT  magic mouthwash (FIRST-MOUTHWASH BLM) oral suspension 5 mL  5 mL Oral AC&HS  
 albumin human 25% (BUMINATE) solution 25 g  25 g IntraVENous DIALYSIS ONCE  
 heparin (porcine) 1,000 unit/mL injection 4,300 Units  4,300 Units InterCATHeter DIALYSIS PRN  
 albuterol-ipratropium (DUO-NEB) 2.5 MG-0.5 MG/3 ML  3 mL Nebulization Q4H PRN  
  guaiFENesin-dextromethorphan (ROBITUSSIN DM) 100-10 mg/5 mL syrup 5 mL  5 mL Oral Q6H PRN  
 nystatin (MYCOSTATIN) 100,000 unit/mL oral suspension 500,000 Units  500,000 Units Oral QID  [START ON 11/26/2019] amiodarone (CORDARONE) tablet 200 mg  200 mg Oral DAILY  polyethylene glycol (MIRALAX) packet 17 g  17 g Oral DAILY  amiodarone (CORDARONE) tablet 400 mg  400 mg Oral DAILY  ondansetron (ZOFRAN) injection 4 mg  4 mg IntraVENous Q6H PRN  zinc sulfate (ZINCATE) capsule 1 Cap  1 Cap Oral DAILY  aspirin delayed-release tablet 81 mg  81 mg Oral DAILY  atorvastatin (LIPITOR) tablet 40 mg  40 mg Oral DAILY  ergocalciferol capsule 50,000 Units  50,000 Units Oral Q7D  
 PARoxetine (PAXIL) tablet 20 mg  20 mg Oral DAILY  sodium chloride (NS) flush 5-40 mL  5-40 mL IntraVENous Q8H  
 sodium chloride (NS) flush 5-40 mL  5-40 mL IntraVENous PRN  
 acetaminophen (TYLENOL) tablet 650 mg  650 mg Oral Q4H PRN  
 HYDROcodone-acetaminophen (NORCO) 5-325 mg per tablet 1 Tab  1 Tab Oral Q4H PRN  
 morphine injection 2 mg  2 mg IntraVENous Q4H PRN  
 naloxone (NARCAN) injection 0.4 mg  0.4 mg IntraVENous PRN  
 
 
 LAB AND IMAGING FINDINGS:  
 
Lab Results Component Value Date/Time WBC 13.1 11/20/2019 04:00 AM  
 HGB 10.2 (L) 11/20/2019 04:00 AM  
 PLATELET 716 (L) 59/96/8508 04:00 AM  
 
Lab Results Component Value Date/Time Sodium 133 (L) 11/19/2019 04:26 AM  
 Potassium 4.0 11/19/2019 04:26 AM  
 Chloride 97 (L) 11/19/2019 04:26 AM  
 CO2 29 11/19/2019 04:26 AM  
 BUN 55 (H) 11/19/2019 04:26 AM  
 Creatinine 5.14 (H) 11/19/2019 04:26 AM  
 Calcium 8.5 11/19/2019 04:26 AM  
 Magnesium 2.2 11/09/2019 02:30 AM  
 Phosphorus 2.2 (L) 11/20/2019 04:00 AM  
  
Lab Results Component Value Date/Time AST (SGOT) 79 (H) 11/08/2019 01:25 PM  
 Alk.  phosphatase 146 (H) 11/08/2019 01:25 PM  
 Protein, total 6.8 11/08/2019 01:25 PM  
 Albumin 3.7 11/08/2019 01:25 PM  
 Globulin 3.1 11/08/2019 01:25 PM  
 
Lab Results Component Value Date/Time INR 1.6 (H) 11/20/2019 04:00 AM  
 Prothrombin time 19.2 (H) 11/20/2019 04:00 AM  
 aPTT >180.0 (HH) 11/20/2019 04:00 AM  
  
Lab Results Component Value Date/Time Iron 11 (L) 11/16/2019 09:10 AM  
 TIBC 271 11/16/2019 09:10 AM  
 Iron % saturation 4 11/16/2019 09:10 AM  
 Ferritin 395 (H) 11/16/2019 09:10 AM  
  
No results found for: PH, PCO2, PO2 No components found for: [place] Lab Results Component Value Date/Time CK 48 11/09/2019 02:30 AM  
 CK - MB 2.3 11/09/2019 02:30 AM  
  
 
   
 
Total time: 15 minutes Counseling / coordination time, spent as noted above: 10 minutes  
> 50% counseling / coordination: yes with patient, son, daughter Prolonged service was provided for  []30 min   []75 min in face to face time in the presence of the patient, spent as noted above. Time Start:  
Time End:  
Note: this can only be billed with 58752 (initial) or 84712 (follow up). If multiple start / stop times, list each separately.

## 2019-11-21 NOTE — ROUTINE PROCESS
0700: received beside shift report from Stella Pool RN (offgoing nurse) and assumed care of the pt. Assessed all current needs, spoke with the family briefly, updated white board, bed in lowest position and call light within reach. 1000: dialysis being performed on pt in room. 1430: dialysis completed, dialysis nurse stated that she pulled off a liter of fluid, and pt's dose of venofer was administered. 1600: purposeful hourly rounding, pt sitting up in bed with her son at the bedside resting quietly with the tv on. All current needs assessed, bed in lowest position and call light within reach. 1900: Shift summary, shift uneventful, no complaints of chest pain or shortness of breath.   
 
Alma Saeed RN

## 2019-11-21 NOTE — PROGRESS NOTES
In patient nephrology progress/HD note Admit Date:    11/8/2019 Name:  Roselyn Mcleod Age :  80 y.o.  
 
 
HPI:  
[de-identified] HARINDER Eden is a 80 y.o. WF with a PMHX of CHF, COPD who presents to the emergency department for newly Dx of afib, sent in by PCP Dr. Priscila Diaz . Patient was discharged 10/26 for CHF and went home with lasox 40 mg every other day . Patient has chronic kidney disease and follows /[facility]. Patient was last seen Dec 2018. Per records patient has stage 4 chronic kidney disease. Baseline cr was 1.77-1.9. Patient has chronic diastolic heart failure prone to biventricular decompensation, mitral regurgitation, pulmonary hypertension. Patient was admitted yesterday. Cardiology seen and placed on Heparin gtt. Patient has SOB with minimal exertion such as eating food . She reports follow low salt diet and denied use  NSAID exposure. Admission CXR showed central vascular congestion, increased in the interval from comparison exam. There are small bilateral pleural effusions present but no pneumothorax or pneumonic opacity. 
 
-Patient started on HD 11/15 Subjective:  
Seen on dialysis today. More awake/alert. Impression:  
-REZA on CKD, likely ATN, oliguric, initiated on HD 11/15. Remains dialysis dependent 
-CKD stage 3/4 w/ baseline Cr of 1.7-2.1. 
-Hyponatremia consistent with total body fluid retention /CHF. 
-Rt sided HF with Pulm HTN  
-Anemia of chronic disease, + Fe def 
-New onset atrial fibrillation RVR, Rate controlled,  
-Coagulopathy/warfarin induced, improved 
-Rt IJ acute DVT, restarted on anticoagulation Recommendations:  
- HD today - Epo w/ HD 
- transfuse PRN 
- Avoid ACEI/ARBs, NSAIDs or IV contrast, and other nephrotoxic meds 
- Continue to monitor UOP and Serum chemistry - Outpt placement in progress - Seen on HD, d/w pt and HD nurse Update: 
Discussed with , pt now has a MWF spot at [facility] and the team plans to d/c her tomorrow to rehab. Will need a short run of dialysis tomorrow prior to d/c. Orders in. 
 
 
Objective:  
Temp (24hrs), Av.7 °F (36.5 °C), Min:97.4 °F (36.3 °C), Max:97.9 °F (36.6 °C) -124 /57 No intake or output data in the 24 hours ending 19 8254 Last 3 Recorded Weights in this Encounter 19 8159 19 0325 19 8826 Weight: 67.3 kg (148 lb 6.4 oz) 66.1 kg (145 lb 11.6 oz) 64 kg (141 lb 1.6 oz) Physical Exam:  
General Appearance: NAD on HD Head: atraumatic HEENT: Sclera clear. Neck: Supple, no JVD or mass Chest: CTA jere, no w/c Heart: irregular rate Abdomen: obese non tender , BS active Skin: intact, no rash Extremity:  trace pedal edema Neuro: awake/alert HD access: Rt IJ TDC with good Qb 
  
 Data Review: BMP Lab Results Component Value Date/Time Sodium 133 (L) 2019 04:26 AM  
 Potassium 4.0 2019 04:26 AM  
 Chloride 97 (L) 2019 04:26 AM  
 CO2 29 2019 04:26 AM  
 Anion gap 7 2019 04:26 AM  
 Glucose 89 2019 04:26 AM  
 BUN 55 (H) 2019 04:26 AM  
 Creatinine 5.14 (H) 2019 04:26 AM  
 BUN/Creatinine ratio 11 (L) 2019 04:26 AM  
 GFR est AA 10 (L) 2019 04:26 AM  
 GFR est non-AA 8 (L) 2019 04:26 AM  
 Calcium 8.5 2019 04:26 AM  
 
 
CBC Lab Results Component Value Date/Time WBC 14.6 (H) 2019 04:32 AM  
 HGB 10.5 (L) 2019 04:32 AM  
 HCT 33.7 (L) 2019 04:32 AM  
 PLATELET 96 (L)  04:32 AM  
 MCV 96.8 2019 04:32 AM  
 
 
No components found for: PTHINT Lab Results Component Value Date IRON 11 (L) 2019 Caitlyn Goodrich MD 
VIA [facility] Office [phone]

## 2019-11-21 NOTE — PROGRESS NOTES
Pharmacy Dosing Services: Warfarin Consult for Warfarin Dosing by Pharmacy by Dr. Hilda Mckee Consult provided for this 80 y.o.  female , for indication of Atrial Fibrillation. Day of Therapy 3 Dose to achieve an INR goal of 2-3 Order entered for Warfarin 4 mg to be given today at 18:00. Significant drug interactions: Amiodarone / ASA 
 
PT/INR Lab Results Component Value Date/Time INR 1.6 (H) 11/21/2019 04:32 AM  
  
Platelets Lab Results Component Value Date/Time PLATELET 96 (L) 96/72/0929 04:32 AM  
  
H/H Lab Results Component Value Date/Time HGB 10.5 (L) 11/21/2019 04:32 AM  
  
 
Warfarin Administrations (last 168 hours) Date/Time Action Medication Dose  
 11/20/19 1900 Given  
 warfarin (COUMADIN) tablet 3 mg 3 mg  
 11/19/19 1755 Given  
 warfarin (COUMADIN) tablet 3 mg 3 mg  
 11/14/19 1845 Given  
 warfarin (COUMADIN) tablet 4 mg 4 mg Pharmacy to follow daily and will provide subsequent Warfarin dosing based on clinical status. Nadia Oates [facility] [place])  Contact information [phone]

## 2019-11-21 NOTE — PROGRESS NOTES
Speech Therapy Note: Follow up attempted. Could not progress with ST intervention because patient:   
 
[]  Lethargic, unable to be alerted enough for safe PO intake 
[]  Refused participation []  Off the unit []  NPO for procedure 
[x]  Other: on dialysis until ~1400 SLP will re-attempt treatment as schedule permits. Martín Tamayo M.S., CCC-SLP Speech Language Pathologist

## 2019-11-21 NOTE — PROGRESS NOTES
Hospitalist Progress Note Patient: Kaela Henry MRN: [id number]  CSN: [id number] YOB: 1935  Age: 80 y.o. Sex: female DOA: 11/8/2019 LOS:  LOS: 13 days Chief Complaint: 
 
ESRD Assessment/Plan Coughing up dark brown tinged sputum yesterday Antibiotics and mucinex started Heparin gtt d/c-ed 
 
cxr ordered this am as wbc rising slightly, as well as blood cultures-no overt pneumonia Possible pneumonia-added empiric doxy and merrem, reviewed CXR, no resp distress, pull 2 blood cx 
ARF progressed to ESRD now on dilaysis-last session sat 11/19-tolerated UF and lower BP during session Right Int Jug DVT noted on duplex 
afib with RVR-stabilized Severe iron deficiency-transfused 11/18 Hyponatremia-improved after dialysis Weakness and debility-needs rehab, uncertain prognosis with continued dialysis Diastolic CHF, acute on chronic-better after dialysis Again prognosis is poor Daily INR on coumadin At this juncture as she wants to continue dialysis and is stable to do so although not moving OOB or eating full meals-will work on placement to rehab and outpt dialysis chair-could d/c tomorrow if stable Dialysis today Prognosis remains poor Disposition : 
Patient Active Problem List  
Diagnosis Code  Respiratory failure ([facility]) J96.90  
 Heart failure ([facility]) I50.9  Benign essential hypertension I10  Chronic diastolic congestive heart failure (HCC) I50.32  
 Cobalamin deficiency E53.8  Hypercholesterolemia E78.00  Hypertensive heart disease I11.9  Hypothyroidism E03.9  Iron deficiency anemia D50.9  Stage 4 chronic kidney disease ([address] N18.4  A-fib (HCC) I48.91  
 Hyponatremia E87.1  Weakness generalized R53.1  Thrombosis of internal jugular vein ([facility]) I82. C19  
 ESRD needing dialysis ([facility]) N18.6, Z99.2 Subjective: 
 
Cough is better Denies SOB Weak still Not OOB at all Denies pain Did eat some last night Review of systems: 
 
Constitutional: denies fevers, chills Cardiovascular: denies chest pain, palpitations Gastrointestinal: denies nausea, vomiting Vital signs/Intake and Output: 
Visit Vitals /70 (BP 1 Location: Right arm, BP Patient Position: At rest) Pulse 95 Temp 97.8 °F (36.6 °C) Resp 16 Ht 4' 11\" (1.499 m) Wt 64 kg (141 lb 1.6 oz) SpO2 100% BMI 28.50 kg/m² Current Shift:  No intake/output data recorded. Last three shifts:  No intake/output data recorded. Exam: 
 
General:frail pale elderly Wf alert, NAD, OX3 Head/Neck: NCAT, supple, No masses, No lymphadenopathy CVS:irreg rhythm, reg rate, no M/R/G, S1/S2 heard, no thrill Lungs:Clear anteriorly, but dec BS bases Abdomen: Soft, Nontender, No distention, Normal Bowel sounds, No hepatomegaly Extremities: No C/C/E, pulses palpable 2+ Neuro:grossly normal , follows commands Psych:appropriate Labs: Results:  
   
Chemistry Recent Labs 11/19/19 
7894 GLU 89 * K 4.0  
CL 97* CO2 29 BUN 55* CREA 5.14* CA 8.5 AGAP 7  
BUCR 11* CBC w/Diff Recent Labs  
  11/21/19 
0432 11/20/19 
0400 11/19/19 
1151 WBC 14.6* 13.1 13.4*  
RBC 3.48* 3.46* 3.68* HGB 10.5* 10.2* 11.0*  
HCT 33.7* 33.4* 35.3 PLT 96* 105* 124* GRANS 84* 88* 88* LYMPH 5* 6* 6*  
EOS 4 0 1 Cardiac Enzymes No results for input(s): CPK, CKND1, CUONG in the last 72 hours. No lab exists for component: Evelyn Horner Coagulation Recent Labs  
  11/21/19 
0432 11/20/19 1510 11/20/19 
0400 PTP 18.4*  --  19.2* INR 1.6*  --  1.6* APTT  --  >180.0* >180.0* Lipid Panel No results found for: CHOL, CHOLPOCT, CHOLX, CHLST, CHOLV, 814578, HDL, HDLP, LDL, LDLC, DLDLP, 406248, VLDLC, VLDL, TGLX, TRIGL, TRIGP, TGLPOCT, CHHD, CHHDX  
BNP No results for input(s): BNPP in the last 72 hours. Liver Enzymes No results for input(s): TP, ALB, TBIL, AP, SGOT, GPT in the last 72 hours. No lab exists for component: DBIL Thyroid Studies Lab Results Component Value Date/Time TSH 2.53 11/08/2019 01:25 PM  
    
Procedures/imaging: see electronic medical records for all procedures/Xrays and details which were not copied into this note but were reviewed prior to creation of Plan Kassy Chase MD

## 2019-11-21 NOTE — PROGRESS NOTES
[facility] ACUTE HEMODIALYSIS FLOW SHEET  
 
PATIENT INFORMATION Hospital:     [facility]                      Dr. Kathryn Fernandez MD                          Room# 097 [x] Routine             [x]Bedside Isolation Precautions: [x]Dialysis  [x]  Standard Special Considerations: none   [] Blood Consent Verified  [x]N/A Allergies:[x]? YES Penicillins Valium [Diazepam] Code Status   [x] DNR Diet: [x] Renal []  Diabetic:       [x]No  
[x]Signed Treatment Consent Verified [x] Time Out/ Safety Check HEMODIALYSIS INPATIENT Duration (hr): 3.5; Dialyzer: Revaclear (300); Dialysate Bath:  K: 3; Dialysate Bath:  CA: 2.5; Dialysate Bath: Bicarb: 38; Sodium Profiling/Modeling: No; Profile (Ending / mEq/L): 138; Target Fluid Removal (mL): 1,000; Acce. .. Peggyann Nims (Order [id number]) Duration (hr) 3.5 Dialyzer Revaclear 300 Dialysate Bath:  K 3 Dialysate Bath:  CA 2.5 Dialysate Bath: Bicarb 38 Sodium Profiling/Modeling No   
Profile (Ending / mEq/L) 138 Target Fluid Removal (mL) 1,000 [employer] Blood Flow Rate (mL/min) 350 Dialysate Flow Rate (mL/min) 600 PRIMARY NURSE REPORT: FIRST INITIAL/ LAST NAME/TITLE 
                                                          PRE DIALYSIS:     Miki Gracia RN                                  TIME:  09:45am  
ACCESS CATHETER ACCESS: [] N/A  [x] RIGHT  [] LEFT  [x] IJ  [] SUBCL [] FEM [] First use X-ray  [x] Tunnel     [] Non-Tunneled [x] No S/S infection  [] Redness [] Drainage  [] Cultured [] Swelling [] Pain  
                 [x] Medical Aseptic Prep                       [x]   Dressing Changed Today 11/21/2019 [] Clotted   [x] Patent      Flows: [x] Good [] Poor [] Reversed If Access Problem Dr. Richter Rule: [] Yes [] No    Date:         [x] N/A   
GRAFT/FISTULA ACCESS:  [x] N/A    
  
  
  
GENERAL ASSESSMENT LUNGS:  SaO2%  100  [] Clear [] Coarse [] Crackles [] Wheezing  
                                        [x] Diminished Location: [] RLL [] LLL [] RUL [] RML [] NITA   
COUGH:  [x] Productive [] Dry [] N/A  RESPIRATIONS: [x] Easy [] Labored THERAPY: [] RA   [x] NC  4. L/min    Mask: [] NRB [] Venti  _____O2%    
             [] Ventilator [] Intubated [] Trach [] BiPap [] CPap [] HI Flow CARDIAC: [] Regular [x] Irregular (very irregular)  [] Pericardial Rub [] JVD Monitored Rhythm: A-Fib [] N/A  
EDEMA: [x] None [] Generalized [] Facial [] Pedal [] UE [] LE  
           [] Pitting [] 1 [] 2 [] 3 [] 4    [] Right [] Left [] Bilateral  
SKIN:    [x] Warm [] Hot [] Cold  [x] Dry [x] Pale [] Diaphoretic  
           [] Flushed [] Jaundiced [] Cyanotic [] Rash [] Weeping LOC:    [] Alert  Oriented to: [x] Person [x] Place [] Time  
          [] Confused [x] Lethargic [] Medicated [] Non-responsive GI/ABDOMEN: [] Flat [] Distended [x] Soft [] Firm [] Diarrhea [x] Bowel Sounds Present [] Nausea [] Vomiting PAIN: [x] 0 [] 1 [] 2 [] 3 [] 4 [] 5 [] 6 [] 7 [] 8 [] 9 [] 10 Scale 1-10 Action/Follow Up MOBILITY: [] Amb [] Amb/Assist [x] Bed  [] Wheelchair CURRENT LABS HBsAg ONLY: Date Drawn:     11/15/2019        [x] Negative HBsAb: Date Drawn:                11/17/2019       [x] Susceptible <10 Obtained/Reviewed Critical Results Called   Date when labs were drawn- 
Hgb-   
HGB Date Value Ref Range Status 11/21/2019 10.5 (L) 12.0 - 16.0 g/dL Final  
 
K-   
Potassium Date Value Ref Range Status 11/21/2019 4.0 3.5 - 5.5 mmol/L Final  
 
Ca-  
Calcium Date Value Ref Range Status 11/21/2019 8.1 (L) 8.5 - 10.1 MG/DL Final  
 
Bun-  
BUN Date Value Ref Range Status 11/21/2019 54 (H) 7.0 - 18 MG/DL Final  
 
Creat-  
Creatinine Date Value Ref Range Status 11/21/2019 4.39 (H) 0.6 - 1.3 MG/DL Final  
 
  
  
EDUCATION  
 Person Educated: [x] Patient [x] Other  Family Knowledge base: [] None [x] Minimal [] Substantial   
Barriers to learning  [x] None Preferred method of learning: [] Written [x] Oral [] Visual [] Hands on Topic: [x] Access Care [] S&S of infection [x] Fluid Management 
 [] K+  [x] Procedural  [] Albumin [] Medications [] Tx Options [] Transplant [] Diet [] Other Teaching Tools: [x] Explain [] Demonstration [] Handout  [] Video RO/HEMODIAYLSIS MACHINE SAFETY CHECKS- BEFORE EACH TREATMENT [x] THE [facility]: Machine Serial #1:  [id number]   RO Serial #[id number] [] THE [facility]: Machine Serial #2:  [id number]   RO Serial #[id number] Alarm Test: [x] Pass  Time 10:14am      [x] RO/Machine Log Complete    [x] Extracorporeal circuit Tested for integrity Dialyzer: Revaclear 300 Lot# [id number] exp 8/28/2022    Tubing: Nipro Lot# [id number] exp 1/31/2024     0.9% NS Lot# [id number] exp 6/30/2021 Dialysate: pH  7.4       Temp. 36.0         Conductivity: Meter  13.8      HD Machine  13.9      TCD 13.7 CHLORINE TESTING- BEFORE EACH TREATMENT AND EVERY 4 HOURS Total Chlorine: [x] Less than 0.1 ppm Time: 10:15am         2nd Check Time:  N/A 
(If greater than 0.1 ppm from Primary then every 30 minutes from Secondary) TREATMENT INIATION-WITH DIALYSIS PRECAUTIONS [x] All Connections Secured   [x] Saline Line Double Clamped    [x] Venous Parameters Set [x] Arterial Parameters Set [x] Prime Given 200 ml   [x] Air Foam Detector Engaged PRE-TREATMENT  
UF Calculations: Wt to lose: 1000ml(+) Oral: 0ml(+)IV Meds/Fluids/Blood prods 0ml(+) Prime/Rinse 500ml(=)Total UF Goal 1500mL Scale Type:[x] Bed scale        70.0 [x] kg Tx Initiation Note:  
Each catheter limb disinfected for 60 seconds per limb with alcohol swabs. Caps removed, dialysis CVC hub scrubbed with Prevantics for 5 seconds, followed by a 5 second dry time per policy Initial goal set for 1000ml as tolerated per orders. No acute distress. Access & lines visible, secure & intact. Vitals   Pre-Dialysis Temp  Temp: 97.8 °F (36.6 °C) (11/21/19 1015) HR   Pulse (Heart Rate): 96 (11/21/19 1015) B/P   BP: 111/62 (11/21/19 1015) Resp   Resp Rate: 16 (11/21/19 1015) Pain level  Pain Intensity 1: 0   Denies discomfort. [x] Time Out/Safety Check  Time:  09:45am  
INTRADIALYTIC MONITORING 
(SEE ATTACHED FLOWSHEET) POST TREATMENT Time Medication Dose Volume Route Initials None [facility] Signatures Title Initials Time Nikhil Cordoba RN AT  10:30am  
     
     
Dialyzer cleared: [x] Good [] Fair [] Poor Blood Volume Processed  70.0 L Net UF Removed  1000 mL Post Tx Access:   
Each catheter limb disinfected for 60 seconds per limb with alcohol swabs. Dialysis CVC hubs scrubbed with Prevantics for 5 seconds, followed by a 5 second dry time per policy, red dialysis caps applied to ports. Catheter: Locking Solution  [] Heparin 1 ml/1000 units [] Normal Saline [x] New caps applied Art. 2.1 ml Adolfo. 2.2 ml 
Post Assessment:   
          Skin: [x]Warm [x]Dry []Diaphoretic []Flushed [] Pale []Cyanotic Lungs: []Clear []Coarse []Crackles [x] Diminished Cardiac: []Regular [x]Irregular  [x]Monitored rhythm A-Fib   []N/A Edema: [x]None []General []Facial []Pedal  []UE []LE []RIGHT []LEFT Pain: [x]0 []1 []2 []3 []4 []5 []6 []7 []8 []9 []10  
POST Tx Note: Net UF= 1,000ml. Patient tolerated full 3.5 hr on dialysis without complications. No acute distress. No change in assessment. Patient stable post-dialysis, denies complaints. Staff RN at the bedside, report given. Vitals   Post-Dialysis Temp  Temp: 97.3  °F    
HR   Pulse (Heart Rate):  102 B/P   BP:  108/57 Resp   Resp Rate: 16   Spo2 100% on Humidified o2 4/lpm NC post-HD. Pain level  Pain Intensity 1: 0  Denies discomfort. Primary Nurse Report: First initial/Last name/Title Post Dialysis:      Nabil Chaparro RN          Time:  14:20pm      
Abbreviations: AVG-arterial venous graft, AVF-arterial venous fistula, IJ-Internal Jugular, Subcl-Subclavian, Fem-Femoral, Tx-treatment, AP/HR-apical heart rate, DFR-dialysate flow rate, BFR-blood flow rate, AP-arterial pressure, -venous pressure, UF-ultrafiltrate, TMP-transmembrane pressure, Adolfo-Venous, Art-Arterial, RO-Reverse Osmosis [x]?  Renal Failure (Adult) Interdisciplinary · Fluid and electrolytes stabilized ? Interventions · Dehydration signs and symptoms (eg: Weight/lab monitoring; vomiting/diarrhea/urine; tenting; mucous membranes;  
· dizziness/lethargy/irritability/confusion; weak pulse; tachycardia; blood pressure; I&O) · Fluid overload signs and symptoms assessment (eg: Body weight increased; dyspnea; edema; hypertension;  
· respiratory crackles/wheezing; JVD; lab monitoring; mental status changes; I&O) · Monitor appropriate lab values · COMPLIANCE WITH PRESCRIBED THERAPY · ARTERIAL ACCESS SITE ASSESSMENT 
· NUTRITION SCREENING 
· Vital signs monitoring per assessed patient condition or unit standard · Cardiac monitoring · Hydration management · Intake and output measurement · Body weight monitoring · Skin care · DIALYSIS 
· Nutrition Care Process per nutrition screen · Oral hygiene care every 2 hours · Pain management 
  
· Outcome [x]?  Progressing Towards Goal 
? []? Not Progressing Towards Goals 
? []? Goals Met/Resolved 
? []? Goals Not Met/ Resolved

## 2019-11-21 NOTE — WOUND CARE
Pt assessed by wound care during quarterly skin prevalence. Pt has current rebecca score of 16 , Per nurse skin intact. Pt currently dialyzing. Wound care will be available if needed during this hospitalization.

## 2019-11-21 NOTE — PROGRESS NOTES
Pt refused PT due to: 
[]  Nausea/vomiting 
[]  Eating 
[]  Pain 
[]  Pt lethargic [x]  Dialysis until ~ 1pm 
Will f/u later as schedule allows. Thank you.  
Lay Ruby, PT

## 2019-11-21 NOTE — PROGRESS NOTES
Problem: Chronic Renal Failure Goal: *Fluid and electrolytes stabilized Outcome: Progressing Towards Goal 
  
Problem: Patient Education: Go to Patient Education Activity Goal: Patient/Family Education Outcome: Progressing Towards Goal 
Intervention: Monitor/Manage Fluid Electrolyte/Acid Base Balance 
  
PROBLEM: HEMODIALYSIS ADULT 
  
INTERVENTION: Hemodynamic stabilization Monitor and maintain BP WNL for this patient while on hemodialysis. 
  
INTERVENTION: Fluid Management UF goal 1000 ml Net fluid removal as tolerated. 
  
INTERVENTION: Metabolic / Electrolyte Imbalance Management 
3K+/2.5Ca++ Dialysate today per orders. Serum K+ 4.0. 
  
GOAL: Signs and symptoms of listed potential problems will be absent or manageable 
(reference Hemodialysis ADULT CPG)

## 2019-11-21 NOTE — PROGRESS NOTES
Problem: Pain Goal: *Control of Pain Outcome: Progressing Towards Goal 
Goal: *PALLIATIVE CARE:  Alleviation of Pain Outcome: Progressing Towards Goal 
  
Problem: Patient Education: Go to Patient Education Activity Goal: Patient/Family Education Outcome: Progressing Towards Goal 
  
Problem: Gas Exchange - Impaired Goal: *Absence of hypoxia Outcome: Progressing Towards Goal 
  
Problem: Patient Education: Go to Patient Education Activity Goal: Patient/Family Education Outcome: Progressing Towards Goal 
  
Problem: Pressure Injury - Risk of 
Goal: *Prevention of pressure injury Description Document Joe Scale and appropriate interventions in the flowsheet. Outcome: Progressing Towards Goal 
Note: Pressure Injury Interventions: 
  
 
Moisture Interventions: Absorbent underpads, Check for incontinence Q2 hours and as needed, Offer toileting Q_hr, Minimize layers, Maintain skin hydration (lotion/cream) Activity Interventions: Increase time out of bed, PT/OT evaluation, Pressure redistribution bed/mattress(bed type) Mobility Interventions: Assess need for specialty bed, Pressure redistribution bed/mattress (bed type), PT/OT evaluation, Turn and reposition approx. every two hours(pillow and wedges) Nutrition Interventions: Document food/fluid/supplement intake, Offer support with meals,snacks and hydration Friction and Shear Interventions: Feet elevated on foot rest, Minimize layers, HOB 30 degrees or less, Apply protective barrier, creams and emollients Problem: Patient Education: Go to Patient Education Activity Goal: Patient/Family Education Outcome: Progressing Towards Goal 
  
Problem: Patient Education: Go to Patient Education Activity Goal: Patient/Family Education Outcome: Progressing Towards Goal 
  
Problem: Nutrition Deficit Goal: *Optimize nutritional status Outcome: Progressing Towards Goal 
  
Problem: Chronic Renal Failure Goal: *Fluid and electrolytes stabilized Outcome: Progressing Towards Goal 
  
Problem: Patient Education: Go to Patient Education Activity Goal: Patient/Family Education Outcome: Progressing Towards Goal 
  
Problem: Patient Education: Go to Patient Education Activity Goal: Patient/Family Education Outcome: Progressing Towards Goal 
 
Makenzie Lerma RN

## 2019-11-21 NOTE — PROGRESS NOTES
NUTRITION FOLLOW-UP 
 
RECOMMENDATIONS/PLAN:  
- Other: rec appetite stimulant - Monitor labs/lytes, BM, PO intake, skin integrity, wt, fluid status NUTRITION ASSESSMENT:  
Client Update: 80 yrs old Female with CKD stage 4, a-fib, hyponatremia, weakness, thrombosis. Needs dialysis, iron def anemia-transfusion 11/18 FOOD/NUTRITION INTAKE Diet Order:  Dysphagia pureed Supplements: ensure pudding Food Allergies: NKFA Average PO Intake:     
Patient Vitals for the past 100 hrs: 
 % Diet Eaten 11/17/19 1055 10 % Pertinent Medications:  [x] Reviewed; ergo califerol, zofran, miralax, warfarin, zinc sulfate Electrolyte Replacement Protocol: []K []Mg []PO4 Insulin:  []SSI  []Pre-meal   []Basal    []Drip  [x]None Cultural/Congregational Food Preferences: None Identified BIOCHEMICAL DATA & MEDICAL TESTS Pertinent Labs:  [x] Reviewed; phos-2.0   ANTHROPOMETRICS Height: 4' 11\" (149.9 cm)       Weight: 64 kg (141 lb 1.6 oz) BMI: 28.5 kg/m^2 overweight (25.0%-29.9% BMI) Adm Weight: 154 lbs                Weight change: -13#, pt not eating Adjusted Body Weight: n/a NUTRITION-FOCUSED PHYSICAL ASSESSMENT Skin: no PU   
GI: +BM 11/19 NUTRITION PRESCRIPTION Calories:0198-8703 kcal/day based on 30-35 kcal/kg  
Protein: 42-56 g/day based on .6-.8 g/kg PRZKL: 3956-6662 /UJ based on 1 kcal/ml   
 
NUTRITION DIAGNOSES:  
1. Inadequate oral intake related to decrease in appetite as evidenced by pt report. NUTRITION INTERVENTIONS:  
INTERVENTIONS:        GOALS: 
1. Other: [facility] appetite supplement 1. Encourage PO intake >75% by next review 4 days LEARNING NEEDS (Diet, Supplementation, Food/Nutrient-Drug Interaction): 
 [] None Identified   [] Education provided/documented      Identified and patient: [] Declined   [x] Was not appropriate/indicated NUTRITION MONITORING /EVALUATION:  
Follow PO intake Monitor wt Monitor renal labs, electrolytes, fluid status Monitor for additional supplement needs Previous Recommendations Implemented: yes Previous Goals Met:  yes - 
   
[] Participated in Interdisciplinary Rounds   
[x] Interdisciplinary Care Plan Reviewed DISCHARGE NUTRITION RECOMMENDATIONS ADDRESSED:  
   [x] To be determined closer to discharge NUTRITION RISK:           [x] At risk                        [] Not currently at risk Will follow-up per policy. Nelson Barton 9

## 2019-11-21 NOTE — PROGRESS NOTES
Transition of care[de-identified] anticipate rehab Met with patient at bedside she currently was getting HD. Cm spoke with her daughter and brother. Informed them that patient is ready for d/c tomorrow per MD. Cm asked what facility they would like for her to go to. They again said [facility]. Cm has sent referral to Esau Gambino she has since returned call and she is able to accommodate but needs auth from insurance. Cm has called in [facility] to [place]. No plans for d/c today anticipate tomorrow. Family is aware that patient. . family is aware that patient would need transportation to and from HD. Cm will arrange for the first trip to HD. famil is awre they would be responsible for getting her to and from HD on MWF 1030 at [facility] [address] on [address]. All of the above is still pending [facility] Son did inform cm patient wants to go home. They asked if there are options informed them yes there are other optionns if they wanted  To take her home they could. However, they have concerns of being able to care for her. Did inform them there is persona care they can ask to pay for privately. They did ask about personal care. Cm has asked med assist to screen pateint for personal care or ltc she does have medicare. They will let cm know their family decisions. Patient not sherrie to respond to cm at this time. They did express that it would be a burden to take her home did not feel she could be cred for at the level of are she will need. They asked cm if they did not get approval what is thenext step. Informed them unless they were able to privaltey pay she would be d/c home with family. They wanted to talk as family Cm looked under tohe [facility] [place] and [facility] was successfully processed Cm has asked [employer] to research about screening Michelle mccarthy she will

## 2019-11-21 NOTE — PROGRESS NOTES
Bedside and Verbal shift change report given to ANTONY Mccallum (oncoming nurse) by BUZZ Zuniga, RN (offgoing nurse). Report included the following information SBAR, Kardex, Intake/Output, MAR and Recent Results.

## 2019-11-21 NOTE — PROGRESS NOTES
Cardiology Progress Note Patient: Ciara Anderson        Sex: female          DOA: 11/8/2019 YOB: 1935      Age:  80 y.o.        LOS:  LOS: 13 days Assessment/Plan Active Problems: 
  Stage 4 chronic kidney disease ([facility] [address] (8/23/2018) Overview: Last Assessment & Plan:  
    Last GFR on record was 25 on 04/15/2019. Will check chemistry profile  
    with today's labs. She is still followed by Nephrology but unfortunately  
    missed her last appointment with them on 06/26/2019. I told her that it  
    was important that she be seen on a regular basis by Nephrology. Currently her rescheduled appointment is on 12/26/2019. I am not certain  
    that that long of weight will be appropriate for her but will wait and see  
    what current labs look like. If they are stable with regards to renal  
    function I suppose she can wait till December for her next visit. Continue current antihypertensive regimen. Hopefully with the every other  
    day dosage Ng of Lasix she will be maintaining stable BUN and creatinine. A-fib ([facility] [address] (11/8/2019) Hyponatremia (11/15/2019) Weakness generalized (11/19/2019) Thrombosis of internal jugular vein ([facility] [address] (11/19/2019) ESRD needing dialysis ([facility] [address] (11/19/2019) Plan: No more hemoptysis On warfarin INR 1.6, continue as per protocol On HD 
afib rate stable and low normal BP feeling stable Subjective:  
 cc: SOB/hemoptysis 
afib 
diastolic heart failure Renal failure DVT REVIEW OF SYSTEMS:  
 
General: No fevers or chills. + bruises Cardiovascular: No chest pain or pressure. No palpitations. No ankle swelling Pulmonary: + SOB, orthopnea, PND Gastrointestinal: No nausea, vomiting or diarrhea Objective:  
  
Visit Vitals /55 Pulse 90 Temp 97.8 °F (36.6 °C) (Oral) Resp 16 Ht 4' 11\" (1.499 m) Wt 64 kg (141 lb 1.6 oz) SpO2 100% BMI 28.50 kg/m² Body mass index is 28.5 kg/m². Physical Exam: 
General Appearance: Comfortable, not using accessory muscles of respiration. NECK: No JVD, no thyroidomeglay. LUNGS: Clear bilaterally. HEART: S1 variable, irregular +S2 audible, ABD: Non-tender, BS Audible EXT: minimal edema, and no cysnosis. VASCULAR EXAM: Pulses are intact. PSYCHIATRIC EXAM: Mood is appropriate. Medication: 
Current Facility-Administered Medications Medication Dose Route Frequency  doxycycline (VIBRAMYCIN) 100 mg in 0.9% sodium chloride (MBP/ADV) 100 mL MBP  100 mg IntraVENous Q12H  warfarin (COUMADIN) tablet 4 mg  4 mg Oral ONCE  
 guaiFENesin ER (MUCINEX) tablet 600 mg  600 mg Oral Q12H  
 meropenem (MERREM) 500 mg in sterile water (preservative free) 10 mL IV syringe  0.5 g IntraVENous Q24H  nystatin (MYCOSTATIN) 100,000 unit/mL oral suspension 500,000 Units  500,000 Units Oral QID  Warfarin - Pharmacy to Dose  1 Each Other Rx Dosing/Monitoring  0.9% sodium chloride infusion 250 mL  250 mL IntraVENous PRN  
 iron sucrose (VENOFER) injection 200 mg  200 mg IntraVENous Q24H  
 dilTIAZem (CARDIZEM) IR tablet 30 mg  30 mg Oral TIDAC  epoetin jonathan-epbx (RETACRIT) injection 10,000 Units  10,000 Units SubCUTAneous DIALYSIS TUE, THU & SAT  magic mouthwash (FIRST-MOUTHWASH BLM) oral suspension 5 mL  5 mL Oral AC&HS  
 albumin human 25% (BUMINATE) solution 25 g  25 g IntraVENous DIALYSIS ONCE  
 heparin (porcine) 1,000 unit/mL injection 4,300 Units  4,300 Units InterCATHeter DIALYSIS PRN  
 albuterol-ipratropium (DUO-NEB) 2.5 MG-0.5 MG/3 ML  3 mL Nebulization Q4H PRN  
 guaiFENesin-dextromethorphan (ROBITUSSIN DM) 100-10 mg/5 mL syrup 5 mL  5 mL Oral Q6H PRN  
 [START ON 11/26/2019] amiodarone (CORDARONE) tablet 200 mg  200 mg Oral DAILY  polyethylene glycol (MIRALAX) packet 17 g  17 g Oral DAILY  amiodarone (CORDARONE) tablet 400 mg  400 mg Oral DAILY  ondansetron (ZOFRAN) injection 4 mg  4 mg IntraVENous Q6H PRN  zinc sulfate (ZINCATE) capsule 1 Cap  1 Cap Oral DAILY  aspirin delayed-release tablet 81 mg  81 mg Oral DAILY  atorvastatin (LIPITOR) tablet 40 mg  40 mg Oral DAILY  ergocalciferol capsule 50,000 Units  50,000 Units Oral Q7D  
 PARoxetine (PAXIL) tablet 20 mg  20 mg Oral DAILY  sodium chloride (NS) flush 5-40 mL  5-40 mL IntraVENous Q8H  
 sodium chloride (NS) flush 5-40 mL  5-40 mL IntraVENous PRN  
 acetaminophen (TYLENOL) tablet 650 mg  650 mg Oral Q4H PRN  
 HYDROcodone-acetaminophen (NORCO) 5-325 mg per tablet 1 Tab  1 Tab Oral Q4H PRN  
 morphine injection 2 mg  2 mg IntraVENous Q4H PRN  
 naloxone (NARCAN) injection 0.4 mg  0.4 mg IntraVENous PRN Lab/Data Reviewed: 
Procedures/imaging: see electronic medical records for all procedures/Xrays  
and details which were not copied into this note but were reviewed prior to creation of Plan 
  
 
All lab results for the last 24 hours reviewed. Recent Labs  
  11/21/19 
0432 11/20/19 
0400 11/19/19 
1151 WBC 14.6* 13.1 13.4* HGB 10.5* 10.2* 11.0*  
HCT 33.7* 33.4* 35.3 PLT 96* 105* 124* Recent Labs  
  11/21/19 
0914 11/19/19 
0426  133* K 4.0 4.0  
CL 96* 97* CO2 33* 29  
GLU 98 89 BUN 54* 55* CREA 4.39* 5.14* CA 8.1* 8.5 Signed By: Queenie Brice MD   
 November 21, 2019

## 2019-11-21 NOTE — PROGRESS NOTES
Pt refused PT due to: 
[]  Nausea/vomiting 
[]  Eating 
[]  Pain 
[x]  Pt lethargic, pt currently sleeping, pt requested PT for tomorrow. []  Off Unit Will f/u later as schedule allows. Thank you.  
Millie Schaeffer, PT

## 2019-11-21 NOTE — PROGRESS NOTES
Bedside shift change report given to Elida Ramos RN (oncoming nurse) by Abelino Palma RN (offgoing nurse). Report included the following information SBAR, Kardex, Intake/Output, MAR and Cardiac Rhythm afib. Visit Vitals /57 Pulse (!) 102 Temp 97.3 °F (36.3 °C) (Oral) Resp 16 Ht 4' 11\" (1.499 m) Wt 64 kg (141 lb 1.6 oz) SpO2 100% BMI 28.50 kg/m² Abelino Palma RN

## 2019-11-22 NOTE — PROGRESS NOTES
In patient nephrology progress/HD note Admit Date:    2019 Name:  Danny Quesada Age :  80 y.o.  
 
 
HPI:  
[de-identified] HARINDER Eden is a 80 y.o. WF with a PMHX of CHF, COPD who presents to the emergency department for newly Dx of afib, sent in by PCP Dr. Ferne Fleischer . Patient was discharged 10/26 for CHF and went home with lasox 40 mg every other day . Patient has chronic kidney disease and follows /[facility]. Patient was last seen Dec 2018. Per records patient has stage 4 chronic kidney disease. Baseline cr was 1.77-1.9. Patient has chronic diastolic heart failure prone to biventricular decompensation, mitral regurgitation, pulmonary hypertension. Patient was admitted yesterday. Cardiology seen and placed on Heparin gtt. Patient has SOB with minimal exertion such as eating food . She reports follow low salt diet and denied use  NSAID exposure. Admission CXR showed central vascular congestion, increased in the interval from comparison exam. There are small bilateral pleural effusions present but no pneumothorax or pneumonic opacity. 
 
-Patient started on HD 11/15 Subjective:  
Pt w/o new complaints. Tolerated short dialysis today. Impression:  
-REZA on CKD, likely ATN, oliguric, initiated on HD 11/15. Remains dialysis dependent 
-CKD stage 3/4 w/ baseline Cr of 1.7-2.1. 
-Hyponatremia consistent with total body fluid retention /CHF. 
-Rt sided HF with Pulm HTN  
-Anemia of chronic disease, + Fe def 
-New onset atrial fibrillation RVR, Rate controlled  
-Coagulopathy/warfarin induced, improved 
-Rt IJ acute DVT, restarted on anticoagulation Recommendations:  
- HD today to keep her on MWF schedule - Epo w/ HD 
- transfuse PRN 
- Avoid ACEI/ARBs, NSAIDs or IV contrast, and other nephrotoxic meds 
- Continue to monitor UOP and Serum chemistry - Pt now has a MWF HD chair at [facility] Objective:  
Temp (24hrs), Av.8 °F (36.6 °C), Min:97.3 °F (36.3 °C), Max:98.3 °F (36.8 °C) -124 /57 Intake/Output Summary (Last 24 hours) at 11/22/2019 1432 Last data filed at 11/22/2019 1215 Gross per 24 hour Intake 380 ml Output 700 ml Net -320 ml Last 3 Recorded Weights in this Encounter 11/20/19 0718 11/21/19 1767 11/22/19 1047 Weight: 66.1 kg (145 lb 11.6 oz) 64 kg (141 lb 1.6 oz) 69.3 kg (152 lb 11.2 oz) Physical Exam:  
General Appearance: NAD on HD Head: atraumatic HEENT: Sclera clear. Neck: Supple, no JVD or mass Chest: CTA jere, no w/c Heart: irregular rate Abdomen: obese non tender , BS active Skin: intact, no rash Extremity:  trace pedal edema Neuro: awake/alert HD access: Rt IJ TDC 
  
 Data Review: BMP Lab Results Component Value Date/Time Sodium 140 11/22/2019 03:30 AM  
 Potassium 3.6 11/22/2019 03:30 AM  
 Chloride 101 11/22/2019 03:30 AM  
 CO2 33 (H) 11/22/2019 03:30 AM  
 Anion gap 6 11/22/2019 03:30 AM  
 Glucose 104 (H) 11/22/2019 03:30 AM  
 BUN 27 (H) 11/22/2019 03:30 AM  
 Creatinine 2.47 (H) 11/22/2019 03:30 AM  
 BUN/Creatinine ratio 11 (L) 11/22/2019 03:30 AM  
 GFR est AA 23 (L) 11/22/2019 03:30 AM  
 GFR est non-AA 19 (L) 11/22/2019 03:30 AM  
 Calcium 7.7 (L) 11/22/2019 03:30 AM  
 
 
CBC Lab Results Component Value Date/Time WBC 10.8 11/22/2019 03:30 AM  
 HGB 10.2 (L) 11/22/2019 03:30 AM  
 HCT 33.0 (L) 11/22/2019 03:30 AM  
 PLATELET 558 (L) 69/87/5128 03:30 AM  
 MCV 98.2 (H) 11/22/2019 03:30 AM  
 
 
No components found for: PTHINT Lab Results Component Value Date IRON 11 (L) 11/16/2019 Alvin Fulton MD 
[facility] [phone]

## 2019-11-22 NOTE — PROGRESS NOTES
Pharmacy Dosing Services: Warfarin Consult for Warfarin Dosing by Pharmacy by Dr. Emily Birmingham Consult provided for this 80 y.o.  female , for indication of Atrial Fibrillation. Day of Therapy 04 Dose to achieve an INR goal of 2-3 HOLD Warfarin dose today due to significant increase in INR (1.6 --> 2.5). Significant drug interactions: Amiodarone (400mg daily until 11/25, then 200mg daily) & ASA 
 
PT/INR Lab Results Component Value Date/Time INR 2.5 (H) 11/22/2019 03:30 AM  
  
Platelets Lab Results Component Value Date/Time PLATELET 695 (L) 90/73/0955 03:30 AM  
  
H/H Lab Results Component Value Date/Time HGB 10.2 (L) 11/22/2019 03:30 AM  
  
 
Warfarin Administrations (last 168 hours) Date/Time Action Medication Dose  
 11/21/19 1723 Given  
 warfarin (COUMADIN) tablet 4 mg 4 mg  
 11/20/19 1900 Given  
 warfarin (COUMADIN) tablet 3 mg 3 mg  
 11/19/19 1755 Given  
 warfarin (COUMADIN) tablet 3 mg 3 mg Pharmacy to follow daily and will provide subsequent Warfarin dosing based on clinical status. ALBER Kessler  Contact information: [phone]

## 2019-11-22 NOTE — PROGRESS NOTES
[facility] [place] ACUTE HEMODIALYSIS FLOW SHEET  
 
PATIENT INFORMATION Hospital:     [facility]                      Dr. Marisela Woods MD                          Room# 772 [x] Routine             [x]Bedside Isolation Precautions: [x]Dialysis  [x]  Standard Special Considerations: none   [] Blood Consent Verified  [x]N/A Allergies:[x]? YES Penicillins Valium [Diazepam] Code Status   [x] DNR Diet: [x] Renal []  Diabetic:       [x]No  
[x]Signed Treatment Consent Verified [x] Time Out/ Safety Check HEMODIALYSIS INPATIENT Duration (hr): 2.5; Dialyzer: Revaclear (300); Dialysate Bath:  K: 3; Dialysate Bath:  CA: 2.5; Dialysate Bath: Bicarb: 38; Sodium Profiling/Modeling: No; Profile (Ending / mEq/L): 138; Target Fluid Removal (mL): 1,000; Acce. .. Juanito Maddiehilary (Order [id number]) Duration (hr) 2.5 Dialyzer Revaclear 300 Dialysate Bath:  K 3 Dialysate Bath:  CA 2.5 Dialysate Bath: Bicarb 38 Sodium Profiling/Modeling No   
Profile (Ending / mEq/L) 138 Target Fluid Removal (mL) 1,000 Wengo Blood Flow Rate (mL/min) 350 Dialysate Flow Rate (mL/min) 600 PRIMARY NURSE REPORT: FIRST INITIAL/ LAST NAME/TITLE 
                                                                    PRE DIALYSIS:  Heath Whitt RN                             TIME:  09:30am  
ACCESS CATHETER ACCESS: [] N/A  [x] RIGHT  [] LEFT  [x] IJ  [] SUBCL [] FEM [] First use X-ray  [x] Tunnel     [] Non-Tunneled [x] No S/S infection  [] Redness [] Drainage  [] Cultured [] Swelling [] Pain  
                 [x] Medical Aseptic Prep                       []   Dressing Changed   11/21/2019 [] Clotted   [x] Patent      Flows: [x] Good [] Poor [] Reversed If Access Problem Dr. Ramon Ponce: [] Yes [] No    Date:         [x] N/A   
GRAFT/FISTULA ACCESS:  [x] N/A    
  
  
  
GENERAL ASSESSMENT LUNGS:  SaO2%  100  [] Clear [] Coarse [] Crackles [] Wheezing  
                                        [x] Diminished Location: [] RLL [] LLL [] RUL [] RML [] NITA   
COUGH:  [] Productive    Less frequent cough. [x] Dry [] N/A  RESPIRATIONS: [x] Easy [] Labored THERAPY: [] RA   [x] NC  4. L/min    Mask: [] NRB [] Venti  _____O2%    
             [] Ventilator [] Intubated [] Trach [] BiPap [] CPap [] HI Flow CARDIAC: [] Regular [x] Irregular (very irregular)  [] Pericardial Rub [] JVD Monitored Rhythm: A-Fib [] N/A  
EDEMA: [x] None [] Generalized [] Facial [] Pedal [] UE [] LE  
           [] Pitting [] 1 [] 2 [] 3 [] 4    [] Right [] Left [] Bilateral  
SKIN:    [x] Warm [] Hot [] Cold  [x] Dry [x] Pale [] Diaphoretic   
           [] Flushed [] Jaundiced [] Cyanotic [] Rash [] Weeping LOC:    [] Alert  Oriented to: [x] Person [x] Place [x] Time  
          [] Confused [x] Lethargic/Drowsy  [] Medicated [] Non-responsive  Patient rouses easily for assessment. Cooperative. GI/ABDOMEN: [] Flat [] Distended [x] Soft [] Firm [] Diarrhea [x] Bowel Sounds Present [] Nausea [] Vomiting PAIN: [x] 0 [] 1 [] 2 [] 3 [] 4 [] 5 [] 6 [] 7 [] 8 [] 9 [] 10 Scale 1-10 Action/Follow Up MOBILITY: [] Amb [] Amb/Assist [x] Bed  [] Wheelchair CURRENT LABS HBsAg ONLY: Date Drawn:     11/15/2019        [x] Negative HBsAb: Date Drawn:                11/17/2019        [x] Susceptible <10 Labs Obtained/Reviewed Critical Results Called   Date when labs were drawn- 
Hgb-   
HGB Date Value Ref Range Status 11/22/2019 10.2 (L) 12.0 - 16.0 g/dL Final  
 
K-   
Potassium Date Value Ref Range Status 11/22/2019 3.6 3.5 - 5.5 mmol/L Final  
 
Ca-  
Calcium Date Value Ref Range Status 11/22/2019 7.7 (L) 8.5 - 10.1 MG/DL Final  
 
Bun-  
BUN Date Value Ref Range Status 11/22/2019 27 (H) 7.0 - 18 MG/DL Final  
 
Creat-  
Creatinine Date Value Ref Range Status 11/22/2019 2.47 (H) 0.6 - 1.3 MG/DL Final  
 
  
  
EDUCATION Person Educated: [x] Patient [x] Other  Family Knowledge base: [] None [x] Minimal [] Substantial   
Barriers to learning  [x] None Preferred method of learning: [] Written [x] Oral [] Visual [] Hands on Topic: [x] Access Care [] S&S of infection [x] Fluid Management 
 [] K+  [x] Procedural  [] Albumin [] Medications [] Tx Options [] Transplant [] Diet [] Other Teaching Tools: [x] Explain [] Demonstration [] Handout  [] Video RO/HEMODIAYLSIS MACHINE SAFETY CHECKS- BEFORE EACH TREATMENT [x] [facility]: Machine Serial #1:  [id number]   RO Serial #[id number] [] [facility]: Machine Serial #2:  [id number]   RO Serial #[id number] Alarm Test: [x] Pass  Time 09:20am      [x] RO/Machine Log Complete    [x] Extracorporeal circuit Tested for integrity Dialyzer: Revaclear 300 Lot# [id number] exp 8/28/2022    Tubing: Nipro Lot# [id number] exp 1/31/2024     0.9% NS Lot# -[id number] exp 6/30/2021 Dialysate: pH  7.4       Temp. 35.9         Conductivity: Meter  13.8      HD Machine  13.9      TCD 13.7 CHLORINE TESTING- BEFORE EACH TREATMENT AND EVERY 4 HOURS Total Chlorine: [x] Less than 0.1 ppm Time: 09:30am         2nd Check Time:  N/A 
(If greater than 0.1 ppm from Primary then every 30 minutes from Secondary) TREATMENT INIATION-WITH DIALYSIS PRECAUTIONS [x] All Connections Secured   [x] Saline Line Double Clamped    [x] Venous Parameters Set [x] Arterial Parameters Set [x] Prime Given 200 ml   [x] Air Foam Detector Engaged PRE-TREATMENT  
UF Calculations: Wt to lose: 1000ml(+) Oral: 0ml(+)IV Meds/Fluids/Blood prods 0ml(+) Prime/Rinse 500ml(=)Total UF Goal 1500mL Scale Type:[x] Bed scale        68.1  [x] kg Tx Initiation Note:  
Each catheter limb disinfected for 60 seconds per limb with alcohol swabs. Caps removed, dialysis CVC hub scrubbed with Prevantics for 5 seconds, followed by a 5 second dry time per policy Initial goal set for 1000ml as tolerated per orders. No acute distress. Access & lines visible, secure & intact. [x] Time Out/Safety Check  Time:  09:00am  
Vitals   Pre-Dialysis Temp  Temp: 98.0 °F    
HR   Pulse (Heart Rate): 97  
B/P   BP: 108/65 Resp   Resp Rate: 16                Spo2 100% on Humidified o2 4/lpm NC pre-HD. Pain level  Pain Intensity 1: 0   Denies discomfort. INTRADIALYTIC MONITORING 
(SEE ATTACHED FLOWSHEET) POST TREATMENT Time Medication Dose Volume Route Initials None [facility] Signatures Title Initials Time Pippa Kwong RN AT  09:30am  
     
     
Dialyzer cleared: [x] Good [] Fair [] Poor Blood Volume Processed 49.9 L Net UF Removed 700 mL Post Tx Access:   
Each catheter limb disinfected for 60 seconds per limb with alcohol swabs. Dialysis CVC hubs scrubbed with Prevantics for 5 seconds, followed by a 5 second dry time per policy, red dialysis caps applied to ports. Catheter: Locking Solution  [] Heparin 1 ml/1000 units [] Normal Saline [x] New caps applied Art. 2.1 ml Adolfo. 2.2 ml 
Post Assessment:   
          Skin: [x]Warm [x]Dry []Diaphoretic []Flushed [x] Pale []Cyanotic Lungs: []Clear []Coarse []Crackles [x] Diminished Cardiac: []Regular [x]Irregular  [x]Monitored rhythm A-Fib   []N/A Edema: [x]None []General []Facial []Pedal  []UE []LE []RIGHT []LEFT Pain: [x]0 []1 []2 []3 []4 []5 []6 []7 []8 []9 []10  
POST Tx Note: Net UF= 700ml. (due to shorter treatment time and asymptomatic decreased BP) Patient tolerated full 2.5 hr on dialysis per orders, without complications. No acute distress. No change in assessment.  Patient stable post-dialysis, denies complaints. Report given to staff RN. Vitals   Post-Dialysis Temp  Temp:   97.3  °F    
HR   Pulse (Heart Rate):   89  
B/P   BP:  102/55 Resp   Resp Rate: 16 Pain level  Pain Intensity 1: 0  Denies discomfort. Primary Nurse Report: First initial/Last name/Title Post Dialysis:     Marvin Carballo RN          Time:   12:30pm      
Abbreviations: AVG-arterial venous graft, AVF-arterial venous fistula, IJ-Internal Jugular, Subcl-Subclavian, Fem-Femoral, Tx-treatment, AP/HR-apical heart rate, DFR-dialysate flow rate, BFR-blood flow rate, AP-arterial pressure, -venous pressure, UF-ultrafiltrate, TMP-transmembrane pressure, Adolfo-Venous, Art-Arterial, RO-Reverse Osmosis [x]?  Renal Failure (Adult) Interdisciplinary · Fluid and electrolytes stabilized ? Interventions · Dehydration signs and symptoms (eg: Weight/lab monitoring; vomiting/diarrhea/urine; tenting; mucous membranes;  
· dizziness/lethargy/irritability/confusion; weak pulse; tachycardia; blood pressure; I&O) · Fluid overload signs and symptoms assessment (eg: Body weight increased; dyspnea; edema; hypertension;  
· respiratory crackles/wheezing; JVD; lab monitoring; mental status changes; I&O) · Monitor appropriate lab values · COMPLIANCE WITH PRESCRIBED THERAPY · ARTERIAL ACCESS SITE ASSESSMENT 
· NUTRITION SCREENING 
· Vital signs monitoring per assessed patient condition or unit standard · Cardiac monitoring · Hydration management · Intake and output measurement · Body weight monitoring · Skin care · DIALYSIS 
· Nutrition Care Process per nutrition screen · Oral hygiene care every 2 hours · Pain management 
  
· Outcome [x]?  Progressing Towards Goal 
? []? Not Progressing Towards Goals 
? []? Goals Met/Resolved 
? []? Goals Not Met/ Resolved

## 2019-11-22 NOTE — PROGRESS NOTES
1900 Verbal bedside received from [address] off going nurse. Assumed patient care, bed in low position, call light within reach, white board updated. Bedside and Verbal shift change report given to Arielle Michaels RN (oncoming nurse) by Cassandra Muñiz (offgoing nurse). Report included the following information SBAR, Kardex and MAR.

## 2019-11-22 NOTE — PROGRESS NOTES
Problem: Dysphagia (Adult) Goal: *Acute Goals and Plan of Care (Insert Text) Description Recommendations: 
Diet: Pureed solids, thin liquids Meds: Crushed in puree Aspiration Precautions Oral Care TID Feeder Goals:  Patient will: 1. Tolerate PO trials with 0 s/s overt distress in 4/5 trials 2. Utilize compensatory swallow strategies/maneuvers (decrease bite/sip, size/rate, alt. liq/sol) with min cues in 4/5 trials 3. Perform oral-motor/laryngeal exercises to increase oropharyngeal swallow function with min cues 4. Complete an objective swallow study (i.e., MBSS) to assess swallow integrity, r/o aspiration, and determine of safest LRD, min A 
    
11/22/2019 1008 by Linnette Son SLP Outcome: Progressing Towards Goal 
 
SPEECH LANGUAGE PATHOLOGY DYSPHAGIA TREATMENT Patient: Danny Quesada (71 y.o. female) Date: 11/22/2019 Diagnosis: A-fib (HCC) [I48.91]  
<principal problem not specified> Precautions: Aspiration Fall PLOF: Per H&P  
 
ASSESSMENT: 
Followed up with dysphagia intervention. A&Ox3. Accepted SLP-fed thin liquid + straw with 0 overt s/sx of aspiration. Refused puree trials, stating she was full. Continues to demo mildly labored oral bolus manipulation. Verbalized preference to continue puree diet vs attempting to advance solids to baseline diet at this time. Recommend pt continue puree/thin liquid diet with aspiration precautions, feeding assistance. Educated pt on aspiration precautions and importance of compensatory swallow techniques to decrease aspiration risk (decrease rate of intake & sip/bite size, upright @HOB for all po intake and ~30 minutes after po); verbalized comprehension. SLP will continue to follow as indicated to assess diet tolerance, possible diet advancement and education. Progression toward goals: 
[]         Improving appropriately and progressing toward goals [x]         Improving slowly and progressing toward goals []         Not making progress toward goals and plan of care will be adjusted PLAN: 
Recommendations and Planned Interventions: 
Puree/thin Patient continues to benefit from skilled intervention to address the above impairments. Continue treatment per established plan of care. Discharge Recommendations:  [facility] SUBJECTIVE:  
Patient stated I feel better than yesterday. OBJECTIVE:  
Cognitive and Communication Status: 
Neurologic State: Alert, Eyes open spontaneously Orientation Level: Oriented X4 Cognition: Appropriate decision making, Appropriate for age attention/concentration, Appropriate safety awareness, Follows commands Dysphagia Treatment: 
Oral Assessment: 
Oral Assessment Labial: No impairment Dentition: Upper & lower dentures Oral Hygiene: Adequate Lingual: Decreased rate Velum: No impairment P.O. Trials: 
 Patient Position: 45 Vocal quality prior to P.O.: No impairment Consistency Presented: Thin liquid, Puree, Mechanical soft How Presented: Self-fed/presented, Cup/sip, Straw, Spoon Bolus Acceptance: No impairment Bolus Formation/Control: Impaired Type of Impairment: Premature spillage, Incomplete Propulsion: Delayed (# of seconds) Oral Residue: Less than 10% of bolus Initiation of Swallow: Delayed (# of seconds) Laryngeal Elevation: Decreased, Weak Aspiration Signs/Symptoms: Clear throat, Weak cough, Watery eyes Pharyngeal Phase Characteristics: Feeling of discomfort, Multiple swallows, Suspected pharyngeal residue, Poor endurance Effective Modifications: Alternate liquids/solids, Small sips and bites, Cup/sip Cues for Modifications: Minimal 
   
 Oral Phase Severity: Mild-moderate Pharyngeal Phase Severity : Moderate PAIN: 
Pain level pre-treatment: 0/10 Pain level post-treatment: 0/10 After treatment:  
[]              Patient left in no apparent distress sitting up in chair [x]              Patient left in no apparent distress in bed 
[x]              Call bell left within reach [x]              Nursing notified 
[]              Family present 
[]              Caregiver present 
[]              Bed alarm activated COMMUNICATION/EDUCATION:  
[x] Aspiration precautions; swallow safety; compensatory techniques []        Patient unable to participate in education; education ongoing with staff 
[]  Posted safety precautions in patient's room. [] Oral-motor/laryngeal strengthening exercises ARIELLA Lopez Time Calculation: 11 mins

## 2019-11-22 NOTE — DISCHARGE SUMMARY
[facility] [place] DISCHARGE SUMMARY Name:  Sonia Dutton 
MR#:   [id number] :  1935 ACCOUNT #:  [de-identified] ADMIT DATE:  2019 DISCHARGE DATE:  2019 The patient is going to rehab today. DISCHARGE DIAGNOSES: 
1. Acute renal failure progressed to end-stage renal disease. 2.  Right internal jugular deep vein thrombosis. 3.  Atrial fibrillation with rapid ventricular rate. 4.  Severe iron deficiency. 5.  Hyponatremia. 6.  Weakness and debility. 7.  Acute-on-chronic diastolic congestive heart failure. DISCHARGE PLAN:  Code status DNR. The patient is now on dialysis Monday, Wednesday, and Friday. DISCHARGE DIET:  Her diet is dysphagia pureed with Ensure Pudding all meals. DISCHARGE MEDICATIONS:  Include 1. Amiodarone 400 mg daily until , then start 200 mg daily. 2.  Aspirin 81 mg daily. 3.  Lipitor 40 mg nightly. 4.  Cardizem 30 mg t.i.d. 5.  Lasix 40 mg every other day. 6.  Norco 1 tablet every 4 hours as needed for severe pain. 7.  Nystatin swish and swallow 5 mL four times daily for 5 days. 8.  Paxil 20 mg daily. 9.  Neutra-Phos 1 packet by mouth twice daily for two days. 10.  Vitamin D 50,000 units weekly. 11.  Warfarin 2 mg at night. DISCHARGE INSTRUCTIONS:  Follow up PT/INR every other day until stable for at least three checks with goal INR of 2 to 3. HOSPITAL SUMMARY:  The patient is a debilitated 80-year-old lady who came into the hospital with progressive issues of shortness of breath and palpitations. She was diagnosed with AFib with RVR. She also had acute-on-chronic renal failure. She does have underlying chronic kidney disease which she has seen Nephrology for. She had persistent hyponatremia.   Nephrology was consulted during her hospital stay as well as Cardiology and extensive management went on with diuresis due to acute-on-chronic diastolic heart failure, trying to balance for electrolytes, volume and as well as superimposed AFib with RVR. Ultimately, she did not progress or get better from the standpoint and had worsening kidney failure that necessitated starting dialysis. The patient has had extensive discussions with family, including myself as the hospitalist, Nephrology, Palliative Care about code status, continuing dialysis considering her advanced age and debilitated status. She has opted at this point to continue dialysis sessions which she has had this week with another session today, tolerating well hemodynamically. Sodium recovered nicely after dialysis. Prognosis however remains poor in general considering advanced age and severe debility. Her oral intake has been poor. She is anticoagulated now on Coumadin. She has been taken off the heparin. She did develop an internal jugular DVT while being anticoagulated as well. She is stable from an electrolyte standpoint with last potassium and sodium within normal limits. BUN and creatinine are 27 and 2.47. Her INR is now 2.5. She is on a small dose of Coumadin nightly. Her H and H are stable at 10.2 and 33. Her white count is normal.  Platelet count is [phone]. We were worried that she was developing a respiratory infection. We did a chest x-ray yesterday, did not show clear evidence for consolidation. We started empiric antibiotics. Blood cultures were drawn. There is no evidence for bacteremia. She has no fevers, so we are going to change to oral antibiotics to complete an empiric course. The patient today shows a blood pressure of 102/55, pulse 89, temperature 97.3, respiratory rate 16, SaO2 100% on 4 L, debilitated, elderly white female in no acute distress, oriented x3, still stating that she wants to proceed and move forward to skilled nursing and continue dialysis. Lungs are clear bilaterally. Cardiac exam irregular rhythm, regular rate. Abdomen is soft, nontender. Lower extremities, no pitting edema noted. She has bruising near her dialysis port on both hands. The patient should discharge to the skilled nursing today. Instructions again are about her INR being regularly monitored, her diet as noted. Meds are as noted and we have put the addition of doxycycline 100 mg twice daily for five more days. I believe the patient's prognosis remains very poor. We has had extensive discussion with her family who all understand that. The patient's decision is that she wants to continue doing dialysis as long as she can tolerate it. This may be short term as I do believe that she is not going to progress well as her appetite has been poor, her intake is also poor and she is generally debilitated and weakened. She is a DNR at this point. We are planning to go to acute rehab with Monday, Wednesday, Friday dialysis sessions. I spent 45 minutes on this discharge time today. Discussed the plan of care with her again, family and case management and she is dialyzing this morning, so she will be set until Monday, the 11/25. Code status DNR as noted. Shania Foley MD 
 
 
[id number] 
D:  11/22/2019 12:55 
T:  11/22/2019 13:38 
JOB #:  [id number] CC:  Geno Wilder DO

## 2019-11-22 NOTE — PROGRESS NOTES
0730: Bedside and Verbal shift change report given to Emilie Jiang RN (oncoming nurse) by JOJO Rubio RN (offgoing nurse). Report included the following information Kardex, Intake/Output, MAR and Cardiac Rhythm a fib. 
 
0900: case management makes this nurse aware pt accepted to [facility] for discharge post dialysis, pt family made aware, pt and family acknowledge and agree Emilie Jiang RN 
 
0930: dialysis nurse makes this nurse aware MD Mckeon instructs pt to have dialysis prior to discharge Emilie Jiang RN  
 
1202: dialysis complete, discharge order noted by MD Sabra Jiang RN 
 
1300:  sets up transport, this nurse made aware transport to  pt to transport to [facility] Emilie Jiang RN 
 
4619: pt report called to [address] Emilie Jiang RN 
 
1624: Shailesh Story arrives to transport pt, no s/s or c/o health abnormalities at time of discharge Emilie Jiang RN

## 2019-11-22 NOTE — PROGRESS NOTES
Georgina has received message fro [employer] a[employer] giving authorization for patient to go to [facility] reference number [id number] [facility] is the contact number [phone]. GEORGINA has sent message to Elbert Thomas LPN. Patient is to have an early HD today and can d/c after to [facility] she will need transportation set up to [facility]. Patient is presently getting HD. Transition of Care Plan:  
 
Communication to Patient/Family: Met with patient and family, and they are agreeable to the transition plan. The Plan for Transition of Care is related to the following treatment goals: Rehab at [facility]. HD on MWF at 1030 she has transportation set up for Monday by georgina then family is aware they will be responsible for transportation The Patient and/or patient representative  was provided with a choice of provider and agrees  
with the discharge plan. Yes [] No [] Freedom of choice list was provided with basic dialogue that supports the patient's individualized plan of care/goals and shares the quality data associated with the providers. Yes [] No [] SNF/Rehab Transition: 
Patient has been accepted to [facility], [address] [place] [address] for SNF and meets criteria for admission. Patient will transported by medical transport and expected to leave at [facility]  
 
Communication to SNF/Rehab: 
Bedside RNMontrell Client has been notified to update the transition plan to the facility and call report [phone]. Discharge information has been updated on the AVS and communicated through [facility] or [facility]. Discharge instructions to be fax'd to facility at [phone] Please include all hard scripts for controlled substances, med rec and dc summary, and AVS in packet. Please medicate for pain prior to dc if possible and needed to help offset delay when patient first arrives to facility. Reviewed and confirmed with facility, [facility] can manage the patient care needs for the following: Colin with (X) only those applicable: 
Medication: 
[]Medications are available at the facility []IV Antibiotics []Controlled Substance  hard copies available sent. []Weekly Labs Equipment: 
[]CPAP/BiPAP []Wound Vacuum []Dong or Urinary Device []PICC/Central Line []Nebulizer []Ventilator Treatment: 
[]Isolation (for MRSA, VRE, etc.) []Surgical Drain Management []Tracheostomy Care 
[]Dressing Changes []Dialysis with transportation []PEG Care 
[x]Oxygen []Daily Weights for Heart Failure Dietary: 
[]Any diet limitations []Tube Feedings []Total Parenteral Management (TPN) Financial Resources: 
[]Medicaid Application Completed [x]UAI Completed and copy given to pt/family. []A screening has previously been completed. []Level II Completed 
 
[] Private pay individual who will not become  
financially eligible for Medicaid within 6 months from admission to a [address] [place] [address] facility. [] Individual refused to have screening conducted. []Medicaid Application Completed []The screening denied because it was determined individual did not need/did not qualify for nursing facility level of care. [] Out of state residents seeking direct admission to a [address] facility. [] Individuals who are inpatients of an out of state hospital, or in state or out of state veterans/ hospital and seek direct admission to a [address] facility [] Individuals who are pateints or residents of a state owned/operated facility that is licensed by [employer] of [employer] ([employer]) and seek direct admission to [address] facility [] A screening not required for enrollment in [facility] services as set out in 12 VAC 30- [] [facility]) staff shall perform screenings of the [facility] clients. Advanced Care Plan: 
[]Surrogate Decision Maker of Care 
[]POA []Communicated Code Status and copy sent. Other: Family is aware that cm will set up transportation to and from HD on MOnday and they would be responsible for the transportation to and from HD Care Management Interventions PCP Verified by CM: Yes Mode of Transport at Discharge: BLS Transition of Care Consult (CM Consult):  [address] Yes 
Partner SNF: Yes Physical Therapy Consult: Yes Occupational Therapy Consult: Yes Current Support Network: Other, Relative's Home Confirm Follow Up Transport: Family Plan discussed with Pt/Family/Caregiver: Yes Freedom of Choice Offered: Yes Discharge Location Discharge Placement: Skilled nursing facility

## 2019-11-22 NOTE — DISCHARGE INSTRUCTIONS
DISCHARGE SUMMARY from Nurse    PATIENT INSTRUCTIONS:    After  for 24 hours or while taking prescription Narcotics:  · Limit your activities  · Do not drive and operate hazardous machinery  · Do not make important personal or business decisions  · Do  not drink alcoholic beverages  · If you have not urinated within 8 hours after discharge, please contact your MD on call. Report the following to your surgeon:  · Excessive pain, swelling, redness or odor of or around the surgical area  · Temperature over 101.5  · Nausea and vomiting lasting longer than 6 hours or if unable to take medications  · Any signs of decreased circulation or nerve impairment to extremity: change in color, persistent  numbness, tingling, coldness or increase pain  · Any questions    What to do at Home:  Recommended activity: {discharge activity:36869}, ***    If you experience any of the following symptoms ***, please follow up with ***. *  Please give a list of your current medications to your Primary Care Provider. *  Please update this list whenever your medications are discontinued, doses are      changed, or new medications (including over-the-counter products) are added. *  Please carry medication information at all times in case of emergency situations. These are general instructions for a healthy lifestyle:    No smoking/ No tobacco products/ Avoid exposure to second hand smoke  Surgeon General's Warning:  Quitting smoking now greatly reduces serious risk to your health. Obesity, smoking, and sedentary lifestyle greatly increases your risk for illness  Patient Education        Atrial Fibrillation: Care Instructions  Your Care Instructions    Atrial fibrillation is an irregular and often fast heartbeat. Treating this condition is important for several reasons. It can cause blood clots, which can travel from your heart to your brain and cause a stroke.  If you have a fast heartbeat, you may feel lightheaded, dizzy, and weak. An irregular heartbeat can also increase your risk for heart failure. Atrial fibrillation is often the result of another heart condition, such as high blood pressure or coronary artery disease. Making changes to improve your heart condition will help you stay healthy and active. Follow-up care is a key part of your treatment and safety. Be sure to make and go to all appointments, and call your doctor if you are having problems. It's also a good idea to know your test results and keep a list of the medicines you take. How can you care for yourself at home? Medicines    · Take your medicines exactly as prescribed. Call your doctor if you think you are having a problem with your medicine. You will get more details on the specific medicines your doctor prescribes.     · If your doctor has given you a blood thinner to prevent a stroke, be sure you get instructions about how to take your medicine safely. Blood thinners can cause serious bleeding problems.     · Do not take any vitamins, over-the-counter drugs, or herbal products without talking to your doctor first.    Lifestyle changes    · Do not smoke. Smoking can increase your chance of a stroke and heart attack. If you need help quitting, talk to your doctor about stop-smoking programs and medicines. These can increase your chances of quitting for good.     · Eat a heart-healthy diet.     · Stay at a healthy weight. Lose weight if you need to.     · Limit alcohol to 2 drinks a day for men and 1 drink a day for women. Too much alcohol can cause health problems.     · Avoid colds and flu. Get a pneumococcal vaccine shot. If you have had one before, ask your doctor whether you need another dose. Get a flu shot every year. If you must be around people with colds or flu, wash your hands often. Activity    · If your doctor recommends it, get more exercise. Walking is a good choice. Bit by bit, increase the amount you walk every day.  Try for at least 30 minutes on most days of the week. You also may want to swim, bike, or do other activities. Your doctor may suggest that you join a cardiac rehabilitation program so that you can have help increasing your physical activity safely.     · Start light exercise if your doctor says it is okay. Even a small amount will help you get stronger, have more energy, and manage stress. Walking is an easy way to get exercise. Start out by walking a little more than you did in the hospital. Gradually increase the amount you walk.     · When you exercise, watch for signs that your heart is working too hard. You are pushing too hard if you cannot talk while you are exercising. If you become short of breath or dizzy or have chest pain, sit down and rest immediately.     · Check your pulse regularly. Place two fingers on the artery at the palm side of your wrist, in line with your thumb. If your heartbeat seems uneven or fast, talk to your doctor. When should you call for help? Call 911 anytime you think you may need emergency care. For example, call if:    · You have symptoms of a heart attack. These may include:  ? Chest pain or pressure, or a strange feeling in the chest.  ? Sweating. ? Shortness of breath. ? Nausea or vomiting. ? Pain, pressure, or a strange feeling in the back, neck, jaw, or upper belly or in one or both shoulders or arms. ? Lightheadedness or sudden weakness. ? A fast or irregular heartbeat. After you call 911, the  may tell you to chew 1 adult-strength or 2 to 4 low-dose aspirin. Wait for an ambulance. Do not try to drive yourself.     · You have symptoms of a stroke. These may include:  ? Sudden numbness, tingling, weakness, or loss of movement in your face, arm, or leg, especially on only one side of your body. ? Sudden vision changes. ? Sudden trouble speaking. ? Sudden confusion or trouble understanding simple statements. ? Sudden problems with walking or balance.   ? A sudden, severe headache that is different from past headaches.     · You passed out (lost consciousness).    Call your doctor now or seek immediate medical care if:    · You have new or increased shortness of breath.     · You feel dizzy or lightheaded, or you feel like you may faint.     · Your heart rate becomes irregular.     · You can feel your heart flutter in your chest or skip heartbeats. Tell your doctor if these symptoms are new or worse.    Watch closely for changes in your health, and be sure to contact your doctor if you have any problems. Where can you learn more? Go to http://tamiKeyLemonjuan.info/. Enter U020 in the search box to learn more about \"Atrial Fibrillation: Care Instructions. \"  Current as of: April 9, 2019  Content Version: 12.2  © 4570-8448 [employer]. Care instructions adapted under license by [employer] (which disclaims liability or warranty for this information). If you have questions about a medical condition or this instruction, always ask your healthcare professional. [place] [address] any warranty or liability for your use of this information. A healthy diet, regular physical exercise & weight monitoring are important for maintaining a healthy lifestyle    You may be retaining fluid if you have a history of heart failure or if you experience any of the following symptoms:  Weight gain of 3 pounds or more overnight or 5 pounds in a week, increased swelling in our hands or feet or shortness of breath while lying flat in bed. Please call your doctor as soon as you notice any of these symptoms; do not wait until your next office visit. The discharge information has been reviewed with the {PATIENT PARENT GUARDIAN:79044}. The {PATIENT PARENT GUARDIAN:12326} verbalized understanding.   Discharge medications reviewed with the {Dishcarge meds reviewed [facility]:38829} and appropriate educational materials and side effects teaching were provided.   ___________________________________________________________________________________________________________________________________

## 2019-11-22 NOTE — CONSULTS
Palliative Medicine Consult DR. SAHNI'[facility]: [phone] ([phone]) Formerly [facility]: [phone] [facility]: [phone] Patient Name: Michaela Mathew YOB: 1935 Date of Initial Consult: 11/19/2019, 11/20/2019, 11/22/2019 Reason for Consult: care decisions Requesting Provider: Dr Kira Davis Primary Care Physician: Edd Ambroes DO 
  
 SUMMARY:  
Michaela Mathew is a 80y.o. year old with a past history of diastolic heart failure with preserved EF, CKD, COPD , who was admitted on 11/8/2019 from primary care MD office  with a diagnosis of new onset of A fib with RVR. Current medical issues leading to Palliative Medicine involvement include: 80year old weak appearing female with new onset of a fib with RVR, ARF which has progressed to ESRD requiring HD. Palliative medicine is consulted for clarification of goals of care. 11/19/2019 family at bedside. Ms Marlo Beltre resting comfortably in bed, eyes closed. Tolerated HD last night but increased sleep today. She is very weak and frail. Family is aware is not likely to do well in the long run. Patient wishes to continue to give HD a try. Goals of care DNR/DNI. 
 
11/22/2019 seen several times today. Had dialysis, weak fatigued post treatment. Tells me she is feeling OK, but very tired. She still wants to continue dialysis. Family aware this is very difficult for her and would be supportive if she decided against it and focused on a more comfort directed approach. PALLIATIVE DIAGNOSES:  
1. Advanced care plan discussion 2. ESRD requiring HD 3. A fib 4. Debility PLAN:  
1. 11/22/2019 follow today have seen several times today for patient and family support. Ms Marlo Beltre had dialysis, very fatigued when I saw after treatment. She continues to have poor po. Tells him she \" believes dialysis is going OK\" wants to continue.  Discussed with the family at length, how comfort measures/ hospice may look. All would ensure her symptoms were managed. Family supportive of patient. They would be very supportive if she decided to stop dialysis and focus on comfort. Plan is cor transfer to SNF today. She will have to be transported to dialysis for treatments. This will only increase her fatigue. Support for family they understand her prognosis is poor . Goals of care set DNR/DNI, Limited interventions, no feeding tubes. POST on file. ( please see below for previous conversations) 2. 11/20/2019 follow up this am. Ms. Chantal Major was resting in bed. Daughter and son at bedside . \" Family tells me she did OK with HD but is now wiped out\". They are aware of how frail she is likely not to do well in the long term but are support her wish to trial HD and therapy at SNF. Goals of care have not changed DNR/DNI. POST completed by patient for DNR/DNI limited interventions, no feeding tubes. 3. Advanced care plan discussions seen today along with Ms Melody Hansen. Kasey is weak, but alert oriented x 3. Surrounded by her daughter Thierry Balderas, son Gilson Mendoza and sister. She has previously executed an AMD naming as co primary decision makers her daughter Thierry Balderas and son Gilson Mendoza. Sister Kiera Clemons is secondary. Chart reviewed, patient had revered her goals of care decision from DNR to full code. Discussed with family prior to discussing with patient intent of discussion. They also would like to have goals of care clarified and present for discussion. Family and patient shared a large concern of patient's is if she has \" passed out \" if she is a DNR no one will help her\" . Discussed and supported but assured no matter her goals of care of care decisions, comfort and appropriate interventions were also going to be provided.  She did tell us \" I want to try dialysis and if it is to hard or painful then I will know\" Goals of care discussed at some length. Shared with patient and family decision for DNR/DNI does not mean she cannot try dialysis. Benefits and burdens discussed at length especially in chronic illness and advanced age. Kasey was able to tells us when her heart stops then \" that is my time\". Clarified several times this meant no Chest compressions or shock. Family was satisfied she understood,when her heart stopped  no chest compressions or shock. Kasey was also able to tell us she would not wish for intubation for any reason. POST form discussed. Patient signed for DNR/DNI. Limited interventions at present time, and no feeding tube. Family supportive of her goals. They are also aware care discussions will be ongoing and if she does not tolerate dialysis or treatments become to  burdensome, discussions can ensue on focusing on a more comfort directed approach. Discussed with RN who is aware of goals of care. 4. ESRD now requiring HD nephrology on board. Last HD on 11/16/2019. Planned for today but needs clearance from vascular to use access. Patient wishes to continue to try and she how she does 5. A fib RVR improved cards on board 6. Debility weak, poor appetite. Prior to hospital stay lived with her son, independent for all ADL's at that time per son. PT on board but have not worked with patient yet. Highly doubt she will return to her baseline level of functioning. 7. Initial consult note routed to primary continuity provider 8. Communicated plan of care with: Palliative IDT Patient/Health Care Proxy Stated Goals: Prolong life TREATMENT PREFERENCES:  
Code Status: DNR Advance Care Planning: 
[] The [facility] Interdisciplinary Team has updated the ACP Navigator with [address] and Patient Capacity Primary Decision [facility] (Health Care Agent):   Primary Decision Maker: Unknown Wong - Child - [phone] Secondary Decision Maker: Yodit Kimball - [phone] Medical Interventions: Limited additional interventions Artificially Administered Nutrition: No feeding tube Other: As far as possible, the palliative care team has discussed with patient / health care proxy about goals of care / treatment preferences for patient. HISTORY:  
 
History obtained from: chart, patient and family CHIEF COMPLAINT: weakness ESRD  
 
HPI/SUBJECTIVE: The patient is:  
[x] Verbal and participatory [] Non-participatory due to:  
Please see summary Clinical Pain Assessment (nonverbal scale for nonverbal patients): Clinical Pain Assessment Severity: 0 Duration: for how long has pt been experiencing pain (e.g., 2 days, 1 month, years) Frequency: how often pain is an issue (e.g., several times per day, once every few days, constant) FUNCTIONAL ASSESSMENT:  
 
Palliative Performance Scale (PPS): PPS: 40 ECOG 
ECOG Status : Completely disabled PSYCHOSOCIAL/SPIRITUAL SCREENING:  
  
Any spiritual / Yarsanism concerns: 
[] Yes /  [x] No 
 
Caregiver Burnout: 
[] Yes /  [x] No /  [] No Caregiver Present Anticipatory grief assessment:  
[x] Normal  / [] Maladaptive REVIEW OF SYSTEMS:  
 
Positive and pertinent negative findings in ROS are noted above in HPI. The following systems were [x] reviewed / [] unable to be reviewed as noted in HPI Other findings are noted below. Systems: constitutional, ears/nose/mouth/throat, respiratory, gastrointestinal, genitourinary, musculoskeletal, integumentary, neurologic, psychiatric, endocrine. Positive findings noted below. Modified ESAS Completed by: provider Fatigue: 9 Drowsiness: 1 Pain: 0 Anxiety: 0 Nausea: 0 Anorexia: 6 Dyspnea: 0 Stool Occurrence(s): 1 PHYSICAL EXAM:  
 
Wt Readings from Last 3 Encounters:  
11/22/19 69.3 kg (152 lb 11.2 oz) 11/04/19 62.4 kg (137 lb 8 oz) 10/29/19 61.8 kg (136 lb 4.8 oz) Blood pressure 102/55, pulse 89, temperature 97.3 °F (36.3 °C), temperature source Oral, resp. rate 16, height 4' 11\" (1.499 m), weight 69.3 kg (152 lb 11.2 oz), SpO2 100 %. Pain: 
Pain Scale 1: Numeric (0 - 10) Pain Intensity 1: 0 Last bowel movement: x 1 11/21/2019 Constitutional: fatigued, weak frail, in bed in NAD Eyes: pupils equal, anicteric Respiratory: breathing not labored, nasal cannula in place Skin: warm, dry Neurologic: alerts to her name verbal answer questions, but very fatigued. Oriented x 3 HISTORY:  
 
Active Problems: 
  Stage 4 chronic kidney disease ([facility] [address] (8/23/2018) Overview: Last Assessment & Plan:  
    Last GFR on record was 25 on 04/15/2019. Will check chemistry profile  
    with today's labs. She is still followed by Nephrology but unfortunately  
    missed her last appointment with them on 06/26/2019. I told her that it  
    was important that she be seen on a regular basis by Nephrology. Currently her rescheduled appointment is on 12/26/2019. I am not certain  
    that that long of weight will be appropriate for her but will wait and see  
    what current labs look like. If they are stable with regards to renal  
    function I suppose she can wait till December for her next visit. Continue current antihypertensive regimen. Hopefully with the every other  
    day dosage Ng of Lasix she will be maintaining stable BUN and creatinine. A-fib ([facility] [address] (11/8/2019) Hyponatremia (11/15/2019) Weakness generalized (11/19/2019) Thrombosis of internal jugular vein ([address] (11/19/2019) ESRD needing dialysis ([address] (11/19/2019) Past Medical History:  
Diagnosis Date  Chronic kidney disease  Congestive heart failure (CHF) ([address]  COPD (chronic obstructive pulmonary disease) (HCC) History reviewed. No pertinent surgical history. History reviewed. No pertinent family history. History reviewed, no pertinent family history. Social History Tobacco Use  Smoking status: Former Smoker  Smokeless tobacco: Never Used Substance Use Topics  Alcohol use: Not on file Allergies Allergen Reactions  Penicillins Hives  Valium [Diazepam] Other (comments) It made me cry more Current Facility-Administered Medications Medication Dose Route Frequency  Warfarin - HOLD dose on 11/22/19   Other ONCE  
 doxycycline (VIBRAMYCIN) 100 mg in 0.9% sodium chloride (MBP/ADV) 100 mL MBP  100 mg IntraVENous Q12H  potassium, sodium phosphates (NEUTRA-PHOS) packet 1 Packet  1 Packet Oral BID  
 guaiFENesin ER (MUCINEX) tablet 600 mg  600 mg Oral Q12H  
 meropenem (MERREM) 500 mg in sterile water (preservative free) 10 mL IV syringe  0.5 g IntraVENous Q24H  nystatin (MYCOSTATIN) 100,000 unit/mL oral suspension 500,000 Units  500,000 Units Oral QID  Warfarin - Pharmacy to Dose  1 Each Other Rx Dosing/Monitoring  0.9% sodium chloride infusion 250 mL  250 mL IntraVENous PRN  
 dilTIAZem (CARDIZEM) IR tablet 30 mg  30 mg Oral TIDAC  epoetin jonathan-epbx (RETACRIT) injection 10,000 Units  10,000 Units SubCUTAneous DIALYSIS TUE, THU & SAT  magic mouthwash (FIRST-MOUTHWASH BLM) oral suspension 5 mL  5 mL Oral AC&HS  
 albumin human 25% (BUMINATE) solution 25 g  25 g IntraVENous DIALYSIS ONCE  
 heparin (porcine) 1,000 unit/mL injection 4,300 Units  4,300 Units InterCATHeter DIALYSIS PRN  
 albuterol-ipratropium (DUO-NEB) 2.5 MG-0.5 MG/3 ML  3 mL Nebulization Q4H PRN  
 guaiFENesin-dextromethorphan (ROBITUSSIN DM) 100-10 mg/5 mL syrup 5 mL  5 mL Oral Q6H PRN  
 [START ON 11/26/2019] amiodarone (CORDARONE) tablet 200 mg  200 mg Oral DAILY  polyethylene glycol (MIRALAX) packet 17 g  17 g Oral DAILY  amiodarone (CORDARONE) tablet 400 mg  400 mg Oral DAILY  ondansetron (ZOFRAN) injection 4 mg  4 mg IntraVENous Q6H PRN  
  zinc sulfate (ZINCATE) capsule 1 Cap  1 Cap Oral DAILY  aspirin delayed-release tablet 81 mg  81 mg Oral DAILY  atorvastatin (LIPITOR) tablet 40 mg  40 mg Oral DAILY  ergocalciferol capsule 50,000 Units  50,000 Units Oral Q7D  
 PARoxetine (PAXIL) tablet 20 mg  20 mg Oral DAILY  sodium chloride (NS) flush 5-40 mL  5-40 mL IntraVENous Q8H  
 sodium chloride (NS) flush 5-40 mL  5-40 mL IntraVENous PRN  
 acetaminophen (TYLENOL) tablet 650 mg  650 mg Oral Q4H PRN  
 HYDROcodone-acetaminophen (NORCO) 5-325 mg per tablet 1 Tab  1 Tab Oral Q4H PRN  
 morphine injection 2 mg  2 mg IntraVENous Q4H PRN  
 naloxone (NARCAN) injection 0.4 mg  0.4 mg IntraVENous PRN  
 
 
 LAB AND IMAGING FINDINGS:  
 
Lab Results Component Value Date/Time WBC 10.8 11/22/2019 03:30 AM  
 HGB 10.2 (L) 11/22/2019 03:30 AM  
 PLATELET 780 (L) 60/17/4472 03:30 AM  
 
Lab Results Component Value Date/Time Sodium 140 11/22/2019 03:30 AM  
 Potassium 3.6 11/22/2019 03:30 AM  
 Chloride 101 11/22/2019 03:30 AM  
 CO2 33 (H) 11/22/2019 03:30 AM  
 BUN 27 (H) 11/22/2019 03:30 AM  
 Creatinine 2.47 (H) 11/22/2019 03:30 AM  
 Calcium 7.7 (L) 11/22/2019 03:30 AM  
 Magnesium 2.2 11/09/2019 02:30 AM  
 Phosphorus 2.0 (L) 11/22/2019 03:30 AM  
  
Lab Results Component Value Date/Time AST (SGOT) 79 (H) 11/08/2019 01:25 PM  
 Alk. phosphatase 146 (H) 11/08/2019 01:25 PM  
 Protein, total 6.8 11/08/2019 01:25 PM  
 Albumin 3.7 11/08/2019 01:25 PM  
 Globulin 3.1 11/08/2019 01:25 PM  
 
Lab Results Component Value Date/Time INR 2.5 (H) 11/22/2019 03:30 AM  
 Prothrombin time 26.6 (H) 11/22/2019 03:30 AM  
 aPTT >180.0 (HH) 11/20/2019 03:10 PM  
  
Lab Results Component Value Date/Time Iron 11 (L) 11/16/2019 09:10 AM  
 TIBC 271 11/16/2019 09:10 AM  
 Iron % saturation 4 11/16/2019 09:10 AM  
 Ferritin 395 (H) 11/16/2019 09:10 AM  
  
No results found for: PH, PCO2, PO2 No components found for: [place] Lab Results Component Value Date/Time CK 48 11/09/2019 02:30 AM  
 CK - MB 2.3 11/09/2019 02:30 AM  
  
 
   
 
Total time: 25 minutes Counseling / coordination time, spent as noted above: 20 minutes  
> 50% counseling / coordination: yes with patient, son, daughter Prolonged service was provided for  []30 min   []75 min in face to face time in the presence of the patient, spent as noted above. Time Start:  
Time End:  
Note: this can only be billed with 92051 (initial) or 74090 (follow up). If multiple start / stop times, list each separately.

## 2019-11-26 PROBLEM — R06.00 DYSPNEA: Status: ACTIVE | Noted: 2019-01-01

## 2019-11-26 NOTE — ACP (ADVANCE CARE PLANNING)
Patient has a [address] Directive on file naming the following medical decision-makers: 
  
Primary Healthcare Agent: Odalys Holt and Vickey Osorio Relationship to patient: Son and Daughter Phone Number: [phone] 
  
Secondary Healthcare Agent: Nabeel Oscar Relationship to patient: Sister Phone Number: [phone] She also has a POST document that states DNR/DNI as well as a  DDNR form. Code status is DNR/DNI

## 2019-11-26 NOTE — CONSULTS
Palliative Medicine Consult DR. SAHNI'[facility]: [phone]) [facility]: [phone] [facility]: [phone] Patient Name: Mary Ellen Hobson YOB: 1935 Date of Initial Consult: 11/26/2019 Reason for Consult: care decisions Requesting Provider: Dr. Fatemeh Hussein Primary Care Physician: Ellis Stone DO 
  
 SUMMARY:  
Mary Ellen Hobson is a 80y.o. year old with a past history of  diastolic heart failure with preserved EF, CKD, COPD , who was admitted on 11/26/2019 from [facility] with increased shortness of breath at the nursing facility O2 sats were found to be in 70's. She was unable to go to dialysis yesterday due to fatigue. This was to her first outpatient dialysis treatment. Current medical issues leading to Palliative Medicine involvement include: 80year old female with multiple co morbidities. She was not able to go to dialysis yesterday due to fatigue. Now fluid overloaded. She is in A fib with RVR, required BiPap. Seen by nephrology in ER, patient weak but alert family at bedside, wants to try dialysis today. Nephrologist discussed risks, including worse hypotension, tachycardia. Patient wishes to try dialysis. Family supportive of decision. Have discussed with family and they are aware she may not tolerate. Introduced moving to more a comfort directed approach if she does not tolerate or the burdens of treatment  become to great. Affirmed DNR/DNI with family. POST is on file. PALLIATIVE DIAGNOSES:  
1. Goals of care discussion 2. End stage renal disease 3. Acute respiratory failure 4. Debility PLAN:  
1. Goals of care discussion Ms Vikki Main is well known to our service. Recently hospitalized with CHF, ESRD requiring dialysis. Was not able to go to her first O/P  dialysis treatment due to fatigue. She has executed an AMD naming her Daughter Ivan Fonseca and son Rohan Solis as primary MPOAs.  Goals of care affirmed DNR/DNI. Long discussion with family and patient in ER. Patient is alert a little difficult to understand with BIPAP but is able to participate in her medical decisions. Patient wishes to continue to try dialysis. Seen by renal and will proceed with dialysis but risks discussed by renal physician. We have discussed with family option of moving to a more comfort directed approach if she can not tolerate dialysis or if the burdens of treatment become to great. They honestly feel this would be the best course of action but patient wants to try dialysis and is currently able to make her own medical decisions. We continue to offer support to family and patient in this difficult time. Goals of care DNR/DNI, limited interventions, no feeding tubes. POST signed by patient on file. 2. End stage renal disease new requirement for dialysis. Did not go to first outpatient dialysis treatment due to fatigue. Nephrology on board. Family and patient  aware she may not tolerate. 3. Acute respiratory failure currently on BIPAP. Tells us she feels better. Now in ICU 4. Debility poor appetite per family, weak, fatigued. Mostly bed fast. Was at SNF for on going rehab. Per family not really able to do much with therapy due to fatigue 5. Initial consult note routed to primary continuity provider 6. Communicated plan of care with: Palliative IDT, patient, family, daughter Jen Guardado, son Kirit Guzman, nephrology, ICU RN 
 
GOALS OF CARE: wishes to attempt dialysis Patient/Health Care Proxy Stated Goals: Prolong life TREATMENT PREFERENCES:  
Code Status: DNR/DNI Advance Care Planning: 
[] The [facility] Interdisciplinary Team has updated the ACP Navigator with [address] and Patient Capacity Primary Decision [facility] (Health Care Agent):   Primary Decision Maker: Francisca Loco - [phone] Secondary Decision Maker: Migdalia Irby - [phone] Medical Interventions: Limited additional interventions Artificially Administered Nutrition: No feeding tube Other: As far as possible, the palliative care team has discussed with patient / health care proxy about goals of care / treatment preferences for patient. HISTORY:  
 
History obtained from:  Chart and family CHIEF COMPLAINT: shortness of breath, ESRD 
 
HPI/SUBJECTIVE: The patient is:  
[x] Verbal and participatory a bit limited due to BIPAP [] Non-participatory due to:  
Please see summary Clinical Pain Assessment (nonverbal scale for nonverbal patients): Clinical Pain Assessment Severity: 0 Duration: for how long has pt been experiencing pain (e.g., 2 days, 1 month, years) Frequency: how often pain is an issue (e.g., several times per day, once every few days, constant) FUNCTIONAL ASSESSMENT:  
 
Palliative Performance Scale (PPS): PPS: 30 
 
ECOG 
ECOG Status : Completely disabled PSYCHOSOCIAL/SPIRITUAL SCREENING:  
  
Any spiritual / Yazdanism concerns: 
[] Yes /  [x] No 
 
Caregiver Burnout: 
[] Yes /  [] No /  [] No Caregiver Present Anticipatory grief assessment:  
[x] Normal  / [] Maladaptive REVIEW OF SYSTEMS:  
 
Positive and pertinent negative findings in ROS are noted above in HPI. The following systems were [x] reviewed / [] unable to be reviewed as noted in HPI Other findings are noted below. Systems: constitutional, ears/nose/mouth/throat, respiratory, gastrointestinal, genitourinary, musculoskeletal, integumentary, neurologic, psychiatric, endocrine. Positive findings noted below. Modified ESAS Completed by: provider Fatigue: 8 Drowsiness: 0 Pain: 0 Anxiety: 1 Anorexia: 6 Dyspnea: 4 PHYSICAL EXAM:  
 
Wt Readings from Last 3 Encounters:  
11/26/19 68.9 kg (151 lb 14.4 oz)  
11/22/19 69.3 kg (152 lb 11.2 oz) 11/04/19 62.4 kg (137 lb 8 oz) Blood pressure (!) 114/92, pulse (!) 122, temperature 97.4 °F (36.3 °C), resp. rate 20, height 4' 9\" (1.448 m), weight 68.9 kg (151 lb 14.4 oz), SpO2 (!) 87 %. Pain: 
Pain Scale 1: Numeric (0 - 10) Pain Intensity 1: 0 Last bowel movement: none recorded Constitutional: chronically ill appearing weak, frail Cardiovascular: irregular, tachycardia Respiratory: on BIPAP, comfortable appearing Skin: warm, dry Neurologic: alert, oriented x3, a bit difficult to understand with BIPAP but participatory in conversation HISTORY:  
 
Active Problems: Dyspnea (11/26/2019) Past Medical History:  
Diagnosis Date  Chronic kidney disease  Congestive heart failure (CHF) ([address]  COPD (chronic obstructive pulmonary disease) ([facility])  Dialysis patient [facility]) History reviewed. No pertinent surgical history. History reviewed. No pertinent family history. History reviewed, no pertinent family history. Social History Tobacco Use  Smoking status: Former Smoker  Smokeless tobacco: Never Used Substance Use Topics  Alcohol use: Not on file Allergies Allergen Reactions  Penicillins Hives  Valium [Diazepam] Other (comments) It made me cry more Current Facility-Administered Medications Medication Dose Route Frequency  midodrine (PROAMITINE) tablet 10 mg  10 mg Oral ONCE  
 
 
 LAB AND IMAGING FINDINGS:  
 
Lab Results Component Value Date/Time WBC 9.0 11/26/2019 10:32 AM  
 HGB 11.8 (L) 11/26/2019 10:32 AM  
 PLATELET 830 90/46/2481 10:32 AM  
 
Lab Results Component Value Date/Time Sodium 138 11/26/2019 11:30 AM  
 Potassium 4.1 11/26/2019 11:30 AM  
 Chloride 99 (L) 11/26/2019 11:30 AM  
 CO2 29 11/26/2019 11:30 AM  
 BUN 81 (H) 11/26/2019 11:30 AM  
 Creatinine 4.37 (H) 11/26/2019 11:30 AM  
 Calcium 8.1 (L) 11/26/2019 11:30 AM  
 Magnesium 2.2 11/09/2019 02:30 AM  
 Phosphorus 2.0 (L) 11/22/2019 03:30 AM  
  
Lab Results Component Value Date/Time AST (SGOT) 20 11/26/2019 11:30 AM  
 Alk. phosphatase 105 11/26/2019 11:30 AM  
 Protein, total 6.0 (L) 11/26/2019 11:30 AM  
 Albumin 2.7 (L) 11/26/2019 11:30 AM  
 Globulin 3.3 11/26/2019 11:30 AM  
 
Lab Results Component Value Date/Time INR 8.0 (HH) 11/26/2019 11:30 AM  
 Prothrombin time 66.5 (H) 11/26/2019 11:30 AM  
 aPTT >180.0 (HH) 11/20/2019 03:10 PM  
  
Lab Results Component Value Date/Time Iron 11 (L) 11/16/2019 09:10 AM  
 TIBC 271 11/16/2019 09:10 AM  
 Iron % saturation 4 11/16/2019 09:10 AM  
 Ferritin 395 (H) 11/16/2019 09:10 AM  
  
No results found for: PH, PCO2, PO2 No components found for: [place] Lab Results Component Value Date/Time CK 39 11/26/2019 11:30 AM  
 CK - MB 1.1 11/26/2019 11:30 AM  
  
 
   
 
Total time: 70 minutes Counseling / coordination time, spent as noted above: 60 minutes 
> 50% counseling / coordination: yes with patient, family, nephrologist 
 
Prolonged service was provided for  []30 min   []75 min in face to face time in the presence of the patient, spent as noted above. Time Start:  
Time End:  
Note: this can only be billed with 24012 (initial) or 70694 (follow up). If multiple start / stop times, list each separately.

## 2019-11-26 NOTE — CONSULTS
Nephrology Consult Note Consult requesting Physican:  
Dr Erum Torres Reason for Consult: SOB and REZA recently started on HD 
 
 
HPI:  
[de-identified] HARINDER Eden is a 80 y.o. WF with a PMHX of CHF, COPD who was recently admitted to [facility] with newly dx Afib with RVR and also started on HD for worsening kidney functionof came back today from SNF with worsening SOB. She was d/sandra on 11/22 and supposed to have her first out pt HD yesterday (11/25) at [place] [address] PW unit, but she was not able to do it due to generalized weakness. During initial evaluation in the ER today, she was hypoxic, placed on Bipap. Her BP is low (SBP 's) and is in RVR (110-140's). Lab showed Cr 4.3, K 4.1 and CO2 29. CxR showed pulm congestion. Pt is currently DNR/I. After discussing with the pt, she strongly wants to pursue dialysis at least for now Impression:  
-Pulmonary edema 
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 MWF schedule at [facility] [place] -Hypotension w/ afib/RVR 
-Rt sided HF with Pulm HTN  
-Anemia of chronic disease, + Fe def. S/p prbc  
-Recent onset atrial fibrillation w/ RVR 
-Coagulopathy/warfarin induced,  
-Rt IJ acute DVT,  on anticoagulation  
-DNR/I Plan: - HD today for 3.5 hrs 1L UF.  
- If HD tolerated today, might need to repeat in am  
- Epo w/ HD 
- HR and RVR control per ICU/primary tem - Avoid ACEI/ARBs, NSAIDs or IV contrast, and other nephrotoxic meds - Discussed with pt the possibility of her not tolerating dialysis and the risks of dialysis given her current condition Medications:  
 
Current Facility-Administered Medications:  
  phytonadione (vitamin K1) (AQUA-MEPHYTON) injection 2.5 mg, 2.5 mg, Oral, NOW, Lissette Givens MD 
 
Current Outpatient Medications:  
  warfarin (COUMADIN) 2 mg tablet, Take 1 Tab by mouth daily. , Disp: 10 Tab, Rfl: 0 
  nystatin (MYCOSTATIN) 100,000 unit/mL suspension, Take 5 mL by mouth four (4) times daily for 5 days. swish and spit, Disp: 100 mL, Rfl: 0 
  amiodarone (CORDARONE) 200 mg tablet, Take 1 Tab by mouth daily. , Disp: 10 Tab, Rfl: 0 
  amiodarone (PACERONE) 400 mg tablet, Take 1 Tab by mouth daily. , Disp: 3 Tab, Rfl: 0 
  dilTIAZem (CARDIZEM) 30 mg tablet, Take 1 Tab by mouth Before breakfast, lunch, and dinner., Disp: 10 Tab, Rfl: 0 
  doxycycline (MONODOX) 100 mg capsule, Take 1 Cap by mouth two (2) times a day., Disp: 10 Cap, Rfl: 0 
  atorvastatin (LIPITOR) 40 mg tablet, Take 1 Tab by mouth daily. , Disp: 30 Tab, Rfl: 0 
  aspirin delayed-release 81 mg tablet, Take 1 Tab by mouth daily. , Disp: 30 Tab, Rfl: 0 
  ergocalciferol (VITAMIN D2) 50,000 unit capsule, Take 50,000 Units by mouth every seven (7) days. , Disp: , Rfl:  
  PARoxetine (PAXIL) 20 mg tablet, Take 20 mg by mouth daily. , Disp: , Rfl:  
  furosemide (LASIX) 40 mg tablet, Take 40 mg by mouth every other day., Disp: , Rfl:  
 
PM/SHx:  
 
Active Ambulatory Problems Diagnosis Date Noted  Respiratory failure ([facility] 10/26/2019  
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  
Diagnosis Date  Chronic kidney disease  Congestive heart failure (CHF) ([address]  COPD (chronic obstructive pulmonary disease) (HCC)  Dialysis patient [facility]) SHx:  
 
Social History Socioeconomic History  Marital status:  Spouse name: Not on file  Number of children: Not on file  Years of education: Not on file  Highest education level: Not on file Occupational History  Not on file Social Needs  Financial resource strain: Not on file  Food insecurity:  
  Worry: Not on file Inability: Not on file  Transportation needs:  
  Medical: Not on file Non-medical: Not on file Tobacco Use  Smoking status: Former Smoker  Smokeless tobacco: Never Used Substance and Sexual Activity  Alcohol use: Not on file  Drug use: Not Currently  Sexual activity: Not on file Lifestyle  Physical activity:  
  Days per week: Not on file Minutes per session: Not on file  Stress: Not on file Relationships  Social connections:  
  Talks on phone: Not on file Gets together: Not on file Attends Latter day service: Not on file Active member of club or organization: Not on file Attends meetings of clubs or organizations: Not on file Relationship status: Not on file  Intimate partner violence:  
  Fear of current or ex partner: Not on file Emotionally abused: Not on file Physically abused: Not on file Forced sexual activity: Not on file Other Topics Concern  Not on file Social History Narrative  Not on file Allergies: Allergies Allergen Reactions  Penicillins Hives  Valium [Diazepam] Other (comments) It made me cry more Review of Systems:  
Review of System is negative except per HPI Physical Assessment:  
 
Visit Vitals BP 96/59 Pulse (!) 112 Temp 97.3 °F (36.3 °C) Resp 15 Ht 4' 9\" (1.448 m) Wt 68.9 kg (151 lb 14.4 oz) SpO2 95% BMI 32.87 kg/m² No intake or output data in the 24 hours ending 11/26/19 1319 General Appearance: in res distress/on bipap Head: atraumatic HEENT: no jaundice, moist oral mucosa Neck: no JVD noted Chest: LS clear to auscultation bilaterally Heart: S1/S2, no murmur, gallop or rub, RRR Adbomen: soft, nontender, BS active : no flank tenderness, no altman catheter Skin: intact, no rash Extremity: + edema, no cyanosis or clubbing MS: No joint deformity or tenderness Neuro: conscious, alert, oriented x 3, no focal deficit HD access: Rt IJ TDC Josh Bunch MD 
 
Work Number: [id number]

## 2019-11-26 NOTE — PROGRESS NOTES
Chart reviewed. Pt in ED bed 6 awaiting admissions. Pt noted to be readmit. Palliative consult noted. Pt is from [facility]. Pt apparently refused dialysis and will have HD today. Provider, please consider PT/OT eval orders to assist with dc planning. CM will follow for transition of care needs and will be available via hospital  for transition of care needs. Reason for Admission:   Dyspnea, SOB 
            
RRAT Score:     high Resources/supports as identified by patient/family:    
             
Top Challenges facing patient (as identified by patient/family and CM): Finances/Medication cost?       
           
Transportation? Support system or lack thereof? Living arrangements? Self-care/ADLs/Cognition? Current Advanced Directive/Advance Care Plan: On file Plan for utilizing home health:    SNF? Transition of Care Plan:     TBD Care Management Interventions Transition of Care Consult (CM Consult): Discharge Planning

## 2019-11-26 NOTE — ED TRIAGE NOTES
Patient arrive from [facility] for shortness of breath x 1 hour, per EMS patient was 85% on 2L when they arrived, patient placed on NRB, a/ox4, patient received dialysis while inpatient, but has not received outpatient dialysis, a/ox4

## 2019-11-26 NOTE — DIALYSIS
[facility] [place] ACUTE HEMODIALYSIS FLOW SHEET 
  
 
PATIENT INFORMATION Hospital:  [facility] []1st Time Acute  []Stat[x] Routine []Urgent []Chronic Unit  
[]Acute Room []Bedside  [x]ICU/CCU []ER Isolation Precautions: [x]Dialysis[] Airborne []Contact []Droplet []Reverse Special Considerations:_______  [] Blood Consent Verified  [x]N/A Allergies:[] NKA [x] _Penicillins, Valium____________ Code Status []Full Code [x] DNR  [] Other_____ Diet: [x] Renal [] NPO [] Diabetic  
[] Enteral Feeding Diabetic: []Yes [x]No 
  
[x]Signed Treatment Consent Verified  
[x] Time Out/ Safety Check PRIMARY NURSE REPORT: FIRST INITIAL/ LAST NAME/TITLE 
PRE DIALYSIS: RAOUL Burch RN                                          TIME: 1500 ACCESS CATHETER ACCESS: [] N/A  [x] RIGHT  [] LEFT  [x] IJ  [] SUBCL [] FEM [] First use X-ray  [x] Tunnel     [] Non-Tunneled [x] No S/S infection  [] Redness [] Drainage  [] Cultured [] Swelling [] Pain  
                 [x] Medical Aseptic [] Prep Dressing Changed  
               [] Clotted [x] Patent []      Flows: [x] Good [] Poor [] Reversed If Access Problem Dr. Jonny Bojorquez: [] Yes [] No    Date:_____  [x] N/A[]  
GRAFT/FISTULA ACCESS:  [x] N/A  [] RIGHT  [] LEFT  [] UE   [] LE   
   [] AVG  [] AVF [] BUTTONHOLE 
  [] +BRUIT/THRILL [] MEDICAL ASEPTIC PREP [] No S/S infection  [] Redness [] Drainage  [] Cultured [] Swelling [] Pain If Access Problem Dr. Jonny Bojorquez: [] Yes [] No    Date:______ [x] N/A  
GENERAL ASSESSMENT  
LUNGS:  SaO2% ____ [] Clear [] Coarse [] Crackles [] Wheezing  
                                      [x] Diminished Location: [x] RLL [x] LLL [x] RUL [x] RML [x] NITA   
COUGH:  [] Productive [x] Dry [] N/A  RESPIRATIONS: [x] Easy [] Labored THERAPY: [] RA   [] NC _____. L/min    Mask: [] NRB [] Venti  _____O2% [] Ventilator [] Intubated [] Trach [] BiPap [] CPap [] HI Flow CARDIAC: [] Regular [] Irregular [] Pericardial Rub [] JVD Monitored Rhythm:______ [] N/A  
EDEMA: [] None [] Generalized [] Facial [] Pedal [] UE [x] LE 
           [] Pitting [x] 1 [] 2 [] 3 [] 4    [] Right [] Left [] Bilateral  
SKIN:    [] Warm [] Hot [x] Cold  [x] Dry [] Pale [] Diaphoretic  
           [] Flushed [] Jaundiced [] Cyanotic [] Rash [] Weeping LOC:    [x] Alert  Oriented to: [x] Person [x] Place [] Time  
          [] Confused [x] Lethargic [] Medicated [] Non-responsive GI/ABDOMEN: [] Flat [] Distended [x] Soft [] Firm [] Diarrhea [x] Bowel Sounds Present [] Nausea [] Vomiting PAIN: [x] 0 [] 1 [] 2 [] 3 [] 4 [] 5 [] 6 [] 7 [] 8 [] 9 [] 10 Scale 1-10 Action/Follow Up_____ MOBILITY: [] Amb [] Amb/Assist [x] Bed  [] Wheelchair CURRENT LABS HBsAg ONLY: Date Drawn: 11/17/2019            [x] Negative [] Positive [] Unknown. HBsAb: Date Drawn: 11/17/2019             [x] Susceptible <10 [] Immune ?10 [] Unknown Date of Current Labs: 11/26/2019 EDUCATION Person Educated: [x] Patient [] Other_________ Knowledge base: [] None [x] Minimal [] Substantial   
Barriers to learning  [x] None _______________ Preferred method of learning: [] Written [x] Oral [] Visual [] Hands on Topic: [] Access Care [] S&S of infection [] Fluid Management 
 [] K+  [x] Procedural  [] Albumin [] Medications [] Tx Options [] Transplant [] Diet [] Other Teaching Tools: [x] Explain [] Demonstration [] Handout_____ [] Video______  
RO/HEMODIAYLSIS MACHINE SAFETY CHECKS- BEFORE EACH TREATMENT [x] [facility]: Machine Serial #1:  [id number]   RO Serial #[id number] [] [facility]: Machine Serial #2:  [id number]   RO Serial #[id number] Alarm Test: [x] Pass  Time__1525______ [x] RO/Machine Log Complete    [x] Extracorporeal circuit Tested for integrity Dialyzer_[id number]_________   Tubing_19G24-8___________ Dialysate: pH _7.2____  Temp. _36_____ Conductivity: Meter __14___ HD Machine_14____ CHLORINE TESTING- BEFORE EACH TREATMENT AND EVERY 4 HOURS Total Chlorine: [x] Less than 0.1 ppm Time:_1530____2nd Check Time:______ 
(If greater than 0.1 ppm from Primary then every 30 minutes from Secondary) TREATMENT INIATION-WITH DIALYSIS PRECAUTIONS [x] All Connections Secured   [x] Saline Line Double Clamped    [x] Venous Parameters Set [x] Arterial Parameters Set    [x] Prime Given 250 ml    
[x] Air Foam Detector Engaged PRE-TREATMENT  
UF Calculations: Wt to lose:_1000___ml(+) Oral:_0_ml(+)IV Meds/Fluids/Blood prods____ml(+) Prime/Rinse_500___ml(=)Total UF Goal__1500__mL Scale Type:[x] Bed scale [] Sling Scale [] Wheel Chair Scale []  Not Ordered [] [] Unable to obtain pt on stretcher/ bed scale malfunctioning 
_____[] kg [] lbs Tx Initiation Note:  Plan to remove 1 liter for 1.5 hours while monitoring patient's well-being and vital signs. [x] Time Out/Safety Check  Time:_1530____ INTRADIALYTIC MONITORING 
(SEE ATTACHED FLOWSHEET) POST TREATMENT Time Medication Dose Volume Route Initials [employer] Signatures Title Initials Time Pat [place] RN RW 1600 Dialyzer cleared: [] Good [] Fair [] Poor Blood Volume Processed _70___L Net UF Removed _1000___mL Post Tx Access: AVF/AVG: Bleeding Stop Time      Art.___min Adolfo.____min []+bruit/thrill Catheter: Locking Solution  [x] Heparin 1 ml/1000 units [] Normal Saline                           
                                            [] New caps applied Art.__2.2___ ml Adolfo.__2.1___ml Post Assessment:   
          Skin: [x]Warm [x]Dry []Diaphoretic []Flushed [] Pale []Cyanotic Lungs: [x]Clear []Coarse []Crackles []Wheezing Cardiac: []Regular [x]Irregular  [x]Monitored rhythm____ []N/A Edema: []None [x]General []Facial []Pedal  []UE [x]LE []RIGHT []LEFT Pain: [x]0 []1 []2 []3 []4 []5 []6 []7 []8 []9 []10  
POST Tx Note: Patient in room 101 dialyzed for 3.5 hours. Tolerated tx well with without complaint or complications. TDC functioning without complication   
1700 ml UF removed with a net UF removal of 1000 ml. Report given to RAOUL Pandya RN with all questions answered. Abbreviations: AVG-arterial venous graft, AVF-arterial venous fistula, IJ-Internal Jugular, Subcl-Subclavian, Fem-Femoral, Tx-treatment, AP/HR-apical heart rate, DFR-dialysate flow rate, BFR-blood flow rate, AP-arterial pressure, -venous pressure, UF-ultrafiltrate, TMP-transmembrane pressure, Adolfo-Venous, Art-Arterial, RO-Reverse Osmosis

## 2019-11-26 NOTE — ED PROVIDER NOTES
EMERGENCY DEPARTMENT HISTORY AND PHYSICAL EXAM 
 
Date: 11/26/2019 Patient Name: Alexandra Dejesus History of Presenting Illness Chief Complaint Patient presents with  Shortness of Breath History Provided By: Patient and EMS 
 
3281 Alexandra Dejesus is a 80 y.o. female with PMHX of CKD, COPD, pulmonary hypertension, a-fib, chronically ill who presents to the emergency department C/O respiratory distress. Patient was admitted on 11/8 for new onset A. fib and worsening renal function. Palliative care was consulted and patient decided to start on dialysis. She had dialysis and was discharged to [address] [place] [address]. Patient was scheduled for dialysis yesterday which would be her first outpatient session however she felt too tired to go. MS was called this morning because patient has been feeling more short of breath since today. The nursing home her pulse ox was reportedly in the 70s. Patient was placed on nonrebreather by EMS with improvement of her pulse ox in the 90s. My evaluation patient is very fatigued, states she had some pain with deep inspiration. She states that the oxygen is helping. PCP: Rico PURCELL, DO 
 
Current Facility-Administered Medications Medication Dose Route Frequency Provider Last Rate Last Dose  phytonadione (vitamin K1) (AQUA-MEPHYTON) injection 2.5 mg  2.5 mg Oral NOW Ishmael St MD      
 
Current Outpatient Medications Medication Sig Dispense Refill  warfarin (COUMADIN) 2 mg tablet Take 1 Tab by mouth daily. 10 Tab 0  
 nystatin (MYCOSTATIN) 100,000 unit/mL suspension Take 5 mL by mouth four (4) times daily for 5 days. swish and spit 100 mL 0  
 amiodarone (CORDARONE) 200 mg tablet Take 1 Tab by mouth daily. 10 Tab 0  
 amiodarone (PACERONE) 400 mg tablet Take 1 Tab by mouth daily. 3 Tab 0  
 dilTIAZem (CARDIZEM) 30 mg tablet Take 1 Tab by mouth Before breakfast, lunch, and dinner.  10 Tab 0  
  doxycycline (MONODOX) 100 mg capsule Take 1 Cap by mouth two (2) times a day. 10 Cap 0  
 atorvastatin (LIPITOR) 40 mg tablet Take 1 Tab by mouth daily. 30 Tab 0  
 aspirin delayed-release 81 mg tablet Take 1 Tab by mouth daily. 30 Tab 0  
 ergocalciferol (VITAMIN D2) 50,000 unit capsule Take 50,000 Units by mouth every seven (7) days.  PARoxetine (PAXIL) 20 mg tablet Take 20 mg by mouth daily.  furosemide (LASIX) 40 mg tablet Take 40 mg by mouth every other day. Past History Past Medical History: 
Past Medical History:  
Diagnosis Date  Chronic kidney disease  Congestive heart failure (CHF) ([facility] [address]  COPD (chronic obstructive pulmonary disease) ([facility])  Dialysis patient [facility]) Past Surgical History: 
History reviewed. No pertinent surgical history. Family History: 
History reviewed. No pertinent family history. Social History: 
Social History Tobacco Use  Smoking status: Former Smoker  Smokeless tobacco: Never Used Substance Use Topics  Alcohol use: Not on file  Drug use: Not Currently Allergies: Allergies Allergen Reactions  Penicillins Hives  Valium [Diazepam] Other (comments) It made me cry more Review of Systems Review of Systems Unable to perform ROS: Acuity of condition Constitutional: Positive for fatigue. Respiratory: Positive for chest tightness and shortness of breath. Cardiovascular: Positive for chest pain. Physical Exam  
 
Vitals:  
 11/26/19 1101 11/26/19 1130 11/26/19 1145 11/26/19 1230 BP: (!) 113/94 98/62 99/57 96/59 Pulse: (!) 117 (!) 126 (!) 120 (!) 112 Resp: 12 19 21 15 Temp:      
SpO2:      
Weight:      
Height:      
 
Physical Exam 
 
Nursing notes and vital signs reviewed Constitutional: Chronically ill and frail appearing, taking short shallow breaths Head: Normocephalic Eyes: Pupils are equal, round, and reactive to light, EOMI Neck: Supple, no JVD Cardiovascular: Irregularly irregular rhythm Chest: Creased work of breathing, shallow breaths Lungs: Rales anterior right side, expiratory wheezes and decreased breath sounds left side Abdomen: Soft, non tender, non distended Extremities: No evidence of trauma, old ecchymosis on forearms, bilateral 2+ lower extremity edema Skin: Cool extremities Neuro: Awake and alert with diminished mentation, moving all extremities Diagnostic Study Results Labs - Recent Results (from the past 12 hour(s)) EKG, 12 LEAD, INITIAL Collection Time: 11/26/19  9:49 AM  
Result Value Ref Range Ventricular Rate 114 BPM  
 Atrial Rate 65 BPM  
 QRS Duration 86 ms  
 Q-T Interval 336 ms  
 QTC Calculation (Bezet) 463 ms Calculated R Axis 22 degrees Calculated T Axis -157 degrees Diagnosis Atrial fibrillation with rapid ventricular response Possible Anterior infarct , age undetermined Abnormal ECG When compared with ECG of 08-NOV-2019 21:56, No significant change was found Confirmed by Zohar Damon MD, -- ([id number]) on 11/26/2019 11:53:14 AM 
  
POC LACTIC ACID Collection Time: 11/26/19 10:02 AM  
Result Value Ref Range Lactic Acid (POC) 0.75 0.40 - 2.00 mmol/L  
POC VENOUS BLOOD GAS Collection Time: 11/26/19 10:02 AM  
Result Value Ref Range Device: BIPAP    
 FIO2 (POC) 100 %  
 pH, venous (POC) 7.338 7.32 - 7.42    
 pCO2, venous (POC) 32.7 (L) 41 - 51 MMHG  
 pO2, venous (POC) 36 25 - 40 mmHg HCO3, venous (POC) 17.6 (L) 23.0 - 28.0 MMOL/L  
 sO2, venous (POC) 67 65 - 88 % Base deficit, venous (POC) 8 mmol/L  
 PEEP/CPAP (POC) 6 cmH2O  
 PIP (POC) 14 Allens test (POC) N/A Total resp. rate 20 Site UAC Specimen type (POC) VENOUS BLOOD Performed by Manuel Correia Spontaneous timed YES    
CBC WITH AUTOMATED DIFF Collection Time: 11/26/19 10:32 AM  
Result Value Ref Range WBC 9.0 4.6 - 13.2 K/uL  
 RBC 3.94 (L) 4.20 - 5.30 M/uL HGB 11.8 (L) 12.0 - 16.0 g/dL HCT 38.5 35.0 - 45.0 % MCV 97.7 (H) 74.0 - 97.0 FL  
 MCH 29.9 24.0 - 34.0 PG  
 MCHC 30.6 (L) 31.0 - 37.0 g/dL  
 RDW 18.5 (H) 11.6 - 14.5 % PLATELET 112 352 - 213 K/uL MPV 12.7 (H) 9.2 - 11.8 FL  
 NEUTROPHILS 89 (H) 40 - 73 % LYMPHOCYTES 7 (L) 21 - 52 % MONOCYTES 4 3 - 10 % EOSINOPHILS 0 0 - 5 % BASOPHILS 0 0 - 2 %  
 ABS. NEUTROPHILS 8.0 1.8 - 8.0 K/UL  
 ABS. LYMPHOCYTES 0.6 (L) 0.9 - 3.6 K/UL  
 ABS. MONOCYTES 0.4 0.05 - 1.2 K/UL  
 ABS. EOSINOPHILS 0.0 0.0 - 0.4 K/UL  
 ABS. BASOPHILS 0.0 0.0 - 0.1 K/UL  
 DF AUTOMATED METABOLIC PANEL, COMPREHENSIVE Collection Time: 11/26/19 11:30 AM  
Result Value Ref Range Sodium 138 136 - 145 mmol/L Potassium 4.1 3.5 - 5.5 mmol/L Chloride 99 (L) 100 - 111 mmol/L  
 CO2 29 21 - 32 mmol/L Anion gap 10 3.0 - 18 mmol/L Glucose 102 (H) 74 - 99 mg/dL BUN 81 (H) 7.0 - 18 MG/DL Creatinine 4.37 (H) 0.6 - 1.3 MG/DL  
 BUN/Creatinine ratio 19 12 - 20 GFR est AA 12 (L) >60 ml/min/1.73m2 GFR est non-AA 10 (L) >60 ml/min/1.73m2 Calcium 8.1 (L) 8.5 - 10.1 MG/DL Bilirubin, total 0.9 0.2 - 1.0 MG/DL  
 ALT (SGPT) 30 13 - 56 U/L  
 AST (SGOT) 20 10 - 38 U/L Alk. phosphatase 105 45 - 117 U/L Protein, total 6.0 (L) 6.4 - 8.2 g/dL Albumin 2.7 (L) 3.4 - 5.0 g/dL Globulin 3.3 2.0 - 4.0 g/dL A-G Ratio 0.8 0.8 - 1.7 NT-PRO BNP Collection Time: 11/26/19 11:30 AM  
Result Value Ref Range NT pro-BNP 18,647 (H) 0 - 1,800 PG/ML  
CARDIAC PANEL,(CK, CKMB & TROPONIN) Collection Time: 11/26/19 11:30 AM  
Result Value Ref Range CK 39 26 - 192 U/L  
 CK - MB 1.1 <3.6 ng/ml CK-MB Index 2.8 0.0 - 4.0 % Troponin-I, QT 0.03 0.0 - 0.045 NG/ML  
PROTHROMBIN TIME + INR Collection Time: 11/26/19 11:30 AM  
Result Value Ref Range Prothrombin time 66.5 (H) 11.5 - 15.2 sec INR 8.0 (HH) 0.8 - 1.2 Radiologic Studies -  
XR CHEST PORT Final Result IMPRESSION:  
  
  
 1. Right IJ approach dialysis catheter in stable position. 2. Cardiomegaly, central vascular congestion, and bilateral pleural effusions as  
previously with mild interval increase in interstitial edema from the prior  
exam. CT Results  (Last 48 hours) None CXR Results  (Last 48 hours)  
          
 11/26/19 0946  XR CHEST PORT Final result Impression:  IMPRESSION:  
   
   
1. Right IJ approach dialysis catheter in stable position. 2. Cardiomegaly, central vascular congestion, and bilateral pleural effusions as  
previously with mild interval increase in interstitial edema from the prior  
exam.   
  
 Narrative:  EXAM: XR CHEST PORT  
   
CLINICAL INDICATION/HISTORY: Shortness of breath  
-Additional: None COMPARISON: Several prior exams, most recently 11/21/2019 TECHNIQUE: Frontal view of the chest  
   
_______________ FINDINGS:  
   
HEART AND MEDIASTINUM: Right IJ approach dialysis catheter with tip projecting  
in stable position. Stable appearing cardiac size and mediastinal contours. LUNGS AND PLEURAL SPACES: Central vascular congestion is overall similar in  
appearance with mild interval increase in perihilar predominant interstitial  
densities. There are small posteriorly layering pleural effusions, right larger  
than left, unchanged. No evidence of pneumothorax. BONY THORAX AND SOFT TISSUES: No acute osseous abnormality. Advanced bilateral  
rotator cuff arthropathy. _______________ Medications given in the ED- Medications  
phytonadione (vitamin K1) (AQUA-MEPHYTON) injection 2.5 mg (has no administration in time range)  
albuterol-ipratropium (DUO-NEB) 2.5 MG-0.5 MG/3 ML (3 mL Nebulization Given 11/26/19 0951)  
albuterol-ipratropium (DUO-NEB) 2.5 MG-0.5 MG/3 ML (3 mL Nebulization Given 11/26/19 0951) Medical Decision Making I am the first provider for this patient. I reviewed the vital signs, available nursing notes, past medical history, past surgical history, family history and social history. Vital Signs-Reviewed the patient's vital signs. Pulse Oximetry Analysis - 100% on BiPAP Cardiac Monitor: 
Rate: 117 bpm 
Rhythm: a-fib RVR EKG interpretation: (Preliminary) EKG read by Dr. Vianney Gardner at 8926 A-fib RVR rate of 114 Records Reviewed: Nursing Notes, Old Medical Records, Previous electrocardiograms, Previous Radiology Studies and Previous Laboratory Studies Provider Notes (Medical Decision Making): Adi Richardson is a 80 y.o. female who presented with respiratory distress. Patient with multiple comorbidities including COPD, CHF, pulmonary hypertension, and now missed dialysis. Treated with duo nebs and had significant improvement on BiPAP. She remains in A. fib RVR and blood pressure in systolic 47K over 82G. Suspect her RVR is secondary to her respiratory distress which demonstrated hypoxia on her initial VBG so as such will not try to rate control at this time as her underlying issues need to be managed. Will discuss with nephrology regarding need for dialysis as patient seems to have worsening pulmonary edema from prior x-ray. Clinically is not presenting as a pneumonia and lactate was unremarkable. No fever or leukocytosis. Labs notable for worsening renal function as patient has missed dialysis and INR 8 with no evidence of bleeding will give low-dose vitamin K. 
 
CONSULT NOTE:  
12:54 PM  
I discussed care with Dr. Reginaldo Garcia It was a standard discussion, including history of patients chief complaint, available diagnostic results, and treatment course. Discussed case. Will admit. CONSULT NOTE:  
1:05 PM  
I discussed care with Dr. Keary Fothergill, Nephro It was a standard discussion, including history of patients chief complaint, available diagnostic results, and treatment course. Discussed case.  He is aware of patient from prior admission, will plan on HD for patient as she missed yesterday session. Procedures: 
Procedures ED Course:  
 
 
Diagnosis and Disposition 1:08 PM 
I have spent 30 minutes of critical care time involved in lab review, consultations with specialist, family decision-making, and documentation. During this entire length of time I was immediately available to the patient. Critical Care: The reason for providing this level of medical care for this critically ill patient was due a critical illness that impaired one or more vital organ systems such that there was a high probability of imminent or life threatening deterioration in the patients condition. This care involved high complexity decision making to assess, manipulate, and support vital system functions, to treat this degreee vital organ system failure and to prevent further life threatening deterioration of the patients condition. Core Measures: 
For Hospitalized Patients: 
 
1. Hospitalization Decision Time: The decision to hospitalize the patient was made by Dr Yasmin Lieberman on 11/26/2019 2. Aspirin: Aspirin was not given because the patient is a bleeding risk 1:08 PM  Patient is being admitted to the hospital by Dr Nicole Lorenzana. The results of their tests and reasons for their admission have been discussed with them and/or available family. They convey agreement and understanding for the need to be admitted and for their admission diagnosis. CONDITIONS ON ADMISSION: 
Sepsis is not present at the time of admission. Deep Vein Thrombosis is not present at the time of admission. Thrombosis is not present at the time of admission. Urinary Tract Infection is not present at the time of admission. Pneumonia is not present at the time of admission. MRSA is not present at the time of admission. Wound infection is not present at the time of admission. Pressure Ulcer is not present at the time of admission.    
 
CLINICAL IMPRESSION: 
 
 1. Respiratory distress 2. Hypoxia 3. Chronic pulmonary edema 4. Elevated INR   
 
 
_______________________________ Please note that this dictation was completed with [employer], the computer voice recognition software. Quite often unanticipated grammatical, syntax, homophones, and other interpretive errors are inadvertently transcribed by the computer software. Please disregard these errors. Please excuse any errors that have escaped final proofreading.

## 2019-11-26 NOTE — ED NOTES
TRANSFER - OUT REPORT: 
 
Verbal report given to [facility] on Wes Marquis  being transferred to ICU for routine progression of care Report consisted of patients Situation, Background, Assessment and  
Recommendations(SBAR). Information from the following report(s) SBAR, ED Summary, Procedure Summary, MAR, Recent Results and Med Rec Status was reviewed with the receiving nurse. Lines:  
Peripheral IV 11/26/19 Right Arm (Active) Site Assessment Clean, dry, & intact 11/26/2019 10:40 AM  
Phlebitis Assessment 0 11/26/2019 10:40 AM  
Infiltration Assessment 0 11/26/2019 10:40 AM  
Dressing Status Clean, dry, & intact 11/26/2019 10:40 AM  
Dressing Type Transparent 11/26/2019 10:40 AM  
Hub Color/Line Status Yellow 11/26/2019 10:40 AM  
Action Taken Blood drawn 11/26/2019 10:40 AM  
Alcohol Cap Used Yes 11/26/2019 10:40 AM  
   
Peripheral IV 11/26/19 Left Hand (Active) Site Assessment Clean, dry, & intact 11/26/2019 11:30 AM  
Phlebitis Assessment 0 11/26/2019 11:30 AM  
Infiltration Assessment 0 11/26/2019 11:30 AM  
Dressing Status Clean, dry, & intact 11/26/2019 11:30 AM  
Dressing Type Tape;Transparent 11/26/2019 11:30 AM  
Hub Color/Line Status Blue;Flushed 11/26/2019 11:30 AM  
Action Taken Blood drawn 11/26/2019 11:30 AM  
  
 
Opportunity for questions and clarification was provided. Patient transported with: 
Registered Nurse, cardiac monitor, respiratory therapist, BIPAP

## 2019-11-26 NOTE — CONSULTS
[facility] Pulmonary, Critical Care, and Sleep Medicine Name: Pal Rasmussen MRN: [id number] : 1935 Hospital: [facility] [place] Date: 2019  Room: 101/01 [facility] Note Consult requesting physician: Dr. Sherrie Jefferson Reason for Consult: respiratory failure IMPRESSION:  
·  
· Patient Active Problem List  
Diagnosis Code  Respiratory failure (HCC) J96.90  
 Heart failure (HCC) I50.9  Benign essential hypertension I10  Chronic diastolic congestive heart failure (HCC) I50.32  
 Cobalamin deficiency E53.8  Hypercholesterolemia E78.00  Hypertensive heart disease I11.9  Hypothyroidism E03.9  Iron deficiency anemia D50.9  Stage 4 chronic kidney disease ([facility] [address] N18.4  A-fib ([facility]) I48.91  
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  
 ESRD needing dialysis ([facility]) N18.6, Z99.2  Dyspnea R06.00 · AFib with RVR · Coagulopathy due to coumadin use · Code status: DNR/DNI  
  
RECOMMENDATIONS:  
Respiratory: hypoxia due to CHF and pleural effusion due to volume overload. Getting HD now to remove more fluids. C/w O2. Increase fio2. Used bipap in ER. Can place back on bipap with higher epap if needed. No sign of pneumonia clinically. Protecting airway. Pt is DNR, DNI Keep SPO2 >=92%. HOB 30 degree elevation all the time. Aggressive pulmonary toileting. Aspiration precautions. Incentive spirometry. CVS: on midodrine. . Tolerating HD currently. If hypotensive during HD, family agreed to vasopressors through PIV. D/w family the risk and benefits, including tissue necrosis etc, they verbalized understanding and declined central line due to risk a/w central line placement (on coumadin with INR 8, increased bleeding risk and risk of PTX etc). AFib with RVR due to volume overload and hypoxia. Use dig if needed.  HD fluid removal will help on RVR. If vasopressor needed, then use neosynephrine due to RVR. ID: no sign of infection. Hematology/Oncology: monitor H&H. INR 8. Give vit k 10 mg x1. Monitor for any bleeding. Repeat INR in am.  
Renal: was suppose to get HD on 11/25/19 as outpatient, missed. Has right neck tunneled HD catheter. Family decided to go for HD, getting HD now. Defer to nephrologist.  
GI/: no acute issues Endocrine: Monitor BS. Neurology: baseline mental status. She was lethargic and weak/tired due to ESRD Pain/Sedation: no acute issues Skin/Wound: no acute issues Electrolytes: Replace electrolytes per ICU electrolyte replacement protocol. IVF: none Nutrition: npo while on bipap Prophylaxis: DVT Prophylaxis: SCD. GI Prophylaxis: Protonix 40 mg daily. Restraints: none Lines/Tubes: PIV Right IJ tunneled HD catheter. Advance Directive/Palliative Care: consult appreciated. Keep DNR/DNI, I d/w son/daughter and other family members and confirmed. Overall poor prognosis explained. Will defer respective systems problem management to primary and other respective consultant and follow patient in ICU with primary and other medical team. 
Further recommendations will be based on the patient's response to recommended treatment and results of the investigation ordered. Quality Care: PPI, DVT prophylaxis, HOB elevated, Infection control all reviewed and addressed. Care of plan d/w RN, RT, MDR. 
D/w patient, family-son/daughter/sister (answered all questions to satisfaction). High complexity decision making was performed during the evaluation of this patient at high risk for decompensation with multiple organ involvement. Total critical care time spent rendering care exclusive of procedures: 46 minutes.  
  
 
 
Subjective/History of Present Illness:  
 
Patient is a 80 y.o. female with PMHx significant for CHF, HTN, AFib, hypothyroidism, COPD, RIJ DVT, CKD-4 on HD admitted with coagulopathy, AFib with RVR, dyspnea due to volume overload on lungs, acute hypoxic respiratory failure. Pt was suppose to get outpatient HD on 11/25/19. Worsening dyspnea. Came to ER from [facility], placed on bipap and o2. cxr with CHF and pleural effusion. No fever chills cp Leg edema + Family decided to go for HD after palliative care consult discussion. 11/26/2019: 
Seen in ICU Placed back on bipap Getting HD 
's No fever Family at bedside, discussed. I/O last 24 hrs: No intake or output data in the 24 hours ending 11/26/19 1548 The patient is critically ill and can not provide additional history due to Unable to comprehend History taken from family, EMR Review of Systems: 
ROS not obtained due to patient factor. Allergies Allergen Reactions  Penicillins Hives  Valium [Diazepam] Other (comments) It made me cry more Past Medical History:  
Diagnosis Date  Chronic kidney disease  Congestive heart failure (CHF) ([facility] [address]  COPD (chronic obstructive pulmonary disease) ([facility])  Dialysis patient [facility]) History reviewed. No pertinent surgical history. Social History Tobacco Use  Smoking status: Former Smoker  Smokeless tobacco: Never Used Substance Use Topics  Alcohol use: Not on file History reviewed. No pertinent family history. Prior to Admission medications Medication Sig Start Date End Date Taking? Authorizing Provider  
warfarin (COUMADIN) 2 mg tablet Take 1 Tab by mouth daily. 11/22/19   Pablo Medrano MD  
nystatin (MYCOSTATIN) 100,000 unit/mL suspension Take 5 mL by mouth four (4) times daily for 5 days. swish and spit 11/22/19 11/27/19  Pablo Medrano MD  
HYDROcodone-acetaminophen [facility]) 5-325 mg per tablet Take 1 Tab by mouth every four (4) hours as needed for Pain for up to 3 days. Max Daily Amount: 6 Tabs.  11/22/19 11/25/19  Pablo Medrano MD  
 amiodarone (CORDARONE) 200 mg tablet Take 1 Tab by mouth daily. 19   Keith Hylton MD  
amiodarone (PACERONE) 400 mg tablet Take 1 Tab by mouth daily. 19   Keith Hylton MD  
dilTIAZem (CARDIZEM) 30 mg tablet Take 1 Tab by mouth Before breakfast, lunch, and dinner. 19   Keith Hylton MD  
doxycycline (MONODOX) 100 mg capsule Take 1 Cap by mouth two (2) times a day. 19   Keith Hylton MD  
atorvastatin (LIPITOR) 40 mg tablet Take 1 Tab by mouth daily. 10/30/19   Alejo Myles MD  
aspirin delayed-release 81 mg tablet Take 1 Tab by mouth daily. 10/29/19   Alejo Myles MD  
ergocalciferol (VITAMIN D2) 50,000 unit capsule Take 50,000 Units by mouth every seven (7) days. Napoleon Lee MD  
PARoxetine (PAXIL) 20 mg tablet Take 20 mg by mouth daily. Napoleon Lee MD  
furosemide (LASIX) 40 mg tablet Take 40 mg by mouth every other day. Napoleon Lee MD  
 
Current Facility-Administered Medications Medication Dose Route Frequency Objective:  
Vital Signs:   
Visit Vitals BP (!) 114/92 Pulse (!) 122 Temp 97.4 °F (36.3 °C) Resp 20 Ht 4' 9\" (1.448 m) Wt 68.9 kg (151 lb 14.4 oz) SpO2 (!) 87% BMI 32.87 kg/m² O2 Device: BIPAP Temp (24hrs), Av.4 °F (36.3 °C), Min:97.3 °F (36.3 °C), Max:97.4 °F (36.3 °C) Intake/Output:  
Last shift:      No intake/output data recorded. Last 3 shifts: No intake/output data recorded. No intake or output data in the 24 hours ending 19 1548 Last 3 Recorded Weights in this Encounter 19 8830 Weight: 68.9 kg (151 lb 14.4 oz) Recent Labs  
  19 
1002 FIO2I 100 Physical Exam:  
 
General/Neurology: Alert, Awake. Mild respiratory distress. Head:   Normocephalic, without obvious abnormality, atraumatic. Eye:   EOM intact, no scleral icterus, no pallor, no cyanosis. Throat:  bipap on now. Neck:   Supple, symmetric. No lymphadenopathy. Trachea midline Lung:   Reduced air entry bilateral lower lung field. B/l crackles +. No wheezing. No stridors. No prolongded expiration. No accessory muscle use. Heart:   Regular rate & rhythm. S1 S2 present. No murmur. No JVD. Abdomen:  Soft. NT. ND. +BS. No masses. Extremities:  1+ pitting b/l pedal edema. No cyanosis. No clubbing. Pulses: 2+ and symmetric in DP. Capillary refill: normal 
Lymphatic:  No cervical or supraclavicular palpable lymphadenopathy. Skin:   Color, texture, turgor normal. No rashes or lesions. Data:  
   
Recent Results (from the past 24 hour(s)) EKG, 12 LEAD, INITIAL Collection Time: 11/26/19  9:49 AM  
Result Value Ref Range Ventricular Rate 114 BPM  
 Atrial Rate 65 BPM  
 QRS Duration 86 ms  
 Q-T Interval 336 ms  
 QTC Calculation (Bezet) 463 ms Calculated R Axis 22 degrees Calculated T Axis -157 degrees Diagnosis Atrial fibrillation with rapid ventricular response Possible Anterior infarct , age undetermined Abnormal ECG When compared with ECG of 08-NOV-2019 21:56, No significant change was found Confirmed by Darron Castellano MD, -- ([id number]) on 11/26/2019 11:53:14 AM 
  
POC LACTIC ACID Collection Time: 11/26/19 10:02 AM  
Result Value Ref Range Lactic Acid (POC) 0.75 0.40 - 2.00 mmol/L  
POC VENOUS BLOOD GAS Collection Time: 11/26/19 10:02 AM  
Result Value Ref Range Device: BIPAP    
 FIO2 (POC) 100 %  
 pH, venous (POC) 7.338 7.32 - 7.42    
 pCO2, venous (POC) 32.7 (L) 41 - 51 MMHG  
 pO2, venous (POC) 36 25 - 40 mmHg HCO3, venous (POC) 17.6 (L) 23.0 - 28.0 MMOL/L  
 sO2, venous (POC) 67 65 - 88 % Base deficit, venous (POC) 8 mmol/L  
 PEEP/CPAP (POC) 6 cmH2O  
 PIP (POC) 14 Allens test (POC) N/A Total resp. rate 20 Site UAC Specimen type (POC) VENOUS BLOOD Performed by Antonio Gomez Spontaneous timed YES    
CBC WITH AUTOMATED DIFF Collection Time: 11/26/19 10:32 AM  
Result Value Ref Range WBC 9.0 4.6 - 13.2 K/uL  
 RBC 3.94 (L) 4.20 - 5.30 M/uL  
 HGB 11.8 (L) 12.0 - 16.0 g/dL HCT 38.5 35.0 - 45.0 % MCV 97.7 (H) 74.0 - 97.0 FL  
 MCH 29.9 24.0 - 34.0 PG  
 MCHC 30.6 (L) 31.0 - 37.0 g/dL  
 RDW 18.5 (H) 11.6 - 14.5 % PLATELET 567 195 - 140 K/uL MPV 12.7 (H) 9.2 - 11.8 FL  
 NEUTROPHILS 89 (H) 40 - 73 % LYMPHOCYTES 7 (L) 21 - 52 % MONOCYTES 4 3 - 10 % EOSINOPHILS 0 0 - 5 % BASOPHILS 0 0 - 2 %  
 ABS. NEUTROPHILS 8.0 1.8 - 8.0 K/UL  
 ABS. LYMPHOCYTES 0.6 (L) 0.9 - 3.6 K/UL  
 ABS. MONOCYTES 0.4 0.05 - 1.2 K/UL  
 ABS. EOSINOPHILS 0.0 0.0 - 0.4 K/UL  
 ABS. BASOPHILS 0.0 0.0 - 0.1 K/UL  
 DF AUTOMATED METABOLIC PANEL, COMPREHENSIVE Collection Time: 11/26/19 11:30 AM  
Result Value Ref Range Sodium 138 136 - 145 mmol/L Potassium 4.1 3.5 - 5.5 mmol/L Chloride 99 (L) 100 - 111 mmol/L  
 CO2 29 21 - 32 mmol/L Anion gap 10 3.0 - 18 mmol/L Glucose 102 (H) 74 - 99 mg/dL BUN 81 (H) 7.0 - 18 MG/DL Creatinine 4.37 (H) 0.6 - 1.3 MG/DL  
 BUN/Creatinine ratio 19 12 - 20 GFR est AA 12 (L) >60 ml/min/1.73m2 GFR est non-AA 10 (L) >60 ml/min/1.73m2 Calcium 8.1 (L) 8.5 - 10.1 MG/DL Bilirubin, total 0.9 0.2 - 1.0 MG/DL  
 ALT (SGPT) 30 13 - 56 U/L  
 AST (SGOT) 20 10 - 38 U/L Alk. phosphatase 105 45 - 117 U/L Protein, total 6.0 (L) 6.4 - 8.2 g/dL Albumin 2.7 (L) 3.4 - 5.0 g/dL Globulin 3.3 2.0 - 4.0 g/dL A-G Ratio 0.8 0.8 - 1.7 NT-PRO BNP Collection Time: 11/26/19 11:30 AM  
Result Value Ref Range NT pro-BNP 18,647 (H) 0 - 1,800 PG/ML  
CARDIAC PANEL,(CK, CKMB & TROPONIN) Collection Time: 11/26/19 11:30 AM  
Result Value Ref Range CK 39 26 - 192 U/L  
 CK - MB 1.1 <3.6 ng/ml CK-MB Index 2.8 0.0 - 4.0 % Troponin-I, QT 0.03 0.0 - 0.045 NG/ML  
PROTHROMBIN TIME + INR Collection Time: 11/26/19 11:30 AM  
Result Value Ref Range Prothrombin time 66.5 (H) 11.5 - 15.2 sec INR 8.0 (HH) 0.8 - 1.2 Chemistry Recent Labs  
  11/26/19 
1130 *   
K 4.1 CL 99* CO2 29 BUN 81* CREA 4.37* CA 8.1* AGAP 10 BUCR 19   
TP 6.0* ALB 2.7*  
GLOB 3.3 AGRAT 0.8 Lactic Acid Lactic acid Date Value Ref Range Status 10/26/2019 0.8 0.4 - 2.0 MMOL/L Final  
 
No results for input(s): LAC in the last 72 hours. Liver Enzymes Protein, total  
Date Value Ref Range Status 11/26/2019 6.0 (L) 6.4 - 8.2 g/dL Final  
 
Albumin Date Value Ref Range Status 11/26/2019 2.7 (L) 3.4 - 5.0 g/dL Final  
 
Globulin Date Value Ref Range Status 11/26/2019 3.3 2.0 - 4.0 g/dL Final  
 
A-G Ratio Date Value Ref Range Status 11/26/2019 0.8 0.8 - 1.7   Final  
 
AST (SGOT) Date Value Ref Range Status 11/26/2019 20 10 - 38 U/L Final  
 
Alk. phosphatase Date Value Ref Range Status 11/26/2019 105 45 - 117 U/L Final  
 
Recent Labs  
  11/26/19 
1130 TP 6.0* ALB 2.7*  
GLOB 3.3 AGRAT 0.8 SGOT 20  CBC w/Diff Recent Labs  
  11/26/19 
1032 WBC 9.0  
RBC 3.94* HGB 11.8* HCT 38.5  GRANS 89* LYMPH 7*  
EOS 0 Cardiac Enzymes Lab Results Component Value Date/Time CPK 39 11/26/2019 11:30 AM  
 CKMB 1.1 11/26/2019 11:30 AM  
 CKND1 2.8 11/26/2019 11:30 AM  
 TROIQ 0.03 11/26/2019 11:30 AM  
  
 
BNP No results found for: BNP, BNPP, XBNPT Coagulation Recent Labs  
  11/26/19 
1130 PTP 66.5* INR 8.0* Thyroid  Lab Results Component Value Date/Time TSH 2.53 11/08/2019 01:25 PM  
   
No results found for: T4  
 
Urinalysis Lab Results Component Value Date/Time  Color YELLOW 11/09/2019 09:40 PM  
 Appearance CLEAR 11/09/2019 09:40 PM  
 Specific gravity 1.016 11/09/2019 09:40 PM  
 pH (UA) 5.0 11/09/2019 09:40 PM  
 Protein NEGATIVE  11/09/2019 09:40 PM  
 Glucose NEGATIVE  11/09/2019 09:40 PM  
 Ketone NEGATIVE  11/09/2019 09:40 PM  
 Bilirubin NEGATIVE  11/09/2019 09:40 PM  
 Urobilinogen 0.2 11/09/2019 09:40 PM  
 Nitrites NEGATIVE  11/09/2019 09:40 PM  
 Leukocyte Esterase LARGE (A) 11/09/2019 09:40 PM  
 Epithelial cells 2+ 11/09/2019 09:40 PM  
 Bacteria 3+ (A) 11/09/2019 09:40 PM  
 WBC TOO NUMEROUS TO COUNT 11/09/2019 09:40 PM  
 RBC 0 to 2 11/09/2019 09:40 PM  
  
 
Micro  No results for input(s): SDES, CULT in the last 72 hours. No results for input(s): CULT in the last 72 hours. Culture data during this hospitalization. All Micro Results None ECHO 11/10/19 · Left Ventricle: Normal cavity size. Increased wall thickness. Low normal systolic dysfunction. Estimated left ventricular ejection fraction is 51 - 55%. E/E' ratio is 28.72. · Left Atrium: Moderately dilated left atrium. · Right Ventricle: Reduced systolic function. · Right Atrium: Moderately dilated right atrium. · Aortic Valve: Mild aortic valve sclerosis with no evidence of reduced excursion. · Mitral Valve: Mitral valve thickening. Moderate mitral annular calcification. Mild mitral valve prolapse. Moderate mitral valve regurgitation is present. · Pulmonary Artery: Moderate pulmonary hypertension. Pulmonary arterial systolic pressure is 46 mmHg. · IVC/Hepatic Veins: Mildly elevated central venous pressure (5-10 mmHg); IVC diameter is less than 21 mm and collapses less than 50% with respiration. · Pericardium: Trivial pericardial effusion. Images report reviewed by me: 
CT (Most Recent) (CT chest reviewed by me) No results found for this or any previous visit. CXR reviewed by me: 
XR (Most Recent). CXR  reviewed by me and compared with previous [place] [facility] [place] XR CHEST PORT Narrative EXAM: XR CHEST PORT 
 
CLINICAL INDICATION/HISTORY: Shortness of breath 
-Additional: None COMPARISON: Several prior exams, most recently 11/21/2019 TECHNIQUE: Frontal view of the chest 
 
_______________ FINDINGS: 
 
 HEART AND MEDIASTINUM: Right IJ approach dialysis catheter with tip projecting 
in stable position. Stable appearing cardiac size and mediastinal contours. LUNGS AND PLEURAL SPACES: Central vascular congestion is overall similar in 
appearance with mild interval increase in perihilar predominant interstitial 
densities. There are small posteriorly layering pleural effusions, right larger 
than left, unchanged. No evidence of pneumothorax. BONY THORAX AND SOFT TISSUES: No acute osseous abnormality. Advanced bilateral 
rotator cuff arthropathy. _______________ Impression IMPRESSION: 
 
 
1. Right IJ approach dialysis catheter in stable position. 2. Cardiomegaly, central vascular congestion, and bilateral pleural effusions as 
previously with mild interval increase in interstitial edema from the prior 
exam.   
  
 
 
Moraima Randall MD 
11/26/2019

## 2019-11-26 NOTE — PROGRESS NOTES
Intervention: Monitor/Manage Fluid Electrolyte/[employer]id [employer] PROBLEM: HEMODIALYSIS ADULT INTERVENTION: Hemodynamic stabilization Maintain BP WNL for this patient while on hemodialysis INTERVENTION: Fluid Management Will attempt 1000 ml total fluid removal as tolerated INTERVENTION: Metabolic / Electrolyte Imbalance Management K+ 3 potassium bath today with dialysis GOAL: Signs and symptoms of listed potential problems will be absent or manageable 
(reference Hemodialysis ADULT CPG) OUTCOME: Progressing HD planned for 3.5 hours today

## 2019-11-26 NOTE — H&P
History & Physical 
 
Patient: Wes Marquis MRN: [id number]  CSN: [id number] YOB: 1935  Age: 80 y.o. Sex: female DOA: 11/26/2019 Primary Care Provider:  Jammie Little DO Assessment/Plan Patient Active Problem List  
Diagnosis Code  Respiratory failure ([facility]) J96.90  
 Heart failure (HCC) I50.9  Benign essential hypertension I10  Chronic diastolic congestive heart failure (HCC) I50.32  
 Cobalamin deficiency E53.8  Hypercholesterolemia E78.00  Hypertensive heart disease I11.9  Hypothyroidism E03.9  Iron deficiency anemia D50.9  Stage 4 chronic kidney disease ([facility] [address] [address] N18.4  A-fib (HCC) I48.91  
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  
 ESRD needing dialysis ([facility]) N18.6, Z99.2  Dyspnea R06.00 Admit to ICU CRITICAL CARE PLAN Resp - Acute respiratory failure - secondary to CHF and volume overload from noncompliance with HD. Continue with BiPAP. Will try to wean from BiPAP and transition to oxygen by nasal cannula. Chest x-ray showed cardiomegaly with central vascular congestion, bilateral pleural effusions and interval increase in interstitial edema. ID - No evidence of infection, will hold on to antibiotics. [facility] - Monitor HD. Acute on chronic diastolic CHF-fluid removal with dialysis. Last echo 11/10/2019 with low normal systolic function EF of 51 to 55%. Blood pressure tends to be on the lower side, on midodrine. Monitor blood pressure if required need to start her on pressors. A. fib with RVR-continue with amiodarone and Cardizem. May need to hold Cardizem due to marginal blood pressure. She is on anticoagulation with Coumadin. Her INR is supratherapeutic. Pharmacy to dose Coumadin Heme/onc - Follow H&H, plts. INR. Coumadin coagulopathy-INR elevated at 8. Received vitamin K. Pharmacy to dose Coumadin. Renal - Trend BUN, Cr, follow I/O, Check and replace Mg, K, phos. ESRD-HD per nephrology. Endocrine -  Follow [facility] Neuro/ Pain/ Sedation - Mental status stable. GI - NPO for now. Start diet once off BiPAP. Prophylaxis - DVT: heparin, GI: protonix [id number] 
45 minutes of critical care time spent in the direct evaluation and treatment of this high risk patient. The reason for providing this level of medical care for this critically ill patient was due a critical illness that impaired one or more vital organ systems such that there was a high probability of imminent or life threatening deterioration in the patients condition. This care involved high complexity decision making to assess, manipulate, and support vital system functions, to treat this degreee vital organ system failure and to prevent further life threatening deterioration of the patients condition. Estimated length of stay : 2-3 days CC: SOB, hypoxia HPI:  
 
Marilyn Salgado is a 80 y.o. female who has past medical history of ESRD, CHF, COPD, atrial fibrillation who was recently discharged from this hospital on 11/22/2019. She was admitted for newly diagnosed A. fib with RVR and worsening renal function which progressed to ESRD and she was started on dialysis. Patient was discharged with the dialysis on Monday Wednesday and Friday but she received hemodialysis on 11/22/2019 before discharge. She was discharged to [place] [address] home. She did not go to dialysis on Monday that is yesterday because she was feeling weak. At nursing home she was feeling more short of breath today and her oxygen saturations was noted to be in 70s. EMS was called and she was placed on nonrebreather. As such patient denies any symptoms however history taking is limited as patient is on BiPAP. In ER she was noted to be in respiratory distress she was started on BiPAP.   She was given Lasix, her chest x-ray showed cardiomegaly with central vascular congestion, bilateral pleural effusions and interval increase in her interstitial edema. Her NT proBNP at 18,647. Past Medical History:  
Diagnosis Date  Chronic kidney disease  Congestive heart failure (CHF) ([address]  COPD (chronic obstructive pulmonary disease) (HCC)  Dialysis patient [facility]) History reviewed. No pertinent surgical history. History reviewed. No pertinent family history. Social History Socioeconomic History  Marital status:  Spouse name: Not on file  Number of children: Not on file  Years of education: Not on file  Highest education level: Not on file Tobacco Use  Smoking status: Former Smoker  Smokeless tobacco: Never Used Substance and Sexual Activity  Drug use: Not Currently Prior to Admission medications Medication Sig Start Date End Date Taking? Authorizing Provider  
warfarin (COUMADIN) 2 mg tablet Take 1 Tab by mouth daily. 11/22/19  Yes Monica Ordoñez MD  
nystatin (MYCOSTATIN) 100,000 unit/mL suspension Take 5 mL by mouth four (4) times daily for 5 days. swish and spit 11/22/19 11/27/19 Yes Monica Ordoñez MD  
amiodarone (CORDARONE) 200 mg tablet Take 1 Tab by mouth daily. 11/26/19  Yes Monica Ordoñez MD  
amiodarone (PACERONE) 400 mg tablet Take 1 Tab by mouth daily. 11/23/19  Yes Monica Ordoñez MD  
dilTIAZem (CARDIZEM) 30 mg tablet Take 1 Tab by mouth Before breakfast, lunch, and dinner. 11/22/19  Yes Monica Ordoñez MD  
doxycycline (MONODOX) 100 mg capsule Take 1 Cap by mouth two (2) times a day. 11/22/19  Yes Monica Ordoñez MD  
atorvastatin (LIPITOR) 40 mg tablet Take 1 Tab by mouth daily. 10/30/19  Yes Tony Sanders MD  
aspirin delayed-release 81 mg tablet Take 1 Tab by mouth daily. 10/29/19  Yes Tony Sanders MD  
ergocalciferol (VITAMIN D2) 50,000 unit capsule Take 50,000 Units by mouth every seven (7) days.    Yes Napoleon Lee MD  
 PARoxetine (PAXIL) 20 mg tablet Take 20 mg by mouth daily. Yes Other, MD Napoleon  
furosemide (LASIX) 40 mg tablet Take 40 mg by mouth every other day. Yes Other, MD Napoleon  
HYDROcodone-acetaminophen (NORCO) 5-325 mg per tablet Take 1 Tab by mouth every four (4) hours as needed for Pain for up to 3 days. Max Daily Amount: 6 Tabs. 19  Cherelle Bhatia MD  
 
 
Allergies Allergen Reactions  Penicillins Hives  Valium [Diazepam] Other (comments) It made me cry more Review of Systems Gen: No fever, chills, malaise, weight loss/gain. Heent: No headache, rhinorrhea, epistaxis, ear pain, hearing loss, sinus pain, neck pain/stiffness, sore throat. Heart: No chest pain, palpitations, CERVANTES, pnd, or orthopnea. Resp: see above GI: No nausea, vomiting, diarrhea, constipation, melena or hematochezia. : No urinary obstruction, dysuria or hematuria. Derm: No rash, new skin lesion or pruritis. Musc/skeletal: no bone or joint complains. Vasc: No edema, cyanosis or claudication. Endo: No heat/cold intolerance, no polyuria,polydipsia or polyphagia. Neuro: No unilateral weakness, numbness, tingling. No seizures. Heme: No easy bruising or bleeding. Physical Exam:  
 
Physical Exam: 
Visit Vitals BP (!) 119/97 Pulse (!) 158 Temp 97.4 °F (36.3 °C) Resp 22 Ht 4' 9\" (1.448 m) Wt 68.9 kg (151 lb 14.4 oz) SpO2 94% BMI 32.87 kg/m² O2 Device: BIPAP Temp (24hrs), Av.4 °F (36.3 °C), Min:97.3 °F (36.3 °C), Max:97.4 °F (36.3 °C) No intake/output data recorded. No intake/output data recorded. General:  Awake, cooperative, on BiPAP. Head:  Normocephalic, without obvious abnormality, atraumatic. Eyes:  Conjunctivae/corneas clear, sclera anicteric, PERRL, EOMs intact. Nose: Nares normal. No drainage or sinus tenderness. Throat: Lips, mucosa, and tongue normal.   
Neck: Supple, symmetrical, trachea midline, no adenopathy. Lungs:   Rales lower lungs bilaterally. Heart:   S1, S2, no murmur, click, rub or gallop. Abdomen: Soft, non-tender. Bowel sounds normal. No masses,  No organomegaly. Extremities: Extremities normal, atraumatic, no cyanosis or edema. Capillary refill normal.  
Pulses: 2+ and symmetric all extremities. Skin: Skin color pink, turgor normal. No rashes or lesions Neurologic: CNII-XII intact. No focal motor or sensory deficit. Labs Reviewed: 
 
CMP:  
Lab Results Component Value Date/Time  11/26/2019 11:30 AM  
 K 4.1 11/26/2019 11:30 AM  
 CL 99 (L) 11/26/2019 11:30 AM  
 CO2 29 11/26/2019 11:30 AM  
 AGAP 10 11/26/2019 11:30 AM  
  (H) 11/26/2019 11:30 AM  
 BUN 81 (H) 11/26/2019 11:30 AM  
 CREA 4.37 (H) 11/26/2019 11:30 AM  
 GFRAA 12 (L) 11/26/2019 11:30 AM  
 GFRNA 10 (L) 11/26/2019 11:30 AM  
 CA 8.1 (L) 11/26/2019 11:30 AM  
 ALB 2.7 (L) 11/26/2019 11:30 AM  
 TP 6.0 (L) 11/26/2019 11:30 AM  
 GLOB 3.3 11/26/2019 11:30 AM  
 AGRAT 0.8 11/26/2019 11:30 AM  
 SGOT 20 11/26/2019 11:30 AM  
 ALT 30 11/26/2019 11:30 AM  
 
CBC:  
Lab Results Component Value Date/Time WBC 9.0 11/26/2019 10:32 AM  
 HGB 11.8 (L) 11/26/2019 10:32 AM  
 HCT 38.5 11/26/2019 10:32 AM  
  11/26/2019 10:32 AM  
 
All Cardiac Markers in the last 24 hours:  
Lab Results Component Value Date/Time CPK 39 11/26/2019 11:30 AM  
 CKMB 1.1 11/26/2019 11:30 AM  
 CKND1 2.8 11/26/2019 11:30 AM  
 TROIQ 0.03 11/26/2019 11:30 AM  
 
 
 
Procedures/imaging: see electronic medical records for all procedures/Xrays and details which were not copied into this note but were reviewed prior to creation of Plan Please note that this dictation was completed with [id number], the computer voice recognition software. Quite often unanticipated grammatical, syntax, homophones, and other interpretive errors are inadvertently transcribed by the computer software. Please disregard these errors. Please excuse any errors that have escaped final proofreading.   
  
 
CC: Geno Wilder, DO

## 2019-11-27 NOTE — PROGRESS NOTES
Nephrology Progress note Subjective:  
 
Kasey Eden is a 80 y.o. WF with a PMHX of CHF, COPD who was recently admitted to [facility] with newly dx Afib with RVR and also started on HD for worsening kidney functionof came back today from SNF with worsening SOB. She was d/sandra on 11/22 and supposed to have her first out pt HD yesterday (11/25) at [facility] unit, but she was not able to do it due to generalized weakness. During initial evaluation in the ER today, she was hypoxic, placed on Bipap. Her BP is low (SBP 's) and is in RVR (110-140's). Lab showed Cr 4.3, K 4.1 and CO2 29. CxR showed pulm congestion. Pt is currently DNR/I. After discussion with the pt, she strongly wants to pursue dialysis at least for now 
[facility] underwent HD yesterday 11/26. Midodrine given for Hypotension. SOB has improved significantly per patient. Admit Date: 11/26/2019 Active Problems: Dyspnea (11/26/2019) Current Facility-Administered Medications Medication Dose Route Frequency  heparin (porcine) 1,000 unit/mL injection 4,300 Units  4,300 Units Hemodialysis DIALYSIS PRN  pantoprazole (PROTONIX) 40 mg in 0.9% sodium chloride 10 mL injection  40 mg IntraVENous DAILY  amiodarone (CORDARONE) tablet 200 mg  200 mg Oral DAILY  aspirin delayed-release tablet 81 mg  81 mg Oral DAILY  atorvastatin (LIPITOR) tablet 40 mg  40 mg Oral DAILY  dilTIAZem (CARDIZEM) IR tablet 30 mg  30 mg Oral TIDAC  doxycycline (MONODOX) capsule 100 mg  100 mg Oral BID  PARoxetine (PAXIL) tablet 20 mg  20 mg Oral DAILY  [START ON 12/2/2019] ergocalciferol capsule 50,000 Units  50,000 Units Oral Q7D  Warfarin-Pharmacy to dose  1 Each Other Rx Dosing/Monitoring  acetaminophen (TYLENOL) solution 650 mg  650 mg Oral Q6H PRN  propofol (DIPRIVAN) 10 mg/mL injection Allergy: Allergies Allergen Reactions  Penicillins Hives  Valium [Diazepam] Other (comments) It made me cry more Objective:  
 
Visit Vitals /56 Pulse (!) 105 Temp 98.4 °F (36.9 °C) Resp 19 Ht 4' 9\" (1.448 m) Wt 68.9 kg (151 lb 14.4 oz) SpO2 96% BMI 32.87 kg/m² Intake/Output Summary (Last 24 hours) at 11/27/2019 9642 Last data filed at 11/26/2019 3674 Gross per 24 hour Intake  Output 1000 ml Net -1000 ml Physical Exam:  
 
 
General: No acute distress HENT: Atraumatic and normocephalic Eyes: Normal conjunctiva Neck: Supple No JVD Cardiovascular: Normal S1 & S2, no m/r/g Pulmonary/Chest Wall: Clear to auscultation bilaterally Abdominal: Soft and non-tender Musculoskeletal: No ankle edema Neurological: No focal deficits Data Review: 
Lab Results Component Value Date/Time Sodium 141 11/27/2019 04:15 AM  
 Potassium 3.2 (L) 11/27/2019 04:15 AM  
 Chloride 104 11/27/2019 04:15 AM  
 CO2 28 11/27/2019 04:15 AM  
 Anion gap 9 11/27/2019 04:15 AM  
 Glucose 77 11/27/2019 04:15 AM  
 BUN 34 (H) 11/27/2019 04:15 AM  
 Creatinine 2.36 (H) 11/27/2019 04:15 AM  
 BUN/Creatinine ratio 14 11/27/2019 04:15 AM  
 GFR est AA 24 (L) 11/27/2019 04:15 AM  
 GFR est non-AA 20 (L) 11/27/2019 04:15 AM  
 Calcium 7.8 (L) 11/27/2019 04:15 AM  
 
Lab Results Component Value Date/Time WBC 6.6 11/27/2019 04:15 AM  
 HGB 9.8 (L) 11/27/2019 04:15 AM  
 HCT 32.1 (L) 11/27/2019 04:15 AM  
 PLATELET 104 58/68/1159 04:15 AM  
 MCV 96.7 11/27/2019 04:15 AM  
 
Lab Results Component Value Date/Time Calcium 7.8 (L) 11/27/2019 04:15 AM  
 Phosphorus 2.0 (L) 11/22/2019 03:30 AM  
 
Lab Results Component Value Date/Time Iron 11 (L) 11/16/2019 09:10 AM  
 TIBC 271 11/16/2019 09:10 AM  
 Iron % saturation 4 11/16/2019 09:10 AM  
 Ferritin 395 (H) 11/16/2019 09:10 AM  
 
Lab Results Component Value Date/Time Ferritin 395 (H) 11/16/2019 09:10 AM  
 
 
 
Impression:  
 
-Pulmonary edema, improved post dialysis 
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  MWF schedule at [facility], [facility] -Hypotension w/ afib/RVR 
-Rt sided HF with Pulm HTN  
-Anemia of chronic disease, + Fe def. S/p prbc - Hypokalemia 
-Recent onset atrial fibrillation w/ RVR 
-Coagulopathy/warfarin induced,  
-Rt IJ acute DVT,  on anticoagulation  
-DNR/I 
- Discussed plans with patient and family at bedside. Plan:  
 
- Next HD tomorrow 11/27 - Epo w/ HD 
- HR and RVR control per ICU/primary tem - Avoid ACEI/ARBs, NSAIDs or IV contrast, and other nephrotoxic meds - Supplement Potassium 
- Renal panel in a.m. Dior Hendrix MD, MPH FACP Artis Draper [phone]

## 2019-11-27 NOTE — ROUTINE PROCESS
0700  Bedside, Verbal and Written shift change report given to WILFREDO Fisher (oncoming nurse) by Dannie Alvarez (offgoing nurse). Report included the following information SBAR, Kardex, Intake/Output and Recent Results. Pt aox 4, following commands, denies pain, tele monitored, 3LNC, edema bilateral Lower extremities, right chest TDC. Family at bedside. 0830  TRANSFER - OUT REPORT: 
 
Verbal report given to ***(name) on Mary Ellen Hobson  being transferred to [employer] (unit) for routine progression of care Report consisted of patients Situation, Background, Assessment and  
Recommendations(SBAR). Information from the following report(s) SBAR, Kardex, Intake/Output and Recent Results was reviewed with the receiving nurse. Lines:  
Peripheral IV 11/26/19 Right Arm (Active) Site Assessment Clean, dry, & intact 11/27/2019  3:15 AM  
Phlebitis Assessment 0 11/27/2019  3:15 AM  
Infiltration Assessment 0 11/27/2019  3:15 AM  
Dressing Status Clean, dry, & intact 11/27/2019  3:15 AM  
Dressing Type Transparent;Tape 11/27/2019  3:15 AM  
Hub Color/Line Status Flushed;Capped 11/27/2019  3:15 AM  
Action Taken Open ports on tubing capped 11/27/2019  3:15 AM  
Alcohol Cap Used Yes 11/27/2019  3:15 AM  
   
Peripheral IV 11/26/19 Left Hand (Active) Site Assessment Clean, dry, & intact 11/27/2019  3:15 AM  
Phlebitis Assessment 0 11/27/2019  3:15 AM  
Infiltration Assessment 0 11/27/2019  3:15 AM  
Dressing Status Clean, dry, & intact 11/27/2019  3:15 AM  
Dressing Type Transparent;Tape 11/27/2019  3:15 AM  
Hub Color/Line Status Infusing 11/27/2019  3:15 AM  
Action Taken Open ports on tubing capped 11/27/2019  3:15 AM  
Alcohol Cap Used Yes 11/27/2019  3:15 AM  
  
 
Opportunity for questions and clarification was provided. Patient transported with: 
 Monitor Registered Nurse

## 2019-11-27 NOTE — PROGRESS NOTES
Pharmacy Dosing Services: Warfarin Consult for Warfarin Dosing by Pharmacy by Dr. Warren Somers Consult provided for this 80 y.o.  female , for indication of Atrial Fibrillation. Day of Therapy 2 Dose to achieve an INR goal of 2-3 Order entered for  Warfarin  5mg ordered to be given today at 18:00. Significant drug interactions: LABS   
PT/INR Lab Results Component Value Date/Time INR 1.4 (H) 11/27/2019 02:54 PM  
  
Platelets Lab Results Component Value Date/Time PLATELET 415 55/29/1376 04:15 AM  
  
H/H Lab Results Component Value Date/Time HGB 9.8 (L) 11/27/2019 04:15 AM  
  
 
Warfarin Administrations (last 168 hours) None Pharmacy to follow daily and will provide subsequent Warfarin dosing based on clinical status. Vidal Bermudez PHARMD)  Contact information

## 2019-11-27 NOTE — PROGRESS NOTES
Pharmacy Dosing Services:Warfarin Consult for Warfarin Dosing by Pharmacy by Dr. Sherrie Jefferson Consult provided for this 80 y.o.  female , for indication of Atrial Fibrillation. Day of Therapy 1 Dose to achieve an INR goal of 2-3 Patient is currently supratherapeutic with an INR 8. She was given Vit K 10mg IV. Home dose is warfarin 2mg daily(last dose 11/25). Will hold warfarin dose today. Significant drug interactions: none LABS   
PT/INR Lab Results Component Value Date/Time INR 8.0 (HH) 11/26/2019 11:30 AM  
  
Platelets Lab Results Component Value Date/Time PLATELET 097 34/59/2543 10:32 AM  
  
H/H Lab Results Component Value Date/Time HGB 11.8 (L) 11/26/2019 10:32 AM  
  
 
Warfarin Administrations (last 168 hours) None Pharmacy to follow daily and will provide subsequent Warfarin dosing based on clinical status.  
Amina Deng, PHARMD

## 2019-11-27 NOTE — PROGRESS NOTES
Reason for Re-Admission:   Meet Jeronimo is a 80 y.o. female with PMHX of CKD, COPD, pulmonary hypertension, aRE_-fib, chronically ill who presents to the emergency department C/O respiratory distress. Patient was admitted on 11/8 for new onset A. fib and worsening renal function. Palliative care was consulted and patient decided to start on dialysis. She had dialysis and was discharged to [address] [place] [address]. Patient was scheduled for dialysis yesterday which would be her first outpatient session however she felt too tired to go. MS was called this morning because patient has been feeling more short of breath since today. The nursing home her pulse ox was reportedly in the 70s. Patient was placed on nonrebreather by EMS with improvement of her pulse ox in the 90s. My evaluation patient is very fatigued, states she had some pain with deep inspiration. She states that the oxygen is helping. 
  
PCP: Geno Wilder DO 
   
            
RRAT Score:     26 Resources/supports as identified by patient/family:    
             
Top Challenges facing patient (as identified by patient/family and CM): Finances/Medication cost?       
           
[facility]? Living arrangements? Lives with her son Self-care/ADLs/Cognition? At this time needs assistnace Current Advanced Directive/Advance Care Plan: On file Plan for utilizing home health:    No plans at this time for [facility]. Plans to return Transition of Care Plan:        Met with patients family they state patient has not made a definitve decision about d/c plan. They would like to submit to [facility]  In case she wants to return there. Patient had left [facility] and gone to [facility].  Cm had set up transportation to and from HD on first visit and family was suppose to provide transporttion to and from HD. However patient refused HD on Monday and then yesterday became so sob she had to come to hospital. They are not sure if she will continue Hd or if she will go home with hospice. They will let cm know when a final decsion has been made. Patient lives with her son. She is a readmit and not sure of what she wants for d/c however cm has placed referral as requested to [facility] 
UAI was done on last admission

## 2019-11-27 NOTE — PROGRESS NOTES
11/26/19 1946  
CPAP/BIPAP  
CPAP/BIPAP Start/Stop Off  
 
Patient placed on 3LNC and tolerating well.

## 2019-11-27 NOTE — PROGRESS NOTES
@8353 pt awake alert oriented x4 on BIPAP , Dialysis is now completed. Denies pain at present. Vital signs fluctuating HR elevated . Previous nurse verbalized that physicians are aware. Dressing to rt chest dialysis cath site intact. Bilateral feet swollen 3+ Assessment done and documented in relevant flow sheets. @8598 Dr. Jordan Aguilera present on unit ,up dated on  Pt , no new orders At this time. Will continue to monitor. @2315 pt reassessed no new changes . Nursing observation continues. @3208 pt reassessed no new developments care continues. @7949 Bedside and Verbal shift change report given to Karen Oates (oncoming nurse) by Maria E Ruiz (offgoing nurse). Report included the following information SBAR, Kardex, Intake/Output, MAR, Recent Results, Med Rec Status and Alarm Parameters .

## 2019-11-27 NOTE — PROGRESS NOTES
1200: pt resting no sign of distress. MD is aware of pt elevated HR.  
1700: pt family asked that they be notified when pt receives dialysis tomorrow. 1800: pt had uneventful shift.

## 2019-11-27 NOTE — PROGRESS NOTES
Problem: Mobility Impaired (Adult and Pediatric) Goal: *Acute Goals and Plan of Care (Insert Text) Description Physical Therapy Goals Initiated 11/27/2019 and to be accomplished within 7 day(s) 1. Patient will move from supine <> sit with min/mod assist in prep for out of bed activity and change of position. 2.  Patient will perform sit<> stand mod A/RW in prep for transfers/ambulation. 3.  Patient will ambulate 50 feet with min/mod A/RW for improved functional mobility. Outcome: Progressing Towards Goal 
 
PHYSICAL THERAPY EVALUATION Patient: Danny Quesada (40 y.o. female) Date: 11/27/2019 Primary Diagnosis: Dyspnea [R06.00] Dyspnea [R06.00] Precautions: Fall ASSESSMENT : 
Based on the objective data described below, the patient seen on telemetry unit. Pt frail appearing 79 yo F familiar to rehab team and presents with generalized weakness, decreased ROM (B shoulder elev <90deg, and with limitations in overall functional mobility. Reports no pain at this time. Requires max/total assist for bed mobility. Decreased sitting balance EOB, requiring UE support. Standing not attempted at this time. Pt returned to supine total assist of 1 and  required total A of 2 to shift up in bed. Pt left with CNA [place] re-applying [employer] and in NAD. O2 sat 98 % throughout session. Recommend return to SNF for follow up physical therapy upon discharge pending progress. May be Hospice candidate at this time. Pt Education: Role of physical therapy in acute care setting, fall prevention and safety/technique during functional mobility tasks Patient will benefit from skilled intervention to address the above impairments. Patients rehabilitation potential is considered to be Guarded Factors which may influence rehabilitation potential include:  
[x]         None noted 
[]         Mental ability/status []         Medical condition 
[]         Home/family situation and support systems []         Safety awareness 
[]         Pain tolerance/management 
[]         Other: PLAN : 
Recommendations and Planned Interventions: 
[x]           Bed Mobility Training             []    Neuromuscular Re-Education 
[x]           Transfer Training                   []    Orthotic/Prosthetic Training 
[x]           Gait Training                          []    Modalities [x]           Therapeutic Exercises          []    Edema Management/Control 
[x]           Therapeutic Activities            [x]    Patient and Family Training/Education 
[]           Other (comment): Frequency/Duration: Patient will be followed by physical therapy 1-2 times per day to address goals. Discharge Recommendations: [facility] vs ?Hospice Further Equipment Recommendations for Discharge: N/A  
 
SUBJECTIVE:  
Patient stated Feeling okay.  OBJECTIVE DATA SUMMARY:  
 
Past Medical History:  
Diagnosis Date Chronic kidney disease Congestive heart failure (CHF) ([facility] [address] [facility] [facility]) History reviewed. No pertinent surgical history. Barriers to Learning/Limitations: yes;  sensory deficits-vision/hearing/speech and physical 
Compensate with: Visual Cues, Verbal Cues, and Tactile Cues Prior Level of Function/Home Situation: as noted above Home Situation Home Environment: Skilled nursing facility Care Facility Name: [address] [place] [address] One/Two Story Residence: One story Living Alone: No 
Support Systems: Family member(s) Patient Expects to be Discharged to[de-identified] Skilled nursing facility Current DME Used/Available at Home: christine Ding Critical Behavior: 
Neurologic State: Alert; Appropriate for age Orientation Level: Oriented to person;Oriented to place;Oriented to situation Cognition: Follows commands Psychosocial 
Patient Behaviors: Calm; Cooperative Purposeful Interaction: Yes Pt Identified Daily Priority: Clinical issues (comment) Kellen Process: Nurture loving kindness;Establish trust;Teaching/learning; Attend basic human needs;Create healing environment;Supportive expression Caring Interventions: Reassure; Therapeutic modalities Reassure: Therapeutic listening; Informing; Acceptance; Support family; Sit with patient;Quiet presence;Caring rounds Therapeutic Modalities: Deep breathing; Intentional therapeutic touch Strength:   
Strength: Generally decreased, functional 
Tone & Sensation:  
Tone: Normal 
Sensation: Intact Range Of Motion: 
AROM: Generally decreased, functional 
PROM: Generally decreased, functional 
Functional Mobility: 
Bed Mobility: 
Rolling: Maximum assistance Supine to Sit: Maximum assistance; Additional time Sit to Supine: Total assistance Scooting: Total assistance;Assist x2 Balance:  
Sitting: Impaired Sitting - Static: Fair (occasional); Poor (constant support) Sitting - Dynamic: Poor (constant support) Pain: 
Pain Scale 1: Numeric (0 - 10) Pain Intensity 1: 0 Activity Tolerance:  
Fair Please refer to the flowsheet for vital signs taken during this treatment. After treatment:  
[]         Patient left in no apparent distress sitting up in chair 
[x]         Patient left in no apparent distress in bed 
[x]         Call bell left within reach [x]         Nursing notified 
[x]         Caregiver present 
[]         Bed alarm activated COMMUNICATION/EDUCATION:  
[x]         Fall prevention education was provided and the patient/caregiver indicated understanding. [x]         Patient/family have participated as able in goal setting and plan of care. [x]         Patient/family agree to work toward stated goals and plan of care. []         Patient understands intent and goals of therapy, but is neutral about his/her participation. []         Patient is unable to participate in goal setting and plan of care.  
 
Eval Complexity: History: HIGH Complexity :3+ comorbidities / personal factors will impact the outcome/ POC Exam:MEDIUM Complexity : 3 Standardized tests and measures addressing body structure, function, activity limitation and / or participation in recreation  Presentation: MEDIUM Complexity : Evolving with changing characteristics  Clinical Decision Making:Medium Complexity    Overall Complexity:MEDIUM Thank you for this referral. 
Yariel Kyle, PT Time Calculation: 23 mins

## 2019-11-27 NOTE — PROGRESS NOTES
Pulmonary Specialists Pulmonary, Critical Care, and Sleep Medicine Name: Jensen Flor MRN: [id number] : 1935 Hospital: [facility] [place] Date: 2019  Room: 342/01 [facility] Note Consult requesting physician: Dr. Abdiel Garza Reason for Consult: respiratory failure IMPRESSION:  
·  
· Patient Active Problem List  
Diagnosis Code  Respiratory failure ([facility]) J96.90  
 Heart failure ([facility]) I50.9  Benign essential hypertension I10  Chronic diastolic congestive heart failure ([facility]) I50.32  
 Cobalamin deficiency E53.8  Hypercholesterolemia E78.00  Hypertensive heart disease I11.9  Hypothyroidism E03.9  Iron deficiency anemia D50.9  Stage 4 chronic kidney disease ([facility] [address] N18.4  A-fib ([facility]) I48.91  
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  
 ESRD needing dialysis ([facility]) N18.6, Z99.2  Dyspnea R06.00 · AFib with RVR · Coagulopathy due to coumadin use · Code status: DNR/DNI  
  
RECOMMENDATIONS:  
Respiratory: hypoxia due to CHF and pleural effusion due to volume overload. After HD in 1 L fluid removed on 2019, respiratory status improved. Now she is not requiring BiPAP. On O2 NC 4 LPM.  Use BiPAP as needed. No sign of pneumonia clinically. No fever. No leukocytosis. Protecting airway. Pt is DNR, DNI Keep SPO2 >=92%. HOB 30 degree elevation all the time. Aggressive pulmonary toileting. Aspiration precautions. Incentive spirometry. CVS: On amiodarone, Cardizem, Lipitor, aspirin. INR improved to 1.9. Coumadin dosing per pharmacy. Now A. fib rate is better controlled since removal of 1 L fluid in HD. ID: no sign of infection. Blood culture NGTD. On doxycycline per hospitalist team. 
Hematology/Oncology: monitor H&H. INR improved to 1.9. Monitor for any bleeding.  Repeat INR in am.  
Renal: dialysis per nephrologist. 
 GI/: no acute issues Endocrine: Monitor BS. Neurology: baseline mental status. Pain/Sedation: no acute issues Skin/Wound: no acute issues Electrolytes: Replace electrolytes per ICU electrolyte replacement protocol. IVF: none Nutrition: P.o. diet. Prophylaxis: DVT Prophylaxis: SCD. GI Prophylaxis: Protonix 40 mg daily. Restraints: none Lines/Tubes: PIV Right IJ tunneled HD catheter. Advance Directive/Palliative Care: consult appreciated. Keep DNR/DNI, I d/w son/daughter and other family members and confirmed on 11/26/2019. Overall poor prognosis explained. Will defer respective systems problem management to primary and other respective consultant and follow patient in ICU with primary and other medical team. 
Further recommendations will be based on the patient's response to recommended treatment and results of the investigation ordered. Quality Care: PPI, DVT prophylaxis, HOB elevated, Infection control all reviewed and addressed. Care of plan d/w RN, RT, MDR. 
D/w patient, family-daughter/sister (answered all questions to satisfaction). Moderate complexity decision making was performed during the evaluation of this patient at high risk for decompensation with multiple organ involvement. Transferred to telemetry. Once transferred, will follow from pulmonary perspective. Subjective/History of Present Illness:  
 
Patient is a 80 y.o. female with PMHx significant for CHF, HTN, AFib,  hypothyroidism, COPD, RIJ DVT, CKD-4 on HD admitted with coagulopathy, AFib with RVR, dyspnea due to volume overload on lungs, acute hypoxic respiratory failure. Pt was suppose to get outpatient HD on 11/25/19. Worsening dyspnea. Came to ER from [facility], placed on bipap and o2. cxr with CHF and pleural effusion. No fever chills cp Leg edema + Family decided to go for HD after palliative care consult discussion. 11/27/2019: 
Remain in ICU. Used BiPAP for couple hours last night. Now on NC 3-4 LPM. Respiratory distress resolved. No fever, cough, wheezing Leg edema present. Hemodynamic stable. A. fib rate improved. Family at bedside, discussed. I/O last 24 hrs: Intake/Output Summary (Last 24 hours) at 11/27/2019 1052 Last data filed at 11/26/2019 6962 Gross per 24 hour Intake  Output 1000 ml Net -1000 ml The patient is critically ill and can not provide additional history due to Unable to comprehend History taken from family, EMR Review of Systems: 
ROS not obtained due to patient factor. Allergies Allergen Reactions  Penicillins Hives  Valium [Diazepam] Other (comments) It made me cry more Past Medical History:  
Diagnosis Date  Chronic kidney disease  Congestive heart failure (CHF) ([facility] [address]  COPD (chronic obstructive pulmonary disease) ([facility])  Dialysis patient [facility]) History reviewed. No pertinent surgical history. Social History Tobacco Use  Smoking status: Former Smoker  Smokeless tobacco: Never Used Substance Use Topics  Alcohol use: Not on file History reviewed. No pertinent family history. Prior to Admission medications Medication Sig Start Date End Date Taking? Authorizing Provider  
warfarin (COUMADIN) 2 mg tablet Take 1 Tab by mouth daily. 11/22/19  Yes Alex West MD  
nystatin (MYCOSTATIN) 100,000 unit/mL suspension Take 5 mL by mouth four (4) times daily for 5 days. swish and spit 11/22/19 11/27/19 Yes Alex West MD  
amiodarone (CORDARONE) 200 mg tablet Take 1 Tab by mouth daily. 11/26/19  Yes Alex West MD  
amiodarone (PACERONE) 400 mg tablet Take 1 Tab by mouth daily. 11/23/19  Yes Alex West MD  
dilTIAZem (CARDIZEM) 30 mg tablet Take 1 Tab by mouth Before breakfast, lunch, and dinner. 11/22/19  Yes Alex West MD  
doxycycline (MONODOX) 100 mg capsule Take 1 Cap by mouth two (2) times a day.  11/22/19  Yes Alex West MD  
 atorvastatin (LIPITOR) 40 mg tablet Take 1 Tab by mouth daily. 10/30/19  Yes Mahendra Izaguirre MD  
aspirin delayed-release 81 mg tablet Take 1 Tab by mouth daily. 10/29/19  Yes Mahendra Izaguirre MD  
ergocalciferol (VITAMIN D2) 50,000 unit capsule Take 50,000 Units by mouth every seven (7) days. Yes Jesus, MD Napoleon  
PARoxetine (PAXIL) 20 mg tablet Take 20 mg by mouth daily. Yes Jesus, MD Napoleon  
furosemide (LASIX) 40 mg tablet Take 40 mg by mouth every other day. Yes Jesus, MD Napoleon  
 
Current Facility-Administered Medications Medication Dose Route Frequency  potassium chloride (K-DUR, KLOR-CON) SR tablet 40 mEq  40 mEq Oral NOW  pantoprazole (PROTONIX) 40 mg in 0.9% sodium chloride 10 mL injection  40 mg IntraVENous DAILY  amiodarone (CORDARONE) tablet 200 mg  200 mg Oral DAILY  aspirin delayed-release tablet 81 mg  81 mg Oral DAILY  atorvastatin (LIPITOR) tablet 40 mg  40 mg Oral DAILY  dilTIAZem (CARDIZEM) IR tablet 30 mg  30 mg Oral TIDAC  doxycycline (MONODOX) capsule 100 mg  100 mg Oral BID  PARoxetine (PAXIL) tablet 20 mg  20 mg Oral DAILY  [START ON 2019] ergocalciferol capsule 50,000 Units  50,000 Units Oral Q7D  Warfarin-Pharmacy to dose  1 Each Other Rx Dosing/Monitoring Objective:  
Vital Signs:   
Visit Vitals /56 Pulse (!) 105 Temp 98.4 °F (36.9 °C) Resp 19 Ht 4' 9\" (1.448 m) Wt 68.9 kg (151 lb 14.4 oz) SpO2 96% BMI 32.87 kg/m² O2 Device: Nasal cannula O2 Flow Rate (L/min): 3 l/min Temp (24hrs), Av.9 °F (36.6 °C), Min:97.4 °F (36.3 °C), Max:98.4 °F (36.9 °C) Intake/Output:  
Last shift:      No intake/output data recorded. Last 3 shifts:  1901 -  0700 In: -  
Out: 1000 Intake/Output Summary (Last 24 hours) at 2019 1052 Last data filed at 2019 1684 Gross per 24 hour Intake  Output 1000 ml Net -1000 ml Last 3 Recorded Weights in this Encounter 19 3618 Weight: 68.9 kg (151 lb 14.4 oz) Recent Labs  
  11/26/19 
1002 FIO2I 100 Physical Exam:  
 
General/Neurology: Alert, Awake. No respiratory distress. Head:   Normocephalic, without obvious abnormality, atraumatic. Eye:   EOM intact, no scleral icterus, no pallor, no cyanosis. Neck:   Supple, symmetric. No lymphadenopathy. Trachea midline Lung:   Reduced air entry bilateral lower lung field. B/l crackles improved. No wheezing. No stridors. No prolongded expiration. No accessory muscle use. Heart:   Irregular rate & rhythm. S1 S2 present. No murmur. Abdomen:  Soft. NT. ND. +BS. No masses. Extremities:  1+ pitting b/l pedal edema. No cyanosis. No clubbing. Pulses: 2+ and symmetric in DP. Capillary refill: normal 
Lymphatic:  No cervical or supraclavicular palpable lymphadenopathy. Skin:   Color, texture, turgor normal. No rashes or lesions. Data:  
   
Recent Results (from the past 24 hour(s)) METABOLIC PANEL, COMPREHENSIVE Collection Time: 11/26/19 11:30 AM  
Result Value Ref Range Sodium 138 136 - 145 mmol/L Potassium 4.1 3.5 - 5.5 mmol/L Chloride 99 (L) 100 - 111 mmol/L  
 CO2 29 21 - 32 mmol/L Anion gap 10 3.0 - 18 mmol/L Glucose 102 (H) 74 - 99 mg/dL BUN 81 (H) 7.0 - 18 MG/DL Creatinine 4.37 (H) 0.6 - 1.3 MG/DL  
 BUN/Creatinine ratio 19 12 - 20 GFR est AA 12 (L) >60 ml/min/1.73m2 GFR est non-AA 10 (L) >60 ml/min/1.73m2 Calcium 8.1 (L) 8.5 - 10.1 MG/DL Bilirubin, total 0.9 0.2 - 1.0 MG/DL  
 ALT (SGPT) 30 13 - 56 U/L  
 AST (SGOT) 20 10 - 38 U/L Alk. phosphatase 105 45 - 117 U/L Protein, total 6.0 (L) 6.4 - 8.2 g/dL Albumin 2.7 (L) 3.4 - 5.0 g/dL Globulin 3.3 2.0 - 4.0 g/dL A-G Ratio 0.8 0.8 - 1.7 NT-PRO BNP Collection Time: 11/26/19 11:30 AM  
Result Value Ref Range NT pro-BNP 18,647 (H) 0 - 1,800 PG/ML  
CARDIAC PANEL,(CK, CKMB & TROPONIN) Collection Time: 11/26/19 11:30 AM  
Result Value Ref Range CK 39 26 - 192 U/L  
 CK - MB 1.1 <3.6 ng/ml CK-MB Index 2.8 0.0 - 4.0 % Troponin-I, QT 0.03 0.0 - 0.045 NG/ML  
PROTHROMBIN TIME + INR Collection Time: 11/26/19 11:30 AM  
Result Value Ref Range Prothrombin time 66.5 (H) 11.5 - 15.2 sec INR 8.0 (HH) 0.8 - 1.2 METABOLIC PANEL, BASIC Collection Time: 11/27/19  4:15 AM  
Result Value Ref Range Sodium 141 136 - 145 mmol/L Potassium 3.2 (L) 3.5 - 5.5 mmol/L Chloride 104 100 - 111 mmol/L  
 CO2 28 21 - 32 mmol/L Anion gap 9 3.0 - 18 mmol/L Glucose 77 74 - 99 mg/dL BUN 34 (H) 7.0 - 18 MG/DL Creatinine 2.36 (H) 0.6 - 1.3 MG/DL  
 BUN/Creatinine ratio 14 12 - 20 GFR est AA 24 (L) >60 ml/min/1.73m2 GFR est non-AA 20 (L) >60 ml/min/1.73m2 Calcium 7.8 (L) 8.5 - 10.1 MG/DL  
CBC W/O DIFF Collection Time: 11/27/19  4:15 AM  
Result Value Ref Range WBC 6.6 4.6 - 13.2 K/uL  
 RBC 3.32 (L) 4.20 - 5.30 M/uL HGB 9.8 (L) 12.0 - 16.0 g/dL HCT 32.1 (L) 35.0 - 45.0 % MCV 96.7 74.0 - 97.0 FL  
 MCH 29.5 24.0 - 34.0 PG  
 MCHC 30.5 (L) 31.0 - 37.0 g/dL  
 RDW 17.4 (H) 11.6 - 14.5 % PLATELET 840 699 - 532 K/uL MPV 11.6 9.2 - 11.8 FL PROTHROMBIN TIME + INR Collection Time: 11/27/19  4:15 AM  
Result Value Ref Range Prothrombin time 21.2 (H) 11.5 - 15.2 sec INR 1.9 (H) 0.8 - 1.2 Chemistry Recent Labs  
  11/27/19 
0415 11/26/19 
1130 GLU 77 102*  138  
K 3.2* 4.1  99* CO2 28 29 BUN 34* 81* CREA 2.36* 4.37* CA 7.8* 8.1* AGAP 9 10 BUCR 14 19 AP  --  105 TP  --  6.0* ALB  --  2.7*  
GLOB  --  3.3 AGRAT  --  0.8 Lactic Acid Lactic acid Date Value Ref Range Status 10/26/2019 0.8 0.4 - 2.0 MMOL/L Final  
 
No results for input(s): LAC in the last 72 hours. Liver Enzymes Protein, total  
Date Value Ref Range Status 11/26/2019 6.0 (L) 6.4 - 8.2 g/dL Final  
 
Albumin Date Value Ref Range Status 11/26/2019 2.7 (L) 3.4 - 5.0 g/dL Final  
 
 Globulin Date Value Ref Range Status 11/26/2019 3.3 2.0 - 4.0 g/dL Final  
 
A-G Ratio Date Value Ref Range Status 11/26/2019 0.8 0.8 - 1.7   Final  
 
AST (SGOT) Date Value Ref Range Status 11/26/2019 20 10 - 38 U/L Final  
 
Alk. phosphatase Date Value Ref Range Status 11/26/2019 105 45 - 117 U/L Final  
 
Recent Labs  
  11/26/19 
1130 TP 6.0* ALB 2.7*  
GLOB 3.3 AGRAT 0.8 SGOT 20  CBC w/Diff Recent Labs  
  11/27/19 
0415 11/26/19 
1032 WBC 6.6 9.0  
RBC 3.32* 3.94* HGB 9.8* 11.8* HCT 32.1* 38.5  218 GRANS  --  89* LYMPH  --  7* EOS  --  0 Cardiac Enzymes Lab Results Component Value Date/Time CPK 39 11/26/2019 11:30 AM  
 CKMB 1.1 11/26/2019 11:30 AM  
 CKND1 2.8 11/26/2019 11:30 AM  
 TROIQ 0.03 11/26/2019 11:30 AM  
  
 
BNP No results found for: BNP, BNPP, XBNPT Coagulation Recent Labs  
  11/27/19 
0415 11/26/19 
1130 PTP 21.2* 66.5* INR 1.9* 8.0* Thyroid  Lab Results Component Value Date/Time TSH 2.53 11/08/2019 01:25 PM  
   
No results found for: T4  
 
Urinalysis Lab Results Component Value Date/Time Color YELLOW 11/09/2019 09:40 PM  
 Appearance CLEAR 11/09/2019 09:40 PM  
 Specific gravity 1.016 11/09/2019 09:40 PM  
 pH (UA) 5.0 11/09/2019 09:40 PM  
 Protein NEGATIVE  11/09/2019 09:40 PM  
 Glucose NEGATIVE  11/09/2019 09:40 PM  
 Ketone NEGATIVE  11/09/2019 09:40 PM  
 Bilirubin NEGATIVE  11/09/2019 09:40 PM  
 Urobilinogen 0.2 11/09/2019 09:40 PM  
 Nitrites NEGATIVE  11/09/2019 09:40 PM  
 Leukocyte Esterase LARGE (A) 11/09/2019 09:40 PM  
 Epithelial cells 2+ 11/09/2019 09:40 PM  
 Bacteria 3+ (A) 11/09/2019 09:40 PM  
 WBC TOO NUMEROUS TO COUNT 11/09/2019 09:40 PM  
 RBC 0 to 2 11/09/2019 09:40 PM  
  
 
Micro  No results for input(s): SDES, CULT in the last 72 hours. No results for input(s): CULT in the last 72 hours. Culture data during this hospitalization. All Micro Results None ECHO 11/10/19 · Left Ventricle: Normal cavity size. Increased wall thickness. Low normal systolic dysfunction. Estimated left ventricular ejection fraction is 51 - 55%. E/E' ratio is 28.72. · Left Atrium: Moderately dilated left atrium. · Right Ventricle: Reduced systolic function. · Right Atrium: Moderately dilated right atrium. · Aortic Valve: Mild aortic valve sclerosis with no evidence of reduced excursion. · Mitral Valve: Mitral valve thickening. Moderate mitral annular calcification. Mild mitral valve prolapse. Moderate mitral valve regurgitation is present. · Pulmonary Artery: Moderate pulmonary hypertension. Pulmonary arterial systolic pressure is 46 mmHg. · IVC/Hepatic Veins: Mildly elevated central venous pressure (5-10 mmHg); IVC diameter is less than 21 mm and collapses less than 50% with respiration. · Pericardium: Trivial pericardial effusion. Images report reviewed by me: 
CT (Most Recent) (CT chest reviewed by me) No results found for this or any previous visit. CXR reviewed by me: 
XR (Most Recent). CXR  reviewed by me and compared with previous CXR Results from [facility] Encounter encounter on 11/26/19 XR CHEST SNGL V  
 Narrative EXAM: CHEST RADIOGRAPH, SINGLE VIEW CLINICAL INDICATION/HISTORY: chf 
 
COMPARISON: Single view chest 11/26/2019 and 11/21/2019 TECHNIQUE: Portable frontal view of the chest was obtained.  
 
_______________ FINDINGS: 
 
SUPPORT DEVICES: Right jugular dialysis catheter unchanged. HEART AND MEDIASTINUM: Heart is at the upper limits of normal.  Asymmetric 
prominence right hilum with apparent central vascular congestion. LUNGS AND PLEURAL SPACES: There continues to be asymmetric infiltrate right 
perihilar and right lower lobe region with probable posteriorly layering 
effusion on the right. Cannot rule out small left effusion. No pneumothorax BONY THORAX AND SOFT TISSUES: No acute osseous abnormality. _______________ Impression IMPRESSION: 
 
 
1. Persistent asymmetric right upper and right lower lobe infiltrates with 
probable right effusion. Similar appearance is present on several recent exams 
suggesting findings are related to pneumonia. Asymmetric pulmonary edema is felt 
to be less likely but not completely excluded. Jacky Lee MD 
11/27/2019

## 2019-11-27 NOTE — PROGRESS NOTES
Palliative Medicine Consult [facility]: [phone] Palliative medicine follow up visit with team members, Radha Diaz MSW and this writer, Shereen Has RN and patient at bedside in ICU bed 1. Also present pt. s sister, Ankush Castillo. Pt is alert and aware x 3 and  stated Reji Cardona are moving me out in reference to transfer out of ICU to 3rd floor. Pt is committed to work with physical therapist to improve endurance. Obed Palma is an 80y.o. year old with a past history of diastolic heart failure with preserved EF, CKD, and COPD. Pt underwent dialysis yesterday and tolerated it well. No c/o pain, shortness of breath or nausea. Pt had no other concerns and is Ok with continuing with the current treatment plan at this time. Sister, Frances stated the family is supportive of patients decision and expressed gratitude for that support offered by team.  Frances shared Ms. Judy Ramirez a goal of being here to see her grandson graduate from [employer] [facility] [employer] the end of December 2019. Emotional and compassionate listening offered. They will remain alert to changes in patients condition and possible need for a change in the course of her care to comfort. DNR/DNI -POST is on file. Palliative Medicine will continue to follow. Shereen Has RN, CCM Palliative Medicine Inpatient RN

## 2019-11-27 NOTE — PROGRESS NOTES
Hospitalist Progress Note Patient: Shawna Morelos MRN: [id number]  CSN: [id number] YOB: 1935  Age: 80 y.o. Sex: female DOA: 11/26/2019 LOS:  LOS: 1 day Chief Complaint: SOB Assessment/Plan Acute volume overload in dialysis patient causing resp distress (missed MOnday dialysis) ESRD recently started on dialysis Severe weakness and debility 
afib with RVR Chronic persistent afib Coagulopathy from coumadin Chronic diastolic CHF Right IJ DVT Anemia of CKD Recent hospital stay-discharged to SNF last week DNR in place Prognosis deemed poor last stay but patient opted to continue dialysis-although she was too weak to get to dialysis this past MOnday Prognosis remains poor-I doubt she will thrive getting outpatient dialysis and family agrees but patient wants to keep trying INR has come down, pharmacy to dose coumadin 
 
Continue amio and cardizem, BP stable at 11 systolic 
resp distress resolved after HD last night with volume removal 
 
Prognosis overall is dismal 
 
Discussed with family Move to tele Can trial PT Expect back to SNF after another day or two with tolerance of dialysis here Disposition : 
Patient Active Problem List  
Diagnosis Code  Respiratory failure (HCC) J96.90  
 Heart failure (HCC) I50.9  Benign essential hypertension I10  Chronic diastolic congestive heart failure (HCC) I50.32  
 Cobalamin deficiency E53.8  Hypercholesterolemia E78.00  Hypertensive heart disease I11.9  Hypothyroidism E03.9  Iron deficiency anemia D50.9  Stage 4 chronic kidney disease ([facility] [address] N18.4  A-fib (HCC) I48.91  
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  
 ESRD needing dialysis (HCC) N18.6, Z99.2  Dyspnea R06.00 Subjective: 
 
I am better Breathing easier Denies chest pain, nausea A little hungry this am 
No pain Review of systems: Constitutional: denies fevers, chills Respiratory: denies SOB, cough Cardiovascular: denies palpitations Gastrointestinal: denies vomiting, diarrhea Vital signs/Intake and Output: 
Visit Vitals /56 Pulse (!) 105 Temp 98.4 °F (36.9 °C) Resp 19 Ht 4' 9\" (1.448 m) Wt 68.9 kg (151 lb 14.4 oz) SpO2 96% BMI 32.87 kg/m² Current Shift:  No intake/output data recorded. Last three shifts:  11/25 1901 - 11/27 0700 In: -  
Out: 1000 Exam: 
 
General: frail thin elderly Weak WF, NAD, ox3 Head/Neck: NCAT, supple, No masses, No lymphadenopathy CVS:tachy irregular, no M/R/G, S1/S2 heard, no thrill Lungs:Clear to auscultation bilaterally, no wheezes, rhonchi, or rales Abdomen: Soft, Nontender, No distention, Normal Bowel sounds, No hepatomegaly Extremities: No C/C/E, pulses palpable 2+ Neuro:grossly normal , follows commands Psych:appropriate Labs: Results:  
   
Chemistry Recent Labs  
  11/27/19 0415 11/26/19 
1130 GLU 77 102*  138  
K 3.2* 4.1  99* CO2 28 29 BUN 34* 81* CREA 2.36* 4.37* CA 7.8* 8.1* AGAP 9 10 BUCR 14 19 AP  --  105 TP  --  6.0* ALB  --  2.7*  
GLOB  --  3.3 AGRAT  --  0.8 CBC w/Diff Recent Labs  
  11/27/19 
0415 11/26/19 
1032 WBC 6.6 9.0  
RBC 3.32* 3.94* HGB 9.8* 11.8* HCT 32.1* 38.5  218 GRANS  --  89* LYMPH  --  7* EOS  --  0 Cardiac Enzymes Recent Labs  
  11/26/19 
1130 CPK 39 CKND1 2.8 Coagulation Recent Labs  
  11/27/19 
0415 11/26/19 
1130 PTP 21.2* 66.5* INR 1.9* 8.0* Lipid Panel No results found for: CHOL, CHOLPOCT, CHOLX, CHLST, CHOLV, 366569, HDL, HDLP, LDL, LDLC, DLDLP, 066040, VLDLC, VLDL, TGLX, TRIGL, TRIGP, TGLPOCT, CHHD, CHHDX  
BNP No results for input(s): BNPP in the last 72 hours. Liver Enzymes Recent Labs  
  11/26/19 
1130 TP 6.0* ALB 2.7*  SGOT 20 Thyroid Studies Lab Results Component Value Date/Time TSH 2.53 11/08/2019 01:25 PM  
    
Procedures/imaging: see electronic medical records for all procedures/Xrays and details which were not copied into this note but were reviewed prior to creation of Plan Rebeca Silvestre MD

## 2019-11-28 PROBLEM — R53.81 DEBILITY: Status: ACTIVE | Noted: 2019-01-01

## 2019-11-28 NOTE — PROGRESS NOTES
Problem: Pressure Injury - Risk of 
Goal: *Prevention of pressure injury Description Document Joe Scale and appropriate interventions in the flowsheet. Outcome: Progressing Towards Goal 
Note:  
Pt stays dry, and shifts on her own. Pressure Injury Interventions: 
Sensory Interventions: Assess changes in LOC Moisture Interventions: Absorbent underpads Activity Interventions: Pressure redistribution bed/mattress(bed type) Mobility Interventions: Pressure redistribution bed/mattress (bed type) Nutrition Interventions: Document food/fluid/supplement intake, Offer support with meals,snacks and hydration Friction and Shear Interventions: HOB 30 degrees or less Problem: Pain Goal: *Control of Pain Outcome: Progressing Towards Goal 
Note:  
Pt states no pain, but grimaces. Needs reinforcement. Problem: Falls - Risk of 
Goal: *Absence of Falls Description Document Prudencio Wadsworth Fall Risk and appropriate interventions in the flowsheet. Outcome: Progressing Towards Goal 
Note:  
Pt on bedrest. 
Fall Risk Interventions: 
 
Medication Interventions: Patient to call before getting OOB Elimination Interventions: Call light in reach Problem: Patient Education: Go to Patient Education Activity Goal: Patient/Family Education Outcome: Progressing Towards Goal 
Note: Pt swallows, feeding takes longer.

## 2019-11-28 NOTE — PROGRESS NOTES
[facility] [place] ACUTE HEMODIALYSIS FLOW SHEET  
 
PATIENT INFORMATION Hospital:    [facility]                          Dr Sarah Biswas MD                         Room# 342 [x] Routine    [x]Bedside Isolation Precautions: [x] Dialysis  [x]  Standard Special Considerations:   [] Blood Consent Verified  [x]N/A Allergies:[x]  YES [x]  Penicillins Valium [Diazepam] Code Status   [x] DNR Diet: [x] Renal [] NPO [] Diabetic  Diabetic: [x]Yes []No  
[x]Signed Treatment Consent Verified    [x] Time Out/ Safety Check HEMODIALYSIS INPATIENT Duration (hr): 3.5; Dialyzer: Revaclear; Dialysate Bath:  K: 3; Dialysate Bath:  CA: 2.5; Dialysate Bath: Bicarb: 35; Sodium Profiling/Modeling: No; Target Fluid Removal (mL): 1,000; Access: Central Line; Blood Flow Rate (mL. .. Meet Beyer (Order [id number]) Duration (hr) 3.5 Dialyzer Revaclear Dialysate Bath:  K 3 Dialysate Bath:  CA 2.5 Dialysate Bath: Bicarb 35 Sodium Profiling/Modeling No   
Target Fluid Removal (mL) 1,000 Oligomerix Blood Flow Rate (mL/min) 350 Dialysate Flow Rate (mL/min) 600 Citrasate? No   
  
PRIMARY NURSE REPORT: FIRST INITIAL/ LAST NAME/TITLE 
                                                                          PRE DIALYSIS:   Louie Bledsoe RN       TIME:  15:00pm  
ACCESS CATHETER ACCESS: [] N/A  [x] RIGHT  [] LEFT  [x] IJ  [] SUBCL [] FEM [] First use X-ray  [x] Tunnel     [] Non-Tunneled [x] No S/S infection  [] Redness [] Drainage  [] Cultured [] Swelling [] Pain  
                 [x] Medical Aseptic Prep       [x]    Dressing Changed Today 11/28/2019 [] Clotted [x] Patent     Flows: [x] Good [] Poor [] Reversed If Access Problem Dr. Hedy Pike: [] Yes [] No    Date:_____  [x] N/A   
GRAFT/FISTULA ACCESS:  [x] N/A    
   
  
  
GENERAL ASSESSMENT LUNGS:  SaO2%  100   [] Clear [] Coarse [] Crackles [] Wheezing  
                                      [x] Diminished Location: [] RLL [] LLL [] RUL [] RML [] NIAT   
COUGH:  [] Productive [] Dry [x] N/A       RESPIRATIONS: [x] Easy [] Labored THERAPY:     [x] NC 3 L/min CARDIAC: [] Regular  [x] Irregular  [] Pericardial Rub [] JVD Monitored Rhythm:______ [] N/A  
EDEMA: [x] None [] Generalized [] Facial [] Pedal [] UE [] LE 
           [] Pitting [] 1 [] 2 [] 3 [] 4    [] Right [] Left [] Bilateral  
SKIN:    [x] Warm   [x] Dry  [x] Pale        [] Diaphoretic     
           [] Flushed [] Jaundiced [] Cyanotic [] Rash [] Weeping LOC:    [] Alert  Oriented to: [x] Person [] Place [] Time   
          [] Confused [x] Lethargic [] Medicated [] Non-responsive GI/ABDOMEN: [] Flat [] Distended [x] Soft [] Firm [] Diarrhea [x] Bowel Sounds Present PAIN: [x] 0 [] 1 [] 2 [] 3 [] 4 [] 5 [] 6 [] 7 [] 8 [] 9 [] 10 Scale 1-10 Action/Follow Up_____ MOBILITY: [] Amb [] Amb/Assist [x] Bed  [] Wheelchair CURRENT LABS HBsAg ONLY: Date Drawn:     11/15/2019          [x] Negative HBsAb: Date Drawn:                11/17/2019          [x] Susceptible <10 Labs Obtained/Reviewed Critical Results Called   Date when labs were drawn- 
Hgb-   
HGB Date Value Ref Range Status 11/28/2019 9.7 (L) 12.0 - 16.0 g/dL Final  
 
K-   
Potassium Date Value Ref Range Status 11/28/2019 4.0 3.5 - 5.5 mmol/L Final  
 
Ca-  
Calcium Date Value Ref Range Status 11/28/2019 7.9 (L) 8.5 - 10.1 MG/DL Final  
 
Bun-  
BUN Date Value Ref Range Status 11/28/2019 48 (H) 7.0 - 18 MG/DL Final  
 
Creat-  
Creatinine Date Value Ref Range Status 11/28/2019 3.16 (H) 0.6 - 1.3 MG/DL Final  
 
  
  
 
EDUCATION Person Educated: [x] Patient [x] Other Family members Knowledge base: [] None [x] Minimal [] Substantial   
Barriers to learning  [x] None Preferred method of learning: [] Written [x] Oral [] Visual [] Hands on Topic: [] Access Care [] S&S of infection [] Fluid Management 
 [] K+  [x] Procedural  [] Albumin [] Medications [] Tx Options [] Transplant [] Diet [] Other Teaching Tools: [x] Explain [] Demonstration [] Handout_____ [] Video______  
RO/HEMODIAYLSIS MACHINE SAFETY CHECKS- BEFORE EACH TREATMENT  
 [] [facility]: Machine Serial #1:  [id number]   RO Serial #[id number] [x] [facility]: Machine Serial #2:  [id number]   RO Serial #[id number] Alarm Test: [x] Pass  Time 16:05pm 
[x] RO/Machine Log Complete    [x] Extracorporeal circuit Tested for integrity. Dialyzer: Revaclear 300 Lot# [id number] exp 08/28/2022  Tubing: Nipro Lot# [id number] exp 06/30/2024 
0.9% NS Lot# -[id number] exp 07/31/2021 Dialysate: pH 7.2  Temp. 36.0 Conductivity: Meter  13.8  HD Machine 13.8   TCD  13.8 CHLORINE TESTING- BEFORE EACH TREATMENT AND EVERY 4 HOURS Total Chlorine: [x] Less than 0.1 ppm Time: 16:10pm              2nd Check Time: N/A 
(If greater than 0.1 ppm from Primary then every 30 minutes from Secondary) TREATMENT INIATION-WITH DIALYSIS PRECAUTIONS [x] All Connections Secured   [x] Saline Line Double Clamped    [x] Venous Parameters Set [x] Arterial Parameters Set   [x] Prime Given 200 ml [x] Air Foam Detector Engaged PRE-TREATMENT  
UF Calculations: Wt to lose: 1000ml(+) Oral: 0ml(+)IV Meds/Fluids/Blood prods 0ml(+) Prime/Rinse 500ml(=)Total UF Goal 1500mL Scale Type:[x] Bed scale         62.6  [x] kg Tx Initiation Note: Each catheter limb disinfected for 60 seconds per limb with alcohol swabs. Caps removed, dialysis CVC hub scrubbed with Prevantics for 5 seconds, followed by a 5 second dry time per policy. Treatment initiated vial right IJ TDC without difficulty. Initial goal set for 1 kg per orders. Access & lines visible/secure/intact. No acute distress. Vitals   Pre-Dialysis Temp  Temp: 97.6 °F    
 HR   Pulse (Heart Rate):  94  
B/P   BP:  116/58 Resp   Resp Rate: 16 Pain level  Pain Intensity 1: 0 [x] Time Out/Safety Check  Time: 15:00pm   
INTRADIALYTIC MONITORING 
(SEE ATTACHED FLOWSHEET) POST TREATMENT Time Medication Dose Volume Route Initials None [facility] Signatures Title Initials Time Shahnaz Pelaez RN AT 16:15pm  
     
     
Dialyzer cleared: [x] Good [] Fair [] Poor Blood Volume Processed    L Net UF Removed  1,000 mL Post Tx Access:   
Catheter: Locking Solution  [] Heparin 1 ml/1000 units [] Normal Saline [x] New caps applied Art. 2.1 ml     Adolfo. 2.2 ml 
Post Assessment:   
          Skin: [x]Warm [x]Dry []Diaphoretic []Flushed [x] Pale []Cyanotic Lungs: [x]Clear []Coarse []Crackles []Wheezing Cardiac: []Regular [x]Irregular  []Monitored rhythm____ []N/A Edema: [x]None []General []Facial []Pedal  []UE []LE []RIGHT []LEFT Pain: [x]0 []1 []2 []3 []4 []5 []6 []7 []8 []9 []10  
POST Tx Note: 
Each catheter limb disinfected for 60 seconds per limb with alcohol swabs. Dialysis CVC hubs scrubbed with Prevantics for 5 seconds, followed by a 5 second dry time per policy, red dialysis caps applied to ports. Tolerated full dialysis without complications, no acute distress. Net UF= 1,000ml. Stable post-dialysis. No change in assessment. Vitals   Post-dialysis Temp  Temp: 97 °F (36.1 °C) (11/28/19 1950) HR   Pulse (Heart Rate): 92 (11/28/19 1950) B/P   BP: 112/70 (11/28/19 1950) Resp   Resp Rate: 16 (11/28/19 1950) Pain level  Pain Intensity 1: 0 Primary Nurse Report: First initial/Last name/Title Post Dialysis:      Min Jaimes RN          Time:   19:50pm  
Abbreviations: AVG-arterial venous graft, AVF-arterial venous fistula, IJ-Internal Jugular, Subcl-Subclavian, Fem-Femoral, Tx-treatment, AP/HR-apical heart rate, DFR-dialysate flow rate, BFR-blood flow rate, AP-arterial pressure, -venous pressure, UF-ultrafiltrate, TMP-transmembrane pressure, Adolfo-Venous, Art-Arterial, RO-Reverse Osmosis

## 2019-11-28 NOTE — PROGRESS NOTES
Pt refused PT due to: 
[]  Nausea/vomiting 
[]  Eating 
[]  Pain 
[x]  Pt lethargic: Not today per family. Pt sleeping. Pt not responding to my voice. []  Off Unit Will f/u tomorrow. Thank you.  
Abril Blanco, PTA

## 2019-11-28 NOTE — PROGRESS NOTES
Hospitalist Progress Note-critical care note Patient: Rayna Edmond MRN: [id number]  CSN: [id number] YOB: 1935  Age: 80 y.o. Sex: female DOA: 11/26/2019 LOS:  LOS: 2 days Chief complaint:esrd chf, afib, htn Assessment/Plan Hospital Problems  Never Reviewed Codes Class Noted POA Dyspnea ICD-10-CM: R06.00 
ICD-9-CM: 786.09  11/26/2019 Unknown ESRD needing dialysis ([facility] [address] ICD-10-CM: N18.6, Z99.2 ICD-9-CM: 585.6  11/19/2019 Yes A-fib [facility]) ICD-10-CM: I48.91 
ICD-9-CM: 427.31  11/8/2019 Yes Chronic diastolic congestive heart failure (HCC) ICD-10-CM: I50.32 
ICD-9-CM: 428.32, 428.0  4/15/2019 Yes Overview Signed 10/26/2019  8:03 AM by Cherylene Prince, MD  
    
  
   
 Benign essential hypertension ICD-10-CM: I10 
ICD-9-CM: 401.1  10/25/2012 Yes Overview Signed 10/26/2019  8:03 AM by Cherylene Prince, MD  
    
  
   
 Hypothyroidism ICD-10-CM: C83.0 ICD-9-CM: 244.9  10/25/2012 Yes Overview Signed 10/26/2019  8:03 AM by Cherylene Prince, MD  
    
  
   
  
 
 
Acute volume overload in dialysis patient causing resp distress Continue hd per renal  
 
ESRD on  recently started on dialysis Will need HD today Pt still not giving up Severe weakness and debility Continue pt/ot if she can cooperate and tolerate  
 
afib with RVR, Chronic persistent afib Rate controlled per amidorone and cardizem ,warfari for [facility] Continue pharm dose Chronic diastolic CHF Need HD Right IJ DVT,on wafarin Anemia of CKD Family was  At the bedside. Son agrees with comfort care, however, pt wants to be treated. Will continue treat per pt wish Subjective: no appetite Will have speech reevaluate pt Continue aspiration precaution   
 
All questions have been answered. 35 total min's spent on patient care including >50% on counseling/coordinating care.  Discussed the above assessments. also discussed labs, medications and hospital course Disposition :tbd, Review of systems: 
 
Unwilling to answer Vital signs/Intake and Output: 
Visit Vitals /65 (BP 1 Location: Left arm, BP Patient Position: At rest) Pulse (!) 104 Temp 98.7 °F (37.1 °C) Resp 18 Ht 4' 9\" (1.448 m) Wt 62.8 kg (138 lb 7.2 oz) SpO2 100% BMI 29.96 kg/m² Current Shift:  No intake/output data recorded. Last three shifts:  11/26 1901 - 11/28 0700 In: 61 [P.O.:60] Out: 1000 Physical Exam: 
General: Alert, some cooperative, no acute distress  with eyes closed HEENT: NC, Atraumatic. PERRLA, anicteric sclerae. Lungs:  No Wheezing, +Rhonchi/Rales. Heart:  Regular  rhythm,  +murmur, No Rubs, No Gallops Abdomen: Soft, Non distended, Non tender.  +Bowel sounds, Extremities: No c/c/e Psych:   Not anxious or agitated. Neurologic:  Not willing to be examed Labs: Results:  
   
Chemistry Recent Labs  
  11/28/19 0410 11/27/19 0415 11/26/19 
1130 * 77 102*  141 138  
K 4.0 3.2* 4.1  104 99* CO2 28 28 29 BUN 48* 34* 81* CREA 3.16* 2.36* 4.37* CA 7.9* 7.8* 8.1* AGAP 9 9 10 BUCR 15 14 19 AP  --   --  105 TP  --   --  6.0* ALB 2.6*  --  2.7*  
GLOB  --   --  3.3 AGRAT  --   --  0.8 CBC w/Diff Recent Labs  
  11/28/19 
0410 11/27/19 0415 11/26/19 
1032 WBC 7.2 6.6 9.0  
RBC 3.26* 3.32* 3.94* HGB 9.7* 9.8* 11.8* HCT 32.1* 32.1* 38.5  175 218 GRANS  --   --  89* LYMPH  --   --  7* EOS  --   --  0 Cardiac Enzymes Recent Labs  
  11/26/19 
1130 CPK 39 CKND1 2.8 Coagulation Recent Labs  
  11/28/19 
0410 11/27/19 
1454 PTP 17.7* 16.6* INR 1.5* 1.4* Lipid Panel No results found for: CHOL, CHOLPOCT, CHOLX, CHLST, CHOLV, 415478, HDL, HDLP, LDL, LDLC, DLDLP, 133834, VLDLC, VLDL, TGLX, TRIGL, TRIGP, TGLPOCT, CHHD, CHHDX  
BNP No results for input(s): BNPP in the last 72 hours. Liver Enzymes Recent Labs  
  11/28/19 
0410 11/26/19 
1130 TP  --  6.0* ALB 2.6* 2.7* AP  --  105 SGOT  --  20 Thyroid Studies Lab Results Component Value Date/Time TSH 2.53 11/08/2019 01:25 PM  
    
Procedures/imaging: see electronic medical records for all procedures/Xrays and details which were not copied into this note but were reviewed prior to creation of Plan Cholo Canseco MD

## 2019-11-28 NOTE — ROUTINE PROCESS
Bedside and Verbal shift change report given to Lou Mclaughlin RN (oncoming nurse) by Leah Neves RN (offgoing nurse). Report included the following information SBAR and Kardex.

## 2019-11-28 NOTE — PROGRESS NOTES
Problem: Pressure Injury - Risk of 
Goal: *Prevention of pressure injury Description Document Joe Scale and appropriate interventions in the flowsheet. Outcome: Progressing Towards Goal 
Note: Pressure Injury Interventions: 
Sensory Interventions: Assess changes in LOC Moisture Interventions: Offer toileting Q_hr Activity Interventions: Pressure redistribution bed/mattress(bed type) Mobility Interventions: Pressure redistribution bed/mattress (bed type) Nutrition Interventions: Document food/fluid/supplement intake Friction and Shear Interventions: HOB 30 degrees or less Problem: Patient Education: Go to Patient Education Activity Goal: Patient/Family Education Outcome: Progressing Towards Goal 
  
Problem: Pain Goal: *Control of Pain Outcome: Progressing Towards Goal 
  
Problem: Patient Education: Go to Patient Education Activity Goal: Patient/Family Education Outcome: Progressing Towards Goal 
  
Problem: Falls - Risk of 
Goal: *Absence of Falls Description Document Kennedy Aldana Fall Risk and appropriate interventions in the flowsheet. Outcome: Progressing Towards Goal 
Note: Fall Risk Interventions: 
  
 
  
 
Medication Interventions: Patient to call before getting OOB, Teach patient to arise slowly, Bed/chair exit alarm Elimination Interventions: Call light in reach Problem: Patient Education: Go to Patient Education Activity Goal: Patient/Family Education Outcome: Progressing Towards Goal 
  
Problem: Patient Education: Go to Patient Education Activity Goal: Patient/Family Education Outcome: Progressing Towards Goal

## 2019-11-28 NOTE — PROGRESS NOTES
Pulmonary Specialists Pulmonary, Critical Care, and Sleep Medicine Name: Sp Barnett MRN: [id number] : 1935 Hospital: [facility] [place] Date: 2019  Room: 342/01 [facility] Note Consult requesting physician: Dr. Kyra Parsons Reason for Consult: respiratory failure IMPRESSION:  
·  
· Patient Active Problem List  
Diagnosis Code  Respiratory failure ([facility]) J96.90  
 Heart failure (HCC) I50.9  Benign essential hypertension I10  Chronic diastolic congestive heart failure (HCC) I50.32  
 Cobalamin deficiency E53.8  Hypercholesterolemia E78.00  Hypertensive heart disease I11.9  Hypothyroidism E03.9  Iron deficiency anemia D50.9  Stage 4 chronic kidney disease ([facility] N18.4  A-fib ([facility]) I48.91  
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  
 ESRD needing dialysis ([facility]) N18.6, Z99.2  Dyspnea R06.00 · AFib with RVR · Coagulopathy due to coumadin use · Code status: DNR/DNI  
  
RECOMMENDATIONS:  
Respiratory:  
hypoxia due to CHF and pleural effusion due to volume overload. Hypoxemia and respiratory distress improved after dialysis. On nasal cannula 3 LPM.  Did not require BiPAP last night. Denies SOB, wheezing, cough. Overall respiratory status has improved since HD. Hemodialysis decision per nephrologist. 
Protecting airway. Pt is DNR, DNI Keep SPO2 >=92%. HOB 30 degree elevation all the time. Aggressive pulmonary toileting. Aspiration precautions. Incentive spirometry. Other medical issues being managed by hospitalist and respective consultants. D/w family, patient, Dr. Jay Jay Romero. 
No further pulmonary recommendation. Will sign off. Call us with any questions. Moderate complexity decision making was performed during the evaluation of this patient at high risk for decompensation with multiple organ involvement. Subjective/History of Present Illness:  
 
Patient is a 80 y.o. female with PMHx significant for CHF, HTN, AFib,  hypothyroidism, COPD, RIJ DVT, CKD-4 on HD admitted with coagulopathy, AFib with RVR, dyspnea due to volume overload on lungs, acute hypoxic respiratory failure. Pt was suppose to get outpatient HD on 11/25/19. Worsening dyspnea. Came to ER from [facility], placed on bipap and o2. cxr with CHF and pleural effusion. No fever chills cp Leg edema + Family decided to go for HD after palliative care consult discussion. 11/28/2019: 
Transferred out of ICU 11/27/2019. Did not require BiPAP last night. Now on oxygen NC 3 LPM. No respiratory distress anymore. Denies fever cough chest pain SOB wheezing. Leg edema improving. No other overnight respiratory issues. I/O last 24 hrs: Intake/Output Summary (Last 24 hours) at 11/28/2019 1119 Last data filed at 11/28/2019 8858 Gross per 24 hour Intake 60 ml Output 0 ml Net 60 ml Allergies Allergen Reactions  Penicillins Hives  Valium [Diazepam] Other (comments) It made me cry more Past Medical History:  
Diagnosis Date  Chronic kidney disease  Congestive heart failure (CHF) ([facility] [address]  COPD (chronic obstructive pulmonary disease) ([facility])  Dialysis patient [facility]) History reviewed. No pertinent surgical history. Social History Tobacco Use  Smoking status: Former Smoker  Smokeless tobacco: Never Used Substance Use Topics  Alcohol use: Not on file History reviewed. No pertinent family history. Prior to Admission medications Medication Sig Start Date End Date Taking? Authorizing Provider  
warfarin (COUMADIN) 2 mg tablet Take 1 Tab by mouth daily. 11/22/19  Yes Ericka Gonzalez MD  
nystatin (MYCOSTATIN) 100,000 unit/mL suspension Take 5 mL by mouth four (4) times daily for 5 days.  swish and spit 11/22/19 11/27/19 Yes Ericka Gonzalez MD  
 amiodarone (CORDARONE) 200 mg tablet Take 1 Tab by mouth daily. 11/26/19  Yes Patrick Reyna MD  
amiodarone (PACERONE) 400 mg tablet Take 1 Tab by mouth daily. 11/23/19  Yes Patrick Reyna MD  
dilTIAZem (CARDIZEM) 30 mg tablet Take 1 Tab by mouth Before breakfast, lunch, and dinner. 11/22/19  Yes Patrick Reyna MD  
doxycycline (MONODOX) 100 mg capsule Take 1 Cap by mouth two (2) times a day. 11/22/19  Yes Patrick Reyna MD  
atorvastatin (LIPITOR) 40 mg tablet Take 1 Tab by mouth daily. 10/30/19  Yes Shelton Jensen MD  
aspirin delayed-release 81 mg tablet Take 1 Tab by mouth daily. 10/29/19  Yes Sheltno Jensen MD  
ergocalciferol (VITAMIN D2) 50,000 unit capsule Take 50,000 Units by mouth every seven (7) days. Yes Jesus, MD Napoleon  
PARoxetine (PAXIL) 20 mg tablet Take 20 mg by mouth daily. Yes Napoleon Lee MD  
furosemide (LASIX) 40 mg tablet Take 40 mg by mouth every other day. Yes Jesus, MD Napoleon  
 
Current Facility-Administered Medications Medication Dose Route Frequency  pantoprazole (PROTONIX) 40 mg in 0.9% sodium chloride 10 mL injection  40 mg IntraVENous DAILY  amiodarone (CORDARONE) tablet 200 mg  200 mg Oral DAILY  aspirin delayed-release tablet 81 mg  81 mg Oral DAILY  atorvastatin (LIPITOR) tablet 40 mg  40 mg Oral DAILY  dilTIAZem (CARDIZEM) IR tablet 30 mg  30 mg Oral TIDAC  doxycycline (MONODOX) capsule 100 mg  100 mg Oral BID  PARoxetine (PAXIL) tablet 20 mg  20 mg Oral DAILY  [START ON 12/2/2019] ergocalciferol capsule 50,000 Units  50,000 Units Oral Q7D  Warfarin-Pharmacy to dose  1 Each Other Rx Dosing/Monitoring Objective:  
Vital Signs:   
Visit Vitals BP (!) 135/98 (BP 1 Location: Left arm, BP Patient Position: At rest) Pulse 85 Temp 98.2 °F (36.8 °C) Resp 18 Ht 4' 9\" (1.448 m) Wt 62.8 kg (138 lb 7.2 oz) SpO2 91% BMI 29.96 kg/m² O2 Device: Nasal cannula O2 Flow Rate (L/min): 3 l/min Temp (24hrs), Av.6 °F (36.4 °C), Min:97.2 °F (36.2 °C), Max:98.2 °F (36.8 °C) Intake/Output:  
Last shift:      No intake/output data recorded. Last 3 shifts:  190 -  0700 In: 61 [P.O.:60] Out: 1000 Intake/Output Summary (Last 24 hours) at 2019 1119 Last data filed at 2019 5653 Gross per 24 hour Intake 60 ml Output 0 ml Net 60 ml Last 3 Recorded Weights in this Encounter 19 8839 19 7089 Weight: 68.9 kg (151 lb 14.4 oz) 62.8 kg (138 lb 7.2 oz) Recent Labs  
  19 
1002 FIO2I 100 Physical Exam:  
 
General/Neurology: Alert, Awake. No respiratory distress. Head:   Normocephalic, without obvious abnormality, atraumatic. Eye:   EOM intact, no scleral icterus, no pallor, no cyanosis. Neck:   Supple, symmetric. No lymphadenopathy. Trachea midline Lung:   Reduced air entry bilateral lower lung field. No crackles. No wheezing. No stridors. No prolongded expiration. No accessory muscle use. Heart:   Irregular rate & rhythm. S1 S2 present. No murmur. Abdomen:  Soft. NT. ND. +BS. No masses. Extremities:  Trace to 1+ pitting b/l pedal edema. No cyanosis. No clubbing. Pulses: 2+ and symmetric in DP. Capillary refill: normal 
 
Data:  
   
Recent Results (from the past 24 hour(s)) PROTHROMBIN TIME + INR Collection Time: 19  2:54 PM  
Result Value Ref Range Prothrombin time 16.6 (H) 11.5 - 15.2 sec INR 1.4 (H) 0.8 - 1.2    
CBC W/O DIFF Collection Time: 19  4:10 AM  
Result Value Ref Range WBC 7.2 4.6 - 13.2 K/uL  
 RBC 3.26 (L) 4.20 - 5.30 M/uL HGB 9.7 (L) 12.0 - 16.0 g/dL HCT 32.1 (L) 35.0 - 45.0 % MCV 98.5 (H) 74.0 - 97.0 FL  
 MCH 29.8 24.0 - 34.0 PG  
 MCHC 30.2 (L) 31.0 - 37.0 g/dL  
 RDW 18.0 (H) 11.6 - 14.5 % PLATELET 056 722 - 779 K/uL MPV 10.3 9.2 - 11.8 FL PROTHROMBIN TIME + INR Collection Time: 19  4:10 AM  
Result Value Ref Range Prothrombin time 17.7 (H) 11.5 - 15.2 sec INR 1.5 (H) 0.8 - 1.2 RENAL FUNCTION PANEL Collection Time: 11/28/19  4:10 AM  
Result Value Ref Range Sodium 142 136 - 145 mmol/L Potassium 4.0 3.5 - 5.5 mmol/L Chloride 105 100 - 111 mmol/L  
 CO2 28 21 - 32 mmol/L Anion gap 9 3.0 - 18 mmol/L Glucose 108 (H) 74 - 99 mg/dL BUN 48 (H) 7.0 - 18 MG/DL Creatinine 3.16 (H) 0.6 - 1.3 MG/DL  
 BUN/Creatinine ratio 15 12 - 20 GFR est AA 17 (L) >60 ml/min/1.73m2 GFR est non-AA 14 (L) >60 ml/min/1.73m2 Calcium 7.9 (L) 8.5 - 10.1 MG/DL Phosphorus 3.7 2.5 - 4.9 MG/DL Albumin 2.6 (L) 3.4 - 5.0 g/dL Chemistry Recent Labs  
  11/28/19 
0410 11/27/19 
0415 11/26/19 
1130 * 77 102*  141 138  
K 4.0 3.2* 4.1  104 99* CO2 28 28 29 BUN 48* 34* 81* CREA 3.16* 2.36* 4.37* CA 7.9* 7.8* 8.1*  
PHOS 3.7  --   --   
AGAP 9 9 10 BUCR 15 14 19 AP  --   --  105 TP  --   --  6.0* ALB 2.6*  --  2.7*  
GLOB  --   --  3.3 AGRAT  --   --  0.8 Lactic Acid Lactic acid Date Value Ref Range Status 10/26/2019 0.8 0.4 - 2.0 MMOL/L Final  
 
No results for input(s): LAC in the last 72 hours. Liver Enzymes Protein, total  
Date Value Ref Range Status 11/26/2019 6.0 (L) 6.4 - 8.2 g/dL Final  
 
Albumin Date Value Ref Range Status 11/28/2019 2.6 (L) 3.4 - 5.0 g/dL Final  
 
Globulin Date Value Ref Range Status 11/26/2019 3.3 2.0 - 4.0 g/dL Final  
 
A-G Ratio Date Value Ref Range Status 11/26/2019 0.8 0.8 - 1.7   Final  
 
AST (SGOT) Date Value Ref Range Status 11/26/2019 20 10 - 38 U/L Final  
 
Alk. phosphatase Date Value Ref Range Status 11/26/2019 105 45 - 117 U/L Final  
 
Recent Labs  
  11/28/19 
0410 11/26/19 
1130 TP  --  6.0* ALB 2.6* 2.7*  
GLOB  --  3.3 AGRAT  --  0.8 SGOT  --  20  
AP  --  105 CBC w/Diff Recent Labs  
  11/28/19 
0410 11/27/19 
0415 11/26/19 
1032 WBC 7.2 6.6 9.0 RBC 3.26* 3.32* 3.94* HGB 9.7* 9.8* 11.8* HCT 32.1* 32.1* 38.5  175 218 GRANS  --   --  89* LYMPH  --   --  7* EOS  --   --  0 Cardiac Enzymes No results found for: CPK, CK, CKMMB, CKMB, RCK3, CKMBT, CKNDX, CKND1, CUONG, TROPT, TROIQ, REENA, TROPT, TNIPOC, BNP, BNPP  
 
BNP No results found for: BNP, BNPP, XBNPT Coagulation Recent Labs  
  11/28/19 
0410 11/27/19 
1454 11/27/19 
0415 PTP 17.7* 16.6* 21.2* INR 1.5* 1.4* 1.9* Thyroid  Lab Results Component Value Date/Time TSH 2.53 11/08/2019 01:25 PM  
   
No results found for: T4  
 
Urinalysis Lab Results Component Value Date/Time Color YELLOW 11/09/2019 09:40 PM  
 Appearance CLEAR 11/09/2019 09:40 PM  
 Specific gravity 1.016 11/09/2019 09:40 PM  
 pH (UA) 5.0 11/09/2019 09:40 PM  
 Protein NEGATIVE  11/09/2019 09:40 PM  
 Glucose NEGATIVE  11/09/2019 09:40 PM  
 Ketone NEGATIVE  11/09/2019 09:40 PM  
 Bilirubin NEGATIVE  11/09/2019 09:40 PM  
 Urobilinogen 0.2 11/09/2019 09:40 PM  
 Nitrites NEGATIVE  11/09/2019 09:40 PM  
 Leukocyte Esterase LARGE (A) 11/09/2019 09:40 PM  
 Epithelial cells 2+ 11/09/2019 09:40 PM  
 Bacteria 3+ (A) 11/09/2019 09:40 PM  
 WBC TOO NUMEROUS TO COUNT 11/09/2019 09:40 PM  
 RBC 0 to 2 11/09/2019 09:40 PM  
  
 
Micro  No results for input(s): SDES, CULT in the last 72 hours. No results for input(s): CULT in the last 72 hours. Culture data during this hospitalization. All Micro Results None ECHO 11/10/19 · Left Ventricle: Normal cavity size. Increased wall thickness. Low normal systolic dysfunction. Estimated left ventricular ejection fraction is 51 - 55%. E/E' ratio is 28.72. · Left Atrium: Moderately dilated left atrium. · Right Ventricle: Reduced systolic function. · Right Atrium: Moderately dilated right atrium. · Aortic Valve: Mild aortic valve sclerosis with no evidence of reduced excursion. · Mitral Valve: Mitral valve thickening. Moderate mitral annular calcification. Mild mitral valve prolapse. Moderate mitral valve regurgitation is present. · Pulmonary Artery: Moderate pulmonary hypertension. Pulmonary arterial systolic pressure is 46 mmHg. · IVC/Hepatic Veins: Mildly elevated central venous pressure (5-10 mmHg); IVC diameter is less than 21 mm and collapses less than 50% with respiration. · Pericardium: Trivial pericardial effusion. Images report reviewed by me: 
CT (Most Recent) (CT chest reviewed by me) No results found for this or any previous visit. CXR reviewed by me: 
XR (Most Recent). CXR  reviewed by me and compared with previous CXR Results from [facility] Encounter encounter on 11/26/19 XR CHEST SNGL V  
 Narrative EXAM: CHEST RADIOGRAPH, SINGLE VIEW CLINICAL INDICATION/HISTORY: chf 
 
COMPARISON: Single view chest 11/26/2019 and 11/21/2019 TECHNIQUE: Portable frontal view of the chest was obtained.  
 
_______________ FINDINGS: 
 
SUPPORT DEVICES: Right jugular dialysis catheter unchanged. HEART AND MEDIASTINUM: Heart is at the upper limits of normal.  Asymmetric 
prominence right hilum with apparent central vascular congestion. LUNGS AND PLEURAL SPACES: There continues to be asymmetric infiltrate right 
perihilar and right lower lobe region with probable posteriorly layering 
effusion on the right. Cannot rule out small left effusion. No pneumothorax BONY THORAX AND SOFT TISSUES: No acute osseous abnormality. _______________ Impression IMPRESSION: 
 
 
1. Persistent asymmetric right upper and right lower lobe infiltrates with 
probable right effusion. Similar appearance is present on several recent exams 
suggesting findings are related to pneumonia. Asymmetric pulmonary edema is felt 
to be less likely but not completely excluded. Phi Mcmahan MD 
11/28/2019

## 2019-11-28 NOTE — ROUTINE PROCESS
Bedside shift change report given to Gabomarie Hui RN (oncoming nurse) by Chelsea Austin RN (offgoing nurse). Report included the following information SBAR, Kardex and MAR.

## 2019-11-28 NOTE — PROGRESS NOTES
Nephrology Progress/HD note Subjective:  
 
Kasey Eden is a 80 y.o. WF with a PMHX of CHF, COPD who was recently admitted to [facility] with newly dx Afib with RVR and also started on HD for worsening kidney functionof came back today from SNF with worsening SOB. She was d/sandra on 11/22 and supposed to have her first out pt HD yesterday (11/25) at [facility] unit, but she was not able to do it due to generalized weakness. During initial evaluation in the ER today, she was hypoxic, placed on Bipap. Her BP is low (SBP 's) and is in RVR (110-140's). Lab showed Cr 4.3, K 4.1 and CO2 29. CxR showed pulm congestion. Pt is currently DNR/I. After discussion with the pt, she strongly wants to pursue dialysis at least for now Seen on HD, doing well today. Admit Date: 11/26/2019 Active Problems: 
  Benign essential hypertension (10/25/2012) Overview: Last Assessment & Plan:  
    Blood pressure controlled on current regimen of Coreg 12.5 mg twice daily,  
    furosemide 40 mg every other day, Aldactone 25 mg daily. Continue  
    low-sodium diet. Will check electrolytes and renal function with today's  
    labs. Chronic diastolic congestive heart failure ([address] (4/15/2019) Overview: Last Assessment & Plan:  
    Appears to be well compensated presently. Her weight is now down to 133  
    on our scales and the patient tells me that she currently weighs 130 lb on  
    her scale. I believe that this is probably a good dry weight for her so  
    she may continue to rely on her scale and shoot for a dry weight of 130 lb  
    as her baseline. If she starts to drift upward with her weight or notices  
    puffiness or swelling in her legs or shortness of breath she is to start  
    taking her furosemide every day and call us. Otherwise continue current  
    dose of furosemide 40 mg every other day.   Continue Aldactone 25 mg daily,  
 continue Coreg 12.5 mg b.i.d. continue low sodium diet. Will check  
    metabolic profile today to reassess serum electrolytes and renal function  
    given that she is on diuretic therapy. Hypothyroidism (10/25/2012) Overview: Last Assessment & Plan: Most recent thyroid function studies done on 04/15/2019 show TSH and free T4 of 3.52 and 0.93 respectively. Notably she is not on thyroid  
    supplement so there is no current evidence that she is truly hypothyroid. Will continue to follow this periodically. A-fib ([facility] (11/8/2019) ESRD needing dialysis ([facility] (11/19/2019) Dyspnea (11/26/2019) Debility (11/28/2019) Current Facility-Administered Medications Medication Dose Route Frequency  magic mouthwash (FIRST-MOUTHWASH BLM) oral suspension 5 mL  5 mL Oral Q4H PRN  
 heparin (porcine) 1,000 unit/mL injection 4,300 Units  4,300 Units Hemodialysis DIALYSIS PRN  pantoprazole (PROTONIX) 40 mg in 0.9% sodium chloride 10 mL injection  40 mg IntraVENous DAILY  amiodarone (CORDARONE) tablet 200 mg  200 mg Oral DAILY  aspirin delayed-release tablet 81 mg  81 mg Oral DAILY  atorvastatin (LIPITOR) tablet 40 mg  40 mg Oral DAILY  dilTIAZem (CARDIZEM) IR tablet 30 mg  30 mg Oral TIDAC  doxycycline (MONODOX) capsule 100 mg  100 mg Oral BID  PARoxetine (PAXIL) tablet 20 mg  20 mg Oral DAILY  [START ON 12/2/2019] ergocalciferol capsule 50,000 Units  50,000 Units Oral Q7D  Warfarin-Pharmacy to dose  1 Each Other Rx Dosing/Monitoring  acetaminophen (TYLENOL) solution 650 mg  650 mg Oral Q6H PRN Allergy: Allergies Allergen Reactions  Penicillins Hives  Valium [Diazepam] Other (comments) It made me cry more Objective:  
 
Visit Vitals BP 96/61 Pulse 83 Temp 97.6 °F (36.4 °C) (Oral) Resp 18 Ht 4' 9\" (1.448 m) Wt 62.8 kg (138 lb 7.2 oz) SpO2 100% Comment: On o2 3/lpm NC. BMI 29.96 kg/m² Intake/Output Summary (Last 24 hours) at 11/28/2019 1821 Last data filed at 11/28/2019 4859 Gross per 24 hour Intake 60 ml Output 0 ml Net 60 ml Physical Exam:  
 
 
General: No acute distress HENT: Atraumatic and normocephalic Eyes: Normal conjunctiva Neck: Supple No JVD Cardiovascular: Normal S1 & S2, no m/r/g Pulmonary/Chest Wall: Clear to auscultation bilaterally Abdominal: Soft and non-tender Musculoskeletal: No ankle edema Neurological: No focal deficits Access: no issues Data Review: 
Lab Results Component Value Date/Time Sodium 142 11/28/2019 04:10 AM  
 Potassium 4.0 11/28/2019 04:10 AM  
 Chloride 105 11/28/2019 04:10 AM  
 CO2 28 11/28/2019 04:10 AM  
 Anion gap 9 11/28/2019 04:10 AM  
 Glucose 108 (H) 11/28/2019 04:10 AM  
 BUN 48 (H) 11/28/2019 04:10 AM  
 Creatinine 3.16 (H) 11/28/2019 04:10 AM  
 BUN/Creatinine ratio 15 11/28/2019 04:10 AM  
 GFR est AA 17 (L) 11/28/2019 04:10 AM  
 GFR est non-AA 14 (L) 11/28/2019 04:10 AM  
 Calcium 7.9 (L) 11/28/2019 04:10 AM  
 
Lab Results Component Value Date/Time WBC 7.2 11/28/2019 04:10 AM  
 HGB 9.7 (L) 11/28/2019 04:10 AM  
 HCT 32.1 (L) 11/28/2019 04:10 AM  
 PLATELET 797 13/66/3477 04:10 AM  
 MCV 98.5 (H) 11/28/2019 04:10 AM  
 
Lab Results Component Value Date/Time Calcium 7.9 (L) 11/28/2019 04:10 AM  
 Phosphorus 3.7 11/28/2019 04:10 AM  
 
Lab Results Component Value Date/Time Iron 11 (L) 11/16/2019 09:10 AM  
 TIBC 271 11/16/2019 09:10 AM  
 Iron % saturation 4 11/16/2019 09:10 AM  
 Ferritin 395 (H) 11/16/2019 09:10 AM  
 
Lab Results Component Value Date/Time Ferritin 395 (H) 11/16/2019 09:10 AM  
 
 
 
Impression:  
 
-Pulmonary edema, improved post dialysis 
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 MWF schedule at [facility] [place] -Hypotension w/ afib/RVR 
-Rt sided HF with Pulm HTN  
-Anemia of chronic disease, + Fe def. S/p prbc - Hypokalemia -Recent onset atrial fibrillation w/ RVR 
-Coagulopathy/warfarin induced,  
-Rt IJ acute DVT,  on anticoagulation  
-DNR/I 
- Discussed plans with patient and family at bedside. Plan: - Dialysis today - Epo w/ HD 
- HR and RVR control per tem - Avoid ACEI/ARBs, NSAIDs or IV contrast, and other nephrotoxic meds - d/w HD staff Getachew Bhatti MD 
VIA [facility] [phone]

## 2019-11-28 NOTE — PROGRESS NOTES
Problem: Dysphagia (Adult) Goal: *Acute Goals and Plan of Care (Insert Text) Description Recommendations: 
Diet: Puree/thin Meds: Crushed in puree Aspiration Precautions Oral Care TID Feeding assistance Small bites/sips, slow rate Goals:  Patient will: 1. Tolerate PO trials with 0 s/s overt distress in 4/5 trials 2. Utilize compensatory swallow strategies/maneuvers (decrease bite/sip, size/rate, alt. liq/sol) with min cues in 4/5 trials 3. Perform oral-motor/laryngeal exercises to increase oropharyngeal swallow function with min cues 4. Complete an objective swallow study (i.e., MBSS) to assess swallow integrity, r/o aspiration, and determine of safest LRD, min A Outcome: Progressing Towards Goal 
 
 
SPEECH LANGUAGE PATHOLOGY BEDSIDE SWALLOW  
EVALUATION & TREATMENT Patient: Jose Bhatti (89 y.o. female) Date: 11/28/2019 Primary Diagnosis: Dyspnea [R06.00] Dyspnea [R06.00] Precautions: Aspiration Fall PLOF: SNF 
 
ASSESSMENT : 
Based on the objective data described below, the patient presents with moderate oral and suspected mild pharyngeal dysphagia. Patient familiar to this caseload from recent admission. Puree diet at baseline. This day, patient A&Ox2, basic command following intact. Multiple family members at bedside. Oral-motor exam revealed pt edentulous and with tremulous lingual and labial movement, general weakness. Accepted SLP-fed and family-fed ice chips, thin liquid and puree trials. 0 overt s/sx of aspiration with all consistencies. Labored oral bolus manipulation, delayed A-P transit and delayed swallow initiation with puree, however 100% oral clearance appreciated given increased time and thin liquid wash as needed. Recommend patient continue puree/thin liquid diet with aspiration precautions, feeding assistance, small sips/bites. Pills should be presented crushed in puree or as tolerated. D/w RN, Nivia Vu.   
 
TREATMENT : 
 Skilled therapy initiated; Educated pt and family on aspiration precautions and importance of safe feeding techniques to decrease aspiration risk (patient-led feeding, decrease rate of intake & sip/bite size, upright @HOB for all po intake and ~30 minutes after po); verbalized comprehension. SLP to follow as indicated. Patient will benefit from skilled intervention to address the above impairments. Patient's rehabilitation potential is considered to be Guarded Factors which may influence rehabilitation potential include:  
[]            None noted [x]            Mental ability/status [x]            Medical condition []            Home/family situation and support systems 
[]            Safety awareness 
[]            Pain tolerance/management []            Other: PLAN : 
Recommendations and Planned Interventions: 
Puree/thin Frequency/Duration: Patient will be followed by speech-language pathology 1-2 times per day/4-7 days per week to address goals. Discharge Recommendations: To Be Determined SUBJECTIVE:  
Patient stated Macy. OBJECTIVE:  
 
Past Medical History:  
Diagnosis Date Chronic kidney disease Congestive heart failure (CHF) ([facility] [address] COPD (chronic obstructive pulmonary disease) ([facility] [address] Dialysis patient [facility]) History reviewed. No pertinent surgical history. Home Situation:  
Home Situation Home Environment: Skilled nursing facility Care Facility Name: [facility] One/Two Story Residence: One story Living Alone: No 
Support Systems: Family member(s) Patient Expects to be Discharged to[de-identified] Skilled nursing facility Current DME Used/Available at Home: [address], rollator Diet prior to admission: Puree/thin Current Diet:  Puree/thin  
 
Cognitive and Communication Status: 
Neurologic State: Alert Orientation Level: Oriented to person, Oriented to place, Disoriented to situation, Disoriented to time Cognition: Follows commands, Memory loss Perception: Appears intact Perseveration: No perseveration noted Safety/Judgement: Awareness of environment Oral Assessment: 
Oral Assessment Labial: Impaired coordination;Decreased rate Dentition: Edentulous Oral Hygiene: Poor Lingual: Decreased rate;Decreased strength; Incoordinated Velum: No impairment Mandible: No impairment P.O. Trials: 
Patient Position: KKQ 28 Vocal quality prior to P.O.: Low volume Consistency Presented: Thin liquid;Puree How Presented: SLP-fed/presented;Spoon;Straw Bolus Acceptance: No impairment Bolus Formation/Control: Impaired Type of Impairment: Delayed;Mastication Propulsion: Delayed (# of seconds); Lingual tremors; Tongue pumping Oral Residue: Lingual;Less than 10% of bolus Initiation of Swallow: Delayed (# of seconds) Laryngeal Elevation: Decreased Aspiration Signs/Symptoms: Strong cough Pharyngeal Phase Characteristics: No impairment, issues, or problems Effective Modifications: Small sips and bites Cues for Modifications: Moderate Oral Phase Severity: Moderate Pharyngeal Phase Severity : Mild PAIN: 
Pain level pre-treatment: 0/10 Pain level post-treatment: 0/10 After treatment:  
[]            Patient left in no apparent distress sitting up in chair 
[x]            Patient left in no apparent distress in bed 
[x]            Call bell left within reach [x]            Nursing notified 
[x]            Family present 
[]            Caregiver present 
[]            Bed alarm activated COMMUNICATION/EDUCATION:  
[x]            Aspiration precautions; swallow safety; compensatory techniques. [x]            Patient/family have participated as able in goal setting and plan of care. [x]            Patient/family agree to work toward stated goals and plan of care. []            Patient understands intent and goals of therapy; neutral about participation. []            Patient unable to participate in goal setting/plan of care; educ ongoing with interdisciplinary staff [x]         Posted safety precautions in patient's room. Thank you for this referral. 
ARIELLA Sal Time Calculation: 20 mins Evaluation Time: 10 minutes Treatment Time: 10 minutes

## 2019-11-28 NOTE — PROGRESS NOTES
0140 Bedside and Verbal shift change report given to HANY Magana (oncoming nurse) by Lilian Chappell, RICK (offgoing nurse). Report included the following information SBAR, Kardex, Intake/Output, and MAR. Pt in bed, call light in reach. 0946 Pt has difficulty swallowing. Pt seems too tired to swallow, will need to space out medications to conserve her energy. 1513 Pt verbalizes pain is 0/10, but grimaces. Pt sleeping and not waking up enough when cardizem was offered earlier, reason for late medication. 1830 Pt in middle of dialysis. 1920 Bedside and Verbal shift change report given to OWEN Stein RN (oncoming nurse) by Sara Lira. Moncho Toscano RN (offgoing nurse). Report included the following information SBAR, Kardex, Intake/Output and MAR. Pt in bed, call light in reach.

## 2019-11-28 NOTE — PROGRESS NOTES
Pharmacy Dosing Services: Warfarin Consult for Warfarin Dosing by Pharmacy by Dr. Mel Santana Consult provided for this 80 y.o.  female , for indication of Atrial Fibrillation. Day of Therapy 2 Dose to achieve an INR goal of 2-3 Order entered for  Warfarin 2 mg to be given today at 18:00. Significant drug interactions: amiodarone LABS   
PT/INR Lab Results Component Value Date/Time INR 1.5 (H) 11/28/2019 04:10 AM  
  
Platelets Lab Results Component Value Date/Time PLATELET 882 90/50/5455 04:10 AM  
  
H/H Lab Results Component Value Date/Time HGB 9.7 (L) 11/28/2019 04:10 AM  
  
 
Warfarin Administrations (last 168 hours) Date/Time Action Medication Dose  
 11/27/19 1809 Given  
 warfarin (COUMADIN) tablet 5 mg 5 mg Pharmacy to follow daily and will provide subsequent Warfarin dosing based on clinical status. [facility], [place]  Contact information  [phone]

## 2019-11-29 NOTE — PROGRESS NOTES
Speech Therapy Note: Follow up attempted. Could not progress with ST intervention because patient:   
 
[]  Lethargic, unable to be alerted enough for safe PO intake 
[]  Refused participation []  Off the unit []  NPO for procedure 
[x]  Other: sleeping soundly, will hold tx per family request  
 
Family reported patient ate a few bites of breakfast and took pills well. SLP will re-attempt treatment later this day or as schedule permits. Carlin Bustos M.S., CCC-SLP Speech Language Pathologist

## 2019-11-29 NOTE — PROGRESS NOTES
Palliative medicine follow up visit. Mrs. Samir Archer was sleeping and did not rouse while I was in the room. Multiple family members (2 sisters, brother in law and son) were at bedside. Family states they felt Mrs. Samir Archer was [place] little stronger\" as she had eaten some of her food and had tolerated dialysis \"better\" last night. When asked to clarify \"better\" sister, Armen Ev, shared Dilia Lux was wide awake and more interactive, usually the dialysis tires her right out\". Sister, Geovanny Soto, shared concerns for poor appetite. We discussed encouraging her to drink the Nepro to help with nutrition. Family shared hope was for Mrs. Eden to continue to improve and return to rehab. They are waiting to speak with attending physician and nephrologist to get better idea of date of discharge. Family is very supportive of patient's determination to continue dialysis but have shared they would also support comfort care if patient changes her mind. PM team continues to follow for support of patient as well as family.  
 
Marco Antonio James RN

## 2019-11-29 NOTE — PROGRESS NOTES
Hospitalist Progress Note-critical care note Patient: Mary Ellen Hobson MRN: [id number]  CSN: [id number] YOB: 1935  Age: 80 y.o. Sex: female DOA: 11/26/2019 LOS:  LOS: 3 days Chief complaint:esrd chf, afib, htn Assessment/Plan Hospital Problems  Never Reviewed Codes Class Noted POA Dyspnea ICD-10-CM: R06.00 
ICD-9-CM: 786.09  11/26/2019 Unknown ESRD needing dialysis ([facility] [address] ICD-10-CM: N18.6, Z99.2 ICD-9-CM: 585.6  11/19/2019 Yes A-fib [facility]) ICD-10-CM: I48.91 
ICD-9-CM: 427.31  11/8/2019 Yes Chronic diastolic congestive heart failure (HCC) ICD-10-CM: I50.32 
ICD-9-CM: 428.32, 428.0  4/15/2019 Yes Overview Signed 10/26/2019  8:03 AM by Garcia Fleming MD  
    
  
   
 Benign essential hypertension ICD-10-CM: I10 
ICD-9-CM: 401.1  10/25/2012 Yes Overview Signed 10/26/2019  8:03 AM by Garcia Fleming MD  
    
  
   
 Hypothyroidism ICD-10-CM: R28.4 ICD-9-CM: 244.9  10/25/2012 Yes Overview Signed 10/26/2019  8:03 AM by Garcia Fleming MD  
    
  
   
  
 
 
Acute volume overload in dialysis patient causing resp distress Continue hd per renal  
 
ESRD on  recently started on dialysis Received HD , will give HD tomorrow Pt still not giving up Severe weakness and debility Hope she will participate  Pt/OT today  
 
afib with RVR, Chronic persistent afib Rate controlled per amidorone and cardizem ,warfari for [facility] Continue pharm dose Chronic diastolic CHF Need HD Right IJ DVT,on wafarin Anemia of CKD Family was  At the bedside. Subjective: no appetite RN: cough with brownish sputum Will have PT OT, placement to rehab, Disposition :monday Review of systems: 
 
Unwilling to answer Vital signs/Intake and Output: 
Visit Vitals /50 (BP 1 Location: Left arm, BP Patient Position: At rest) Pulse 93 Temp 98.8 °F (37.1 °C) Resp 15 Ht 4' 9\" (1.448 m) Wt 61 kg (134 lb 8 oz) SpO2 99% BMI 29.11 kg/m² Current Shift:  No intake/output data recorded. Last three shifts:  11/27 1901 - 11/29 0700 In: 300 [P.O.:300] Out: 1000 Physical Exam: 
General: Alert, some cooperative, no acute distress  with eyes closed HEENT: NC, Atraumatic. PERRLA, anicteric sclerae. Lungs:  No Wheezing, +Rhonchi/Rales. Heart:  Regular  rhythm,  +murmur, No Rubs, No Gallops Abdomen: Soft, Non distended, Non tender.  +Bowel sounds, Extremities: No c/c/e Psych:   Not anxious or agitated. Neurologic:  Not acute neuro deficit Labs: Results:  
   
Chemistry Recent Labs  
  11/29/19 0420 11/28/19 0410 11/27/19 
8527 * 108* 77  142 141  
K 4.3 4.0 3.2*  
CL 99* 105 104 CO2 29 28 28 BUN 72* 48* 34* CREA 2.31* 3.16* 2.36* CA 8.6 7.9* 7.8* AGAP 8 9 9 BUCR 31* 15 14 ALB  --  2.6*  --   
  
CBC w/Diff Recent Labs  
  11/29/19 0420 11/28/19 0410 11/27/19 0415 WBC 4.3* 7.2 6.6  
RBC 3.58* 3.26* 3.32* HGB 9.8* 9.7* 9.8* HCT 30.0* 32.1* 32.1*  
 158 175 Cardiac Enzymes No results for input(s): CPK, CKND1, CUONG in the last 72 hours. No lab exists for component: Justus Rareynas Coagulation Recent Labs  
  11/29/19 0420 11/28/19 0410 PTP 26.7* 17.7* INR 2.5* 1.5* Lipid Panel No results found for: CHOL, CHOLPOCT, CHOLX, CHLST, CHOLV, 286920, HDL, HDLP, LDL, LDLC, DLDLP, 866340, VLDLC, VLDL, TGLX, TRIGL, TRIGP, TGLPOCT, CHHD, CHHDX  
BNP No results for input(s): BNPP in the last 72 hours. Liver Enzymes Recent Labs  
  11/28/19 
0410 ALB 2.6* Thyroid Studies Lab Results Component Value Date/Time TSH 2.53 11/08/2019 01:25 PM  
    
Procedures/imaging: see electronic medical records for all procedures/Xrays and details which were not copied into this note but were reviewed prior to creation of Plan Anand Marti MD

## 2019-11-29 NOTE — PROGRESS NOTES
Pharmacy Dosing Services: warfarin Consult for Warfarin Dosing by Pharmacy by Dr. Renny Apodaca Consult provided for this 80 y.o.  female , for indication of Atrial Fibrillation. Day of Therapy 3 Dose to achieve an INR goal of 2-3 No Warfarin    ordered to be given today at 18:00. LABS   
PT/INR Lab Results Component Value Date/Time INR 2.5 (H) 11/29/2019 04:20 AM  
  
Platelets Lab Results Component Value Date/Time PLATELET 664 78/88/8821 04:20 AM  
  
H/H Lab Results Component Value Date/Time HGB 9.8 (L) 11/29/2019 04:20 AM  
  
 
Warfarin Administrations (last 168 hours) Date/Time Action Medication Dose  
 11/28/19 1806 Given  
 warfarin (COUMADIN) tablet 2 mg 2 mg  
 11/27/19 1809 Given  
 warfarin (COUMADIN) tablet 5 mg 5 mg Pharmacy to follow daily and will provide subsequent Warfarin dosing based on clinical status. Ronaldo Najera, [facility])  Contact information

## 2019-11-29 NOTE — PROGRESS NOTES
Intervention: Monitor/Manage Fluid Electrolyte/Acid [employer] PROBLEM: HEMODIALYSIS ADULT INTERVENTION: Hemodynamic stabilization Maintain BP WNL for this patient while on hemodialysis. INTERVENTION: Fluid Management Will attempt 1000 ml total fluid removal as tolerated. INTERVENTION: Metabolic / Electrolyte Imbalance Management K+ 3 potassium bath today with dialysis. GOAL: Signs and symptoms of listed potential problems will be absent or manageable 
(reference Hemodialysis ADULT CPG) OUTCOME: Progressing HD planned for 3.5 hours today.

## 2019-11-29 NOTE — ROUTINE PROCESS
Assume care of patient from [facility]. Patient received in bed awake a bit drowsy. Patient A&Ox4, denies pain and discomfort. No distress noted. Family present at bedside. Frequently use items within reach. Bed locked in low position. Call bell within reach and patient verbalized understanding of use for assistance and needs. 0938- Bedside and Verbal shift change report given to RICK Stearns (oncoming nurse) by Everardo Bella RN (offgoing nurse). Report included the following information SBAR, Kardex, Intake/Output, MAR and Recent Results.

## 2019-11-29 NOTE — PROGRESS NOTES
Plan: [facility], pending  PT/OT notes (pt will need insurance authorization- has not been started) Not ready for SNF discharge over weekend. Chart reviewed, spoke with MD. Noted pt wishes to continue with aggressive tx at this time, family supportive of either continued plan of care or comfort care. Once therapy notes available, will need to seek insurance approval for SNF, not before Monday. Pt is new HD at [facility], [place] MWF 1030. Family aware from recent discharge to SNF, that they will need to provide transportation or pay privately for ambulance transport as Medicare may not cover. If family would like to take pt home, [place] can be arranged. If goals of care change, and pt ready for discharge over weekend, please page on call CM .

## 2019-11-29 NOTE — PROGRESS NOTES
Problem: Mobility Impaired (Adult and Pediatric) Goal: *Acute Goals and Plan of Care (Insert Text) Description Physical Therapy Goals Initiated 11/27/2019 and to be accomplished within 7 day(s) 1. Patient will move from supine <> sit with min/mod assist in prep for out of bed activity and change of position. 2.  Patient will perform sit<> stand mod A/RW in prep for transfers/ambulation. 3.  Patient will ambulate 50 feet with min/mod A/RW for improved functional mobility. Outcome: Not Progressing Towards Goal 
 PHYSICAL THERAPY TREATMENT Patient: Dave Russo (43 y.o. female) Date: 11/29/2019 Diagnosis: Dyspnea [R06.00] Dyspnea [R06.00] <principal problem not specified> Precautions: Fall Chart, physical therapy assessment, plan of care and goals were reviewed. ASSESSMENT: 
Pt is agreeable to attempting EOB sitting, but notes extreme weakness and fatigue. Pt requires max total assist to EOB, with short periods of unsupported sitting. Exercises as listed, with frequent break. Will continue to advance activity as tolerated. Progression toward goals: 
[]      Improving appropriately and progressing toward goals 
[]      Improving slowly and progressing toward goals [x]      Not making progress toward goals PLAN: 
Patient continues to benefit from skilled intervention to address the above impairments. Continue treatment per established plan of care. Discharge Recommendations:  LTC/comfort or [facility] Further Equipment Recommendations for Discharge:  N/A  
 
SUBJECTIVE:  
Patient stated I'll try.  OBJECTIVE DATA SUMMARY:  
Critical Behavior: 
Neurologic State: Alert Orientation Level: Oriented X4 Cognition: Follows commands Safety/Judgement: Awareness of environment Functional Mobility Training: 
Bed Mobility: 
Rolling: Maximum assistance Supine to Sit: Maximum assistance Sit to Supine: Total assistance Scooting: Total assistance Balance: Sitting: Impaired Sitting - Static: Fair (occasional); Poor (constant support) Sitting - Dynamic: Poor (constant support) Therapeutic Exercises:  
 
 
EXERCISE Sets Reps Active Active Assist  
Passive Self ROM Comments Ankle Pumps 1 20  [x] [] [] [] Quad Sets/Glut Sets 1 10 [x] [] [] [] Hamstring Sets   [] [] [] [] Short Arc Quads   [] [] [] [] Heel Slides   [] [] [] [] Straight Leg Raises   [] [] [] [] Hip Abd/Add 1 10 [x] [] [] [] Long Arc Quads 1 10 [x] [] [] [] Seated Marching   [] [] [] []   
Standing Marching   [] [] [] []   
   [] [] [] []   
 
 
Pain: 
Pain in: 0 Pain out: 0 Activity Tolerance:  
Fair- 
Please refer to the flowsheet for vital signs taken during this treatment. After treatment:  
[x] Patient left in no apparent distress sitting up in chair 
[] Patient left in no apparent distress in bed 
[x] Call bell left within reach [x] Nursing notified 
[] Caregiver present 
[] Bed alarm activated Akshat Ceja PTA Time Calculation: 24 mins

## 2019-11-29 NOTE — PROGRESS NOTES
Problem: Pressure Injury - Risk of 
Goal: *Prevention of pressure injury Description Document Joe Scale and appropriate interventions in the flowsheet. Outcome: Progressing Towards Goal 
Note: Pressure Injury Interventions: 
Sensory Interventions: Assess changes in LOC Moisture Interventions: Absorbent underpads Activity Interventions: Increase time out of bed, PT/OT evaluation, Pressure redistribution bed/mattress(bed type) Mobility Interventions: HOB 30 degrees or less, Pressure redistribution bed/mattress (bed type), PT/OT evaluation Nutrition Interventions: Document food/fluid/supplement intake Friction and Shear Interventions: Apply protective barrier, creams and emollients, HOB 30 degrees or less

## 2019-11-29 NOTE — PROGRESS NOTES
Problem: Pressure Injury - Risk of 
Goal: *Prevention of pressure injury Description Document Joe Scale and appropriate interventions in the flowsheet. Outcome: Progressing Towards Goal 
Note: Pressure Injury Interventions: 
Sensory Interventions: Assess changes in LOC, Float heels, Keep linens dry and wrinkle-free, Maintain/enhance activity level, Minimize linen layers, Monitor skin under medical devices, Pad between skin to skin, Pressure redistribution bed/mattress (bed type), Turn and reposition approx. every two hours (pillows and wedges if needed) Moisture Interventions: Absorbent underpads, Apply protective barrier, creams and emollients, Check for incontinence Q2 hours and as needed, Internal/External urinary devices, Moisture barrier, Minimize layers Activity Interventions: Increase time out of bed, Pressure redistribution bed/mattress(bed type), PT/OT evaluation Mobility Interventions: HOB 30 degrees or less, Pressure redistribution bed/mattress (bed type), PT/OT evaluation Nutrition Interventions: Document food/fluid/supplement intake, Offer support with meals,snacks and hydration Friction and Shear Interventions: Apply protective barrier, creams and emollients, HOB 30 degrees or less, Foam dressings/transparent film/skin sealants, Minimize layers Problem: Patient Education: Go to Patient Education Activity Goal: Patient/Family Education Outcome: Progressing Towards Goal 
  
Problem: Pain Goal: *Control of Pain Outcome: Progressing Towards Goal 
  
Problem: Patient Education: Go to Patient Education Activity Goal: Patient/Family Education Outcome: Progressing Towards Goal 
  
Problem: Falls - Risk of 
Goal: *Absence of Falls Description Document [facility] Fall Risk and appropriate interventions in the flowsheet.  
Outcome: Progressing Towards Goal 
Note: Fall Risk Interventions: 
  
 
  
 
Medication Interventions: Bed/chair exit alarm, Teach patient to arise slowly, Patient to call before getting OOB Elimination Interventions: Bed/chair exit alarm, Call light in reach, Patient to call for help with toileting needs, Stay With Me (per policy), Toilet paper/wipes in reach, Toileting schedule/hourly rounds Problem: Patient Education: Go to Patient Education Activity Goal: Patient/Family Education Outcome: Progressing Towards Goal 
  
Problem: Patient Education: Go to Patient Education Activity Goal: Patient/Family Education Outcome: Progressing Towards Goal 
  
Problem: Patient Education: Go to Patient Education Activity Goal: Patient/Family Education Outcome: Progressing Towards Goal

## 2019-11-29 NOTE — PROGRESS NOTES
Nephrology Progress Note Subjective:  
 
Kasey Eden is a 80 y.o. WF with a PMHX of CHF, COPD who was recently admitted to THE [facility] with newly dx Afib with RVR and also started on HD for worsening kidney functionof came back today from SNF with worsening SOB. She was d/sandra on 11/22 and supposed to have her first out pt HD yesterday (11/25) at [facility] PW unit, but she was not able to do it due to generalized weakness. During initial evaluation in the ER today, she was hypoxic, placed on Bipap. Her BP is low (SBP 's) and is in RVR (110-140's). Lab showed Cr 4.3, K 4.1 and CO2 29. CxR showed pulm congestion. Pt is currently DNR/I. After discussion with the pt, she strongly wants to pursue dialysis at least for now. S/p HD yesterday. +tired today. Admit Date: 11/26/2019 Active Problems: 
  Benign essential hypertension (10/25/2012) Overview: Last Assessment & Plan:  
    Blood pressure controlled on current regimen of Coreg 12.5 mg twice daily,  
    furosemide 40 mg every other day, Aldactone 25 mg daily. Continue  
    low-sodium diet. Will check electrolytes and renal function with today's  
    labs. Chronic diastolic congestive heart failure ([address] (4/15/2019) Overview: Last Assessment & Plan:  
    Appears to be well compensated presently. Her weight is now down to 133  
    on our scales and the patient tells me that she currently weighs 130 lb on  
    her scale. I believe that this is probably a good dry weight for her so  
    she may continue to rely on her scale and shoot for a dry weight of 130 lb  
    as her baseline. If she starts to drift upward with her weight or notices  
    puffiness or swelling in her legs or shortness of breath she is to start  
    taking her furosemide every day and call us. Otherwise continue current  
    dose of furosemide 40 mg every other day.   Continue Aldactone 25 mg daily,  
 continue Coreg 12.5 mg b.i.d. continue low sodium diet. Will check  
    metabolic profile today to reassess serum electrolytes and renal function  
    given that she is on diuretic therapy. Hypothyroidism (10/25/2012) Overview: Last Assessment & Plan: Most recent thyroid function studies done on 04/15/2019 show TSH and free T4 of 3.52 and 0.93 respectively. Notably she is not on thyroid  
    supplement so there is no current evidence that she is truly hypothyroid. Will continue to follow this periodically. A-fib ([facility] (11/8/2019) ESRD needing dialysis ([facility] (11/19/2019) Dyspnea (11/26/2019) Debility (11/28/2019) Current Facility-Administered Medications Medication Dose Route Frequency  magic mouthwash (FIRST-MOUTHWASH BLM) oral suspension 5 mL  5 mL Oral Q4H PRN  
 heparin (porcine) 1,000 unit/mL injection 4,300 Units  4,300 Units Hemodialysis DIALYSIS PRN  pantoprazole (PROTONIX) 40 mg in 0.9% sodium chloride 10 mL injection  40 mg IntraVENous DAILY  amiodarone (CORDARONE) tablet 200 mg  200 mg Oral DAILY  aspirin delayed-release tablet 81 mg  81 mg Oral DAILY  atorvastatin (LIPITOR) tablet 40 mg  40 mg Oral DAILY  dilTIAZem (CARDIZEM) IR tablet 30 mg  30 mg Oral TIDAC  doxycycline (MONODOX) capsule 100 mg  100 mg Oral BID  PARoxetine (PAXIL) tablet 20 mg  20 mg Oral DAILY  [START ON 12/2/2019] ergocalciferol capsule 50,000 Units  50,000 Units Oral Q7D  Warfarin-Pharmacy to dose  1 Each Other Rx Dosing/Monitoring  acetaminophen (TYLENOL) solution 650 mg  650 mg Oral Q6H PRN Allergy: Allergies Allergen Reactions  Penicillins Hives  Valium [Diazepam] Other (comments) It made me cry more Objective:  
 
Visit Vitals /50 (BP 1 Location: Left arm, BP Patient Position: At rest) Comment: second time 98/49 Pulse 93 Temp 98.8 °F (37.1 °C) Resp 15 Ht 4' 9\" (1.448 m) Wt 61 kg (134 lb 8 oz) SpO2 99% BMI 29.11 kg/m² Intake/Output Summary (Last 24 hours) at 11/29/2019 1406 Last data filed at 11/28/2019 2015 Gross per 24 hour Intake 240 ml Output 1000 ml Net -760 ml Physical Exam:  
 
 
General: No acute distress HENT: Atraumatic and normocephalic Eyes: Normal conjunctiva Neck: Supple, No JVD or mass Cardiovascular: Normal S1 & S2, no m/r/g Pulmonary/Chest Wall: Clear to auscultation bilaterally Abdominal: Soft and non-tender Musculoskeletal: No ankle edema Neurological: No focal deficits Access: no issues Data Review: 
Lab Results Component Value Date/Time Sodium 136 11/29/2019 04:20 AM  
 Potassium 4.3 11/29/2019 04:20 AM  
 Chloride 99 (L) 11/29/2019 04:20 AM  
 CO2 29 11/29/2019 04:20 AM  
 Anion gap 8 11/29/2019 04:20 AM  
 Glucose 136 (H) 11/29/2019 04:20 AM  
 BUN 72 (H) 11/29/2019 04:20 AM  
 Creatinine 2.31 (H) 11/29/2019 04:20 AM  
 BUN/Creatinine ratio 31 (H) 11/29/2019 04:20 AM  
 GFR est AA 24 (L) 11/29/2019 04:20 AM  
 GFR est non-AA 20 (L) 11/29/2019 04:20 AM  
 Calcium 8.6 11/29/2019 04:20 AM  
 
Lab Results Component Value Date/Time WBC 4.3 (L) 11/29/2019 04:20 AM  
 HGB 9.8 (L) 11/29/2019 04:20 AM  
 HCT 30.0 (L) 11/29/2019 04:20 AM  
 PLATELET 865 48/07/0459 04:20 AM  
 MCV 83.8 11/29/2019 04:20 AM  
 
Lab Results Component Value Date/Time Calcium 8.6 11/29/2019 04:20 AM  
 Phosphorus 3.7 11/28/2019 04:10 AM  
 
Lab Results Component Value Date/Time Iron 11 (L) 11/16/2019 09:10 AM  
 TIBC 271 11/16/2019 09:10 AM  
 Iron % saturation 4 11/16/2019 09:10 AM  
 Ferritin 395 (H) 11/16/2019 09:10 AM  
 
Lab Results Component Value Date/Time Ferritin 395 (H) 11/16/2019 09:10 AM  
 
 
 
Impression:  
 
-Pulmonary edema, improved post dialysis 
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 MWF schedule at [facility] prior to admission -Hypotension w/ afib/RVR 
 -Rt sided HF with Pulm HTN  
-Anemia of chronic disease, + Fe def. S/p prbc -Hypokalemia 
-Recent onset atrial fibrillation w/ RVR 
-Coagulopathy/warfarin induced,  
-Rt IJ acute DVT,  on anticoagulation  
-DNR/I 
-Discussed plans with patient and family at bedside. Plan:  
 
- Next HD tomorrow, then MWF next week - Epo w/ HD 
- HR and RVR control per team 
- Avoid ACEI/ARBs, NSAIDs or IV contrast, and other nephrotoxic meds - Recheck AM labs - d/w Dr Caroline Kc MD 
VIA [facility] [phone]

## 2019-11-29 NOTE — PROGRESS NOTES
0730: Assumed care of pt from [address]. 
2955: pt showed this RN a sample of bloody mucus in a tissue. This RN showed . Portable chest xray ordered. 1318: Pt refused to work with PT today. Pt appears to be very tired today.

## 2019-11-30 NOTE — ROUTINE PROCESS
Bedside shift change report given to Marni King RN (oncoming nurse) by Paige Kyle RN (offgoing nurse). Report included the following information SBAR, Kardex, ED Summary, Intake/Output and MAR.

## 2019-11-30 NOTE — PROGRESS NOTES
Nephrology Progress Note Subjective:  
 
Kasey Eden is a 80 y.o. WF with a PMHX of CHF, COPD who was recently admitted to THE [facility] with newly dx Afib with RVR and also started on HD for worsening kidney functionof came back today from SNF with worsening SOB. She was d/sandra on 11/22 and supposed to have her first out pt HD yesterday (11/25) at [facility] PW unit, but she was not able to do it due to generalized weakness. During initial evaluation in the ER today, she was hypoxic, placed on Bipap. Her BP is low (SBP 's) and is in RVR (110-140's). Lab showed Cr 4.3, K 4.1 and CO2 29. CxR showed pulm congestion. Pt is currently DNR/I. After discussion with the pt, she strongly wants to pursue dialysis at least for now. Pt seen on dialysis today, tolerating well. SOB has improved. Poor appetite. Admit Date: 11/26/2019 Active Problems: 
  Benign essential hypertension (10/25/2012) Overview: Last Assessment & Plan:  
    Blood pressure controlled on current regimen of Coreg 12.5 mg twice daily,  
    furosemide 40 mg every other day, Aldactone 25 mg daily. Continue  
    low-sodium diet. Will check electrolytes and renal function with today's  
    labs. Chronic diastolic congestive heart failure ([facility] (4/15/2019) Overview: Last Assessment & Plan:  
    Appears to be well compensated presently. Her weight is now down to 133  
    on our scales and the patient tells me that she currently weighs 130 lb on  
    her scale. I believe that this is probably a good dry weight for her so  
    she may continue to rely on her scale and shoot for a dry weight of 130 lb  
    as her baseline. If she starts to drift upward with her weight or notices  
    puffiness or swelling in her legs or shortness of breath she is to start  
    taking her furosemide every day and call us. Otherwise continue current  
    dose of furosemide 40 mg every other day.   Continue Aldactone 25 mg daily,  
 continue Coreg 12.5 mg b.i.d. continue low sodium diet. Will check  
    metabolic profile today to reassess serum electrolytes and renal function  
    given that she is on diuretic therapy. Hypothyroidism (10/25/2012) Overview: Last Assessment & Plan: Most recent thyroid function studies done on 04/15/2019 show TSH and free T4 of 3.52 and 0.93 respectively. Notably she is not on thyroid  
    supplement so there is no current evidence that she is truly hypothyroid. Will continue to follow this periodically. A-fib ([facility] (11/8/2019) ESRD needing dialysis ([facility] (11/19/2019) Dyspnea (11/26/2019) Debility (11/28/2019) Current Facility-Administered Medications Medication Dose Route Frequency  Warfarin on Hold (INR = 4.9)  1 Each Other ONCE  
 magic mouthwash (FIRST-MOUTHWASH BLM) oral suspension 5 mL  5 mL Oral Q4H PRN  
 heparin (porcine) 1,000 unit/mL injection 4,300 Units  4,300 Units Hemodialysis DIALYSIS PRN  pantoprazole (PROTONIX) 40 mg in 0.9% sodium chloride 10 mL injection  40 mg IntraVENous DAILY  amiodarone (CORDARONE) tablet 200 mg  200 mg Oral DAILY  aspirin delayed-release tablet 81 mg  81 mg Oral DAILY  atorvastatin (LIPITOR) tablet 40 mg  40 mg Oral DAILY  dilTIAZem (CARDIZEM) IR tablet 30 mg  30 mg Oral TIDAC  doxycycline (MONODOX) capsule 100 mg  100 mg Oral BID  PARoxetine (PAXIL) tablet 20 mg  20 mg Oral DAILY  [START ON 12/2/2019] ergocalciferol capsule 50,000 Units  50,000 Units Oral Q7D  Warfarin-Pharmacy to dose  1 Each Other Rx Dosing/Monitoring  acetaminophen (TYLENOL) solution 650 mg  650 mg Oral Q6H PRN Allergy: Allergies Allergen Reactions  Penicillins Hives  Valium [Diazepam] Other (comments) It made me cry more Objective:  
 
Visit Vitals /66 Pulse (!) 123 Temp 97.7 °F (36.5 °C) Resp 14 Ht 4' 9\" (1.448 m) Wt 60.8 kg (134 lb) SpO2 100% BMI 29.00 kg/m² No intake or output data in the 24 hours ending 11/30/19 1112 Physical Exam:  
 
 
General: No acute distress HENT: Atraumatic and normocephalic;   
Eyes: Normal conjunctiva Neck: Supple, No JVD or mass Cardiovascular: Normal S1 & S2, no m/r/g Pulmonary/Chest Wall: Clear to auscultation bilaterally Abdominal: Soft and non-tender Musculoskeletal: No ankle edema Neurological: No focal deficits Access: no issues Data Review: 
Lab Results Component Value Date/Time Sodium 140 11/30/2019 03:28 AM  
 Potassium 4.0 11/30/2019 03:28 AM  
 Chloride 107 11/30/2019 03:28 AM  
 CO2 28 11/30/2019 03:28 AM  
 Anion gap 5 11/30/2019 03:28 AM  
 Glucose 101 (H) 11/30/2019 03:28 AM  
 BUN 39 (H) 11/30/2019 03:28 AM  
 Creatinine 2.50 (H) 11/30/2019 03:28 AM  
 BUN/Creatinine ratio 16 11/30/2019 03:28 AM  
 GFR est AA 22 (L) 11/30/2019 03:28 AM  
 GFR est non-AA 18 (L) 11/30/2019 03:28 AM  
 Calcium 7.7 (L) 11/30/2019 03:28 AM  
 
Lab Results Component Value Date/Time WBC 6.6 11/30/2019 03:28 AM  
 HGB 10.1 (L) 11/30/2019 03:28 AM  
 HCT 33.6 (L) 11/30/2019 03:28 AM  
 PLATELET 840 98/58/6273 03:28 AM  
 MCV 99.7 (H) 11/30/2019 03:28 AM  
 
Lab Results Component Value Date/Time Calcium 7.7 (L) 11/30/2019 03:28 AM  
 Phosphorus 3.7 11/28/2019 04:10 AM  
 
Lab Results Component Value Date/Time Iron 11 (L) 11/16/2019 09:10 AM  
 TIBC 271 11/16/2019 09:10 AM  
 Iron % saturation 4 11/16/2019 09:10 AM  
 Ferritin 395 (H) 11/16/2019 09:10 AM  
 
Lab Results Component Value Date/Time Ferritin 395 (H) 11/16/2019 09:10 AM  
 
 
 
Impression:  
 
-Pulmonary edema, improved post dialysis 
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 MWF schedule at [facility] [place] prior to admission -Hypotension w/ afib/RVR 
-Rt sided HF with Pulm HTN  
-Anemia of chronic disease, + Fe def. S/p prbc -Hypokalemia, improved 
-Recent onset atrial fibrillation w/ RVR 
 -Coagulopathy/warfarin induced,  
-Rt IJ acute DVT,  on anticoagulation  
-DNR/I Plan: - HD today, then MWF next week 
- use current access - UF 1L as tolerated - Epo w/ HD 
- HR and RVR control per team 
- encourage po intake 
- salt/fluid restriciton 1200ml/day - Avoid ACEI/ARBs, NSAIDs or IV contrast, and other nephrotoxic meds 
- will need placement to ensure compliance with dialysis - d/w pt and staff and family, will cont follow John Paul Farias MD 
VIA [facility] [phone]

## 2019-11-30 NOTE — PROGRESS NOTES
Pt refused PT due to: 
[]  Nausea/vomiting 
[]  Eating 
[]  Pain 
[]  Pt lethargic [x]  Pt still on dialysis. This is second attempt. Will f/u later as schedule allows. Thank you. Estrellita Soni

## 2019-11-30 NOTE — DIALYSIS
TREATMENT SUMMARY Patient dialyzed in room 340 Tolerated treatment well asymptomatic hypotension w/ SP remaining greater than 90 during tx. RIJ TDC functioning well without complication of BFR or accessing   
1500 ml  removed via UF with a with a net removal 1000 ml Report given to Lashell Hebert RN with all questions answered 
  
TREATMENT  NOTES  
   
1110 MD Marylu Cowden at bedside. Orders a to place on UF profile 2 and increase bicarb to 40 and calcium bath to 3.0 Ca.   
  
             
                                                                                        
 
   
 
  
 
ACUTE HEMODIALYSIS FLOW SHEET 
 
    
  
PATIENT INFORMATION Hever Mao     
[]? 1st Time Acute  []? Stat[x]? Routine []? Urgent []? Chronic Unit  
[]? Acute Room [x]? Bedside  []? ICU/CCU []?ER Isolation Precautions: [x]? Dialysis[]? Airborne []? Contact []? Droplet []? Reverse Special Considerations:_______  []? Blood Consent Verified  []? N/A Allergies:[]? NKA Reaction Severity Reaction Type Noted Valid Until    
             
Allergies Penicillins Hives Not Specified   10/26/2019    
Valium [Diazepam] Other (comments) Not Specified   11/8/2019    
 Code Status []? Full Code [x]? DNR  []? Other_____ Diet: [x]? Renal []? NPO []? Diabetic  
[]? Enteral Feeding []? Cardiac Diabetic: []? Yes [x]? No 
    
[x]? Signed Treatment Consent Verified  
[x]? Time Out/ Safety Check PRIMARY NURSE REPORT: FIRST INITIAL/ LAST NAME/TITLE 
PRE DIALYSIS:  Lashell Hebert RN                                      TIME: 3617 ACCESS CATHETER ACCESS: []? N/A  [x]? RIGHT  []? LEFT  [x]? IJ  []? SUBCL []? FEM   
                 []? First use X-ray  [x]? Tunnel     []? Non-Tunneled   
   [x]? No S/S infection  []? Redness []? Drainage  []? Cultured []? Swelling []? Pain   
                 [x]? Medical Aseptic []? Prep Dressing Changed []? Clotted [x]? Patent []? Flows: [x]? Good []? Poor []? Reversed If Access Problem Dr. Jluis Tripp: []? Yes []? No    Date:_____  []? N/A[]? GRAFT/FISTULA ACCESS:  [x]? N/A  []? RIGHT  []? LEFT  []? UE   []? LE    
   []? AVG  []? AVF []? BUTTONHOLE 
  []? +BRUIT/THRILL []? MEDICAL ASEPTIC PREP []? No S/S infection  []? Redness []? Drainage  []? Cultured []? Swelling []? Pain If Access Problem Dr. Jluis Tripp: []? Yes []? No    Date:______ []? N/A   
GENERAL ASSESSMENT   
LUNGS:  SaO2% _96___ [x]? Clear []? Coarse []? Crackles []? Wheezing  
                                      []? Diminished Location: []? RLL []? LLL []? RUL []? RML []? NITA    
COUGH:  []? Productive  []? Loose[]? Dry [x]? N/A  
RESPIRATIONS: [x]? Easy []? Labored THERAPY: [x]? RA   []? NC _3__. L/min    Mask: []? NRB []? Venti  _____O2%    
             []? Ventilator []? Intubated []? Trach []? BiPap []? CPap []? HI Flow CARDIAC: []? Regular [x]? Irregular []? Pericardial Rub []? JVD Monitored Rhythm:__AFIB____ []? N/A   
EDEMA: [x]? None []? Generalized []? Facial []? Pedal []? UE []? LE 
           []? Pitting []? 1 []? 2 []? 3 []? 4    []? Right []? Left []? Bilateral   
SKIN:    [x]? Warm []? Hot []? Cold  [x]? Dry []? Pale []? Diaphoretic  
           []? Flushed []? Jaundiced []? Cyanotic []? Rash []? Sheri Mckenzie LOC:    [x]? Alert  Oriented to: [x]? Person [x]? Place [x]? Time  
          []? Confused []? Lethargic []? Medicated []? Non-responsive GI/ABDOMEN: []? Flat []? Distended [x]? Soft []? Firm []? Diarrhea [x]? Bowel Sounds Present []? Nausea []? Vomiting PAIN: [x]? 0 []? 1 []? 2 []? 3 []? 4 []? 5 []? 6 []? 7 []? 8 []? 9 []? 10 
        Scale 1-10 Action/Follow Up_____ MOBILITY: []? Amb []? Amb/Assist [x]? Bed  []? Wheelchair CURRENT LABS HBsAg ONLY: Date Drawn:  11/15/2019          [x]? Negative []? Positive []? Unknown. HBsAb: Date Drawn:   11/17/2019           [x]? Susceptible <10 []? Immune ?10 []? Unknown Date of Current Labs:  ATTACHED  
  
EDUCATION Person Educated: [x]? Patient []? Other_DAUGHTER________ Knowledge base: []? None [x]? Minimal []? Substantial   
Barriers to learning  [x]? None _______________ Preferred method of learning: []? Written [x]? Oral [x]? Visual []? Hands on Topic: [x]? Access Care [x]? S&S of infection [x]? Fluid Management 
 []? K+  [x]? Procedural  []? Albumin []? Medications []? Tx Options []? Transplant []? Diet []? Other Teaching Tools: [x]? Explain [x]? Demonstration []? Handout_____ []? Video______ 
CARE PLAN [x]?  Renal Failure (Adult) Interdisciplinary · Fluid and electrolytes stabilized ? Interventions · Dehydration signs and symptoms (eg: Weight/lab monitoring; vomiting/diarrhea/urine; tenting; mucous membranes; dizziness/lethargy/irritability/confusion; weak pulse; tachycardia; blood pressure; I&O) · Fluid overload signs and symptoms assessment (eg: Body weight increased; dyspnea; edema; hypertension; respiratory crackles/wheezing; JVD; lab monitoring; mental status changes; I&O) · Monitor appropriate lab values · COMPLIANCE WITH PRESCRIBED THERAPY · ARTERIAL ACCESS SITE ASSESSMENT 
· NUTRITION SCREENING 
· Vital signs monitoring per assessed patient condition or unit standard · Cardiac monitoring · Hydration management · Intake and output measurement · Body weight monitoring · Skin care · DIALYSIS 
· Nutrition Care Process per nutrition screen · Oral hygiene care every 2 hours · Pain management 
  
· Outcome ? [x]? Progressing Towards Goal 
? []? Not Progressing Towards Goals 
? []? Goals Met/Resolved 
? []? Goals Not Met/ Resolved 
  
  
· Patient/ Family Education ? Progressing Towards Goals 
   
  
RO/HEMODIAYLSIS MACHINE SAFETY CHECKS- BEFORE EACH TREATMENT  
     
 [x]?  MIH: Machine Serial #1: [id number] Serial [id number]   
  
  []? MI: Machine Serial #2: [id number] Serial [id number]    
Alarm Test: [x]? Pass  Time__1329______ [x]? RO/Machine Log Complete    [x]? Extracorporeal circuit Tested for integrity Dialyzer_[id number]_________   Tubing___19G24-8_________ Dialysate: pH__7.4_  Temp.__37___Conductivity: Meter __14__ HD Machine__13.8_ CHLORINE TESTING- BEFORE EACH TREATMENT AND EVERY 4 HOURS Total Chlorine: [x]? Less than 0.1 ppm Time:_1030____2nd Check Time:__NA____ 
(If greater than 0.1 ppm from Primary then every 30 minutes from Secondary) TREATMENT INIATION-WITH DIALYSIS PRECAUTIONS [x]? All Connections Secured   [x]? Saline Line Double Clamped    [x]? Venous Parameters Set [x]? Arterial Parameters Set    [x]? Prime Given 250 ml    
[x]? Air Foam Detector Engaged PRE-TREATMENT  
UF Calculations: Wt to lose:__1000__ml(+) Oral:_0_ml(+)IV Meds/Fluids/Blood prods_0__ml(+) Prime/Rinse__500_ml(=)Total UF Goal__1500__mL Scale Type:[x]? Bed scale []? Sling Scale []? Wheel Chair Scale []? Not Ordered []? []? Unable to obtain pt on stretcher/ bed scale malfunctioning Tx Initiation Note: RECEIVED REPORT. PATIENT ALERT AND ORIENTED. VITAL SIGNS WNL. NAD. ALL QUESTIONS ANSWERED WITH PATIENT  SAFETY CHECKS COMPLETE. TIME OUT COMPLETE. TREATMENT INITIATED VIA _RIJ TDC____. NO CONCERNS NOTED. [x]? Time Out/Safety Check  Time:__1037___ INTRADIALYTIC MONITORING 
(SEE ATTACHED FLOWSHEET) 
   
POST TREATMENT Time Medication Dose Volume Route Initials NA            
             
             
             
             
[facility] Signatures Title Initials Time KEVIN CARDENAS RN PAP 1500  
         
         
Dialyzer cleared: [x]? Good []? Fair []? Poor Blood Volume Processed _70.8__L Net UF Removed __1500__mL Post Tx Access: AVF/AVG: Bleeding Stop Time      Art. NA_min Adolfo.__NA_min []? +bruit/thrill Catheter: Locking Solution  [x]? Heparin 1 ml/1000 units []? Normal Saline Art.__2.1___ ml Adolfo.__2.2___ml [x]? New caps placed Post Assessment:   
          Skin: [x]? Warm [x]? Dry []? Diaphoretic []? Flushed []? Pale []? Cyanotic Lungs: [x]? Clear []? Coarse []? Crackles []? Wheezing Cardiac: []? Regular [x]? Irregular  []? Monitored rhythm_AFIB___ []?N/A Edema: [x]? None []? General []? Facial []? Pedal  []?UE []?LE []? RIGHT []? LEFT Pain: [x]?0 []?1 []?2 []?3 []?4 []?5 []?6 []?7 []?8 []?9 []?10 POST Tx Note:TREATMENT COMPLETED. ALL POSSIBLE BLOOD RETURNED. PT TOLERATED WELL. VITAL SIGNS WNL  FOR PATIENT. NAD NOTED. DIALYSIS CATHETER DE ACCESSED, FLUSHED AND LOCKED. STERILE CAPS APPLIED. REPORT GIVEN WITH OPPORTUNITY TO ADDRESS ANY CONCERNS OR QUESTTIONS AND PATIENT STAYS ROOM IN BED WITHOUT DISTRESS OR ACUTE DISCOMFORT. BED LOWED, CALL BELL WITHIN REACH . 
   
Primary Nurse Report: First initial/Last name/Title Post Dialysis:___Rad Euceda RN________         Time:_____1445___________ Abbreviations: AVG-arterial venous graft, AVF-arterial venous fistula, IJ-Internal Jugular, Subcl-Subclavian, Fem-Femoral, Tx-treatment, AP/HR-apical heart rate, DFR-dialysate flow rate, BFR-blood flow rate, AP-arterial pressure, -venous pressure, UF-ultrafiltrate, TMP-transmembrane pressure, Adolfo-Venous, Art-Arterial, RO-Reverse Osmosis

## 2019-11-30 NOTE — PROGRESS NOTES
Pt refused PT due to: 
[]  Nausea/vomiting 
[]  Eating 
[]  Pain 
[]  Pt lethargic [x]  Pt receiving dialysis. Will f/u later as schedule allows. Thank you. Mikala Metcalf

## 2019-11-30 NOTE — PROGRESS NOTES
Problem: Mobility Impaired (Adult and Pediatric) Goal: *Acute Goals and Plan of Care (Insert Text) Description Physical Therapy Goals Initiated 11/27/2019 and to be accomplished within 7 day(s) 1. Patient will move from supine <> sit with min/mod assist in prep for out of bed activity and change of position. 2.  Patient will perform sit<> stand mod A/RW in prep for transfers/ambulation. 3.  Patient will ambulate 50 feet with min/mod A/RW for improved functional mobility. Outcome: Progressing Towards Goal 
  
PHYSICAL THERAPY TREATMENT Patient: Michaela Mathew (63 y.o. female) Date: 11/30/2019 Diagnosis: Dyspnea [R06.00] Dyspnea [R06.00] <principal problem not specified> Precautions: Fall Chart, physical therapy assessment, plan of care and goals were reviewed. ASSESSMENT: 
Ms. Marlo Beltre is easily convinced to participate, although fatigued post HD. Pt able to slightly increase exercise load and sits EOB for 21 min. 2 attempts at standing, but LE and postural strength are limited. 3 L side scoots to [facility] performed, before being assisted back to supine. Pt given R quarter turn for weight shift (Skin protection). RN, CNA and family informed. Pt remains motivated to recover, but functional level is very low Progression toward goals: 
[]      Improving appropriately and progressing toward goals [x]      Improving slowly and progressing toward goals 
[]      Not making progress toward goals and plan of care will be adjusted PLAN: 
Patient continues to benefit from skilled intervention to address the above impairments. Continue treatment per established plan of care. Discharge Recommendations:  Eduardo Morrow or To Be Determined Further Equipment Recommendations for Discharge:  N/A  
 
SUBJECTIVE:  
Patient stated Ok. I'll try. I have to get better!  OBJECTIVE DATA SUMMARY:  
Critical Behavior: 
Neurologic State: Alert Orientation Level: Oriented X4 
 Cognition: Appropriate decision making Safety/Judgement: Awareness of environment Functional Mobility Training: 
Bed Mobility: 
Rolling: Maximum assistance; Total assistance Supine to Sit: Maximum assistance; Total assistance Sit to Supine: Total assistance Scooting: Total assistance Transfers: 
Sit to Stand: Total assistance Stand to Sit: Total assistance Balance: 
Sitting: Impaired Sitting - Static: Fair (occasional) Sitting - Dynamic: Fair (occasional); Poor (constant support) Standing: Impaired(unable to complete) Ambulation/Gait Training: 
Unable Pain: 
Pain Scale 1: Numeric (0 - 10) Pain Intensity 1: 0 Pain out: 0 Activity Tolerance:  
Fair- 
Please refer to the flowsheet for vital signs taken during this treatment. After treatment:  
[] Patient left in no apparent distress sitting up in chair 
[x] Patient left in no apparent distress in bed 
[x] Call bell left within reach [x] Nursing notified 
[x] Caregiver present 
[] Bed alarm activated Jannie Matos PTA Time Calculation: 35 mins

## 2019-11-30 NOTE — PROGRESS NOTES
Problem: Pressure Injury - Risk of 
Goal: *Prevention of pressure injury Description Document Joe Scale and appropriate interventions in the flowsheet. Outcome: Progressing Towards Goal 
Note: Pressure Injury Interventions: 
Sensory Interventions: Assess changes in LOC, Float heels, Maintain/enhance activity level, Keep linens dry and wrinkle-free, Minimize linen layers, Monitor skin under medical devices, Pad between skin to skin, Pressure redistribution bed/mattress (bed type), Turn and reposition approx. every two hours (pillows and wedges if needed) Moisture Interventions: Absorbent underpads, Apply protective barrier, creams and emollients, Check for incontinence Q2 hours and as needed, Limit adult briefs, Maintain skin hydration (lotion/cream), Minimize layers, Moisture barrier, Offer toileting Q_hr Activity Interventions: Pressure redistribution bed/mattress(bed type) Mobility Interventions: Float heels, HOB 30 degrees or less, Pressure redistribution bed/mattress (bed type), Turn and reposition approx. every two hours(pillow and wedges) Nutrition Interventions: Document food/fluid/supplement intake Friction and Shear Interventions: Feet elevated on foot rest, Foam dressings/transparent film/skin sealants, HOB 30 degrees or less, Lift sheet, Minimize layers Problem: Pain Goal: *Control of Pain Outcome: Progressing Towards Goal 
  
Problem: Falls - Risk of 
Goal: *Absence of Falls Description Document Sharmaine Bowman Fall Risk and appropriate interventions in the flowsheet. Outcome: Progressing Towards Goal 
Note: Fall Risk Interventions: 
  
 
  
 
Medication Interventions: Evaluate medications/consider consulting pharmacy, Patient to call before getting OOB Elimination Interventions: Call light in reach, Bed/chair exit alarm, Patient to call for help with toileting needs, Stay With Me (per policy), Toileting schedule/hourly rounds Problem: Fluid Volume - Risk of, Imbalanced Goal: *Balanced intake and output Outcome: Progressing Towards Goal

## 2019-11-30 NOTE — PROGRESS NOTES
Pharmacy Dosing Services: 
 
Consult for Warfarin Dosing by Pharmacy by Dr. Kiara Randall Consult provided for this 80 y.o.  female , for indication of Atrial Fibrillation. Day of Therapy: 5 Dose to achieve an INR goal of 2-3 Order entered for  Warfarin to be held today. Significant drug interactions: Amiodarone LABS PT/INR  
PT/INR Lab Results Component Value Date/Time INR 4.9 (H) 11/30/2019 03:28 AM  
  
Platelets Lab Results Component Value Date/Time PLATELET 492 24/55/7473 03:28 AM  
  
H/H Lab Results Component Value Date/Time HGB 10.1 (L) 11/30/2019 03:28 AM  
  
 
Warfarin Administrations (last 168 hours) Date/Time Action Medication Dose  
 11/28/19 1806 Given  
 warfarin (COUMADIN) tablet 2 mg 2 mg  
 11/27/19 1809 Given  
 warfarin (COUMADIN) tablet 5 mg 5 mg Pharmacy to follow daily and will provide subsequent Warfarin dosing based on clinical status. Leonie Uriarte, FLAVIAD  Contact information

## 2019-11-30 NOTE — PROGRESS NOTES
Hospitalist Progress Note Patient: Lukasz Escamilla MRN: [id number]  CSN: [id number] YOB: 1935  Age: 80 y.o. Sex: female DOA: 11/26/2019 LOS:  LOS: 4 days Assessment and Plan: Active Problems: 
  Benign essential hypertension (10/25/2012) Overview: Last Assessment & Plan:  
    Blood pressure controlled on current regimen of Coreg 12.5 mg twice daily,  
    furosemide 40 mg every other day, Aldactone 25 mg daily. Continue  
    low-sodium diet. Will check electrolytes and renal function with today's  
    labs. Chronic diastolic congestive heart failure ([facility] (4/15/2019) Overview: Last Assessment & Plan:  
    Appears to be well compensated presently. Her weight is now down to 133  
    on our scales and the patient tells me that she currently weighs 130 lb on  
    her scale. I believe that this is probably a good dry weight for her so  
    she may continue to rely on her scale and shoot for a dry weight of 130 lb  
    as her baseline. If she starts to drift upward with her weight or notices  
    puffiness or swelling in her legs or shortness of breath she is to start  
    taking her furosemide every day and call us. Otherwise continue current  
    dose of furosemide 40 mg every other day. Continue Aldactone 25 mg daily,  
    continue Coreg 12.5 mg b.i.d. continue low sodium diet. Will check  
    metabolic profile today to reassess serum electrolytes and renal function  
    given that she is on diuretic therapy. Hypothyroidism (10/25/2012) Overview: Last Assessment & Plan: Most recent thyroid function studies done on 04/15/2019 show TSH and free T4 of 3.52 and 0.93 respectively. Notably she is not on thyroid  
    supplement so there is no current evidence that she is truly hypothyroid. Will continue to follow this periodically. [facility]-fib ([facility] (11/8/2019) ESRD needing dialysis ([facility] (11/19/2019) Dyspnea (11/26/2019) Debility (11/28/2019) Acute volume overload in dialysis patient causing resp distress  
Continue hd per renal  
  
ESRD today then MWF 
  
Severe weakness and debility PT went a little better today   
  
afib with RVR, Chronic persistent afib Rate controlled [facility] Continue pharm dose It was high today 
  
Chronic diastolic CHF Need HD  
  
Right IJ DVT,on wafarin  
  
Anemia of CKD 
  
 
   
  
Will have PT OT, placement to rehab, Chief complaint:  esrd chf, afib, htn Subjective: 
COmplains of mouth sores Review of systems: 
 
General: No fevers or chills. Cardiovascular: No chest pain or pressure. No palpitations. Pulmonary: No shortness of breath. Gastrointestinal: No nausea, vomiting. Objective: 
 
Vital signs/Intake and Output: 
Visit Vitals BP 95/49 Pulse (P) 62 Temp 97.8 °F (36.6 °C) (Oral) Resp 20 Ht 4' 9\" (1.448 m) Wt 60.8 kg (134 lb) SpO2 (P) 100% BMI 29.00 kg/m² Current Shift:  No intake/output data recorded. Last three shifts:  11/28 1901 - 11/30 0700 In: -  
Out: 1000 Physical Exam: 
General: NAD, AAOx3. Non-toxic. HEENT: NC/AT. PERRLA, EOMI.  MMM. Lungs: Nml inspection. CTA B/L. No wheezing, rales or rhonchi. Heart:  S1S2 RRR,  PMI mid 5th IC space. No M/RG. Abdomen: Soft, NT/ND.  BS+. No peritoneal signs. Extremities: No C/C/E. Psych:   Nml affect. Neurologic:  2-12 intact. Strength 5/5 throughout. Sensation symmetrical. 
 
 
 
 
Labs: Results:  
   
Chemistry Recent Labs 11/30/19 
2319 11/29/19 
0420 11/28/19 
0410 * 136* 108*  136 142  
K 4.0 4.3 4.0  
 99* 105 CO2 28 29 28 BUN 39* 72* 48* CREA 2.50* 2.31* 3.16* CA 7.7* 8.6 7.9* AGAP 5 8 9 BUCR 16 31* 15 ALB  --   --  2.6* CBC w/Diff Recent Labs 11/30/19 
7683 11/29/19 
0420 11/28/19 
0410 WBC 6.6 4.3* 7.2  
RBC 3.37* 3.58* 3.26* HGB 10.1* 9.8* 9.7*  
 HCT 33.6* 30.0* 32.1*  
 260 158 Cardiac Enzymes No results for input(s): CPK, CKND1, CUONG in the last 72 hours. No lab exists for component: Aneta Proctor Coagulation Recent Labs 11/30/19 
0328 11/29/19 
9483 PTP 45.6* 26.7* INR 4.9* 2.5* Lipid Panel No results found for: CHOL, CHOLPOCT, CHOLX, CHLST, CHOLV, 592215, HDL, HDLP, LDL, LDLC, DLDLP, 636523, VLDLC, VLDL, TGLX, TRIGL, TRIGP, TGLPOCT, CHHD, CHHDX  
BNP No results for input(s): BNPP in the last 72 hours. Liver Enzymes Recent Labs  
  11/28/19 
0410 ALB 2.6* Thyroid Studies Lab Results Component Value Date/Time TSH 2.53 11/08/2019 01:25 PM  
    
Procedures/imaging: see electronic medical records for all procedures/Xrays and details which were not copied into this note but were reviewed prior to creation of Plan

## 2019-11-30 NOTE — PROGRESS NOTES
Caryl MARADIAGA. 62. care from [facility] MORGAN REGALADO RN. Shift uneventful. 0715 Bedside and Verbal shift change report given to Christa Olsen RN (oncoming nurse) by Carito Edwards RN 
 (offgoing nurse). Report included the following information SBAR, Kardex, Procedure Summary, Intake/Output, MAR, Recent Results and Med Rec Status.

## 2019-11-30 NOTE — PROGRESS NOTES
0715 Bedside and Verbal shift change report given to HANY Tovar (oncoming nurse) by Cheryl Beltran, RN (offgoing nurse). Report included the following information SBAR, Kardex, Intake/Output, and MAR. Pt in bed, call light in reach. 1025 Pt to receive dialysis. 1400 Pt finished with dialysis, 1.5L removed. 1910 Bedside and Verbal shift change report given to BUZZ Bunch RN (oncoming nurse) by Tonya Duane. Buck Crane RN (offgoing nurse). Report included the following information SBAR, Kardex, Intake/Output and MAR. Pt in bed, call light in reach.

## 2019-12-01 NOTE — PROGRESS NOTES
4114 Bedside and Verbal shift change report given to HANY Chen RN (oncoming nurse) by BUZZ Zuniga, RICK (offgoing nurse). Report included the following information SBAR, Kardex, Intake/Output, and MAR. Pt in bed, call light in reach. Notified Dr. Roseanne Mg of redness on pt's L heel family was concerned about. Will continue to monitor. 1945 Bedside and Verbal shift change report given to OWEN Edwards (oncoming nurse) by Fredi Wilson. Kasie Chen RN (offgoing nurse). Report included the following information SBAR, Kardex, Intake/Output, and MAR. Pt in bed, call light in reach. Family at bedside.

## 2019-12-01 NOTE — PROGRESS NOTES
Problem: Mobility Impaired (Adult and Pediatric) Goal: *Acute Goals and Plan of Care (Insert Text) Description Physical Therapy Goals Initiated 11/27/2019 and to be accomplished within 7 day(s) 1. Patient will move from supine <> sit with min/mod assist in prep for out of bed activity and change of position. 2.  Patient will perform sit<> stand mod A/RW in prep for transfers/ambulation. 3.  Patient will ambulate 50 feet with min/mod A/RW for improved functional mobility. Outcome: Progressing Towards Goal 
 PHYSICAL THERAPY TREATMENT Patient: Sakshi Spencer (29 y.o. female) Date: 12/1/2019 Diagnosis: Dyspnea [R06.00] Dyspnea [R06.00] A-fib ([address] Precautions: Fall Chart, physical therapy assessment, plan of care and goals were reviewed. ASSESSMENT: 
Pt continues to require Max/Total A for all aspects of mobility. Pt present with decrease tolerance to activity and fatigues quickly. Multiple STS with RW performed Max A w bed height elevated. Pt unable to initiate gait training at this time, due to poor standing tolerance. Attempted to remain sitting EOB with family present, however too lethargic. Returned to supine. Cont POC. Progression toward goals: 
[]      Improving appropriately and progressing toward goals [x]      Improving slowly and progressing toward goals 
[]      Not making progress toward goals and plan of care will be adjusted PLAN: 
Patient continues to benefit from skilled intervention to address the above impairments. Continue treatment per established plan of care. Discharge Recommendations:  Rehab w medical transport. Further Equipment Recommendations for Discharge:  rolling walker SUBJECTIVE:  
Patient stated  I will try   OBJECTIVE DATA SUMMARY:  
Critical Behavior: 
Neurologic State: Alert Orientation Level: Oriented X4 Cognition: Appropriate decision making, Appropriate for age attention/concentration, Appropriate safety awareness, Follows commands Safety/Judgement: Awareness of environment Functional Mobility Training: 
Bed Mobility: 
Rolling: Maximum assistance Supine to Sit: Maximum assistance(HOB elevated ) Sit to Supine: Maximum assistance; Total assistance Scooting: Total assistance Transfers: 
Sit to Stand: Maximum assistance Stand to Sit: Maximum assistance Balance: 
Sitting: Impaired Sitting - Static: Fair (occasional) Sitting - Dynamic: Poor (constant support) Standing: Impaired; With support Standing - Static: Poor Pain: 
Pain Scale 1: Numeric (0 - 10) Pain Intensity 1: 0 Activity Tolerance:  
Poor After treatment:  
[] Patient left in no apparent distress sitting up in chair 
[x] Patient left in no apparent distress in bed 
[x] Call bell left within reach [x] Nursing notified 
[x] Caregiver present 
[] Bed alarm activated Saeid Mark PTA Time Calculation: 32 mins

## 2019-12-01 NOTE — PROGRESS NOTES
Problem: Pressure Injury - Risk of 
Goal: *Prevention of pressure injury Description Document Joe Scale and appropriate interventions in the flowsheet. Outcome: Progressing Towards Goal 
Note:  
Pt has heels floated, Mepelex on heels, and turned frequently to prevent skin breakdown. Pressure Injury Interventions: 
Sensory Interventions: Assess changes in LOC Moisture Interventions: Absorbent underpads Activity Interventions: Pressure redistribution bed/mattress(bed type) Mobility Interventions: HOB 30 degrees or less Nutrition Interventions: Document food/fluid/supplement intake, Offer support with meals,snacks and hydration Friction and Shear Interventions: Apply protective barrier, creams and emollients Problem: Pain Goal: *Control of Pain Outcome: Progressing Towards Goal 
Note:  
Pt stated she had minor pain on the buttocks, but did not want anything for the pain. Problem: Falls - Risk of 
Goal: *Absence of Falls Description Document Sharmaine Bowman Fall Risk and appropriate interventions in the flowsheet. Outcome: Progressing Towards Goal 
Note:  
Pt has not attempted to get OOB. Pt has had no falls during shift. Fall Risk Interventions: 
Medication Interventions: Teach patient to arise slowly Elimination Interventions: Call light in reach Problem: Chronic Renal Failure Goal: *Fluid and electrolytes stabilized Outcome: Progressing Towards Goal 
Note:  
Pt received potassium PO to correct.

## 2019-12-01 NOTE — PROGRESS NOTES
Problem: Pressure Injury - Risk of 
Goal: *Prevention of pressure injury Description Document Joe Scale and appropriate interventions in the flowsheet. Outcome: Progressing Towards Goal 
Note: No pressure injury or redness noticed during shift. Pressure Injury Interventions: 
Sensory Interventions: Assess changes in LOC Moisture Interventions: Absorbent underpads Activity Interventions: Pressure redistribution bed/mattress(bed type) Mobility Interventions: Pressure redistribution bed/mattress (bed type) Nutrition Interventions: Document food/fluid/supplement intake, Offer support with meals,snacks and hydration Friction and Shear Interventions: HOB 30 degrees or less Problem: Pain Goal: *Control of Pain Outcome: Progressing Towards Goal 
Note: No reports of pain 
  
Problem: Falls - Risk of 
Goal: *Absence of Falls Description Document Polo Rodríguez Fall Risk and appropriate interventions in the flowsheet. Outcome: Progressing Towards Goal 
Note:  
Pt on bedrest, does not attempt to get OOB Fall Risk Interventions: 
  
 
  
 
Medication Interventions: Teach patient to arise slowly Elimination Interventions: Call light in reach

## 2019-12-01 NOTE — PROGRESS NOTES
Nephrology Progress Note Subjective:  
 
Kasey Eden is a 80 y.o. WF with a PMHX of CHF, COPD who was recently admitted to [facility] with newly dx Afib with RVR and also started on HD for worsening kidney functionof came back today from SNF with worsening SOB. She was d/sandra on 11/22 and supposed to have her first out pt HD yesterday (11/25) at [facility] PW unit, but she was not able to do it due to generalized weakness. During initial evaluation in the ER today, she was hypoxic, placed on Bipap. Her BP is low (SBP 's) and is in RVR (110-140's). Lab showed Cr 4.3, K 4.1 and CO2 29. CxR showed pulm congestion. Pt is currently DNR/I. After discussion with the pt, she strongly wants to pursue dialysis at least for now. Pt feels better today, denies CP/SOB. Appetite still poor. 1L removed on dialysis yesterday, tolerated well. Admit Date: 11/26/2019 Active Problems: 
  Benign essential hypertension (10/25/2012) Overview: Last Assessment & Plan:  
    Blood pressure controlled on current regimen of Coreg 12.5 mg twice daily,  
    furosemide 40 mg every other day, Aldactone 25 mg daily. Continue  
    low-sodium diet. Will check electrolytes and renal function with today's  
    labs. Chronic diastolic congestive heart failure ([facility] (4/15/2019) Overview: Last Assessment & Plan:  
    Appears to be well compensated presently. Her weight is now down to 133  
    on our scales and the patient tells me that she currently weighs 130 lb on  
    her scale. I believe that this is probably a good dry weight for her so  
    she may continue to rely on her scale and shoot for a dry weight of 130 lb  
    as her baseline. If she starts to drift upward with her weight or notices  
    puffiness or swelling in her legs or shortness of breath she is to start  
    taking her furosemide every day and call us. Otherwise continue current dose of furosemide 40 mg every other day. Continue Aldactone 25 mg daily,  
    continue Coreg 12.5 mg b.i.d. continue low sodium diet. Will check  
    metabolic profile today to reassess serum electrolytes and renal function  
    given that she is on diuretic therapy. Hypothyroidism (10/25/2012) Overview: Last Assessment & Plan: Most recent thyroid function studies done on 04/15/2019 show TSH and free T4 of 3.52 and 0.93 respectively. Notably she is not on thyroid  
    supplement so there is no current evidence that she is truly hypothyroid. Will continue to follow this periodically. A-fib ([facility] (11/8/2019) ESRD needing dialysis ([facility] (11/19/2019) Dyspnea (11/26/2019) Debility (11/28/2019) Current Facility-Administered Medications Medication Dose Route Frequency  magic mouthwash (FIRST-MOUTHWASH BLM) oral suspension 5 mL  5 mL Oral Q4H PRN  
 heparin (porcine) 1,000 unit/mL injection 4,300 Units  4,300 Units Hemodialysis DIALYSIS PRN  pantoprazole (PROTONIX) 40 mg in 0.9% sodium chloride 10 mL injection  40 mg IntraVENous DAILY  amiodarone (CORDARONE) tablet 200 mg  200 mg Oral DAILY  aspirin delayed-release tablet 81 mg  81 mg Oral DAILY  atorvastatin (LIPITOR) tablet 40 mg  40 mg Oral DAILY  dilTIAZem (CARDIZEM) IR tablet 30 mg  30 mg Oral TIDAC  doxycycline (MONODOX) capsule 100 mg  100 mg Oral BID  PARoxetine (PAXIL) tablet 20 mg  20 mg Oral DAILY  [START ON 12/2/2019] ergocalciferol capsule 50,000 Units  50,000 Units Oral Q7D  Warfarin-Pharmacy to dose  1 Each Other Rx Dosing/Monitoring  acetaminophen (TYLENOL) solution 650 mg  650 mg Oral Q6H PRN Allergy: Allergies Allergen Reactions  Penicillins Hives  Valium [Diazepam] Other (comments) It made me cry more Objective:  
 
Visit Vitals /63 (BP 1 Location: Left arm, BP Patient Position: At rest) Pulse 97 Temp 97.4 °F (36.3 °C) Resp 16 Ht 4' 9\" (1.448 m) Wt 60.5 kg (133 lb 6.4 oz) SpO2 97% BMI 28.87 kg/m² Intake/Output Summary (Last 24 hours) at 12/1/2019 1032 Last data filed at 11/30/2019 1420 Gross per 24 hour Intake  Output 1000 ml Net -1000 ml Physical Exam:  
 
 
General: No acute distress HENT: Atraumatic and normocephalic;   
Eyes: Normal conjunctiva Neck: Supple, No JVD or mass Cardiovascular: Normal S1 & S2, no m/r/g Pulmonary/Chest Wall: Clear to auscultation bilaterally Abdominal: Soft and non-tender Musculoskeletal: No ankle edema Neurological: No focal deficits Access: no issues Data Review: 
Lab Results Component Value Date/Time Sodium 140 12/01/2019 01:25 AM  
 Potassium 3.3 (L) 12/01/2019 01:25 AM  
 Chloride 102 12/01/2019 01:25 AM  
 CO2 32 12/01/2019 01:25 AM  
 Anion gap 6 12/01/2019 01:25 AM  
 Glucose 100 (H) 12/01/2019 01:25 AM  
 BUN 22 (H) 12/01/2019 01:25 AM  
 Creatinine 1.66 (H) 12/01/2019 01:25 AM  
 BUN/Creatinine ratio 13 12/01/2019 01:25 AM  
 GFR est AA 36 (L) 12/01/2019 01:25 AM  
 GFR est non-AA 29 (L) 12/01/2019 01:25 AM  
 Calcium 8.2 (L) 12/01/2019 01:25 AM  
 
Lab Results Component Value Date/Time WBC 7.5 12/01/2019 01:25 AM  
 HGB 10.7 (L) 12/01/2019 01:25 AM  
 HCT 35.8 12/01/2019 01:25 AM  
 PLATELET 766 20/99/3115 01:25 AM  
 MCV 99.7 (H) 12/01/2019 01:25 AM  
 
Lab Results Component Value Date/Time Calcium 8.2 (L) 12/01/2019 01:25 AM  
 Phosphorus 3.7 11/28/2019 04:10 AM  
 
Lab Results Component Value Date/Time Iron 11 (L) 11/16/2019 09:10 AM  
 TIBC 271 11/16/2019 09:10 AM  
 Iron % saturation 4 11/16/2019 09:10 AM  
 Ferritin 395 (H) 11/16/2019 09:10 AM  
 
Lab Results Component Value Date/Time Ferritin 395 (H) 11/16/2019 09:10 AM  
 
 
 
Impression:  
 
-Pulmonary edema, improved with dialysis 
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  MWF schedule at [facility] [place] prior to admission -Hypotension w/ afib/RVR, improved 
-Rt sided HF with Pulm HTN  
-Anemia of chronic disease, + Fe def. S/p prbc -Hypokalemia 
-Recent onset atrial fibrillation w/ RVR 
-Coagulopathy/warfarin induced,  
-Rt IJ acute DVT,  on anticoagulation  
-DNR/I 
- deconditioning Plan: - HD tomorrow back on MWF schedule 
- use current access - Epo w/ HD 
- HR and RVR control per team 
- encourage po intake 
- salt/fluid restriciton 1200ml/day - Avoid ACEI/ARBs, NSAIDs or IV contrast, and other nephrotoxic meds - pending rehab 
- d/w pt and family at bedside, will cont follow Jonathon Ferrer MD 
[facility] [phone]

## 2019-12-01 NOTE — PROGRESS NOTES
Hospitalist Progress Note Patient: Dang Barillas MRN: [id number]  [facility]: [id number] YOB: 1935  Age: 80 y.o. Sex: female DOA: 11/26/2019 LOS:  LOS: 5 days Assessment and Plan: 
 
Principal Problem: 
  A-fib ([facility] (11/8/2019) Active Problems: 
  Benign essential hypertension (10/25/2012) Overview: Last Assessment & Plan:  
    Blood pressure controlled on current regimen of Coreg 12.5 mg twice daily,  
    furosemide 40 mg every other day, Aldactone 25 mg daily. Continue  
    low-sodium diet. Will check electrolytes and renal function with today's  
    labs. Chronic diastolic congestive heart failure ([facility] (4/15/2019) Overview: Last Assessment & Plan:  
    Appears to be well compensated presently. Her weight is now down to 133  
    on our scales and the patient tells me that she currently weighs 130 lb on  
    her scale. I believe that this is probably a good dry weight for her so  
    she may continue to rely on her scale and shoot for a dry weight of 130 lb  
    as her baseline. If she starts to drift upward with her weight or notices  
    puffiness or swelling in her legs or shortness of breath she is to start  
    taking her furosemide every day and call us. Otherwise continue current  
    dose of furosemide 40 mg every other day. Continue Aldactone 25 mg daily,  
    continue Coreg 12.5 mg b.i.d. continue low sodium diet. Will check  
    metabolic profile today to reassess serum electrolytes and renal function  
    given that she is on diuretic therapy. Hypothyroidism (10/25/2012) Overview: Last Assessment & Plan: Most recent thyroid function studies done on 04/15/2019 show TSH and free T4 of 3.52 and 0.93 respectively. Notably she is not on thyroid  
    supplement so there is no current evidence that she is truly hypothyroid. Will continue to follow this periodically. ESRD needing dialysis ([facility] (11/19/2019) Dyspnea (11/26/2019) Debility (11/28/2019) Acute volume overload in dialysis patient causing resp distress  
Continue hd per renal  
  
ESRD  MWF 
  
Severe weakness and debility No PT today as well   
 
afib with RVR, Chronic persistent afib Rate controlled per amidorone and cardizem janell for [facility] Continue pharm dose It was high again today 
  
Chronic diastolic CHF Need HD  
  
Right IJ DVT,on wafarin  
  
Anemia of CKD 
  
  
   
  
Will have PT OT, placement to rehab Monday Family is worried she is too weak to participate in rehab / dialysis and will end up right back in the hospital/  We had long discussion about her needing to participate in nutrition and physical therapy or she will be too weak to progress 
  
  
Chief complaint:  esrd chf, afib, htn  
  
 
 
Subjective: 
Mouth feels better She ate breakfast today and is drinking better Review of systems: 
 
General: No fevers or chills. Cardiovascular: No chest pain or pressure. No palpitations. Pulmonary: No shortness of breath. Gastrointestinal: No nausea, vomiting. Objective: 
 
Vital signs/Intake and Output: 
Visit Vitals BP 95/40 (BP 1 Location: Right arm, BP Patient Position: At rest;Lying left side) Pulse 97 Temp 97.9 °F (36.6 °C) Resp 16 Ht 4' 9\" (1.448 m) Wt 60.5 kg (133 lb 6.4 oz) SpO2 100% BMI 28.87 kg/m² Current Shift:  No intake/output data recorded. Last three shifts:  11/29 1901 - 12/01 0700 In: -  
Out: 1000 Physical Exam: 
General: NAD, AAOx3. Non-toxic. HEENT: NC/AT. PERRLA, EOMI.  MMM. Lungs: Nml inspection. CTA B/L. No wheezing, rales or rhonchi. Heart:  S1S2 RRR,  PMI mid 5th IC space. No M/RG. Abdomen: Soft, NT/ND.  BS+. No peritoneal signs. Extremities: No C/C/E. Psych:   Nml affect. Neurologic:  2-12 intact. Strength 5/5 throughout. Sensation symmetrical. 
 
 
 
 
Labs: Results:  
   
Chemistry Recent Labs 12/01/19 
0125 11/30/19 
6779 11/29/19 
6778 * 101* 136*  140 136  
K 3.3* 4.0 4.3  107 99* CO2 32 28 29 BUN 22* 39* 72* CREA 1.66* 2.50* 2.31* CA 8.2* 7.7* 8.6 AGAP 6 5 8 BUCR 13 16 31* CBC w/Diff Recent Labs 12/01/19 
0125 11/30/19 
6574 11/29/19 
4052 WBC 7.5 6.6 4.3*  
RBC 3.59* 3.37* 3.58* HGB 10.7* 10.1* 9.8* HCT 35.8 33.6* 30.0*  
 169 260 GRANS 76*  --   --   
LYMPH 12*  --   --   
EOS 2  --   --   
  
Cardiac Enzymes No results for input(s): CPK, CKND1, CUONG in the last 72 hours. No lab exists for component: Jabari Goins Coagulation Recent Labs 12/01/19 0125 11/30/19 
7946 PTP 43.6* 45.6* INR 4.7* 4.9* Lipid Panel No results found for: CHOL, CHOLPOCT, CHOLX, CHLST, CHOLV, 115874, HDL, HDLP, LDL, LDLC, DLDLP, [id number], VLDLC, VLDL, TGLX, TRIGL, TRIGP, TGLPOCT, CHHD, CHHDX  
BNP No results for input(s): BNPP in the last 72 hours. Liver Enzymes No results for input(s): TP, ALB, TBIL, AP, SGOT, GPT in the last 72 hours. No lab exists for component: DBIL Thyroid Studies Lab Results Component Value Date/Time TSH 2.53 11/08/2019 01:25 PM  
    
Procedures/imaging: see electronic medical records for all procedures/Xrays and details which were not copied into this note but were reviewed prior to creation of Plan

## 2019-12-01 NOTE — PROGRESS NOTES
Bedside and Verbal shift change report given to HANY Velazco RN (oncoming nurse) by BUZZ Zuniga RN (offgoing nurse). Report included the following information SBAR, Kardex, Intake/Output, MAR and Recent Results.

## 2019-12-02 NOTE — DIALYSIS
TREATMENT SUMMARY Patient dialyzed in room 340 Tolerated treatment well asymptomatic hypotension w/ SP remaining greater than 90 during tx. RIJ TDC functioning well without complication of BFR or accessing   
1500 ml  removed via UF with a with a net removal 1000 ml Report given to Phoebe Amaya RN with all questions answered 
  
TREATMENT  NOTES  
   
  
             
                                                                                        
 
   
 
  
 
ACUTE HEMODIALYSIS FLOW SHEET 
 
    
  
PATIENT INFORMATION Hever Mao     
[]? 1st Time Acute  []? Stat[x]? Routine []? Urgent []? Chronic Unit  
[]? Acute Room [x]? Bedside  []? ICU/CCU []?ER Isolation Precautions: [x]? Dialysis[]? Airborne []? Contact []? Droplet []? Reverse Special Considerations:_______  []? Blood Consent Verified  []? N/A Allergies:[]? NKA Reaction Severity Reaction Type Noted Valid Until    
             
Allergies Penicillins Hives Not Specified   10/26/2019    
Valium [Diazepam] Other (comments) Not Specified   11/8/2019    
 Code Status []? Full Code [x]? DNR  []? Other_____ Diet: [x]? Renal []? NPO []? Diabetic  
[]? Enteral Feeding []? Cardiac Diabetic: []? Yes [x]? No 
    
[x]? Signed Treatment Consent Verified  
[x]? Time Out/ Safety Check PRIMARY NURSE REPORT: FIRST INITIAL/ LAST NAME/TITLE 
PRE DIALYSIS: Phoebe Amaya RN                                    TIME: 5886 ACCESS CATHETER ACCESS: []? N/A  [x]? RIGHT  []? LEFT  [x]? IJ  []? SUBCL []? FEM   
                 []? First use X-ray  [x]? Tunnel     []? Non-Tunneled   
   [x]? No S/S infection  []? Redness []? Drainage  []? Cultured []? Swelling []? Pain   
                 [x]? Medical Aseptic []? Prep Dressing Changed   
               []? Clotted [x]? Patent []? Flows: [x]? Good []? Poor []? Reversed If Access Problem Dr. Jonny Bojorquez: []? Yes []? No    Date:_____  []? N/A[]? GRAFT/FISTULA ACCESS:  [x]? N/A  []? RIGHT  []? LEFT  []? UE   []? LE    
   []? AVG  []? AVF []? BUTTONHOLE 
  []? +BRUIT/THRILL []? MEDICAL ASEPTIC PREP []? No S/S infection  []? Redness []? Drainage  []? Cultured []? Swelling []? Pain If Access Problem Dr. Jonny Bojorquez: []? Yes []? No    Date:______ []? N/A   
GENERAL ASSESSMENT   
LUNGS:  SaO2% _100___ [x]? Clear []? Coarse []? Crackles []? Wheezing  
                                      []? Diminished Location: []? RLL []? LLL []? RUL []? RML []? NITA    
COUGH:  []? Productive  []? Loose[]? Dry [x]? N/A  
RESPIRATIONS: [x]? Easy []? Labored THERAPY: [x]? RA   []? NC _3__. L/min    Mask: []? NRB []? Venti  _____O2%    
             []? Ventilator []? Intubated []? Trach []? BiPap []? CPap []? HI Flow CARDIAC: []? Regular [x]? Irregular []? Pericardial Rub []? JVD Monitored Rhythm:__AFIB____ []? N/A   
EDEMA: [x]? None []? Generalized []? Facial []? Pedal []? UE []? LE 
           []? Pitting []? 1 []? 2 []? 3 []? 4    []? Right []? Left []? Bilateral   
SKIN:    [x]? Warm []? Hot []? Cold  [x]? Dry []? Pale []? Diaphoretic  
           []? Flushed []? Jaundiced []? Cyanotic []? Rash []? Jeni Cordoba LOC:    [x]? Alert  Oriented to: [x]? Person [x]? Place [x]? Time  
          []? Confused []? Lethargic []? Medicated []? Non-responsive GI/ABDOMEN: []? Flat []? Distended [x]? Soft []? Firm []? Diarrhea [x]? Bowel Sounds Present []? Nausea []? Vomiting PAIN: [x]? 0 []? 1 []? 2 []? 3 []? 4 []? 5 []? 6 []? 7 []? 8 []? 9 []? 10 
        Scale 1-10 Action/Follow Up_____ MOBILITY: []? Amb []? Amb/Assist [x]? Bed  []? Wheelchair CURRENT LABS HBsAg ONLY: Date Drawn:  11/15/2019          [x]? Negative []? Positive []? Unknown. HBsAb: Date Drawn:   11/17/2019           [x]? Susceptible <10 []? Immune ?10 []? Unknown Date of Current Labs:  ATTACHED  
  
EDUCATION Person Educated: [x]? Patient []? Other_DAUGHTER________ Knowledge base: []? None [x]? Minimal []? Substantial   
Barriers to learning  [x]? None _______________ Preferred method of learning: []? Written [x]? Oral [x]? Visual []? Hands on Topic: [x]? Access Care [x]? S&S of infection [x]? Fluid Management 
 []? K+  [x]? Procedural  []? Albumin []? Medications []? Tx Options []? Transplant []? Diet []? Other Teaching Tools: [x]? Explain [x]? Demonstration []? Handout_____ []? Video______ 
CARE PLAN [x]?  Renal Failure (Adult) Interdisciplinary · Fluid and electrolytes stabilized ? Interventions · Dehydration signs and symptoms (eg: Weight/lab monitoring; vomiting/diarrhea/urine; tenting; mucous membranes; dizziness/lethargy/irritability/confusion; weak pulse; tachycardia; blood pressure; I&O) · Fluid overload signs and symptoms assessment (eg: Body weight increased; dyspnea; edema; hypertension; respiratory crackles/wheezing; JVD; lab monitoring; mental status changes; I&O) · Monitor appropriate lab values · COMPLIANCE WITH PRESCRIBED THERAPY · ARTERIAL ACCESS SITE ASSESSMENT 
· NUTRITION SCREENING 
· Vital signs monitoring per assessed patient condition or unit standard · Cardiac monitoring · Hydration management · Intake and output measurement · Body weight monitoring · Skin care · DIALYSIS 
· Nutrition Care Process per nutrition screen · Oral hygiene care every 2 hours · Pain management 
  
· Outcome ? [x]? Progressing Towards Goal 
? []? Not Progressing Towards Goals 
? []? Goals Met/Resolved 
? []? Goals Not Met/ Resolved 
  
  
· Patient/ Family Education ? Progressing Towards Goals 
   
  
RO/HEMODIAYLSIS MACHINE SAFETY CHECKS- BEFORE EACH TREATMENT  
     
 [x]?  [facility]: Machine Serial #1: [id number] Serial [id number]   
  
 []? MI: Machine Serial #2: [id number] Serial [id number]    
 Alarm Test: [x]? Pass  Time__1329______ [x]? RO/Machine Log Complete    [x]? Extracorporeal circuit Tested for integrity Dialyzer_[id number]_________   Tubing___19C18-9________ Dialysate: pH__7.4_  Temp.__37___Conductivity: Meter __14.3__ HD Machine__14.2_ CHLORINE TESTING- BEFORE EACH TREATMENT AND EVERY 4 HOURS Total Chlorine: [x]? Less than 0.1 ppm Time:_1440____2nd Check Time:__NA____ 
(If greater than 0.1 ppm from Primary then every 30 minutes from Secondary) TREATMENT INIATION-WITH DIALYSIS PRECAUTIONS [x]? All Connections Secured   [x]? Saline Line Double Clamped    [x]? Venous Parameters Set [x]? Arterial Parameters Set    [x]? Prime Given 250 ml    
[x]? Air Foam Detector Engaged PRE-TREATMENT  
UF Calculations: Wt to lose:__1000__ml(+) Oral:_0_ml(+)IV Meds/Fluids/Blood prods_0__ml(+) Prime/Rinse__500_ml(=)Total UF Goal__1500__mL Scale Type:[x]? Bed scale []? Sling Scale []? Wheel Chair Scale []? Not Ordered []? []? Unable to obtain pt on stretcher/ bed scale malfunctioning Tx Initiation Note: RECEIVED REPORT. PATIENT ALERT AND ORIENTED. VITAL SIGNS WNL. NAD. ALL QUESTIONS ANSWERED WITH PATIENT  SAFETY CHECKS COMPLETE. TIME OUT COMPLETE. TREATMENT INITIATED VIA _[facility] TD____. NO CONCERNS NOTED. [x]? Time Out/Safety Check  Time:__1414___ INTRADIALYTIC MONITORING 
(SEE ATTACHED FLOWSHEET) 
   
POST TREATMENT Time Medication Dose Volume Route Initials NA            
             
             
             
             
[facility] Signatures Title Initials Time KEVIN CARDENAS RN PAP  1908  
         
         
Dialyzer cleared: [x]? Good []? Fair []? Poor Blood Volume Processed _68.4__L Net UF Removed __1500__mL Post Tx Access: AVF/AVG: Bleeding Stop Time      Art. NA_min Adolfo.__NA_min []? +bruit/thrill Catheter: Locking Solution  [x]? Heparin 1 ml/1000 units []? Normal Saline Art.__2.1___ ml Adolfo.__2.2___ml [x]? New caps placed Post Assessment:   
          Skin: [x]? Warm [x]? Dry []? Diaphoretic []? Flushed []? Pale []? Cyanotic Lungs: [x]? Clear []? Coarse []? Crackles []? Wheezing Cardiac: []? Regular [x]? Irregular  []? Monitored rhythm_AFIB___ []?N/A Edema: [x]? None []? General []? Facial []? Pedal  []?UE []?LE []? RIGHT []? LEFT Pain: [x]?0 []?1 []?2 []?3 []?4 []?5 []?6 []?7 []?8 []?9 []?10 POST Tx Note:TREATMENT COMPLETED. ALL POSSIBLE BLOOD RETURNED. PT TOLERATED WELL. VITAL SIGNS WNL  FOR PATIENT. NAD NOTED. DIALYSIS CATHETER DE ACCESSED, FLUSHED AND LOCKED. STERILE CAPS APPLIED. REPORT GIVEN WITH OPPORTUNITY TO ADDRESS ANY CONCERNS OR QUESTTIONS AND PATIENT STAYS ROOM IN BED WITHOUT DISTRESS OR ACUTE DISCOMFORT. BED LOWED, CALL BELL WITHIN REACH . 
   
Primary Nurse Report: First initial/Last name/Title Post Dialysis:___Tess Puente RN________         Time:_____1900___________ Abbreviations: AVG-arterial venous graft, AVF-arterial venous fistula, IJ-Internal Jugular, Subcl-Subclavian, Fem-Femoral, Tx-treatment, AP/HR-apical heart rate, DFR-dialysate flow rate, BFR-blood flow rate, AP-arterial pressure, -venous pressure, UF-ultrafiltrate, TMP-transmembrane pressure, Adolfo-Venous, Art-Arterial, RO-Reverse Osmosis

## 2019-12-02 NOTE — PROGRESS NOTES
Palliative medicine team members, Leah Martinez and this writer saw Mrs. Ozzy Rodriguez for follow up. She was sitting up in bed with her sister Tobias Pierre, and daughter, Charlotte Randall, at bedside. She stated she was feeling \"a little stronger today\". She also told us that her appetite has been \"getting better\" and that she had eaten \"a good bit\" of her breakfast. Family confirmed this. Mrs. Ozzy Rodriguez stated \"my arms are getting stronger but my legs still feel so weak. Daughter, Charlotte aRndall, shared plan was still to return to rehab but that they hoped to stay in the hospital \"at least until she is up on her feet\". Family is waiting to speak with Dr. Helena Snow and Care Manager, james, to get more information about discharge. PM team will continue to follow for support.  
 
Martin Chong RN

## 2019-12-02 NOTE — PROGRESS NOTES
Pharmacy Dosing Services: Warfarin Consult for Warfarin Dosing by Pharmacy by Dr. Adams Phelan Consult provided for this 80 y.o.  female , for indication of Atrial Fibrillation. Day of Therapy 7 Dose to achieve an INR goal of 2-3 INR = 5.2 Warfarin dose HELD 12/2/19 for supra-therapeutic INR No signs of bleeding reported by RN Significant drug interactions: amiodarone LABS   
PT/INR Lab Results Component Value Date/Time INR 5.2 (HH) 12/02/2019 01:20 AM  
  
Platelets Lab Results Component Value Date/Time PLATELET 961 25/61/8542 01:20 AM  
  
H/H Lab Results Component Value Date/Time HGB 10.2 (L) 12/02/2019 01:20 AM  
  
 
Warfarin Administrations (last 168 hours) Date/Time Action Medication Dose  
 11/28/19 1806 Given  
 warfarin (COUMADIN) tablet 2 mg 2 mg  
 11/27/19 1809 Given  
 warfarin (COUMADIN) tablet 5 mg 5 mg Pharmacy to follow daily and will provide subsequent Warfarin dosing based on clinical status. ALBER Fried  Contact information [phone]

## 2019-12-02 NOTE — PROGRESS NOTES
Problem: Dysphagia (Adult) Goal: *Acute Goals and Plan of Care (Insert Text) Description Recommendations: 
Diet: Puree/thin Meds: Crushed in puree Aspiration Precautions Oral Care TID Feeding assistance Small bites/sips, slow rate Goals:  Patient will: 1. Tolerate PO trials with 0 s/s overt distress in 4/5 trials - goal met 2. Utilize compensatory swallow strategies/maneuvers (decrease bite/sip, size/rate, alt. liq/sol) with min cues in 4/5 trials - goal met 3. Perform oral-motor/laryngeal exercises to increase oropharyngeal swallow function with min cues - goal N/A 
4. Complete an objective swallow study (i.e., MBSS) to assess swallow integrity, r/o aspiration, and determine of safest LRD, min A - goal N/A 
   
12/2/2019 1013 by Donnell Orellana, SLP Outcome: Resolved/Met SPEECH LANGUAGE PATHOLOGY DYSPHAGIA TREATMENT & DISCHARGE Patient: Disha John (59 y.o. female) Date: 12/2/2019 Diagnosis: Dyspnea [R06.00] Dyspnea [R06.00] A-fib ([address] Precautions: Aspiration Fall PLOF: Independent ASSESSMENT: 
Followed up with dysphagia intervention. Pt A&Ox4 with sister at bedside. Patient reports self-feeding puree/thin liquid breakfast with no difficulty. Observed self-feeding thin liquid + straw and puree with 0 overt s/sx of aspiration with all consistencies. Mildly delayed oral bolus manipulation, delayed A-P transit and delayed swallow initiation with puree, however 100% oral clearance appreciated given increased time and thin liquid wash as needed. Recommend patient continue puree/thin liquid diet with aspiration precautions, feeding assistance, small sips/bites. Pills should be presented crushed in puree or as tolerated.  Further educated pt on aspiration precautions and importance of compensatory swallow techniques to decrease aspiration risk (decrease rate of intake & sip/bite size, upright @HOB for all po intake and ~30 minutes after po); verbalized comprehension. Maximum therapeutic gains met; safest, least restrictive diet achieved in current in-patient/acute setting. Patient is on baseline diet at this time. Accordingly, SLP to d/c intervention at this time. Progression toward goals: 
[x]         Improving appropriately - goals met/approximated 
[]         Not making progress/Not appropriate - will d/c POC PLAN: 
Recommendations and Planned Interventions: 
Maximum therapeutic gains met; safest, least restrictive diet achieved. D/C ST intervention at this time. Discharge Recommendations:  None SUBJECTIVE:  
Patient stated I'm feeling better every day\". OBJECTIVE:  
Cognitive and Communication Status: 
Neurologic State: Alert Orientation Level: Oriented X4 Cognition: Appropriate for age attention/concentration, Follows commands Perception: Appears intact Perseveration: No perseveration noted Safety/Judgement: Awareness of environment Dysphagia Treatment: 
Oral Assessment: 
Oral Assessment Labial: Impaired coordination, Decreased rate Dentition: Edentulous Oral Hygiene: Poor Lingual: Decreased rate, Decreased strength, Incoordinated Velum: No impairment Mandible: No impairment P.O. Trials: 
 Patient Position: EUU 46 Vocal quality prior to P.O.: Low volume Consistency Presented: Thin liquid, Puree How Presented: SLP-fed/presented, Spoon, Straw Bolus Acceptance: No impairment Bolus Formation/Control: Impaired Type of Impairment: Delayed, Mastication Propulsion: Delayed (# of seconds), Lingual tremors, Tongue pumping Oral Residue: Lingual, Less than 10% of bolus Initiation of Swallow: Delayed (# of seconds) Laryngeal Elevation: Decreased Aspiration Signs/Symptoms: Strong cough Pharyngeal Phase Characteristics: No impairment, issues, or problems Effective Modifications: Small sips and bites Cues for Modifications: Moderate Oral Phase Severity: Moderate Pharyngeal Phase Severity : Mild PAIN: 
Pain level pre-treatment: 0/10 Pain level post-treatment: 0/10 After treatment:  
[]              Patient left in no apparent distress sitting up in chair 
[x]              Patient left in no apparent distress in bed 
[x]              Call bell left within reach [x]              Nursing notified 
[x]              Caregiver present 
[]              Bed alarm activated COMMUNICATION/EDUCATION:  
[x] Aspiration precautions; swallow safety; compensatory techniques []        Patient unable to participate in education; education ongoing with staff [x]  Posted safety precautions in patient's room. [] Oral-motor/laryngeal strengthening exercises Thank you for this referral, Cm Martin, SLP Time Calculation: 15 mins

## 2019-12-02 NOTE — PROGRESS NOTES
Nephrology Progress/HD Note Subjective:  
 
Kasey Eden is a 80 y.o. WF with a PMHX of CHF, COPD who was recently admitted to THE [facility] with newly dx Afib with RVR and also started on HD for worsening kidney functionof came back today from SNF with worsening SOB. She was d/sandra on 11/22 and supposed to have her first out pt HD yesterday (11/25) at [facility] PW unit, but she was not able to do it due to generalized weakness. During initial evaluation in the ER today, she was hypoxic, placed on Bipap. Her BP is low (SBP 's) and is in RVR (110-140's). Lab showed Cr 4.3, K 4.1 and CO2 29. CxR showed pulm congestion. Pt is currently DNR/I. However she wants to pursue dialysis at least for now. Stable overnight. Seen on HD, no cp/sob. Admit Date: 11/26/2019 Principal Problem: 
  A-fib ([facility] (11/8/2019) Active Problems: 
  Benign essential hypertension (10/25/2012) Overview: Last Assessment & Plan:  
    Blood pressure controlled on current regimen of Coreg 12.5 mg twice daily,  
    furosemide 40 mg every other day, Aldactone 25 mg daily. Continue  
    low-sodium diet. Will check electrolytes and renal function with today's  
    labs. [facility] (4/15/2019) Overview: Last Assessment & Plan:  
    Appears to be well compensated presently. Her weight is now down to 133  
    on our scales and the patient tells me that she currently weighs 130 lb on  
    her scale. I believe that this is probably a good dry weight for her so  
    she may continue to rely on her scale and shoot for a dry weight of 130 lb  
    as her baseline. If she starts to drift upward with her weight or notices  
    puffiness or swelling in her legs or shortness of breath she is to start  
    taking her furosemide every day and call us. Otherwise continue current  
    dose of furosemide 40 mg every other day.   Continue Aldactone 25 mg daily,  
 continue Coreg 12.5 mg b.i.d. continue low sodium diet. Will check  
    metabolic profile today to reassess serum electrolytes and renal function  
    given that she is on diuretic therapy. Hypothyroidism (10/25/2012) Overview: Last Assessment & Plan: Most recent thyroid function studies done on 04/15/2019 show TSH and free T4 of 3.52 and 0.93 respectively. Notably she is not on thyroid  
    supplement so there is no current evidence that she is truly hypothyroid. Will continue to follow this periodically. ESRD needing dialysis ([facility] (11/19/2019) Dyspnea (11/26/2019) Debility (11/28/2019) Current Facility-Administered Medications Medication Dose Route Frequency  magic mouthwash (FIRST-MOUTHWASH BLM) oral suspension 5 mL  5 mL Oral Q4H PRN  
 heparin (porcine) 1,000 unit/mL injection 4,300 Units  4,300 Units Hemodialysis DIALYSIS PRN  pantoprazole (PROTONIX) 40 mg in 0.9% sodium chloride 10 mL injection  40 mg IntraVENous DAILY  amiodarone (CORDARONE) tablet 200 mg  200 mg Oral DAILY  aspirin delayed-release tablet 81 mg  81 mg Oral DAILY  atorvastatin (LIPITOR) tablet 40 mg  40 mg Oral DAILY  dilTIAZem (CARDIZEM) IR tablet 30 mg  30 mg Oral TIDAC  PARoxetine (PAXIL) tablet 20 mg  20 mg Oral DAILY  ergocalciferol capsule 50,000 Units  50,000 Units Oral Q7D  Warfarin-Pharmacy to dose  1 Each Other Rx Dosing/Monitoring  acetaminophen (TYLENOL) solution 650 mg  650 mg Oral Q6H PRN Allergy: Allergies Allergen Reactions  Penicillins Hives  Valium [Diazepam] Other (comments) It made me cry more Objective:  
 
Visit Vitals BP 90/65 Pulse 69 Temp 97.8 °F (36.6 °C) Resp 16 Ht 4' 9\" (1.448 m) Wt 64.9 kg (143 lb) SpO2 100% BMI 30.94 kg/m² No intake or output data in the 24 hours ending 12/02/19 1226 Physical Exam:  
 
 
General: No acute distress HENT: Atraumatic and normocephalic;   
 Eyes: Normal conjunctiva Neck: Supple, No JVD or mass Cardiovascular: Normal S1 & S2, no m/r/g Pulmonary/Chest Wall: Clear to auscultation bilaterally Abdominal: Soft and non-tender, has NABS Musculoskeletal: No ankle edema Neurological: No focal deficits Access: No problem on dialysis Data Review: 
Lab Results Component Value Date/Time Sodium 138 12/02/2019 01:20 AM  
 Potassium 3.9 12/02/2019 01:20 AM  
 Chloride 103 12/02/2019 01:20 AM  
 CO2 30 12/02/2019 01:20 AM  
 Anion gap 5 12/02/2019 01:20 AM  
 Glucose 98 12/02/2019 01:20 AM  
 BUN 38 (H) 12/02/2019 01:20 AM  
 Creatinine 2.45 (H) 12/02/2019 01:20 AM  
 BUN/Creatinine ratio 16 12/02/2019 01:20 AM  
 GFR est AA 23 (L) 12/02/2019 01:20 AM  
 GFR est non-AA 19 (L) 12/02/2019 01:20 AM  
 Calcium 8.2 (L) 12/02/2019 01:20 AM  
 
Lab Results Component Value Date/Time WBC 7.0 12/02/2019 01:20 AM  
 HGB 10.2 (L) 12/02/2019 01:20 AM  
 HCT 33.7 (L) 12/02/2019 01:20 AM  
 PLATELET 593 54/61/3281 01:20 AM  
 .0 (H) 12/02/2019 01:20 AM  
 
Lab Results Component Value Date/Time Calcium 8.2 (L) 12/02/2019 01:20 AM  
 Phosphorus 3.7 11/28/2019 04:10 AM  
 
Lab Results Component Value Date/Time Iron 11 (L) 11/16/2019 09:10 AM  
 TIBC 271 11/16/2019 09:10 AM  
 Iron % saturation 4 11/16/2019 09:10 AM  
 Ferritin 395 (H) 11/16/2019 09:10 AM  
 
Lab Results Component Value Date/Time Ferritin 395 (H) 11/16/2019 09:10 AM  
 
 
 
Impression:  
 
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 MWF schedule at [facility] prior to admission 
-SOB with pulm edema, improving 
-Hypotension w/ afib/RVR, improved 
-Rt sided HF with Pulm HTN  
-Anemia of chronic disease, + Fe def. S/p prbc -Hypokalemia, resolved 
-Recent onset atrial fibrillation w/ RVR 
-Coagulopathy/warfarin induced,  
-Rt IJ acute DVT,  on anticoagulation  
-DNR/I 
-Deconditioning Plan: - Dialysis today for clearance - use current access. Goal UF 2L if tolerated - Cont DEBORAH w/ HD 
- HR and RVR control per team 
- encourage po intake - Cont Na and fluid restriciton 1200ml/day - Avoid ACEI/ARBs, NSAIDs or IV contrast, and other nephrotoxic meds - Pending d/c to rehab 
- d/w HD staff Getachew Bhatti MD 
VIA [facility] [phone]

## 2019-12-02 NOTE — PROGRESS NOTES
Hospitalist Progress Note Patient: Michaela Mathew MRN: [id number]  [facility]: [id number] YOB: 1935  Age: 80 y.o. Sex: female DOA: 11/26/2019 LOS:  LOS: 6 days Assessment/Plan Patient Active Problem List  
Diagnosis Code  Respiratory failure (HCC) J96.90  
 Heart failure (HCC) I50.9  Benign essential hypertension I10  Chronic diastolic congestive heart failure (HCC) I50.32  
 Cobalamin deficiency E53.8  Hypercholesterolemia E78.00  Hypertensive heart disease I11.9  Hypothyroidism E03.9  Iron deficiency anemia D50.9  Stage 4 chronic kidney disease ([facility] N18.4  A-fib (HCC) I48.91  
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  
 ESRD needing dialysis ([facility]) N18.6, Z99.2  Dyspnea R06.00  Debility R50.80  
  
 
81 yo female admitted for SOB, hypoxia. Feels better. Acute respiratory failure - secondary to CHF and volume overload from noncompliance with HD. now resolved, on oxygen by NC. 
  
 
 
Acute on chronic diastolic CHF-  
fluid removal with dialysis. Last echo 11/10/2019 with low normal systolic function EF of 51 to 55%. A. fib with RVR- 
continue with amiodarone and Cardizem. Pharmacy to dose Coumadin 
  
Heme/onc - Follow H&H, plts. INR. Coumadin coagulopathy - Pharmacy to dose Coumadin. 
  
Renal - Trend BUN, Cr, follow I/O, Check and replace Mg, K, phos. ESRD-HD per nephrology. PT/OT Long discussion with patient, daughter and sister at bedside. Disposition : 1-2 days Review of systems General: No fevers or chills. Cardiovascular: No chest pain or pressure. No palpitations. Pulmonary: No shortness of breath. Gastrointestinal: No nausea, vomiting. Physical Exam: 
General: Awake, cooperative, no acute distress   
HEENT: NC, Atraumatic. PERRLA, anicteric sclerae. Lungs: CTA Bilaterally. No Wheezing/Rhonchi/Rales. Heart:  S1 S2,  No murmur, No Rubs, No Gallops Abdomen: Soft, Non distended, Non tender.  +Bowel sounds, Extremities: No c/c/e Psych:   Not anxious or agitated. Neurologic:  No acute neurological deficit. Vital signs/Intake and Output: 
Visit Vitals /75 Pulse 91 Temp 97.8 °F (36.6 °C) Resp 16 Ht 4' 9\" (1.448 m) Wt 64.9 kg (143 lb) SpO2 100% BMI 30.94 kg/m² Current Shift:  No intake/output data recorded. Last three shifts:  No intake/output data recorded. Labs: Results:  
   
Chemistry Recent Labs 12/02/19 0120 12/01/19 0125 11/30/19 
7513 GLU 98 100* 101*  140 140  
K 3.9 3.3* 4.0  
 102 107 CO2 30 32 28 BUN 38* 22* 39* CREA 2.45* 1.66* 2.50* CA 8.2* 8.2* 7.7* AGAP 5 6 5 BUCR 16 13 16 CBC w/Diff Recent Labs 12/02/19 0120 12/01/19 0125 11/30/19 
3318 WBC 7.0 7.5 6.6  
RBC 3.37* 3.59* 3.37* HGB 10.2* 10.7* 10.1* HCT 33.7* 35.8 33.6*  
 167 169 GRANS 76* 76*  --   
LYMPH 12* 12*  --   
EOS 3 2  --   
  
Cardiac Enzymes No results for input(s): CPK, CKND1, CUONG in the last 72 hours. No lab exists for component: Aneta Proctor Coagulation Recent Labs 12/02/19 0120 12/01/19 0125 PTP 47.6* 43.6* INR 5.2* 4.7* Lipid Panel No results found for: CHOL, CHOLPOCT, CHOLX, CHLST, CHOLV, 170711, HDL, HDLP, LDL, LDLC, DLDLP, 935896, VLDLC, VLDL, TGLX, TRIGL, TRIGP, TGLPOCT, CHHD, CHHDX  
BNP No results for input(s): BNPP in the last 72 hours. Liver Enzymes No results for input(s): TP, ALB, TBIL, AP, SGOT, GPT in the last 72 hours. No lab exists for component: DBIL Thyroid Studies Lab Results Component Value Date/Time TSH 2.53 11/08/2019 01:25 PM  
    
Procedures/imaging: see electronic medical records for all procedures/Xrays and details which were not copied into this note but were reviewed prior to creation of Plan

## 2019-12-02 NOTE — ROUTINE PROCESS
0700: received bedside shift report from [address] (offgoing nurse) and assumed care of the pt. Updated white board, spoke briefly with family, pt sleeping, left bed in lowest position and call light within reach. 0900: family stated that pt ate a good amount of her breakfast, took her morning medications well in applesauce, all that could be crushed were crushed. Pt resting comfortably in bed. 
 
1400: pt being dialyzed, magic mouthwash was administered after lunch before dialysis at the request of the pt to ease the discomfort in her mouth. 1900: Shift summary, shift uneventful, no complaints of chest pain or shortness of breath.   
 
Alma Saeed RN

## 2019-12-02 NOTE — PROGRESS NOTES
Transition of care: anticipate snf tomorrow Met with patient and her family at bedside. Including her sister and daughter. Patient does not want to leave the hospital until she is IADL's. Explained to patient that would be the purpose of rehab or pt. States she thought that she would stay in the hospital until she was independent again. Patient does agree and choose to return to [facility]. Cm has called in the authorization to [facility] and cms will fax clinicals as requested. family has requested for cm to set up transportation for the first day of HD. Patient has already had a UAI done. Patient will be going to HD at [facility] on MWF. Patient has Dr. Rosita Cuello for pcp. Cm has called [employer] to find out where they are with the medicaid applicationis aware if patient continues to want HD family will be responsible fo transportation to and from HD Transition of Care Plan:  
 
Communication to Patient/Family: Met with patient and family, and they are agreeable to the transition plan. The Plan for Transition of Care is related to the following treatment goals:snf for rehab and HD as out patient The Patient and/or patient representative  Daughter  was provided with a choice of provider and agrees  
with the discharge plan. Yes [x] No [] Freedom of choice list was provided with basic dialogue that supports the patient's individualized plan of care/goals and shares the quality data associated with the providers. Yes [x] No [] SNF/Rehab Transition: 
Patient has been accepted to [facility] at [address] [facility], [address] [place] [address] for SNF and meets criteria for admission. Patient will transported by medical transportand expected to leave at [facility] once has authorization Communication to SNF/Rehab: 
Bedside RN,  has been notified to update the transition plan to the facility and call report [phone]. Discharge information has been updated on the AVS and communicated through [facility] or [facility]. Discharge instructions to be fax'd to facility at [phone] Please include all hard scripts for controlled substances, med rec and dc summary, and AVS in packet. Please medicate for pain prior to dc if possible and needed to help offset delay when patient first arrives to facility. Reviewed and confirmed with facility, [facility] can manage the patient care needs for the following:  
 
Min Lina[place] with (X) only those applicable: 
Medication: 
[]Medications are available at the facility []IV Antibiotics []Controlled Substance  hard copies available sent. []Weekly Labs Equipment: 
[]CPAP/BiPAP []Wound Vacuum []Dong or Urinary Device []PICC/Central Line []Nebulizer []Ventilator Treatment: 
[]Isolation (for MRSA, VRE, etc.) []Surgical Drain Management []Tracheostomy Care 
[]Dressing Changes []Dialysis with transportation []PEG Care []Oxygen []Daily Weights for Heart Failure Dietary: 
[]Any diet limitations []Tube Feedings []Total Parenteral Management (TPN) Financial Resources: 
[]Medicaid Application Completed [x]UAI Completed and copy given to pt/family. Upon last admission and sfnc has a copy[]A screening has previously been completed. []Level II Completed 
 
[] Private pay individual who will not become  
financially eligible for Medicaid within 6 months from admission to a [address] [place] [address]. [] Individual refused to have screening conducted. []Medicaid Application Completed []The screening denied because it was determined individual did not need/did not qualify for nursing facility level of care. [] Out of state residents seeking direct admission to a [address] facility. [] Individuals who are inpatients of an out of state hospital, or in state or out of state veterans/ hospital and seek direct admission to a [address] facility [] Individuals who are pateints or residents of a state owned/operated facility that is licensed by Department of [employer] ([employer]) and seek direct admission to [address] [place] [address] facility [] A screening not required for enrollment in [facility] services as set out in 12 VAC 30- [] [facility]) staff shall perform screenings of the [facility] clients. Advanced Care Plan: 
[]Surrogate Decision Maker of Care 
[]POA []Communicated Code Status and copy sent. Other:

## 2019-12-02 NOTE — PROGRESS NOTES
Bedside and Verbal shift change report received from JAIMIE Euceda RN (offgoing nurse). Report included the following information SBAR, Kardex, Intake/Output, MAR and Recent Results. 2015 Shift assessment completed, see EMR. Family at bedside. 0040 Reassessment completed, see EMR 
 
0345 Reassessment completed, see EMR Bedside and Verbal shift change report given to HANY Chen RN (oncoming nurse) . Report included the following information SBAR, Kardex, Intake/Output, MAR and Recent Results.

## 2019-12-02 NOTE — PROGRESS NOTES
PT session held due to: 
[]  Nausea/vomiting 
[]  RN Communication/ suggestion 
[]  Extreme Pain 
[x]  Dialysis treatment in progress. Will f/u tomorrow. Thank you.  
Saeid Mark, PTA

## 2019-12-03 NOTE — ROUTINE PROCESS
0700: received bedside shift report from Tejas Hawkins RN (offgoing) nurse and assumed care of the pt. Updated white board, assessed all current needs, left bed in lowest position and call light within reach. 1230: called report to Greg Valle, spoke with Maicol Cloud RN and gave a full SBAR report with most recent set of vitals and review of meds. Transportation set up for 1230, Shift summary, shift uneventful, no complaints of chest pain or shortness of breath.   
 
Olinda Figueroa RN

## 2019-12-03 NOTE — PROGRESS NOTES
Nephrology Progress/HD Note Subjective:  
 
Kasey Eden is a 80 y.o. WF with a PMHX of CHF, COPD who was recently admitted to [facility] with newly dx Afib with RVR and also started on HD for worsening kidney functionof came back today from SNF with worsening SOB. She was d/sandra on 11/22 and supposed to have her first out pt HD yesterday (11/25) at [facility] unit, but she was not able to do it due to generalized weakness. During initial evaluation in the ER today, she was hypoxic, placed on Bipap. Her BP is low (SBP 's) and is in RVR (110-140's). Lab showed Cr 4.3, K 4.1 and CO2 29. CxR showed pulm congestion. Pt is currently DNR/I. However she wants to pursue dialysis at least for now. Stable overnight. S/p HD yesterday, feels better today Admit Date: 11/26/2019 Principal Problem: 
  A-fib ([facility] (11/8/2019) Active Problems: 
  Benign essential hypertension (10/25/2012) Overview: Last Assessment & Plan:  
    Blood pressure controlled on current regimen of Coreg 12.5 mg twice daily,  
    furosemide 40 mg every other day, Aldactone 25 mg daily. Continue  
    low-sodium diet. Will check electrolytes and renal function with today's  
    labs. [facility] (4/15/2019) Overview: Last Assessment & Plan:  
    Appears to be well compensated presently. Her weight is now down to 133  
    on our scales and the patient tells me that she currently weighs 130 lb on  
    her scale. I believe that this is probably a good dry weight for her so  
    she may continue to rely on her scale and shoot for a dry weight of 130 lb  
    as her baseline. If she starts to drift upward with her weight or notices  
    puffiness or swelling in her legs or shortness of breath she is to start  
    taking her furosemide every day and call us. Otherwise continue current  
    dose of furosemide 40 mg every other day.   Continue Aldactone 25 mg daily,  
 continue Coreg 12.5 mg b.i.d. continue low sodium diet. Will check  
    metabolic profile today to reassess serum electrolytes and renal function  
    given that she is on diuretic therapy. Hypothyroidism (10/25/2012) Overview: Last Assessment & Plan: Most recent thyroid function studies done on 04/15/2019 show TSH and free T4 of 3.52 and 0.93 respectively. Notably she is not on thyroid  
    supplement so there is no current evidence that she is truly hypothyroid. Will continue to follow this periodically. ESRD needing dialysis ([facility] (11/19/2019) Dyspnea (11/26/2019) Debility (11/28/2019) Current Facility-Administered Medications Medication Dose Route Frequency  pantoprazole (PROTONIX) tablet 40 mg  40 mg Oral ACB  magic mouthwash (FIRST-MOUTHWASH BLM) oral suspension 5 mL  5 mL Oral Q4H PRN  
 heparin (porcine) 1,000 unit/mL injection 4,300 Units  4,300 Units Hemodialysis DIALYSIS PRN  
 amiodarone (CORDARONE) tablet 200 mg  200 mg Oral DAILY  aspirin delayed-release tablet 81 mg  81 mg Oral DAILY  atorvastatin (LIPITOR) tablet 40 mg  40 mg Oral DAILY  dilTIAZem (CARDIZEM) IR tablet 30 mg  30 mg Oral TIDAC  PARoxetine (PAXIL) tablet 20 mg  20 mg Oral DAILY  ergocalciferol capsule 50,000 Units  50,000 Units Oral Q7D  Warfarin-Pharmacy to dose  1 Each Other Rx Dosing/Monitoring  acetaminophen (TYLENOL) solution 650 mg  650 mg Oral Q6H PRN Allergy: Allergies Allergen Reactions  Penicillins Hives  Valium [Diazepam] Other (comments) It made me cry more Objective:  
 
Visit Vitals /52 (BP 1 Location: Left arm, BP Patient Position: At rest) Pulse (!) 101 Temp 98.3 °F (36.8 °C) Resp 18 Ht 4' 9\" (1.448 m) Wt 64.9 kg (143 lb) SpO2 99% BMI 30.94 kg/m² Intake/Output Summary (Last 24 hours) at 12/3/2019 1835 Last data filed at 12/2/2019 1820 Gross per 24 hour Intake  Output 1000 ml Net -1000 ml Physical Exam:  
 
 
General: No acute distress HENT: Atraumatic and normocephalic;   
Eyes: Normal conjunctiva Neck: Supple, No JVD or mass Cardiovascular: Normal S1 & S2, no m/r/g Pulmonary/Chest Wall: Clear to auscultation bilaterally Abdominal: Soft and non-tender, has NABS Musculoskeletal: No ankle edema Neurological: No focal deficits Access: No problem on dialysis Data Review: 
Lab Results Component Value Date/Time Sodium 138 12/02/2019 01:20 AM  
 Potassium 3.9 12/02/2019 01:20 AM  
 Chloride 103 12/02/2019 01:20 AM  
 CO2 30 12/02/2019 01:20 AM  
 Anion gap 5 12/02/2019 01:20 AM  
 Glucose 98 12/02/2019 01:20 AM  
 BUN 38 (H) 12/02/2019 01:20 AM  
 Creatinine 2.45 (H) 12/02/2019 01:20 AM  
 BUN/Creatinine ratio 16 12/02/2019 01:20 AM  
 GFR est AA 23 (L) 12/02/2019 01:20 AM  
 GFR est non-AA 19 (L) 12/02/2019 01:20 AM  
 Calcium 8.2 (L) 12/02/2019 01:20 AM  
 
Lab Results Component Value Date/Time WBC 7.0 12/02/2019 01:20 AM  
 HGB 10.2 (L) 12/02/2019 01:20 AM  
 HCT 33.7 (L) 12/02/2019 01:20 AM  
 PLATELET 307 59/53/2974 01:20 AM  
 .0 (H) 12/02/2019 01:20 AM  
 
Lab Results Component Value Date/Time Calcium 8.2 (L) 12/02/2019 01:20 AM  
 Phosphorus 3.7 11/28/2019 04:10 AM  
 
Lab Results Component Value Date/Time Iron 11 (L) 11/16/2019 09:10 AM  
 TIBC 271 11/16/2019 09:10 AM  
 Iron % saturation 4 11/16/2019 09:10 AM  
 Ferritin 395 (H) 11/16/2019 09:10 AM  
 
Lab Results Component Value Date/Time Ferritin 395 (H) 11/16/2019 09:10 AM  
 
 
 
Impression:  
 
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 MWF schedule at [facility] prior to admission 
-SOB with pulm edema, improving 
-Hypotension w/ afib/RVR, improved 
-Rt sided HF with Pulm HTN  
-Anemia of chronic disease, + Fe def. S/p prbc -Hypokalemia, resolved 
-Recent onset atrial fibrillation w/ RVR 
-Coagulopathy/warfarin induced,  
 -Rt IJ acute DVT,  on anticoagulation  
-DNR/I 
-Deconditioning Plan: - Dialysis tomorrow for clearance 
- Cont DEBORAH w/ HD 
- HR and RVR control per team 
- encourage po intake - Cont Na and fluid restriction 1200ml/day - Avoid ACEI/ARBs, NSAIDs or IV contrast, and other nephrotoxic meds - Pending d/c to rehab 
 
Vinay Bell MD 
VIA [facility] [phone]

## 2019-12-03 NOTE — PROGRESS NOTES
Received confirmation from [address]on from [facility] that she has got authorization for patient to go to [facility] today. Re # [id number]. Layo has sent email to Ashly Ross at [facility] admissions nurse that we do have authorization  And sent her the reference number. Cm met with patient and her sister and informed them that cm has authorization for her to go to their  foc at [facility]. They asked for cm to set up transportation for the first day of dialysis. Albaro Tay has set up transportation as requested  Also informed them cm has spoken with code[employer] and they have been trying since yesterday to get in touch with medicaid to see if she has been approved for medicaid. Cm spoke with  
 
1235 cm has spoken to [employer] she still has no response from medicaid yet. [address] [facility] [facility] telephone call from [address] [facility] she informed cm that patient does have a  assigned ms licona [phone] cm also informed patients daughter of this however no information yet on medicaid Transition of Care Plan:  
 
Communication to Patient/Family: Met with patient and family, and they are agreeable to the transition plan. The Plan for Transition of Care is related to the following treatment goals: patient to go to rehab today Patient and family and cm met with Dr. Angelica Sanchez at [phone] they are in agreement with d/c. They are aware that they are responsible for transporting patient to and from HD The Patient and/or patient representative sister was provided with a choice of provider and agrees  
with the discharge plan. Yes [x] No [] Freedom of choice list was provided with basic dialogue that supports the patient's individualized plan of care/goals and shares the quality data associated with the providers. Yes [x] No [] SNF/Rehab Transition: 
Patient has been accepted to [facility] at [address] [facility] for SNF and meets criteria for admission.   
Patient will transported by medical transport and expected to leave ( Pastor Johns   11:30transportation to and from HD for tomorrow for 1030 at at [address] [facility] Communication to SNF/Rehab: 
Bedside RN, Lisa,  has been notified to update the transition plan to the facility and call report [phone]. Discharge information has been updated on the AVS and communicated through [facility] or [facility]. Discharge instructions to be fax'd to facility at [phone] Please include all hard scripts for controlled substances, med rec and dc summary, and AVS in packet. Please medicate for pain prior to dc if possible and needed to help offset delay when patient first arrives to facility. Reviewed and confirmed with facility, [facility] can manage the patient care needs for the following:  
 
Azalia Jamison with (X) only those applicable: 
Medication: 
[]Medications are available at the facility []IV Antibiotics []Controlled Substance  hard copies available sent. []Weekly Labs Equipment: 
[]CPAP/BiPAP []Wound Vacuum []Dong or Urinary Device []PICC/Central Line []Nebulizer []Ventilator Treatment: 
[]Isolation (for MRSA, VRE, etc.) []Surgical Drain Management []Tracheostomy Care 
[]Dressing Changes []Dialysis with transportation []PEG Care []Oxygen []Daily Weights for Heart Failure Dietary: 
[]Any diet limitations []Tube Feedings []Total Parenteral Management (TPN) Financial Resources: 
[]Medicaid Application Completed [x]UAI Completed and copy given to pt/family. []A screening has previously been completed. []Level II Completed 
 
[] Private pay individual who will not become  
financially eligible for Medicaid within 6 months from admission to a [address] [place] [address]. [] Individual refused to have screening conducted. []Medicaid Application Completed []The screening denied because it was determined individual did not need/did not qualify for nursing facility level of care. [] Out of state residents seeking direct admission to a [address] facility. [] Individuals who are inpatients of an out of state hospital, or in state or out of state veterans/ hospital and seek direct admission to a [address] facility [] Individuals who are pateints or residents of a state owned/operated facility that is licensed by Department of [employer] ([employer]) and seek direct admission to [address] facility [] A screening not required for enrollment in [facility] services as set out in 12 VAC 30- [] [facility]) staff shall perform screenings of the [facility] clients. Advanced Care Plan: 
[]Surrogate Decision Maker of Care 
[]POA []Communicated Code Status and copy sent. Other:  
   
 
Care Management Interventions PCP Verified by CM: Yes Mode of Transport at Discharge: BLS Transition of Care Consult (CM Consult): SNF Partner SNF: Yes Physical Therapy Consult: Yes Occupational Therapy Consult: Yes Current Support Network: Relative's Home Confirm Follow Up Transport: Family Plan discussed with Pt/Family/Caregiver: Yes Freedom of Choice Offered: Yes Discharge Location Discharge Placement: Skilled nursing facility

## 2019-12-03 NOTE — PROGRESS NOTES
Pharmacy Dosing Services: warfarin Consult for Warfarin Dosing by Pharmacy by Dr. Norris Hsieh Consult provided for this 80 y.o.  female , for indication of Atrial Fibrillation. Day of Therapy 8 Dose to achieve an INR goal of 2-3 No Order entered for  Warfarin    to be given today at 18:00. LABS   
PT/INR Lab Results Component Value Date/Time INR 4.1 (H) 12/03/2019 02:40 AM  
  
Platelets Lab Results Component Value Date/Time PLATELET 900 47/31/7234 01:20 AM  
  
H/H Lab Results Component Value Date/Time HGB 10.2 (L) 12/02/2019 01:20 AM  
  
 
Warfarin Administrations (last 168 hours) Date/Time Action Medication Dose  
 11/28/19 1806 Given  
 warfarin (COUMADIN) tablet 2 mg 2 mg  
 11/27/19 1809 Given  
 warfarin (COUMADIN) tablet 5 mg 5 mg Pharmacy to follow daily and will provide subsequent Warfarin dosing based on clinical status. [facility])  Contact information

## 2019-12-03 NOTE — PROGRESS NOTES
Pharmacy Dosing Services: IV - PO Conversion This patient meets P & T approved criteria for conversion from IV to oral therapy for the following medication:Pantoprazole. Pt has renal diet ordered and is taking other oral medications. Order adjusted to: pantoprazole 40 mg PO daily before breakfast.   
 
(prior order:  pantoprazole 40 mg IV daily) Pharmacy will continue to monitor the patient's status. Signed ALBER Johnson Contact information:  [phone]

## 2019-12-03 NOTE — PROGRESS NOTES
Problem: Pressure Injury - Risk of 
Goal: *Prevention of pressure injury Description Document Joe Scale and appropriate interventions in the flowsheet. Outcome: Progressing Towards Goal 
Note: Pressure Injury Interventions: 
Sensory Interventions: Assess need for specialty bed, Keep linens dry and wrinkle-free, Pressure redistribution bed/mattress (bed type) Moisture Interventions: Absorbent underpads, Offer toileting Q_hr, Moisture barrier, Minimize layers, Check for incontinence Q2 hours and as needed Activity Interventions: Pressure redistribution bed/mattress(bed type), Increase time out of bed Mobility Interventions: Pressure redistribution bed/mattress (bed type), HOB 30 degrees or less Nutrition Interventions: Offer support with meals,snacks and hydration, Document food/fluid/supplement intake Friction and Shear Interventions: Apply protective barrier, creams and emollients, HOB 30 degrees or less Problem: Pain Goal: *Control of Pain Outcome: Progressing Towards Goal 
  
Problem: Falls - Risk of 
Goal: *Absence of Falls Description Document [facility] Fall Risk and appropriate interventions in the flowsheet. Outcome: Progressing Towards Goal 
Note: Fall Risk Interventions: 
Mobility Interventions: Communicate number of staff needed for ambulation/transfer, Patient to call before getting OOB, Utilize walker, cane, or other assistive device Medication Interventions: Teach patient to arise slowly, Patient to call before getting OOB Elimination Interventions: Call light in reach, Patient to call for help with toileting needs, Toileting schedule/hourly rounds Problem: Patient Education: Go to Patient Education Activity Goal: Patient/Family Education Outcome: Progressing Towards Goal

## 2019-12-03 NOTE — ROUTINE PROCESS
Bedside shift change report given to Katerin Aguirre RN (oncoming nurse) by Mahsa Alcala RN (offgoing nurse). Report included the following information SBAR, Kardex, MAR and Cardiac Rhythm afib. Visit Vitals /84 Pulse 91 Temp 97.8 °F (36.6 °C) Resp 18 Ht 4' 9\" (1.448 m) Wt 64.9 kg (143 lb) SpO2 100% BMI 30.94 kg/m² Mahsa Alcala RN

## 2019-12-03 NOTE — PROGRESS NOTES
0730: shift was unevenful. Bedside and Verbal shift change report given to Jona Gottron, RN (oncoming nurse) by Chas Tate RN (offgoing nurse). Report included the following information SBAR, Kardex, Intake/Output, MAR, Accordion, Recent Results and Med Rec Status.

## 2019-12-03 NOTE — PROGRESS NOTES
Problem: Pressure Injury - Risk of 
Goal: *Prevention of pressure injury Description Document Joe Scale and appropriate interventions in the flowsheet. Outcome: Resolved/Met Note: Pressure Injury Interventions: 
Sensory Interventions: Assess changes in LOC, Turn and reposition approx. every two hours (pillows and wedges if needed), Avoid rigorous massage over bony prominences, Discuss PT/OT consult with provider Moisture Interventions: Limit adult briefs, Absorbent underpads, Check for incontinence Q2 hours and as needed Activity Interventions: Increase time out of bed, Pressure redistribution bed/mattress(bed type), PT/OT evaluation Mobility Interventions: HOB 30 degrees or less, Pressure redistribution bed/mattress (bed type), PT/OT evaluation Nutrition Interventions: Document food/fluid/supplement intake, Offer support with meals,snacks and hydration Friction and Shear Interventions: HOB 30 degrees or less, Feet elevated on foot rest, Lift sheet Problem: Patient Education: Go to Patient Education Activity Goal: Patient/Family Education Outcome: Resolved/Met Problem: Pain Goal: *Control of Pain Outcome: Resolved/Met Problem: Patient Education: Go to Patient Education Activity Goal: Patient/Family Education Outcome: Resolved/Met Problem: Falls - Risk of 
Goal: *Absence of Falls Description Document [facility] Fall Risk and appropriate interventions in the flowsheet. Outcome: Resolved/Met Note: Fall Risk Interventions: 
Mobility Interventions: Assess mobility with egress test, Bed/chair exit alarm, Communicate number of staff needed for ambulation/transfer Medication Interventions: Assess postural VS orthostatic hypotension, Bed/chair exit alarm, Patient to call before getting OOB, Teach patient to arise slowly Elimination Interventions: Bed/chair exit alarm, Call light in reach, Patient to call for help with toileting needs, Stay With Me (per policy), Toileting schedule/hourly rounds Problem: Patient Education: Go to Patient Education Activity Goal: Patient/Family Education Outcome: Resolved/Met Problem: Patient Education: Go to Patient Education Activity Goal: Patient/Family Education Outcome: Resolved/Met Problem: Patient Education: Go to Patient Education Activity Goal: Patient/Family Education Outcome: Resolved/Met Problem: Fluid Volume - Risk of, Imbalanced Goal: *Balanced intake and output Outcome: Resolved/Met Problem: Patient Education: Go to Patient Education Activity Goal: Patient/Family Education Outcome: Resolved/Met Problem: Chronic Renal Failure Goal: *Fluid and electrolytes stabilized Outcome: Resolved/Met Problem: Patient Education: Go to Patient Education Activity Goal: Patient/Family Education Outcome: Resolved/Met Ling Garcia RN

## 2019-12-03 NOTE — DISCHARGE SUMMARY
Discharge Summary Patient: Bc Garcia MRN: [id number]  CSN: [id number] YOB: 1935  Age: 80 y.o. Sex: female DOA: 11/26/2019 LOS:  LOS: 7 days   Discharge Date:   
 
Primary Care Provider:  Dedra Somers DO Admission Diagnoses: Dyspnea [R06.00] Dyspnea [R06.00] Discharge Diagnoses:   
Problem List as of 12/3/2019 Never Reviewed Codes Class Noted - Resolved Debility ICD-10-CM: R53.81 ICD-9-CM: 799.3  11/28/2019 - Present Dyspnea ICD-10-CM: R06.00 
ICD-9-CM: 786.09  11/26/2019 - Present Weakness generalized ICD-10-CM: R53.1 ICD-9-CM: 780.79  11/19/2019 - Present Thrombosis of internal jugular vein (HCC) ICD-10-CM: I82.C19 
ICD-9-CM: 453.86  11/19/2019 - Present ESRD needing dialysis ([facility] ICD-10-CM: N18.6, Z99.2 ICD-9-CM: 585.6  11/19/2019 - Present Hyponatremia ICD-10-CM: E87.1 ICD-9-CM: 276.1  11/15/2019 - Present * ([facility]) ICD-10-CM: I48.91 
ICD-9-CM: 427.31  11/8/2019 - Present Respiratory failure ([facility] ICD-10-CM: J96.90 ICD-9-CM: 518.81  10/26/2019 - Present Chronic diastolic congestive heart failure (HCC) ICD-10-CM: I50.32 
ICD-9-CM: 428.32, 428.0  4/15/2019 - Present Overview Signed 10/26/2019  8:03 AM by Ziyad Calvo MD  
  Last Assessment & Plan:  
Appears to be well compensated presently. Her weight is now down to 133 on our scales and the patient tells me that she currently weighs 130 lb on her scale. I believe that this is probably a good dry weight for her so she may continue to rely on her scale and shoot for a dry weight of 130 lb as her baseline. If she starts to drift upward with her weight or notices puffiness or swelling in her legs or shortness of breath she is to start taking her furosemide every day and call us. Otherwise continue current dose of furosemide 40 mg every other day.   Continue Aldactone 25 mg daily, continue Coreg 12.5 mg b.i.d. continue low sodium diet. Will check metabolic profile today to reassess serum electrolytes and renal function given that she is on diuretic therapy. Iron deficiency anemia ICD-10-CM: D50.9 ICD-9-CM: 280.9  11/6/2018 - Present Overview Signed 10/26/2019  8:03 AM by Tiesha Young MD  
  Last Assessment & Plan: Will check iron profile with today's labs. Will also check ferritin level. Check CBC. Currently on iron sulfate supplement every 3rd day but may be able to come off of that. Stage 4 chronic kidney disease ([facility] ICD-10-CM: N18.4 ICD-9-CM: 585.4  8/23/2018 - Present Overview Signed 10/26/2019  8:03 AM by Tiesha Young MD  
  Last Assessment & Plan:  
Last GFR on record was 25 on 04/15/2019. Will check chemistry profile with today's labs. She is still followed by Nephrology but unfortunately missed her last appointment with them on 06/26/2019. I told her that it was important that she be seen on a regular basis by Nephrology. Currently her rescheduled appointment is on 12/26/2019. I am not certain that that long of weight will be appropriate for her but will wait and see what current labs look like. If they are stable with regards to renal function I suppose she can wait till December for her next visit. Continue current antihypertensive regimen. Hopefully with the every other day dosage Ng of Lasix she will be maintaining stable BUN and creatinine. Heart failure ([facility] ICD-10-CM: I50.9 ICD-9-CM: 428.9  7/9/2018 - Present Overview Signed 10/26/2019  8:03 AM by Tiesha Young MD  
  Last Assessment & Plan:  
She has biventricular congestive heart failure. Currently she seems well compensated with clear lungs and with excellent blood pressure. Continue with current medications as listed on the chart including Lasix 40 mg daily, Coreg 12.5 mg b.i.d. and Aldactone 25 mg daily. Low-sodium diet. Cobalamin deficiency ICD-10-CM: E53.8 ICD-9-CM: 266.2  11/20/2013 - Present Overview Signed 10/26/2019  8:03 AM by Claudia Zamora MD  
  Last Assessment & Plan:  
She is on a fairly large dose of sublingual vitamin B12 currently taking 2500 mcg. Will check vitamin B12 level to see where she stands. May need to cut back on dosage. Hypercholesterolemia ICD-10-CM: E78.00 ICD-9-CM: 272.0  10/1/2013 - Present Benign essential hypertension ICD-10-CM: I10 
ICD-9-CM: 401.1  10/25/2012 - Present Overview Signed 10/26/2019  8:03 AM by Claudia Zaomra MD  
  Last Assessment & Plan:  
Blood pressure controlled on current regimen of Coreg 12.5 mg twice daily, furosemide 40 mg every other day, Aldactone 25 mg daily. Continue low-sodium diet. Will check electrolytes and renal function with today's labs. Hypothyroidism ICD-10-CM: E03.9 ICD-9-CM: 244.9  10/25/2012 - Present Overview Signed 10/26/2019  8:03 AM by Claudia Zamora MD  
  Last Assessment & Plan: Most recent thyroid function studies done on 04/15/2019 show TSH and free T4 of 3.52 and 0.93 respectively. Notably she is not on thyroid supplement so there is no current evidence that she is truly hypothyroid. Will continue to follow this periodically. Hypertensive heart disease ICD-10-CM: I11.9 ICD-9-CM: 402.90  10/5/2011 - Present RESOLVED: UTI (urinary tract infection) ICD-10-CM: N39.0 ICD-9-CM: 599.0  11/10/2019 - 11/19/2019 Discharge Medications:    
Current Discharge Medication List  
  
CONTINUE these medications which have CHANGED Details  
warfarin (COUMADIN) 2 mg tablet Take 1 Tab by mouth daily. Qty: 10 Tab, Refills: 0 CONTINUE these medications which have NOT CHANGED Details  
amiodarone (CORDARONE) 200 mg tablet Take 1 Tab by mouth daily.  
Qty: 10 Tab, Refills: 0  
  
dilTIAZem (CARDIZEM) 30 mg tablet Take 1 Tab by mouth Before breakfast, lunch, and dinner. Qty: 10 Tab, Refills: 0  
  
atorvastatin (LIPITOR) 40 mg tablet Take 1 Tab by mouth daily. Qty: 30 Tab, Refills: 0  
  
aspirin delayed-release 81 mg tablet Take 1 Tab by mouth daily. Qty: 30 Tab, Refills: 0  
  
ergocalciferol (VITAMIN D2) 50,000 unit capsule Take 50,000 Units by mouth every seven (7) days. PARoxetine (PAXIL) 20 mg tablet Take 20 mg by mouth daily. furosemide (LASIX) 40 mg tablet Take 40 mg by mouth every other day. STOP taking these medications  
  
 nystatin (MYCOSTATIN) 100,000 unit/mL suspension Comments:  
Reason for Stopping:   
   
 doxycycline (MONODOX) 100 mg capsule Comments:  
Reason for Stopping:   
   
 HYDROcodone-acetaminophen (NORCO) 5-325 mg per tablet Comments:  
Reason for Stopping:   
   
  
 
 
Discharge Condition: Good Procedures : None Consults: Nephrology, Pulmonary/Critical Care and Palliative care PHYSICAL EXAM  
Visit Vitals /52 (BP 1 Location: Left arm, BP Patient Position: At rest) Pulse (!) 101 Temp 98.3 °F (36.8 °C) Resp 18 Ht 4' 9\" (1.448 m) Wt 64.1 kg (141 lb 4.8 oz) SpO2 99% BMI 30.58 kg/m² General: Awake, cooperative, no acute distress HEENT: NC, Atraumatic. PERRLA, EOMI. Anicteric sclerae. Lungs:  CTA Bilaterally. No Wheezing/Rhonchi/Rales. Heart:  Regular  rhythm,  No murmur, No Rubs, No Gallops Abdomen: Soft, Non distended, Non tender. +Bowel sounds, Extremities: No c/c/e Psych:   Not anxious or agitated. Neurologic:  No acute neurological deficits. Admission HPI :  
Disha John is a 80 y.o. female who has past medical history of ESRD, CHF, COPD, atrial fibrillation who was recently discharged from this hospital on 11/22/2019. She was admitted for newly diagnosed A. fib with RVR and worsening renal function which progressed to ESRD and she was started on dialysis.   Patient was discharged with the dialysis on Monday Wednesday and Friday but she received hemodialysis on 11/22/2019 before discharge. She was discharged to [place] [address] home. She did not go to dialysis on Monday that is yesterday because she was feeling weak. At nursing home she was feeling more short of breath today and her oxygen saturations was noted to be in 70s. EMS was called and she was placed on nonrebreather. As such patient denies any symptoms however history taking is limited as patient is on BiPAP. In ER she was noted to be in respiratory distress she was started on BiPAP. She was given Lasix, her chest x-ray showed cardiomegaly with central vascular congestion, bilateral pleural effusions and interval increase in her interstitial edema. Her NT proBNP at 18,647. Hospital Course :  
Ms. Daniel Helton was admitted to intensive care unit initially, she was seen and followed by pulmonary critical care and nephrology, she did not had any acute events during hospitalization. Acute respiratory failure with hypoxia- 
Secondary to CHF and volume overload from noncompliance with hemodialysis. She was placed on BiPAP at the time of admission. Her chest x-ray showed cardiomegaly with central vascular congestion, bilateral pleural effusions and interval increase in interstitial edema. She was gradually weaned off BiPAP and transitioned to oxygen by nasal cannula. She is now weaned off of oxygen and she is saturating above 93% on room air. Acute on chronic diastolic CHF- Last echo done 11/10/2019 showed low normal systolic function with EF of 51 to 55%, she was seen by nephrology and she received dialysis and also fluid removal with dialysis. Her symptoms improved. She is strongly advised to be compliant on dialysis. Chronic atrial fibrillation- 
Continued her home amiodarone and Cardizem. Continued her anticoagulation with Coumadin. Her INR initially was supratherapeutic at 8.   She received vitamin K. In hospital her Coumadin was managed by pharmacy. Today her INR is 4.1, recommend to hold her Coumadin for 2 days and follow-up with her INR level and restart her Coumadin if she has reached therapeutic levels. ESRD- Receive HD per nephrology. This writer had long discussion with the patient's sister and daughter at bedside discussed about the importance of compliance with hemodialysis. Patient has significant debility and she is limited in participating in physical therapy and Occupational Therapy. Her recovery will depend on how she participates with PT/OT. She has multiple comorbidities, she was seen by palliative care. She is a DNR. Long-term goals discussed. She will remain high risk for readmission. Activity: Activity as tolerated Diet: Renal Diet Follow-up: PCP, nephrology Disposition: Rehab Minutes spent on discharge: 45 Labs: Results:  
   
Chemistry Recent Labs 12/02/19 
0120 12/01/19 
0125 GLU 98 100*  140  
K 3.9 3.3*  
 102 CO2 30 32 BUN 38* 22* CREA 2.45* 1.66* CA 8.2* 8.2* AGAP 5 6 BUCR 16 13 CBC w/Diff Recent Labs 12/02/19 
0120 12/01/19 
0125 WBC 7.0 7.5  
RBC 3.37* 3.59* HGB 10.2* 10.7* HCT 33.7* 35.8  167 GRANS 76* 76* LYMPH 12* 12* EOS 3 2 Cardiac Enzymes No results for input(s): CPK, CKND1, CUONG in the last 72 hours. No lab exists for component: Ciara Loop Coagulation Recent Labs 12/03/19 
0240 12/02/19 
0120 PTP 39.7* 47.6* INR 4.1* 5.2* Lipid Panel No results found for: CHOL, CHOLPOCT, CHOLX, CHLST, CHOLV, 120548, HDL, HDLP, LDL, LDLC, DLDLP, 252190, VLDLC, VLDL, TGLX, TRIGL, TRIGP, TGLPOCT, CHHD, CHHDX  
BNP No results for input(s): BNPP in the last 72 hours. Liver Enzymes No results for input(s): TP, ALB, TBIL, AP, SGOT, GPT in the last 72 hours. No lab exists for component: DBIL Thyroid Studies Lab Results Component Value Date/Time TSH 2.53 11/08/2019 01:25 PM  
    
 
 
Significant Diagnostic Studies: Xr Chest Sngl V Result Date: 11/27/2019 EXAM: CHEST RADIOGRAPH, SINGLE VIEW CLINICAL INDICATION/HISTORY: chf COMPARISON: Single view chest 11/26/2019 and 11/21/2019 TECHNIQUE: Portable frontal view of the chest was obtained. _______________ FINDINGS: SUPPORT DEVICES: Right jugular dialysis catheter unchanged. HEART AND MEDIASTINUM: Heart is at the upper limits of normal.  Asymmetric prominence right hilum with apparent central vascular congestion. LUNGS AND PLEURAL SPACES: There continues to be asymmetric infiltrate right perihilar and right lower lobe region with probable posteriorly layering effusion on the right. Cannot rule out small left effusion. No pneumothorax  BONY THORAX AND SOFT TISSUES: No acute osseous abnormality. _______________ IMPRESSION: 1. Persistent asymmetric right upper and right lower lobe infiltrates with probable right effusion. Similar appearance is present on several recent exams suggesting findings are related to pneumonia. Asymmetric pulmonary edema is felt to be less likely but not completely excluded. Us Retroperitoneum Comp Result Date: 11/14/2019 Retroperitoneal Ultrasound History: Acute renal failure Comparison: No prior study available for comparison. Technique: Multiple gray scale sonographic images of the kidneys and bladder were obtained. Additional color Doppler and the Doppler waveform images were obtained . Findings: The right kidney measures 7.8 cm in length and demonstrates increased parenchymal echotexture. No focal lesions are seen. There is no evidence of hydronephrosis. The left kidney measures 9.1 cm in length and demonstrates increased parenchymal echotexture. No focal lesions are seen. There is no evidence of hydronephrosis. The bladder is decompressed with a Dong catheter. Partial visualization of a right pleural effusion IMPRESSION: 1. No evidence of hydronephrosis involving either kidney. 2. Increased renal parenchymal echotexture as can be seen with medical renal disease. 3. Partial visualization of right pleural effusion. Xr Chest [facility] Result Date: 11/29/2019 EXAM: CHEST RADIOGRAPH, SINGLE VIEW CLINICAL INDICATION/HISTORY: sob for interval change      <Additional:  None. COMPARISON: 11/27/2019 TECHNIQUE: Portable frontal view of the chest was obtained. _______________ FINDINGS: SUPPORT DEVICES: Large bore right jugular tunneled dialysis catheter is unchanged with its tip projecting at the cavoatrial junction. HEART AND MEDIASTINUM: Cardiac silhouette is mildly prominent, aorta is tortuous with moderate calcified plaque. LUNGS AND PLEURAL SPACES: Pulmonary vascular redistribution is present, improved. Previous alveolar opacities in the right perihilar region have cleared. Both costophrenic angles are blunted and hazy opacity is present across the lower fourth of the right hemithorax, with slight improvement. No pneumothorax. BONY THORAX AND SOFT TISSUES: Advanced degeneration of the shoulders again noted with high riding humeral heads abutting the acromion processes bilaterally compatible with chronic full-thickness rotator cuff tears. Low anterior cervical plate is incompletely included. _______________ IMPRESSION: CHF with improvement. Interval clearing of previous right perihilar alveolar edema versus pneumonia or pneumonitis. Small to moderate right and small left pleural effusions, with improvement on the right. Xr Chest [facility] Result Date: 11/26/2019 EXAM: XR CHEST PORT CLINICAL INDICATION/HISTORY: Shortness of breath -Additional: None COMPARISON: Several prior exams, most recently 11/21/2019 TECHNIQUE: Frontal view of the chest _______________ FINDINGS: HEART AND MEDIASTINUM: Right IJ approach dialysis catheter with tip projecting in stable position.  Stable appearing cardiac size and mediastinal contours. LUNGS AND PLEURAL SPACES: Central vascular congestion is overall similar in appearance with mild interval increase in perihilar predominant interstitial densities. There are small posteriorly layering pleural effusions, right larger than left, unchanged. No evidence of pneumothorax. BONY THORAX AND SOFT TISSUES: No acute osseous abnormality. Advanced bilateral rotator cuff arthropathy. _______________ IMPRESSION: 1. Right IJ approach dialysis catheter in stable position. 2. Cardiomegaly, central vascular congestion, and bilateral pleural effusions as previously with mild interval increase in interstitial edema from the prior exam.  
 
Xr Chest [facility] Result Date: 11/21/2019 EXAM: XR CHEST PORT CLINICAL INDICATION/HISTORY: poss pneumonia -Additional: Shortness of breath with concern for pneumonia. COMPARISON: 11/19/2019 TECHNIQUE: Portable frontal view of the chest _______________ FINDINGS: SUPPORT DEVICES: Tunneled dialysis catheter is in stable positioning. HEART AND MEDIASTINUM: Mild cardiomegaly is present. Soft tissue prominence within the right hilar region could be related to [address] Developing consolidation versus worsening edema which is in the right lower lung is unchanged since 11/19/2019. Soft tissue prominence in the right hilar region is unchanged since 11/19/2019, but new since 11/8/2019. Small bilateral pleural effusions with adjacent atelectasis is present. BONY THORAX AND SOFT TISSUES: Unremarkable. _______________ IMPRESSION: Persistent opacity within the right lower lung either related to layering pleural effusion or developing consolidation is unchanged since 11/19/2019. There is no change since 11/19/2019. Soft tissue prominence in the right hilar region is new since 11/8/2019, which could be related to enlarged vessels or asymmetric lymphadenopathy reactive from acute pneumonia. Xr Chest [facility] Result Date: 11/19/2019 EXAM: One-view chest CLINICAL HISTORY: Short of breath , COMPARISON: None FINDINGS: Frontal view of the chest demonstrate small bilateral pleural effusions and probable underlying atelectasis. Right approach dialysis type catheter tip in the mid SVC. . Cardiac silhouette is normal in size and contour. No acute bony or soft tissue abnormality. IMPRESSION: No significant interval change appreciated. Small bilateral pleural effusions and probable underlying atelectasis. Xr Chest [facility] Result Date: 11/17/2019 EXAM: Portable Chest CLINICAL INDICATION: Wheezing -Additional: None COMPARISON: None TECHNIQUE: AP portable view at 3991 ______________ FINDINGS: HEART AND MEDIASTINUM: The heart size is stable. LUNGS AND AIRWAYS: Mild blunting of the costophrenic angles is present suggesting small effusions. Density is seen at the right lung base compatible with an area of atelectasis/infiltrate PLEURA: No pneumothorax BONES: Spondylosis is present. Changes are seen related to bilateral chronic rotator cuff tears OTHER: Dialysis catheter is present with the catheter tip at the cavoatrial junction ______________ IMPRESSION: Stable chest with small effusion suggested and right lung base atelectasis/infiltrate Xr Chest [facility] Result Date: 11/15/2019 
--------------------------------------------------------------------------- <<<<<<<<<           [facility]           >>>>>>>>> --------------------------------------------------------------------------- CLINICAL HISTORY:  Shortness of breath. COMPARISON EXAMINATIONS:  November 8, 2019. ---  FRONTAL AND LATERAL VIEWS OF THE CHEST   --- The costophrenic angles are blunted. There is pulmonary vascular congestion. There is no definite focal consolidation. The cardiomediastinal silhouette is stable. No significant thoracic degenerative disc disease. No acute osseous abnormalities.   -------------- 
 
 Impression: -------------- Pulmonary vascular congestion similar to prior. Consider fluid overload and/early congestive heart failure. Small bilateral pleural effusions. No significant interval change. Xr Chest [facility] Result Date: 11/8/2019 EXAM: XR CHEST PORT CLINICAL INDICATION/HISTORY: Atrial fibrillation, rapid ventricular response -Additional: None COMPARISON: Several prior exams, most recently 10/27/2019 TECHNIQUE: Frontal view of the chest _______________ FINDINGS: HEART AND MEDIASTINUM: Enlarged appearing cardiac silhouette with stable mediastinal contours. Mild tortuosity of the thoracic aorta as previously. LUNGS AND PLEURAL SPACES: Central vascular congestion is increased in the interval from comparison exam. There are small bilateral pleural effusions present. No pneumothorax or pneumonic opacity. BONY THORAX AND SOFT TISSUES: No acute osseous abnormality. Partial visualization of lower cervical fusion instrumentation. Advanced bilateral rotator cuff arthropathy. _______________ IMPRESSION: 1. Cardiomegaly, central vascular congestion, and small bilateral pleural effusions. No results found for this or any previous visit. Please note that this dictation was completed with [employer], the computer voice recognition software. Quite often unanticipated grammatical, syntax, homophones, and other interpretive errors are inadvertently transcribed by the computer software. Please disregard these errors. Please excuse any errors that have escaped final proofreading.   
 
CC: Geno Wilder, DO

## 2019-12-03 NOTE — PROGRESS NOTES
Problem: Mobility Impaired (Adult and Pediatric) Goal: *Acute Goals and Plan of Care (Insert Text) Description Physical Therapy Goals Initiated 11/27/2019 and to be accomplished within 7 day(s) 1. Patient will move from supine <> sit with min/mod assist in prep for out of bed activity and change of position. 2.  Patient will perform sit<> stand mod A/RW in prep for transfers/ambulation. 3.  Patient will ambulate 50 feet with min/mod A/RW for improved functional mobility. Outcome: Not Progressing Towards Goal 
  
PHYSICAL THERAPY TREATMENT Patient: Stef Acosta (20 y.o. female) Date: 12/3/2019 Diagnosis: Dyspnea [R06.00] Dyspnea [R06.00] A-fib ([address] Precautions: Fall Chart, physical therapy assessment, plan of care and goals were reviewed. ASSESSMENT: 
Pt demos improved sitting balance and exercise performance. Pt is not able to increase standing transfer, due to decreased LE strength. Placement is needed and Med transport is only appropriate means for transport to new facility and to HD treatments. Progression toward goals: 
[]      Improving appropriately and progressing toward goals [x]      Improving slowly and progressing toward goals 
[]      Not making progress toward goals and plan of care will be adjusted PLAN: 
Patient continues to benefit from skilled intervention to address the above impairments. Continue treatment per established plan of care. Discharge Recommendations:  Home Health Further Equipment Recommendations for Discharge:  bedside commode and rolling walker SUBJECTIVE:  
Patient stated I'm go over there tomorrow. Are you coming with me? OBJECTIVE DATA SUMMARY:  
Critical Behavior: 
Neurologic State: Alert, Appropriate for age Orientation Level: Oriented X4 Cognition: Appropriate decision making, Appropriate for age attention/concentration, Appropriate safety awareness, Follows commands, Recognition of people/places Safety/Judgement: Awareness of environment Functional Mobility Training: 
Bed Mobility: 
Rolling: Maximum assistance Supine to Sit: Maximum assistance Sit to Supine: Maximum assistance Scooting: Total assistance Transfers: 
Sit to Stand: Maximum assistance Stand to Sit: Maximum assistance Balance: 
Sitting: Impaired Sitting - Static: Fair (occasional) Sitting - Dynamic: Poor (constant support) Standing: Impaired; With support Standing - Static: Poor Therapeutic Exercises:  
 
 
EXERCISE Sets Reps Active Active Assist  
Passive Self ROM Comments Ankle Pumps 1 30  [x] [] [] [] Quad Sets/Glut Sets 1 10 [x] [] [] [] Hamstring Sets   [] [] [] [] Short Arc Quads 1 10 [x] [] [] [] Heel Slides 1 10 [x] [] [] [] Straight Leg Raises 1 10 [] [x] [] [] Hip Abd/Add 1 10 [x] [] [] [] Long Arc Quads 1 10 [x] [] [] [] Seated Marching   [] [] [] []   
Standing Marching   [] [] [] []   
   [] [] [] []   
 
 
Pain: 
Pain Scale 1: Numeric (0 - 10) Pain Intensity 1: 0 Pain out: 0 Activity Tolerance:  
Fair- 
Please refer to the flowsheet for vital signs taken during this treatment. After treatment:  
[] Patient left in no apparent distress sitting up in chair 
[x] Patient left in no apparent distress in bed 
[x] Call bell left within reach [x] Nursing notified 
[x] Caregiver present 
[] Bed alarm activated Luis Hanks PTA Time Calculation: 30 mins

## 2019-12-22 PROBLEM — I95.9 HYPOTENSION: Status: ACTIVE | Noted: 2019-01-01

## 2019-12-22 PROBLEM — I48.91 ATRIAL FIBRILLATION WITH RVR (HCC): Status: ACTIVE | Noted: 2019-01-01

## 2019-12-22 NOTE — ED PROVIDER NOTES
EMERGENCY DEPARTMENT HISTORY AND PHYSICAL EXAM 
 
Date: 12/22/2019 Patient Name: Michaela Mathew History of Presenting Illness Chief Complaint Patient presents with  Irregular Heart Beat History Provided By: Patient and EMS History Jewel Luis):  
11:27 AM 
Michaela Mathew is a 80 y.o. female with PMHX of CKD, CHF, COPD, dialysis and a-fib who presents to the emergency department C/O weakness onset 1 day. Patient was recently diagnosed with atrial fibrillation. Sent from the [address] for Ativan associated sxs include weakness. Pt denies fever, chills, nausea, vomiting or any other sxs or complaints. Chief Complaint: weakness Duration: 1 day Timing:  acute Location: generalized Quality: weak Severity: Moderate Modifying Factors: Nothing makes it better or worse. Associated Symptoms: denies any other associated signs or symptoms PCP: Jones PURCELL DO 
  
Current Facility-Administered Medications Medication Dose Route Frequency Provider Last Rate Last Dose  dilTIAZem (CARDIZEM) injection 14.5 mg  0.25 mg/kg IntraVENous ONCE Amrik Seymour Arm, MD      
 
Current Outpatient Medications Medication Sig Dispense Refill  warfarin (COUMADIN) 2 mg tablet Take 1 Tab by mouth daily. 10 Tab 0  
 amiodarone (CORDARONE) 200 mg tablet Take 1 Tab by mouth daily. 10 Tab 0  
 dilTIAZem (CARDIZEM) 30 mg tablet Take 1 Tab by mouth Before breakfast, lunch, and dinner. 10 Tab 0  
 atorvastatin (LIPITOR) 40 mg tablet Take 1 Tab by mouth daily. 30 Tab 0  
 aspirin delayed-release 81 mg tablet Take 1 Tab by mouth daily. 30 Tab 0  
 ergocalciferol (VITAMIN D2) 50,000 unit capsule Take 50,000 Units by mouth every seven (7) days.  PARoxetine (PAXIL) 20 mg tablet Take 20 mg by mouth daily.  furosemide (LASIX) 40 mg tablet Take 40 mg by mouth every other day. Past History Past Medical History: 
Past Medical History:  
Diagnosis Date  [facility]  Chronic kidney disease  Congestive heart failure (CHF) ([address]  COPD (chronic obstructive pulmonary disease) (HCC)  Dialysis patient [facility]) Past Surgical History: 
Past Surgical History:  
Procedure Laterality Date  IR INSERT TUNL CVC W/O PORT OVER 5 YR  11/15/2019 Family History: 
History reviewed. No pertinent family history. Social History: 
Social History Tobacco Use  Smoking status: Former Smoker  Smokeless tobacco: Never Used Substance Use Topics  Alcohol use: Never Frequency: Never  Drug use: Not Currently Allergies: Allergies Allergen Reactions  Penicillins Hives  Valium [Diazepam] Other (comments) It made me cry more Review of Systems Review of Systems Constitutional: Negative for chills and fever. Respiratory: Negative for shortness of breath. Cardiovascular: Positive for palpitations. Negative for chest pain. Gastrointestinal: Negative for abdominal pain, nausea and vomiting. Neurological: Positive for weakness. All other systems reviewed and are negative. Physical Exam  
 
Vitals:  
 12/22/19 1615 12/22/19 1645 12/22/19 1700 12/22/19 1715 BP: 90/61 93/75 (!) 84/67 (!) 86/61 Pulse: (!) 118 (!) 123 (!) 125 (!) 160 Resp: 22 17 15 19 Temp:      
SpO2: 100% 96% 100% 100% Weight:      
 
Physical Exam 
Vitals signs and nursing note reviewed. Vital signs and nursing notes reviewed CONSTITUTIONAL: Alert, in no apparent distress; well-developed; thin. HEAD:  Normocephalic, atraumatic EYES: PERRL; EOM's intact. ENTM: Nose: no rhinorrhea; Throat: no erythema or exudate, mucous membranes moist 
Neck:  No JVD, supple without lymphadenopathy RESP: Chest clear, equal breath sounds. CV: S1 and S2 WNL; rapid rate, irregularly irregular rhythm, +3/6 murmur GI: Normal bowel sounds, abdomen soft and non-tender. No masses or organomegaly. : No costo-vertebral angle tenderness. BACK:  Non-tender UPPER EXT:  Normal inspection LOWER EXT: No edema, no calf tenderness. Distal pulses intact. NEURO: CN intact, reflexes 2/4 and sym, strength 5/5 and sym, sensation intact. SKIN: No rashes; Normal for age and stage. PSYCH:  Alert and oriented, normal affect. Diagnostic Study Results Labs - Recent Results (from the past 12 hour(s)) EKG, 12 LEAD, INITIAL Collection Time: 12/22/19 11:26 AM  
Result Value Ref Range Ventricular Rate 128 BPM  
 Atrial Rate 153 BPM  
 QRS Duration 92 ms Q-T Interval 302 ms QTC Calculation (Bezet) 440 ms Calculated R Axis 37 degrees Calculated T Axis 133 degrees Diagnosis Atrial fibrillation with rapid ventricular response Nonspecific T wave abnormality , probably digitalis effect Abnormal ECG When compared with ECG of 26-NOV-2019 09:49, No significant change was found CBC WITH AUTOMATED DIFF Collection Time: 12/22/19 11:29 AM  
Result Value Ref Range WBC 12.1 4.6 - 13.2 K/uL  
 RBC 3.18 (L) 4.20 - 5.30 M/uL  
 HGB 10.0 (L) 12.0 - 16.0 g/dL HCT 31.5 (L) 35.0 - 45.0 % MCV 99.1 (H) 74.0 - 97.0 FL  
 MCH 31.4 24.0 - 34.0 PG  
 MCHC 31.7 31.0 - 37.0 g/dL  
 RDW 18.6 (H) 11.6 - 14.5 % PLATELET 470 578 - 135 K/uL MPV 11.4 9.2 - 11.8 FL  
 NEUTROPHILS 87 (H) 40 - 73 % LYMPHOCYTES 7 (L) 21 - 52 % MONOCYTES 6 3 - 10 % EOSINOPHILS 0 0 - 5 % BASOPHILS 0 0 - 2 %  
 ABS. NEUTROPHILS 10.5 (H) 1.8 - 8.0 K/UL  
 ABS. LYMPHOCYTES 0.9 0.9 - 3.6 K/UL  
 ABS. MONOCYTES 0.7 0.05 - 1.2 K/UL  
 ABS. EOSINOPHILS 0.1 0.0 - 0.4 K/UL  
 ABS. BASOPHILS 0.0 0.0 - 0.1 K/UL  
 DF AUTOMATED PROTHROMBIN TIME + INR Collection Time: 12/22/19 11:29 AM  
Result Value Ref Range Prothrombin time 22.6 (H) 11.5 - 15.2 sec INR 2.0 (H) 0.8 - 1.2 METABOLIC PANEL, COMPREHENSIVE Collection Time: 12/22/19 11:29 AM  
Result Value Ref Range Sodium 134 (L) 136 - 145 mmol/L  Potassium 3.6 3.5 - 5.5 mmol/L  
 Chloride 94 (L) 100 - 111 mmol/L  
 CO2 31 21 - 32 mmol/L Anion gap 9 3.0 - 18 mmol/L Glucose 98 74 - 99 mg/dL BUN 33 (H) 7.0 - 18 MG/DL Creatinine 2.15 (H) 0.6 - 1.3 MG/DL  
 BUN/Creatinine ratio 15 12 - 20 GFR est AA 26 (L) >60 ml/min/1.73m2 GFR est non-AA 22 (L) >60 ml/min/1.73m2 Calcium 8.0 (L) 8.5 - 10.1 MG/DL Bilirubin, total 0.7 0.2 - 1.0 MG/DL  
 ALT (SGPT) 19 13 - 56 U/L  
 AST (SGOT) 18 10 - 38 U/L Alk. phosphatase 108 45 - 117 U/L Protein, total 5.5 (L) 6.4 - 8.2 g/dL Albumin 2.3 (L) 3.4 - 5.0 g/dL Globulin 3.2 2.0 - 4.0 g/dL A-G Ratio 0.7 (L) 0.8 - 1.7 NT-PRO BNP Collection Time: 12/22/19 11:29 AM  
Result Value Ref Range NT pro-BNP 9,930 (H) 0 - 1,800 PG/ML  
MAGNESIUM Collection Time: 12/22/19 11:29 AM  
Result Value Ref Range Magnesium 2.4 1.6 - 2.6 mg/dL CARDIAC PANEL,(CK, CKMB & TROPONIN) Collection Time: 12/22/19 11:29 AM  
Result Value Ref Range CK 54 26 - 192 U/L  
 CK - MB <1.0 <3.6 ng/ml CK-MB Index  0.0 - 4.0 % CALCULATION NOT PERFORMED WHEN RESULT IS BELOW LINEAR LIMIT Troponin-I, QT <0.02 0.0 - 0.045 NG/ML  
PTT Collection Time: 12/22/19 11:29 AM  
Result Value Ref Range aPTT 35.8 23.0 - 36.4 SEC URINALYSIS W/ RFLX MICROSCOPIC Collection Time: 12/22/19  4:30 PM  
Result Value Ref Range Color DARK YELLOW Appearance TURBID Specific gravity 1.022 1.005 - 1.030    
 pH (UA) 5.0 5.0 - 8.0 Protein 100 (A) NEG mg/dL Glucose NEGATIVE  NEG mg/dL Ketone TRACE (A) NEG mg/dL Bilirubin LARGE (A) NEG Blood LARGE (A) NEG Urobilinogen 1.0 0.2 - 1.0 EU/dL Nitrites POSITIVE (A) NEG Leukocyte Esterase MODERATE (A) NEG URINE MICROSCOPIC ONLY Collection Time: 12/22/19  4:30 PM  
Result Value Ref Range WBC 11 to 20 0 - 5 /hpf  
 RBC 11 to 20 0 - 5 /hpf Epithelial cells 1+ 0 - 5 /lpf  Bacteria 2+ (A) NEG /hpf  
 CA Oxalate crystals 1+ (A) NEG  
 Hyaline cast 1 to 3 0 - 2 /lpf Yeast 3+ (A) NEG Radiologic Studies -  
XR CHEST PORT Final Result IMPRESSION:  
  
  
1. Right IJ approach dialysis catheter in stable position. 2. Cardiomegaly, central vascular congestion/edema and bilateral pleural  
effusions overall similar to prior. 3. Interval increase in left retrocardiac atelectasis. CT Results  (Last 48 hours) None CXR Results  (Last 48 hours)  
          
 12/22/19 1158  XR CHEST PORT Final result Impression:  IMPRESSION:  
   
   
1. Right IJ approach dialysis catheter in stable position. 2. Cardiomegaly, central vascular congestion/edema and bilateral pleural  
effusions overall similar to prior. 3. Interval increase in left retrocardiac atelectasis. Narrative:  EXAM: XR CHEST PORT  
   
CLINICAL INDICATION/HISTORY: Atrial fibrillation  
-Additional: Congestive heart failure COMPARISON: Several prior exams, most recently 11/29/2019 TECHNIQUE: Portable frontal view of the chest  
   
_______________ FINDINGS:  
   
SUPPORT DEVICES: There is a right IJ approach dialysis catheter tip projecting  
in stable position. HEART AND MEDIASTINUM: Cardiac size and mediastinal contours remain stable, with  
redemonstration of cardiac enlargement. Dense mitral annular calcification. LUNGS AND PLEURAL SPACES: Central vascular congestion and mild interstitial  
edema is present along with small bilateral pleural effusions. Interval increase  
in left retrocardiac opacity noted with slight leftward shift of mediastinal  
structures. No pneumothorax. BONY THORAX AND SOFT TISSUES: No acute osseous abnormality. Advanced bilateral  
rotator cuff arthropathy. Partial visualization of postoperative changes related  
to anterior cervical discectomy and instrumented fusion. _______________ Medications given in the ED- Medications dilTIAZem (CARDIZEM) injection 14.5 mg (has no administration in time range) dilTIAZem (CARDIZEM) injection 14.5 mg (14.5 mg IntraVENous Given 12/22/19 1234) Medical Decision Making I am the first provider for this patient. I reviewed the vital signs, available nursing notes, past medical history, past surgical history, family history and social history. Vital Signs-Reviewed the patient's vital signs. Pulse Oximetry Analysis - 100% on RA Cardiac Monitor: 
Rate: 128 bpm 
Rhythm: Atrial fibrillation with rapid ventricular response EKG interpretation: (Preliminary) Rhythm: A. fib with RVR. Rate: 128 bpm; no STEMI 
EKG read by Morteza Trevino MD at 11:26 AM 
 
Records Reviewed: Nursing Notes Provider Notes (Medical Decision Making): DDX: A. fib, CHF, ACS, UTI Procedures: 
Procedures ED Course:  
11:27 AM Initial assessment performed. The patients presenting problems have been discussed, and they are in agreement with the care plan formulated and outlined with them. I have encouraged them to ask questions as they arise throughout their visit. 
 
5:22 PM Discussed with Dr Lavenia Canavan for ICU admission, we will consult cardiology for medication recommendations with hypotension. 5:39 PM  Discussed with Dr. Lubna Salazar who recommends 250 mL IV NS bolus and then consider amiodarone 150 single bolus if heart rate not improved and consider Levophed if blood pressure not improved. 5:44 PM Discussed with Dr. Saint Peon for ICU admit. Diagnosis and Disposition Discussion: 
80 y.o. female with atrial fibrillation with RVR and UTI. Patient was initially treated in the ED with Cardizem which did have an adverse effect on her blood pressure. Her heart rate did respond initially her blood pressure improved at first but then slowly trended down.   As patient was tachycardic and hypotensive she was given a mild fluid bolus of 250 mL IV normal saline which helped with her blood pressure. Cardiology was consulted for medication recommendations and admission. Hospitalist was also consult for admission as well as the intensivist.  Patient and family agree with admission to the ICU. Critical Care Time:  
I have spent 79 minutes of critical care time involved in lab review, consultations with specialist, family decision-making, and documentation. During this entire length of time I was immediately available to the patient. Critical Care: The reason for providing this level of medical care for this critically ill patient was due a critical illness that impaired one or more vital organ systems such that there was a high probability of imminent or life threatening deterioration in the patients condition. This care involved high complexity decision making to assess, manipulate, and support vital system functions, to treat this degreee vital organ system failure and to prevent further life threatening deterioration of the patients condition. Critical Care Time: 79 minutes Core Measures: 
For Hospitalized Patients: 
 
1. Hospitalization Decision Time: The decision to hospitalize the patient was made by Barron Workman MD, MD at 4:30 PM on 12/22/2019 2. Aspirin: Aspirin was not given because the patient did not present with a stroke at the time of their Emergency Department evaluation 5:22 PM Patient is being admitted to the hospital by Dr. Kori Caballero. The results of their tests and reasons for their admission have been discussed with them and/or available family. They convey agreement and understanding for the need to be admitted and for their admission diagnosis. CONDITIONS ON ADMISSION: 
Sepsis is not present at the time of admission. Deep Vein Thrombosis is not present at the time of admission. Thrombosis is not present at the time of admission.  Urinary Tract Infection is present at the time of admission. Pneumonia is not present at the time of admission. MRSA is not present at the time of admission. Wound infection is not present at the time of admission. Pressure Ulcer is not present at the time of admission. CLINICAL IMPRESSION: 
 
1. Atrial fibrillation with rapid ventricular response ([address] 2. Hypotension, unspecified hypotension type 3. Acute cystitis without hematuria Dragon Disclaimer Please note that this dictation was completed with [employer]ir[employer], the computer voice recognition software. Quite often unanticipated grammatical, syntax, homophones, and other interpretive errors are inadvertently transcribed by the computer software. Please disregard these errors. Please excuse any errors that have escaped final proofreading.  
 
Martin Gutierrez MD

## 2019-12-22 NOTE — H&P
History & Physical 
 
Patient: Jitendra Garcia MRN: [id number]  CSN: [id number] YOB: 1935  Age: 80 y.o. Sex: female DOA: 12/22/2019 Chief Complaint:  
Chief Complaint Patient presents with  Irregular Heart Beat HPI:  
 
Jitendra Garcia is a 80 y.o.  female who has history of end-stage renal disease on dialysis Monday Wednesday, chronic atrial fib on amiodarone and Coumadin presents to the emergency room due to tachycardia noted at the nursing home. Patient states she feels fatigue with minimal dyspnea denies any chest pain or palpitations she did have some swelling in her legs she is on Coumadin and her INR is therapeutic at 2 denies urinary frequency or dysuria. Patient recently started dialysis in the last month and a half and has been overall tolerating this well. In the emergency room found to be hypotensive and tachycardic she was given IV Cardizem fluid bolus with minimal improvement of tachycardia patient became hypotensive a cardiology consult was obtained it was recommended that she be given fluid bolus and IV amiodarone and transferred to the ICU for 24 hours patient is a DO NOT RESUSCITATE she was also found to have a urinary tract infection Past Medical History:  
Diagnosis Date  A-fib ([facility]  Chronic kidney disease  Congestive heart failure (CHF) ([facility]  COPD (chronic obstructive pulmonary disease) (HCC)  Dialysis patient [facility]) Past Surgical History:  
Procedure Laterality Date  IR INSERT TUNL CVC W/O PORT OVER 5 YR  11/15/2019 History reviewed. No pertinent family history. Social History Socioeconomic History  Marital status:  Spouse name: Not on file  Number of children: Not on file  Years of education: Not on file  Highest education level: Not on file Tobacco Use  Smoking status: Former Smoker  Smokeless tobacco: Never Used Substance and Sexual Activity  Alcohol use: Never Frequency: Never  Drug use: Not Currently Prior to Admission medications Medication Sig Start Date End Date Taking? Authorizing Provider  
warfarin (COUMADIN) 2 mg tablet Take 1 Tab by mouth daily. 12/5/19   Ling Alston MD  
amiodarone (CORDARONE) 200 mg tablet Take 1 Tab by mouth daily. 11/26/19   Patrick Reyna MD  
dilTIAZem (CARDIZEM) 30 mg tablet Take 1 Tab by mouth Before breakfast, lunch, and dinner. 11/22/19   Patrick Reyna MD  
atorvastatin (LIPITOR) 40 mg tablet Take 1 Tab by mouth daily. 10/30/19   Shelton Jensen MD  
aspirin delayed-release 81 mg tablet Take 1 Tab by mouth daily. 10/29/19   Shelton Jensen MD  
ergocalciferol (VITAMIN D2) 50,000 unit capsule Take 50,000 Units by mouth every seven (7) days. Other, MD Napoleon  
PARoxetine (PAXIL) 20 mg tablet Take 20 mg by mouth daily. Other, MD Napoleon  
furosemide (LASIX) 40 mg tablet Take 40 mg by mouth every other day. Jesus, MD Napoleon  
 
 
Allergies Allergen Reactions  Penicillins Hives  Valium [Diazepam] Other (comments) It made me cry more Review of Systems GENERAL: Patient alert, awake and oriented times 3, able to communicate full sentences and not in distress. HEENT: No change in vision, no earache, tinnitus, sore throat or sinus congestion. NECK: No pain or stiffness. PULMONARY: + shortness of breath, cough or wheeze. Cardiovascular: no pnd or orthopnea, no CP GASTROINTESTINAL: No abdominal pain, nausea, vomiting or diarrhea, melena or bright red blood per rectum. GENITOURINARY:+ urinary frequency, urgency, hesitancy or dysuria. MUSCULOSKELETAL: No joint or muscle pain, no back pain, no recent trauma. DERMATOLOGIC:+ rash, no itching, no lesions. ENDOCRINE: No polyuria, polydipsia, no heat or cold intolerance. No recent change in weight. HEMATOLOGICAL: + anemia or easy bruising or bleeding. NEUROLOGIC: No headache, seizures, numbness, tingling + weakness. Physical Exam:  
 
Physical Exam: 
Visit Vitals /79 Pulse (!) 127 Temp 98 °F (36.7 °C) Resp 20 Wt 58.1 kg (128 lb) SpO2 100% BMI 27.70 kg/m² Temp (24hrs), Av.4 °F (36.9 °C), Min:98 °F (36.7 °C), Max:98.7 °F (37.1 °C) 
   07 -  1900 In: 250 [I.V.:250] Out: -    No intake/output data recorded. General:  Alert, cooperative, no distress, appears stated age. Head: Normocephalic, without obvious abnormality, atraumatic. Eyes:  Conjunctivae/corneas clear. PERRL, EOMs intact. Nose: Nares normal. No drainage or sinus tenderness. Neck: Supple, symmetrical, trachea midline, no adenopathy, thyroid: no enlargement, no carotid bruit and no JVD. Lungs:   Clear to auscultation bilaterally. Heart:   Irregularly irregular cardiac al.  
  Abdomen: Soft, non-tender. Bowel sounds normal.   
Extremities: Extremities normal, atraumatic, no cyanosis t+2edema. Pulses: 2+ and symmetric all extremities. Skin:  No rashes or lesions sacral decub Neurologic: AAOx3, No focal motor or sensory deficit. Lower extremity weakness Labs Reviewed: All lab results for the last 24 hours reviewed. and EKG Procedures/imaging: see electronic medical records for all procedures/Xrays and details which were not copied into this note but were reviewed prior to creation of Plan Recent Results (from the past 24 hour(s)) EKG, 12 LEAD, INITIAL Collection Time: 19 11:26 AM  
Result Value Ref Range Ventricular Rate 128 BPM  
 Atrial Rate 153 BPM  
 QRS Duration 92 ms Q-T Interval 302 ms QTC Calculation (Bezet) 440 ms Calculated R Axis 37 degrees Calculated T Axis 133 degrees Diagnosis Atrial fibrillation with rapid ventricular response Nonspecific T wave abnormality , probably digitalis effect Abnormal ECG When compared with ECG of 2019 09:49, No significant change was found CBC WITH AUTOMATED DIFF  
 Collection Time: 12/22/19 11:29 AM  
Result Value Ref Range WBC 12.1 4.6 - 13.2 K/uL  
 RBC 3.18 (L) 4.20 - 5.30 M/uL  
 HGB 10.0 (L) 12.0 - 16.0 g/dL HCT 31.5 (L) 35.0 - 45.0 % MCV 99.1 (H) 74.0 - 97.0 FL  
 MCH 31.4 24.0 - 34.0 PG  
 MCHC 31.7 31.0 - 37.0 g/dL  
 RDW 18.6 (H) 11.6 - 14.5 % PLATELET 673 238 - 033 K/uL MPV 11.4 9.2 - 11.8 FL  
 NEUTROPHILS 87 (H) 40 - 73 % LYMPHOCYTES 7 (L) 21 - 52 % MONOCYTES 6 3 - 10 % EOSINOPHILS 0 0 - 5 % BASOPHILS 0 0 - 2 %  
 ABS. NEUTROPHILS 10.5 (H) 1.8 - 8.0 K/UL  
 ABS. LYMPHOCYTES 0.9 0.9 - 3.6 K/UL  
 ABS. MONOCYTES 0.7 0.05 - 1.2 K/UL  
 ABS. EOSINOPHILS 0.1 0.0 - 0.4 K/UL  
 ABS. BASOPHILS 0.0 0.0 - 0.1 K/UL  
 DF AUTOMATED PROTHROMBIN TIME + INR Collection Time: 12/22/19 11:29 AM  
Result Value Ref Range Prothrombin time 22.6 (H) 11.5 - 15.2 sec INR 2.0 (H) 0.8 - 1.2 METABOLIC PANEL, COMPREHENSIVE Collection Time: 12/22/19 11:29 AM  
Result Value Ref Range Sodium 134 (L) 136 - 145 mmol/L Potassium 3.6 3.5 - 5.5 mmol/L Chloride 94 (L) 100 - 111 mmol/L  
 CO2 31 21 - 32 mmol/L Anion gap 9 3.0 - 18 mmol/L Glucose 98 74 - 99 mg/dL BUN 33 (H) 7.0 - 18 MG/DL Creatinine 2.15 (H) 0.6 - 1.3 MG/DL  
 BUN/Creatinine ratio 15 12 - 20 GFR est AA 26 (L) >60 ml/min/1.73m2 GFR est non-AA 22 (L) >60 ml/min/1.73m2 Calcium 8.0 (L) 8.5 - 10.1 MG/DL Bilirubin, total 0.7 0.2 - 1.0 MG/DL  
 ALT (SGPT) 19 13 - 56 U/L  
 AST (SGOT) 18 10 - 38 U/L Alk. phosphatase 108 45 - 117 U/L Protein, total 5.5 (L) 6.4 - 8.2 g/dL Albumin 2.3 (L) 3.4 - 5.0 g/dL Globulin 3.2 2.0 - 4.0 g/dL A-G Ratio 0.7 (L) 0.8 - 1.7 NT-PRO BNP Collection Time: 12/22/19 11:29 AM  
Result Value Ref Range NT pro-BNP 9,930 (H) 0 - 1,800 PG/ML  
MAGNESIUM Collection Time: 12/22/19 11:29 AM  
Result Value Ref Range Magnesium 2.4 1.6 - 2.6 mg/dL CARDIAC PANEL,(CK, CKMB & TROPONIN) Collection Time: 12/22/19 11:29 AM  
Result Value Ref Range CK 54 26 - 192 U/L  
 CK - MB <1.0 <3.6 ng/ml CK-MB Index  0.0 - 4.0 % CALCULATION NOT PERFORMED WHEN RESULT IS BELOW LINEAR LIMIT Troponin-I, QT <0.02 0.0 - 0.045 NG/ML  
PTT Collection Time: 12/22/19 11:29 AM  
Result Value Ref Range aPTT 35.8 23.0 - 36.4 SEC URINALYSIS W/ RFLX MICROSCOPIC Collection Time: 12/22/19  4:30 PM  
Result Value Ref Range Color DARK YELLOW Appearance TURBID Specific gravity 1.022 1.005 - 1.030    
 pH (UA) 5.0 5.0 - 8.0 Protein 100 (A) NEG mg/dL Glucose NEGATIVE  NEG mg/dL Ketone TRACE (A) NEG mg/dL Bilirubin LARGE (A) NEG Blood LARGE (A) NEG Urobilinogen 1.0 0.2 - 1.0 EU/dL Nitrites POSITIVE (A) NEG Leukocyte Esterase MODERATE (A) NEG URINE MICROSCOPIC ONLY Collection Time: 12/22/19  4:30 PM  
Result Value Ref Range WBC 11 to 20 0 - 5 /hpf  
 RBC 11 to 20 0 - 5 /hpf Epithelial cells 1+ 0 - 5 /lpf Bacteria 2+ (A) NEG /hpf  
 CA Oxalate crystals 1+ (A) NEG Hyaline cast 1 to 3 0 - 2 /lpf Yeast 3+ (A) NEG Assessment/Plan Active Problems: Hypotension (12/22/2019) Atrial fibrillation with RVR ([address] (12/22/2019) Cardiovascular: A. fib RVR with hypotension starting on amiodarone doing on drip Small fluid bolus given in the emergency room Check cardiac enzymes and echo Renal: 
End-stage renal disease due for dialysis on Monday renal consulted Renal consulted ID: 
1.  UTI urine cultures obtained on IV Rocephin Also with yeast we will start Diflucan Pending urine cultures as there is an interaction with amiodarone and Coumadin GI 
Protonix IV for GI protection Heme: On Coumadin will continue and have pharmacy dose Respiratory: 
History of COPD currently not on oxygen duo nebs as needed once tachycardia is improved intensivist consulted per ER 
 
  
 
DVT/GI Prophylaxis: Coumadin Discussed with patient at bedside about hospital admission and my plan care, who understood and agree with my plan care.  
 
Petey Garcia MD 
12/22/2019 6:33 PM

## 2019-12-22 NOTE — ED TRIAGE NOTES
Patient from [facility]. Patient noted to be in afib on the monitor. Patient states that she feels tired.

## 2019-12-22 NOTE — ED NOTES
Patient bp remains low at this time. Verbal order received to hold 2nd dose of Cardizem. Family remains at bedside.

## 2019-12-22 NOTE — PROGRESS NOTES
1815 Verbal shift change report received from Winifred Barbosa RN . Report included the following information SBAR, Kardex, ED Summary, Intake/Output, Recent Results, Med Rec Status and Cardiac Rhythm A-Fib w RVR.

## 2019-12-22 NOTE — ED NOTES
Patient's blood pressure 120/79. Orders from Dr. Armond John to hold to rosamariataldixiem at this time.

## 2019-12-22 NOTE — ROUTINE PROCESS
TRANSFER - OUT REPORT: 
 
Verbal report given to Ted Romero R.N(name) on Soy Bran  being transferred to ICU(unit) for routine progression of care Report consisted of patients Situation, Background, Assessment and  
Recommendations(SBAR). Information from the following report(s) SBAR, Kardex, ED Summary and MAR was reviewed with the receiving nurse. Lines:  
Peripheral IV 12/22/19 Left Antecubital (Active) Site Assessment Clean, dry, & intact 12/22/2019 11:32 AM  
Phlebitis Assessment 0 12/22/2019 11:32 AM  
Infiltration Assessment 0 12/22/2019 11:32 AM  
Dressing Status Clean, dry, & intact 12/22/2019 11:32 AM  
Dressing Type Transparent 12/22/2019 11:32 AM  
  
 
Opportunity for questions and clarification was provided. Patient transported with: 
 Monitor O2 @ 2 liters Registered Nurse Tech

## 2019-12-23 PROBLEM — R06.00 DYSPNEA: Status: RESOLVED | Noted: 2019-01-01 | Resolved: 2019-01-01

## 2019-12-23 PROBLEM — N18.4 STAGE 4 CHRONIC KIDNEY DISEASE (HCC): Status: RESOLVED | Noted: 2018-08-23 | Resolved: 2019-01-01

## 2019-12-23 PROBLEM — J96.90 RESPIRATORY FAILURE (HCC): Status: RESOLVED | Noted: 2019-01-01 | Resolved: 2019-01-01

## 2019-12-23 NOTE — PROGRESS NOTES
Community Care Team Documentation for Patient in [facility]     Patient discharged from [facility] to [address] [place] [address], on 12/3/19. Hospital Discharge diagnosis:       Debility   Dyspnea   Generalized Weakness   ESRD    A fib   Chronic CHF    SNF Attending Provider:      Anticipated discharge date from SNF:  N/a, pt to transition to LTC    PCP : Ryanne Meza DO    CTN:     Community Care Team rounds completed, updates provided by facility. Brief Summary of Care:    Patient receiving PT/OT - but not participating much. To transition to LTC as of today 12/19/19. Will follow for ABIODUN x 30 days. RRAT:  Medium Risk            18       Total Score        3 Has Seen PCP in Last 6 Months (Yes=3, No=0)    4 IP Visits Last 12 Months (1-3=4, 4=9, >4=11)    11 Charlson Comorbidity Score (Age + Comorbid Conditions)        Criteria that do not apply:    . Living with Significant Other. Assisted Living. LTAC. SNF. or   Rehab    Patient Length of Stay (>5 days = 3)    Pt.  Coverage (Medicare=5 , Medicaid, or Self-Pay=4)        Active Ambulatory Problems     Diagnosis Date Noted    Heart failure ([address] 07/09/2018    Benign essential hypertension 10/25/2012    Chronic diastolic congestive heart failure ([address] 04/15/2019    Cobalamin deficiency 11/20/2013    Hypercholesterolemia 10/01/2013    Hypertensive heart disease 10/05/2011    Hypothyroidism 10/25/2012    Iron deficiency anemia 11/06/2018    A-fib ([address] 11/08/2019    UTI (urinary tract infection) 11/10/2019    Hyponatremia 11/15/2019    Weakness generalized 11/19/2019    Thrombosis of internal jugular vein ([address] 11/19/2019    ESRD needing dialysis ([address] 11/19/2019    Debility 11/28/2019    Hypotension 12/22/2019    Atrial fibrillation with RVR ([address] 12/22/2019     Resolved Ambulatory Problems     Diagnosis Date Noted    Respiratory failure ([address] 10/26/2019    Stage 4 chronic kidney disease ([facility] 08/23/2018    Dyspnea 11/26/2019     Past Medical History:   Diagnosis Date    Chronic kidney disease     Congestive heart failure (CHF) (HCC)     COPD (chronic obstructive pulmonary disease) ([facility]     Dialysis patient ([facility]

## 2019-12-23 NOTE — PROGRESS NOTES
Pharmacy Dosing Services:   Warfarin Consult for Warfarin Dosing by Pharmacy by Dr. Alexa Lombardi Consult provided for this 80 y.o.  female , for indication of Atrial Fibrillation. Dose to achieve an INR goal of 2-3 Order entered for  Warfarin  2 (mg) ordered to be given today at 18:00. Significant drug interactions:  Amiodarone LABS   
PT/INR Lab Results Component Value Date/Time INR 2.2 (H) 12/23/2019 06:36 AM  
  
Platelets Lab Results Component Value Date/Time PLATELET 405 19/27/0745 06:36 AM  
  
H/H Lab Results Component Value Date/Time HGB 10.0 (L) 12/23/2019 06:36 AM  
  
 
Warfarin Administrations (last 168 hours) Date/Time Action Medication Dose  
 12/22/19 2046 Given  
 warfarin (COUMADIN) tablet 2 mg 2 mg Pharmacy to follow daily and will provide subsequent Warfarin dosing based on clinical status. ZEINAB [facility] [place]   Contact information   [phone]

## 2019-12-23 NOTE — DIABETES MGMT
GLYCEMIC CONTROL PROGRESS NOTE: 
 
- chart reviewed, no known h/o DM 
- no glycemic control needs identified at this time Luis Eduardo Christian MS, RN, CDE Glycemic Control Team 
[phone] Pager [phone] (M-TH 8:00-4:30P) *After Hours pager [phone]

## 2019-12-23 NOTE — PROGRESS NOTES
Problem: Dysphagia (Adult) Goal: *Acute Goals and Plan of Care (Insert Text) Description Recommendations: 
Diet: Mechanical soft solids, chopped meat/thin liquid Meds: Per patient preferene Aspiration Precautions Oral Care TID Goals:  Patient will: 1. Tolerate PO trials with 0 s/s overt distress in 4/5 trials 2. Utilize compensatory swallow strategies/maneuvers (decrease bite/sip, size/rate, alt. liq/sol) with min cues in 4/5 trials 3. Perform oral-motor/laryngeal exercises to increase oropharyngeal swallow function with min cues 4. Complete an objective swallow study (i.e., MBSS) to assess swallow integrity, r/o aspiration, and determine of safest LRD, min A Outcome: Progressing Towards Goal 
 
SPEECH LANGUAGE PATHOLOGY BEDSIDE SWALLOW  
EVALUATION & TREATMENT Patient: Jose Bhatti (23 y.o. female) Date: 12/23/2019 Primary Diagnosis: Hypotension [I95.9] Atrial fibrillation with RVR ([address] [I48.91] Precautions: Aspiration PLOF: Independent ASSESSMENT : 
Based on the objective data described below, the patient presents with mild-moderate oropharyngeal dysphagia. Patient familiar to this service from multiple previous hospitalizations. A&Ox3, sister at bedside. Patient was recently upgraded at SNF from puree to mechanical soft diet. Oral-motor exam revealed structures grossly intact for mastication and deglutition, upper and lower dentures in place. Patient observed self-feeding thin liquid + straw, puree and solid trials. Pt exhibited moderately delayed mastication of solids with otherwise adequate bolus cohesion and A-P transit. Further exhibited adequate swallow timing/reflex and hyolaryngeal excursion. Able to manipulate and clear with 0 clinical s/s aspiration. Patient reports fatigue following complete meals. Pt safe for mechanical soft solids, chopped meat/thin liquid diet with aspiration precautions, slow rate, frequent breaks. D/w RN, Lazarus Samuel and Dr. Miladys Parish.   
 
TREATMENT : 
 Skilled therapy initiated; Educated pt on aspiration precautions and importance of compensatory swallow techniques to decrease aspiration risk (decrease rate of intake & sip/bite size, upright @HOB for all po intake and ~30 minutes after po); verbalized comprehension. SLP to follow as indicated. Patient will benefit from skilled intervention to address the above impairments. Patient's rehabilitation potential is considered to be Fair Factors which may influence rehabilitation potential include:  
[]            None noted []            Mental ability/status [x]            Medical condition []            Home/family situation and support systems 
[]            Safety awareness 
[]            Pain tolerance/management []            Other: PLAN : 
Recommendations and Planned Interventions: 
Mechanical soft, thin liquid Frequency/Duration: Patient will be followed by speech-language pathology 1-2 times per day/4-7 days per week to address goals. Discharge Recommendations: Eduardo Morrow SUBJECTIVE:  
Patient stated I'm back. OBJECTIVE:  
 
Past Medical History:  
Diagnosis Date A-fib ([facility] Chronic kidney disease Congestive heart failure (CHF) ([facility] COPD (chronic obstructive pulmonary disease) ([facility] Dialysis patient [facility]) Past Surgical History:  
Procedure Laterality Date IR INSERT TUNL CVC W/O PORT OVER 5 YR  11/15/2019 Home Situation:  
 
Diet prior to admission: Mechanical soft, thin liquid Current Diet:  Mechanical soft, thin liquid Cognitive and Communication Status: 
Neurologic State: Alert Orientation Level: Oriented to person, Oriented to place, Oriented to situation Cognition: Follows commands Perception: Appears intact Perseveration: No perseveration noted Safety/Judgement: Awareness of environment, Good awareness of safety precautions Oral Assessment: 
Oral Assessment Labial: No impairment Dentition: Upper & lower dentures Oral Hygiene: Good Lingual: No impairment Velum: No impairment Mandible: No impairment P.O. Trials: 
Patient Position: OLD 63 Vocal quality prior to P.O.: No impairment Consistency Presented: Thin liquid;Puree; Solid How Presented: Self-fed/presented;Straw;Successive swallows;Spoon Bolus Acceptance: No impairment Bolus Formation/Control: Impaired Type of Impairment: Delayed;Mastication Propulsion: No impairment Oral Residue: None Initiation of Swallow: No impairment Laryngeal Elevation: Functional 
Aspiration Signs/Symptoms: None Pharyngeal Phase Characteristics: Easily fatigued Effective Modifications: Alternate liquids/solids;Small sips and bites Cues for Modifications: Minimal 
  
 
Oral Phase Severity: Mild-moderate Pharyngeal Phase Severity : Minimal 
 
PAIN: 
Pain level pre-treatment: 0/10 Pain level post-treatment: 0/10 After treatment:  
[]            Patient left in no apparent distress sitting up in chair 
[x]            Patient left in no apparent distress in bed 
[x]            Call bell left within reach [x]            Nursing notified 
[x]            Family present 
[]            Caregiver present 
[]            Bed alarm activated COMMUNICATION/EDUCATION:  
[x]            Aspiration precautions; swallow safety; compensatory techniques. [x]            Patient/family have participated as able in goal setting and plan of care. []            Patient/family agree to work toward stated goals and plan of care. []            Patient understands intent and goals of therapy; neutral about participation. []            Patient unable to participate in goal setting/plan of care; educ ongoing with interdisciplinary staff 
[]         Posted safety precautions in patient's room. Thank you for this referral. 
Monica Flor SLP Time Calculation: 20 mins Evaluation Time: 10 minutes Treatment Time: 10 minutes

## 2019-12-23 NOTE — PROGRESS NOTES
Hospitalist Progress Note Patient: Radha Copeland MRN: [id number]  CSN: [id number] YOB: 1935  Age: 80 y.o. Sex: female DOA: 12/22/2019 LOS:  LOS: 1 day Chief Complaint: 
 
hypotension Assessment/Plan AFIB with RVR Hypotension AC with coumadin UTI,  may have yeast UTI also-rocephin 
Debility and weakness at SNF still ESRD, holding dialysis today due to hemodynamic instability Diastolic CHF Recent thrombosis of IJ Hypertensive heart disease Monitor on amiodarone gtt Add nystatin powder to groin, but caution with diflucan due to QT prolongation possible whilst on amiodarone Daily INR Cardiology following DNR status Next dialysis tomorrow Disposition : 
Patient Active Problem List  
Diagnosis Code  Heart failure ([facility]) I50.9  Benign essential hypertension I10  Chronic diastolic congestive heart failure ([facility]) I50.32  
 Cobalamin deficiency E53.8  Hypercholesterolemia E78.00  Hypertensive heart disease I11.9  Hypothyroidism E03.9  Iron deficiency anemia D50.9  A-fib ([facility]) I48.91  
 UTI (urinary tract infection) N39.0  Hyponatremia E87.1  Weakness generalized R53.1  Thrombosis of internal jugular vein ([facility]) I82. C19  
 ESRD needing dialysis ([facility]) N18.6, Z99.2  Debility R53.81  
 Hypotension I95.9  Atrial fibrillation with RVR ([facility]) I48.91 Subjective: She denies chest pain, SOB, pain this am 
 
Review of systems: 
 
Constitutional: denies fevers, chills Respiratory: denies cough Cardiovascular: denies palpitations Gastrointestinal: denies nausea, vomiting, diarrhea Vital signs/Intake and Output: 
Visit Vitals /54 Pulse (!) 111 Temp 99.4 °F (37.4 °C) Resp 21 Ht 4' 9\" (1.448 m) Wt 58.1 kg (128 lb) SpO2 100% Breastfeeding No  
BMI 27.70 kg/m² Current Shift:  No intake/output data recorded. Last three shifts:  12/21 1901 - 12/23 0700 In: 250 [I.V.:250] Out: 50 [Urine:50] Exam: 
 
General: elderly frail WF, alert, NAD, OX3 
CVS:tachy irregular, no M/R/G, S1/S2 heard, no thrill Lungs:Clear to auscultation bilaterally, no wheezes, rhonchi, or rales Abdomen: Soft, Nontender, No distention, Normal Bowel sounds, No hepatomegaly Extremities: No C/C/E, pulses palpable 2+ Neuro:grossly normal , follows commands Psych:appropriate Labs: Results:  
   
Chemistry Recent Labs  
  12/23/19 
0636 12/22/19 
1129 GLU 82 98 * 134* K 3.8 3.6 CL 94* 94* CO2 30 31 BUN 37* 33* CREA 2.34* 2.15* CA 7.9* 8.0* AGAP 8 9 BUCR 16 15  108 TP 5.2* 5.5* ALB 2.2* 2.3*  
GLOB 3.0 3.2 AGRAT 0.7* 0.7* CBC w/Diff Recent Labs  
  12/23/19 
0636 12/22/19 
1129 WBC 17.0* 12.1 RBC 3.25* 3.18* HGB 10.0* 10.0* HCT 31.6* 31.5*  208 GRANS PENDING 87* LYMPH PENDING 7*  
EOS PENDING 0 Cardiac Enzymes Recent Labs  
  12/23/19 
0636 12/23/19 
0130 CPK 13* 13* CKND1 CALCULATION NOT PERFORMED WHEN RESULT IS BELOW LINEAR LIMIT CALCULATION NOT PERFORMED WHEN RESULT IS BELOW LINEAR LIMIT Coagulation Recent Labs  
  12/23/19 
0636 12/22/19 
1129 PTP 24.1* 22.6* INR 2.2* 2.0* APTT  --  35.8 Lipid Panel No results found for: CHOL, CHOLPOCT, CHOLX, CHLST, CHOLV, 073091, HDL, HDLP, LDL, LDLC, DLDLP, 277728, VLDLC, VLDL, TGLX, TRIGL, TRIGP, TGLPOCT, CHHD, CHHDX  
BNP No results for input(s): BNPP in the last 72 hours. Liver Enzymes Recent Labs  
  12/23/19 
0636 TP 5.2* ALB 2.2*  SGOT 19 Thyroid Studies Lab Results Component Value Date/Time TSH 2.53 11/08/2019 01:25 PM  
    
Procedures/imaging: see electronic medical records for all procedures/Xrays and details which were not copied into this note but were reviewed prior to creation of Plan Gin Bhatt MD

## 2019-12-23 NOTE — PROGRESS NOTES
Reason for Readmission:    C/o weakness RRAT Score and Risk Level:     23 Level of Readmission:     
   
Care Conference scheduled: not at this time Did you attend your follow up appointment (s): If not, why not: 
 
    
Resources/supports as identified by patient/family:    
    
Top Challenges facing patient (as identified by patient/family and CM): Finances/Medication cost?      
Transportation Support system or lack thereof? Living arrangements? Self-care/ADLs/Cognition? Current Advanced Directive/Advance Care Plan:   
        
Plan for utilizing home health:    
          
Transition of Care Plan:    Based on readmission, the patient's previous Plan of Care 
 has been evaluated and/or modified. The current Transition of Care Plan is:

## 2019-12-23 NOTE — WOUND CARE
IP WOUND CONSULT Lukasz Escamilla MEDICAL RECORD NUMBER:  [id number] AGE: 80 y.o. GENDER: female  : 1935 TODAY'S DATE:  2019 GENERAL  
 
[] Follow-up [x] New Consult Lukasz Escamilla is a 80 y.o. female referred by:  
[] Physician 
[x] Nursing 
[] Other: PAST MEDICAL HISTORY Past Medical History:  
Diagnosis Date  A-[facility] ([facility]  Chronic kidney disease  Congestive heart failure (CHF) ([facility]  COPD (chronic obstructive pulmonary disease) (HCC)  Dialysis patient [facility]) PAST SURGICAL HISTORY Past Surgical History:  
Procedure Laterality Date  IR INSERT TUNL CVC W/O PORT OVER 5 YR  11/15/2019 FAMILY HISTORY History reviewed. No pertinent family history. SOCIAL HISTORY Social History Tobacco Use  Smoking status: Former Smoker  Smokeless tobacco: Never Used Substance Use Topics  Alcohol use: Never Frequency: Never  Drug use: Not Currently ALLERGIES Allergies Allergen Reactions  Penicillins Hives  Valium [Diazepam] Other (comments) It made me cry more MEDICATIONS No current facility-administered medications on file prior to encounter. Current Outpatient Medications on File Prior to Encounter Medication Sig Dispense Refill  warfarin (COUMADIN) 2 mg tablet Take 1 Tab by mouth daily. 10 Tab 0  
 amiodarone (CORDARONE) 200 mg tablet Take 1 Tab by mouth daily. 10 Tab 0  
 atorvastatin (LIPITOR) 40 mg tablet Take 1 Tab by mouth daily. 30 Tab 0  
 dilTIAZem (CARDIZEM) 30 mg tablet Take 1 Tab by mouth Before breakfast, lunch, and dinner. 10 Tab 0  
 aspirin delayed-release 81 mg tablet Take 1 Tab by mouth daily. 30 Tab 0  
 ergocalciferol (VITAMIN D2) 50,000 unit capsule Take 50,000 Units by mouth every seven (7) days.  PARoxetine (PAXIL) 20 mg tablet Take 20 mg by mouth daily.  furosemide (LASIX) 40 mg tablet Take 40 mg by mouth every other day. Wt Readings from Last 3 Encounters:  
12/23/19 58.1 kg (128 lb) 12/03/19 64.1 kg (141 lb 4.8 oz)  
11/22/19 69.3 kg (152 lb 11.2 oz) Visit Vitals BP 91/49 Pulse 98 Temp 99.3 °F (37.4 °C) Resp 23 Ht 4' 9\" (1.448 m) Wt 58.1 kg (128 lb) SpO2 100% Breastfeeding No  
BMI 27.70 kg/m² ASSESSMENT Ulcer Identification: 
Ulcer Type: traumatic - Friction Contributing Factors: shear force Wound Coccyx (Active) Wound Length (cm) 0.2 cm 12/23/2019 11:10 AM  
Wound Width (cm) 0.2 cm 12/23/2019 11:10 AM  
Wound Surface Area (cm^2) 0.04 cm^2 12/23/2019 11:10 AM  
Tissue Type Percent Pink 100 12/23/2019 11:10 AM  
Dressing Type Applied Silicone 92/38/1231 22:45 AM  
Number of days: 0 PLAN Skin Care & Pressure Relief Recommendations Minimize layers of linen Turn/reposition approximately every 2 hours Manage incontinence Promote continence; Skin Protective lotion/cream to buttocks and sacrum daily and as needed with incontinence care Offload heels pillows Physician/Provider notified:  
Orders: Apply proshield to sacrum followed by protective border. Keep heels elevated Teaching completed with:  
[x] Patient [x] Family member      
[] Caregiver         
[] Nursing 
[] Other Patient/Caregiver Teaching: 
Level of patient/caregiver understanding able to:  
[x] Indicates understanding       [] Needs reinforcement 
[] Unsuccessful      [] Verbal Understanding 
[] Demonstrated understanding       [] No evidence of learning 
[] Refused teaching         [] N/A Electronically signed by Dixon Cooks, RN on 12/23/2019 at 11:27 AM

## 2019-12-23 NOTE — CONSULTS
Nephrology Consult Patient: Bc Garcia Requesting physician: Dr. Logan Davila Reason for consult: ESRD 
 
CC: Palpitation History of Present Illness: 
Bc Garcia is a 80 y.o. female with a past medical history significant for CHF, COPD, afib, ESRD on HD MWF noticed to have more palpitation in nursing home, with worsening weakness. Found to have low BP in ED with tachycardia. IV cardizem given, along with bolus iv fluid. Cardiology consulted, placed pt on iv amiodarone. Pt then admitted to ICU. Nephrology was consulted for further evaluation and management of her ERSD. Seen in ICU, feeling weak and BP borderline low. Past Medical History: 
Patient Active Problem List  
Diagnosis Code  Respiratory failure ([facility]) J96.90  
 Heart failure ([facility]) I50.9  Benign essential hypertension I10  Chronic diastolic congestive heart failure ([facility]) I50.32  
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  
 ESRD needing dialysis (Formerly [facility]) N18.6, Z99.2  Dyspnea R06.00  Debility R53.81  
 Hypotension I95.9  Atrial fibrillation with RVR ([facility]) I48.91 Social History: 
Social History Socioeconomic History  Marital status:  Spouse name: Not on file  Number of children: Not on file  Years of education: Not on file  Highest education level: Not on file Tobacco Use  Smoking status: Former Smoker  Smokeless tobacco: Never Used Substance and Sexual Activity  Alcohol use: Never Frequency: Never  Drug use: Not Currently Family History: 
History reviewed. No pertinent family history. Pt reports no kidney disease in the family. Allergy: Allergies Allergen Reactions  Penicillins Hives  Valium [Diazepam] Other (comments) It made me cry more Medication: 
Home meds: reviewed Inpatient meds: 
Current Facility-Administered Medications Medication Dose Route Frequency  pantoprazole (PROTONIX) injection 40 mg  40 mg IntraVENous Q24H  
 amiodarone (NEXTERONE) 360 mg in dextrose 200 mL (1.8 mg/mL) infusion  1 mg/min IntraVENous TITRATE  cefTRIAXone (ROCEPHIN) 1 g in sterile water (preservative free) 10 mL IV syringe  1 g IntraVENous Q24H  
 aspirin delayed-release tablet 81 mg  81 mg Oral DAILY  atorvastatin (LIPITOR) tablet 40 mg  40 mg Oral DAILY  PARoxetine (PAXIL) tablet 20 mg  20 mg Oral DAILY  [START ON 12/24/2019] ergocalciferol capsule 50,000 Units  50,000 Units Oral Q7D  Warfarin - Pharmacy to Dose   Other Rx Dosing/Monitoring Review of Systems: 
Gen: no fever or chills, + weakness Head: no headache or trauma Eyes: no change in vision Throat/Neck: no sore throat or dysphagia CV: no chest pain but + palpitation Pulm: no cough or hemoptysis GI: no nausea, vomiting or diarrhea : no dysuria or hematuria Skin: no new rash or lesions Endocrine: no hot or cold intolerance Psych: no anxiety or depression Vital signs:  
Visit Vitals /55 Pulse (!) 111 Temp 99 °F (37.2 °C) Resp 21 Wt 58.1 kg (128 lb) SpO2 100% Breastfeeding No  
BMI 27.70 kg/m² Intake/Output Summary (Last 24 hours) at 12/23/2019 5021 Last data filed at 12/22/2019 1950 Gross per 24 hour Intake 250 ml Output 50 ml Net 200 ml Physical Exam: 
 
General: No acute distress HENT: Atraumatic and normocephalic Eyes: Normal conjunctiva, EOMI Neck: Supple with no mass Cardiovascular: Normal S1 & S2, no m/r/g Pulmonary/Chest Wall: Clear to auscultation bilaterally, no w/c Abdominal: Soft and non-tender, normal active bowel sound Musculoskeletal: No edema lower ext bilaterraly Skin: No lesions Neurological: No focal neurological deficits Access: +TDC Data Review: 
CMP:  
Lab Results Component Value Date/Time  (L) 12/23/2019 06:36 AM  
 K 3.8 12/23/2019 06:36 AM  
 CL 94 (L) 12/23/2019 06:36 AM  
 CO2 30 12/23/2019 06:36 AM  
 AGAP 8 12/23/2019 06:36 AM  
 GLU 82 12/23/2019 06:36 AM  
 BUN 37 (H) 12/23/2019 06:36 AM  
 CREA 2.34 (H) 12/23/2019 06:36 AM  
 GFRAA 24 (L) 12/23/2019 06:36 AM  
 GFRNA 20 (L) 12/23/2019 06:36 AM  
 CA 7.9 (L) 12/23/2019 06:36 AM  
 MG 2.5 12/23/2019 06:36 AM  
 ALB 2.2 (L) 12/23/2019 06:36 AM  
 TP 5.2 (L) 12/23/2019 06:36 AM  
 GLOB 3.0 12/23/2019 06:36 AM  
 AGRAT 0.7 (L) 12/23/2019 06:36 AM  
 SGOT 19 12/23/2019 06:36 AM  
 ALT 18 12/23/2019 06:36 AM  
 
CBC:  
Lab Results Component Value Date/Time WBC 17.0 (H) 12/23/2019 06:36 AM  
 HGB 10.0 (L) 12/23/2019 06:36 AM  
 HCT 31.6 (L) 12/23/2019 06:36 AM  
  12/23/2019 06:36 AM  
 
All Cardiac Markers in the last 24 hours:  
Lab Results Component Value Date/Time CPK 13 (L) 12/23/2019 06:36 AM  
 CPK 13 (L) 12/23/2019 01:30 AM  
 CPK 13 (L) 12/22/2019 07:33 PM  
 CPK 54 12/22/2019 11:29 AM  
 CKMB <1.0 12/23/2019 06:36 AM  
 CKMB <1.0 12/23/2019 01:30 AM  
 CKMB <1.0 12/22/2019 07:33 PM  
 CKMB <1.0 12/22/2019 11:29 AM  
 CKND1  12/23/2019 06:36 AM  
  CALCULATION NOT PERFORMED WHEN RESULT IS BELOW LINEAR LIMIT  
 CKND1  12/23/2019 01:30 AM  
  CALCULATION NOT PERFORMED WHEN RESULT IS BELOW LINEAR LIMIT  
 CKND1  12/22/2019 07:33 PM  
  CALCULATION NOT PERFORMED WHEN RESULT IS BELOW LINEAR LIMIT  
 CKND1  12/22/2019 11:29 AM  
  CALCULATION NOT PERFORMED WHEN RESULT IS BELOW LINEAR LIMIT  
 TROIQ 0.02 12/23/2019 06:36 AM  
 TROIQ 0.02 12/23/2019 01:30 AM  
 TROIQ <0.02 12/22/2019 07:33 PM  
 TROIQ <0.02 12/22/2019 11:29 AM  
 
Recent Glucose Results:  
Lab Results Component Value Date/Time  GLU 82 12/23/2019 06:36 AM  
 GLU 98 12/22/2019 11:29 AM  
 
 ABG: No results found for: PH, PHI, PCO2, PCO2I, PO2, PO2I, HCO3, HCO3I, FIO2, FIO2I 
COAGS:  
Lab Results Component Value Date/Time APTT 35.8 12/22/2019 11:29 AM  
 PTP 24.1 (H) 12/23/2019 06:36 AM  
 INR 2.2 (H) 12/23/2019 06:36 AM  
 
Liver Panel:  
Lab Results Component Value Date/Time ALB 2.2 (L) 12/23/2019 06:36 AM  
 TP 5.2 (L) 12/23/2019 06:36 AM  
 GLOB 3.0 12/23/2019 06:36 AM  
 AGRAT 0.7 (L) 12/23/2019 06:36 AM  
 SGOT 19 12/23/2019 06:36 AM  
 ALT 18 12/23/2019 06:36 AM  
  12/23/2019 06:36 AM  
 
 
CXR:      
1. Right IJ approach dialysis catheter in stable position. 2. Cardiomegaly, central vascular congestion/edema and bilateral pleural 
effusions overall similar to prior. 3. Interval increase in left retrocardiac atelectasis. Impression: 1. ESRD on HD MWF 2. Tachycardia 3. Weakness 4. Hypotension 5. Anemia in CKD Plan:  
 
Electrolytes are wnl, pt is also not vol overload. Will hold off dialysis today Cont to stabilize her hemodynamics per cardiology and ICU team 
Check AM labs Consider next HD tomorrow D/w pt Thanks for inviting us to participate in this patient's medical care. We'll follow the patient closely with the medical team. 
 
Josh Connolly MD 
VIA [facility] [phone]

## 2019-12-23 NOTE — PROGRESS NOTES
Pharmacy Dosing Services: Warfarin Consult for Warfarin Dosing by Pharmacy by Dr. Christiano Juan Consult provided for this 80 y.o.  female , for indication of Atrial Fibrillation. Day of Therapy - continuation of home med Dose to achieve an INR goal of 2-3 Order entered for Warfarin 2mg to be given today at 2000. Significant drug interactions: Amiodarone PT/INR Lab Results Component Value Date/Time INR 2.0 (H) 12/22/2019 11:29 AM  
  
Platelets Lab Results Component Value Date/Time PLATELET 086 24/65/2291 11:29 AM  
  
H/H Lab Results Component Value Date/Time HGB 10.0 (L) 12/22/2019 11:29 AM  
  
 
Warfarin Administrations (last 168 hours) None Pharmacy to follow daily and will provide subsequent Warfarin dosing based on clinical status. ALBER Osorio  Contact information: [phone]

## 2019-12-23 NOTE — PROGRESS NOTES
1293 Bedside shift change report received from Ashley Whiting RN. Report included the following information SBAR, Kardex, Intake/Output, MAR and Recent Results. 5735 BP systolic 85, called physician orders received.

## 2019-12-23 NOTE — CDMP QUERY
Patient admitted with hypotension, noted to have A-fib on H&P. If possible, please document in progress notes and d/c summary if you are evaluating and/or treating any of the following:  
 
=> Paroxysmal Atrial Fibrillation  
=> Persistent Atrial Fibrillation  
=> Permanent Atrial Fibrillation 
=> Other Explanation of clinical findings  
=> Clinically Undetermined (no explanation for clinical findings) The medical record reflects the following:  
   Risk Factors: PMH A-fib Clinical Indicators: Tachycardia, EKG Atrial fibrillation with rapid ventricular response Abnormal ECG Treatment: Amiodarone, EKG, Echo Please clarify and document your clinical opinion in the progress notes and discharge summary including the definitive and/or presumptive diagnosis, (suspected or probable), related to the above clinical findings. Please include clinical findings supporting your diagnosis.  
 
-------------------------------------------------------------------------------------------------  
Paroxysmal:  begins suddenly and stops on its own. Symptoms can be mild or severe. They stop within about a week, but usually in less than 24 hours. Persistent: continues for more than a week. It may stop on its own, or it can be stopped with treatment. Permanent: cannot be restored with treatment Thank you, Prema Boyle, [facility], [facility]

## 2019-12-23 NOTE — CONSULTS
[facility] Pulmonary, Critical Care, and Sleep Medicine Initial Patient Consult Name: Mary Ellen Hobson MRN: [id number] : 1935 Hospital: [facility] [place] Date: 2019  Room: 103/01 Subjective: This patient has been seen and evaluated at the request of Dr. Meghna Bell for patient with Afib and hypotension needing icu admission. Patient is a 80 y.o. female with hx of ESRD on dialysis started last month for CKD. She also has chronic Afib. Patient is in [place] for rehab since last admission. She was sent to ER for tachycardia and shortness of breath. In the ER, she got cardizem and her BP dropped. Patient was given a 250 ml fluid bolus and moved to icu. She has been placed on amiodarone drip by Cardiology Dr Amanda Fraser. Patient is awake, alert this am. 
She has no CP or cough or abd pains or vomiting. No SOB at rest.  
Afebrile; BP low normal 
Tele-Afib rate 110s UOP poor 50 ml overnight Drip: amiodarone 0.5 mg/hr Past Medical History:  
Diagnosis Date  A-fib ([facility] [address]  Chronic kidney disease  Congestive heart failure (CHF) ([facility] [address]  COPD (chronic obstructive pulmonary disease) (HCC)  Dialysis patient [facility]) Past Surgical History:  
Procedure Laterality Date  IR INSERT TUNL CVC W/O PORT OVER 5 YR  11/15/2019 Prior to Admission medications Medication Sig Start Date End Date Taking? Authorizing Provider  
warfarin (COUMADIN) 2 mg tablet Take 1 Tab by mouth daily. 19  Yes Hamida Alvarez MD  
amiodarone (CORDARONE) 200 mg tablet Take 1 Tab by mouth daily. 19  Yes Sanjuanita Escobar MD  
atorvastatin (LIPITOR) 40 mg tablet Take 1 Tab by mouth daily. 10/30/19  Yes Joy Aguirre MD  
dilTIAZem (CARDIZEM) 30 mg tablet Take 1 Tab by mouth Before breakfast, lunch, and dinner. 19   Sanjuanita Escobar MD  
aspirin delayed-release 81 mg tablet Take 1 Tab by mouth daily.  10/29/19   Joy Aguirre MD  
 ergocalciferol (VITAMIN D2) 50,000 unit capsule Take 50,000 Units by mouth every seven (7) days. Napoleon Lee MD  
PARoxetine (PAXIL) 20 mg tablet Take 20 mg by mouth daily. Napoleon Lee MD  
furosemide (LASIX) 40 mg tablet Take 40 mg by mouth every other day. Napoleon Lee MD  
 
Allergies Allergen Reactions  Penicillins Hives  Valium [Diazepam] Other (comments) It made me cry more Social History Tobacco Use  Smoking status: Former Smoker  Smokeless tobacco: Never Used Substance Use Topics  Alcohol use: Never Frequency: Never History reviewed. No pertinent family history. Current Facility-Administered Medications Medication Dose Route Frequency  nystatin (MYCOSTATIN) 100,000 unit/gram powder   Topical TID  vancomycin (VANCOCIN) 1,000 mg in 0.9% sodium chloride 250 mL (VIAL-MATE)  1,000 mg IntraVENous ONCE  pantoprazole (PROTONIX) injection 40 mg  40 mg IntraVENous Q24H  
 amiodarone (NEXTERONE) 360 mg in dextrose 200 mL (1.8 mg/mL) infusion  1 mg/min IntraVENous TITRATE  cefTRIAXone (ROCEPHIN) 1 g in sterile water (preservative free) 10 mL IV syringe  1 g IntraVENous Q24H  
 aspirin delayed-release tablet 81 mg  81 mg Oral DAILY  atorvastatin (LIPITOR) tablet 40 mg  40 mg Oral DAILY  PARoxetine (PAXIL) tablet 20 mg  20 mg Oral DAILY  [START ON 2019] ergocalciferol capsule 50,000 Units  50,000 Units Oral Q7D  Warfarin - Pharmacy to Dose   Other Rx Dosing/Monitoring Review of Systems: 
Limited due to patient condition Objective:  
Vital Signs:   
Visit Vitals /54 Pulse (!) 111 Temp 99.4 °F (37.4 °C) Resp 21 Ht 4' 9\" (1.448 m) Wt 58.1 kg (128 lb) SpO2 100% Breastfeeding No  
BMI 27.70 kg/m² Temp (24hrs), Av.6 °F (37 °C), Min:98 °F (36.7 °C), Max:99.4 °F (37.4 °C) Intake/Output:  
Last shift:      No intake/output data recorded. Last 3 shifts:  1901 -  0700 In: 250 [I.V.:250] Out: 50 [Urine:50] Intake/Output Summary (Last 24 hours) at 12/23/2019 4295 Last data filed at 12/22/2019 1950 Gross per 24 hour Intake 250 ml Output 50 ml Net 200 ml Physical Exam:  
Comfortable; on 2 L nc o2; acyanotic; thin and frail Chest: RT chest wall dialysis cath HEENT: pupils not dilated, no scleral jaundice, moist oral mucosa, no nasal drainage Neck: No adenopathy or thyroid swelling CVS: S1S2 no murmurs; JVD not elevated RS: Mod air entry bilaterally, decreased BS at bases, no wheezes or crackles Abd: soft, non tender, no hepatosplenomegaly, no abd distension, no guarding or rigidity, bowel sounds heard Neuro: awake, alert, moving all extremities Extrm: no leg edema or swelling or clubbing Skin: no rash Lymphatic: no cervical or supraclavicular adenopathy Data review:  
 
Recent Results (from the past 24 hour(s)) EKG, 12 LEAD, INITIAL Collection Time: 12/22/19 11:26 AM  
Result Value Ref Range Ventricular Rate 128 BPM  
 Atrial Rate 153 BPM  
 QRS Duration 92 ms Q-T Interval 302 ms QTC Calculation (Bezet) 440 ms Calculated R Axis 37 degrees Calculated T Axis 133 degrees Diagnosis Atrial fibrillation with rapid ventricular response Nonspecific T wave abnormality , probably digitalis effect Abnormal ECG When compared with ECG of 26-NOV-2019 09:49, No significant change was found CBC WITH AUTOMATED DIFF Collection Time: 12/22/19 11:29 AM  
Result Value Ref Range WBC 12.1 4.6 - 13.2 K/uL  
 RBC 3.18 (L) 4.20 - 5.30 M/uL  
 HGB 10.0 (L) 12.0 - 16.0 g/dL HCT 31.5 (L) 35.0 - 45.0 % MCV 99.1 (H) 74.0 - 97.0 FL  
 MCH 31.4 24.0 - 34.0 PG  
 MCHC 31.7 31.0 - 37.0 g/dL  
 RDW 18.6 (H) 11.6 - 14.5 % PLATELET 550 461 - 853 K/uL MPV 11.4 9.2 - 11.8 FL  
 NEUTROPHILS 87 (H) 40 - 73 % LYMPHOCYTES 7 (L) 21 - 52 % MONOCYTES 6 3 - 10 % EOSINOPHILS 0 0 - 5 % BASOPHILS 0 0 - 2 % ABS. NEUTROPHILS 10.5 (H) 1.8 - 8.0 K/UL  
 ABS. LYMPHOCYTES 0.9 0.9 - 3.6 K/UL  
 ABS. MONOCYTES 0.7 0.05 - 1.2 K/UL  
 ABS. EOSINOPHILS 0.1 0.0 - 0.4 K/UL  
 ABS. BASOPHILS 0.0 0.0 - 0.1 K/UL  
 DF AUTOMATED PROTHROMBIN TIME + INR Collection Time: 12/22/19 11:29 AM  
Result Value Ref Range Prothrombin time 22.6 (H) 11.5 - 15.2 sec INR 2.0 (H) 0.8 - 1.2 METABOLIC PANEL, COMPREHENSIVE Collection Time: 12/22/19 11:29 AM  
Result Value Ref Range Sodium 134 (L) 136 - 145 mmol/L Potassium 3.6 3.5 - 5.5 mmol/L Chloride 94 (L) 100 - 111 mmol/L  
 CO2 31 21 - 32 mmol/L Anion gap 9 3.0 - 18 mmol/L Glucose 98 74 - 99 mg/dL BUN 33 (H) 7.0 - 18 MG/DL Creatinine 2.15 (H) 0.6 - 1.3 MG/DL  
 BUN/Creatinine ratio 15 12 - 20 GFR est AA 26 (L) >60 ml/min/1.73m2 GFR est non-AA 22 (L) >60 ml/min/1.73m2 Calcium 8.0 (L) 8.5 - 10.1 MG/DL Bilirubin, total 0.7 0.2 - 1.0 MG/DL  
 ALT (SGPT) 19 13 - 56 U/L  
 AST (SGOT) 18 10 - 38 U/L Alk. phosphatase 108 45 - 117 U/L Protein, total 5.5 (L) 6.4 - 8.2 g/dL Albumin 2.3 (L) 3.4 - 5.0 g/dL Globulin 3.2 2.0 - 4.0 g/dL A-G Ratio 0.7 (L) 0.8 - 1.7 NT-PRO BNP Collection Time: 12/22/19 11:29 AM  
Result Value Ref Range NT pro-BNP 9,930 (H) 0 - 1,800 PG/ML  
MAGNESIUM Collection Time: 12/22/19 11:29 AM  
Result Value Ref Range Magnesium 2.4 1.6 - 2.6 mg/dL CARDIAC PANEL,(CK, CKMB & TROPONIN) Collection Time: 12/22/19 11:29 AM  
Result Value Ref Range CK 54 26 - 192 U/L  
 CK - MB <1.0 <3.6 ng/ml CK-MB Index  0.0 - 4.0 % CALCULATION NOT PERFORMED WHEN RESULT IS BELOW LINEAR LIMIT Troponin-I, QT <0.02 0.0 - 0.045 NG/ML  
PTT Collection Time: 12/22/19 11:29 AM  
Result Value Ref Range aPTT 35.8 23.0 - 36.4 SEC URINALYSIS W/ RFLX MICROSCOPIC Collection Time: 12/22/19  4:30 PM  
Result Value Ref Range Color DARK YELLOW Appearance TURBID  Specific gravity 1.022 1.005 - 1.030    
 pH (UA) 5.0 5.0 - 8.0 Protein 100 (A) NEG mg/dL Glucose NEGATIVE  NEG mg/dL Ketone TRACE (A) NEG mg/dL Bilirubin LARGE (A) NEG Blood LARGE (A) NEG Urobilinogen 1.0 0.2 - 1.0 EU/dL Nitrites POSITIVE (A) NEG Leukocyte Esterase MODERATE (A) NEG URINE MICROSCOPIC ONLY Collection Time: 12/22/19  4:30 PM  
Result Value Ref Range WBC 11 to 20 0 - 5 /hpf  
 RBC 11 to 20 0 - 5 /hpf Epithelial cells 1+ 0 - 5 /lpf Bacteria 2+ (A) NEG /hpf  
 CA Oxalate crystals 1+ (A) NEG Hyaline cast 1 to 3 0 - 2 /lpf Yeast 3+ (A) NEG  
CARDIAC PANEL,(CK, CKMB & TROPONIN) Collection Time: 12/22/19  7:33 PM  
Result Value Ref Range CK 13 (L) 26 - 192 U/L  
 CK - MB <1.0 <3.6 ng/ml CK-MB Index  0.0 - 4.0 % CALCULATION NOT PERFORMED WHEN RESULT IS BELOW LINEAR LIMIT Troponin-I, QT <0.02 0.0 - 0.045 NG/ML  
CARDIAC PANEL,(CK, CKMB & TROPONIN) Collection Time: 12/23/19  1:30 AM  
Result Value Ref Range CK 13 (L) 26 - 192 U/L  
 CK - MB <1.0 <3.6 ng/ml CK-MB Index  0.0 - 4.0 % CALCULATION NOT PERFORMED WHEN RESULT IS BELOW LINEAR LIMIT Troponin-I, QT 0.02 0.0 - 0.045 NG/ML  
CBC WITH AUTOMATED DIFF Collection Time: 12/23/19  6:36 AM  
Result Value Ref Range WBC 17.0 (H) 4.6 - 13.2 K/uL  
 RBC 3.25 (L) 4.20 - 5.30 M/uL  
 HGB 10.0 (L) 12.0 - 16.0 g/dL HCT 31.6 (L) 35.0 - 45.0 % MCV 97.2 (H) 74.0 - 97.0 FL  
 MCH 30.8 24.0 - 34.0 PG  
 MCHC 31.6 31.0 - 37.0 g/dL  
 RDW 18.4 (H) 11.6 - 14.5 % PLATELET 620 466 - 779 K/uL MPV 11.2 9.2 - 11.8 FL  
 NEUTROPHILS 87 (H) 42 - 75 % BAND NEUTROPHILS 12 (H) 0 - 5 % LYMPHOCYTES 0 (L) 20 - 51 % MONOCYTES 1 (L) 2 - 9 % EOSINOPHILS 0 0 - 5 % BASOPHILS 0 0 - 3 %  
 ABS. NEUTROPHILS 14.8 (H) 1.8 - 8.0 K/UL  
 ABS. LYMPHOCYTES 0.0 (L) 0.8 - 3.5 K/UL  
 ABS. MONOCYTES 0.2 0 - 1.0 K/UL  
 ABS. EOSINOPHILS 0.0 0.0 - 0.4 K/UL  
 ABS.  BASOPHILS 0.0 0.0 - 0.1 K/UL  
 RBC COMMENTS ANISOCYTOSIS 
1+ 
    
 RBC COMMENTS HYPOCHROMIA 1+ 
    
 RBC COMMENTS SCHISTOCYTES 
FEW ACANTHOCYTES 1+ 
    
 DF MANUAL METABOLIC PANEL, COMPREHENSIVE Collection Time: 12/23/19  6:36 AM  
Result Value Ref Range Sodium 132 (L) 136 - 145 mmol/L Potassium 3.8 3.5 - 5.5 mmol/L Chloride 94 (L) 100 - 111 mmol/L  
 CO2 30 21 - 32 mmol/L Anion gap 8 3.0 - 18 mmol/L Glucose 82 74 - 99 mg/dL BUN 37 (H) 7.0 - 18 MG/DL Creatinine 2.34 (H) 0.6 - 1.3 MG/DL  
 BUN/Creatinine ratio 16 12 - 20 GFR est AA 24 (L) >60 ml/min/1.73m2 GFR est non-AA 20 (L) >60 ml/min/1.73m2 Calcium 7.9 (L) 8.5 - 10.1 MG/DL Bilirubin, total 0.6 0.2 - 1.0 MG/DL  
 ALT (SGPT) 18 13 - 56 U/L  
 AST (SGOT) 19 10 - 38 U/L Alk. phosphatase 111 45 - 117 U/L Protein, total 5.2 (L) 6.4 - 8.2 g/dL Albumin 2.2 (L) 3.4 - 5.0 g/dL Globulin 3.0 2.0 - 4.0 g/dL A-G Ratio 0.7 (L) 0.8 - 1.7 MAGNESIUM Collection Time: 12/23/19  6:36 AM  
Result Value Ref Range Magnesium 2.5 1.6 - 2.6 mg/dL PROTHROMBIN TIME + INR Collection Time: 12/23/19  6:36 AM  
Result Value Ref Range Prothrombin time 24.1 (H) 11.5 - 15.2 sec INR 2.2 (H) 0.8 - 1.2 CK-MB,QT Collection Time: 12/23/19  6:36 AM  
Result Value Ref Range CK - MB <1.0 <3.6 ng/ml CK-MB Index  0.0 - 4.0 % CALCULATION NOT PERFORMED WHEN RESULT IS BELOW LINEAR LIMIT  
CK Collection Time: 12/23/19  6:36 AM  
Result Value Ref Range CK 13 (L) 26 - 192 U/L  
TROPONIN I Collection Time: 12/23/19  6:36 AM  
Result Value Ref Range Troponin-I, QT 0.02 0.0 - 0.045 NG/ML  
GLUCOSE, POC Collection Time: 12/23/19  7:43 AM  
Result Value Ref Range Glucose (POC) 88 70 - 110 mg/dL ECHO ADULT COMPLETE Collection Time: 12/23/19  8:07 AM  
Result Value Ref Range AO ASC D 2.76 cm Aortic Valve Systolic Peak Velocity 577.80 cm/s AoV VTI 15.92 cm  Aortic Valve Area by Continuity of Peak Velocity 1.8 cm2  
 Aortic Valve Area by Continuity of VTI 1.8 cm2 AoV PG 5.5 mmHg LVIDd 1.80 (A) 3.9 - 5.3 cm  
 LVPWd 0.56 (A) 0.6 - 0.9 cm LVIDs 2.51 cm IVSd 0.46 (A) 0.6 - 0.9 cm  
 LV ED Vol A2C 33.2 mL  
 LV ES Vol A4C 17.6 mL  
 LV ES Vol BP 15.2 (A) 19 - 49 mL  
 LVOT d 1.74 cm  
 LVOT Peak Velocity 90.59 cm/s LVOT Peak Gradient 3.3 mmHg LVOT VTI 12.06 cm  
 LV E' Septal Velocity 5.00 cm/s LV E' Lateral Velocity 9.00 cm/s Mitral Valve Area by Proximal Isovelocity Surface Area 0.1 cm2  
 MV A Mian 21.21 cm/s  
 MV E Mian 186.69 cm/s  
 MV E/A 8.80 MV Mean Gradient 8.7 mmHg Mitral Valve Annulus Velocity Time Integral 28.67 cm RVIDd 3.45 cm Right Ventricular Outflow Track Peak Velocity 62.25 cm/s Main Pulmonary Artery Diameter 1.90 cm Aortic Valve Systolic Mean Gradient 2.8 mmHg Mitral Effective Regurgitant Orifice Area 0.10 cm2 BP EF 57.9 55 - 100 % LV Ejection Fraction MOD 4C 52 % LV Ejection Fraction MOD 2C 61 % LA Vol 4C 65.05 (A) 22 - 52 mL  
 LA Vol 2C 77.17 (A) 22 - 52 mL  
 LA Area 2C 24.94 cm2 LA Area 4C 23.6 cm2 LV Mass AL 3.8 (A) 67 - 162 g  
 LV Mass AL Index 9.5 (A) 43 - 95 g/m2 LVPWs 0.00 cm  
 E/E' lateral 20.74   
 E/E' septal 37.34 RV Longitudinal Dimension 5.9 cm  
 TAPSV 13.0 cm/s IVC proximal 2.08 cm  
 MV Peak Gradient 15.9 mmHg E/E' ratio (averaged) 29.04   
 MV regurgitant volume 14.20 cc  
 LV ES Vol A2C 13.1 mL  
 LVES Vol Index BP 10.2 mL/m2 LV ED Vol A4C 36.3 mL  
 LVED Vol Index BP 24.2 mL/m2 Mitral Regurgitant Velocity Time Integral 101.88 cm Mitral Regurgitant Peak Velocity 441.13 cm/s Mitral Valve E Wave Deceleration Time 149.3 ms Aortic Sinus Valsalva 2.51 cm Left Atrium Major Axis 3.75 cm Tapse 1.85 1.5 - 2.0 cm Triscuspid Valve Regurgitation Peak Gradient 40.2 mmHg Pulmonic Valve Max Velocity 90.70 cm/s Mitral Valve Max Velocity 199.53 cm/s  MVA VTI 1.0 cm2  
 LV ED Vol BP 36.0 (A) 56 - 104 ml  
 TR Max Velocity 317.18 cm/s PISA MR Rad 0.50 cm PASP 50.0 mmHg Right Atrial Volume 43.2 ml  
 LA Vol Index 51.87 16 - 28 ml/m2 LA Vol Index 43.72 16 - 28 ml/m2 LVED Vol Index A4C 24.4 mL/m2 LVED Vol Index A2C 22.3 mL/m2 LVES Vol Index A4C 11.8 mL/m2 LVES Vol Index A2C 8.8 mL/m2 MR Peak Gradient 77.8 mmHg ISMAAR/BSA Pk Mian 1.2 cm2/m2 ISAMAR/BSA VTI 1.2 cm2/m2 Left Ventricular Fractional Shortening by 2D -62.80256291 % Left Ventricular Outflow Tract Mean Gradient 1.96894448428260 mmHg Left Ventricular Outflow Tract Mean Velocity 6.17906757974417 cm/s Mitral valve mean inflow velocity 1.48419751007616 m/s AV Velocity Ratio 0.77 AV VTI Ratio 0.8 Aortic valve mean velocity 3.85914351405629 m/s Left Ventricular End Diastolic Volume by Teichholz Method 8.2539071734 mL Left Ventricular End Systolic Volume by Teichholz Method 7.60337982198 mL Left Ventricular Stroke Volume by 2-D Biplane-MOD 18.184442738 mL Left Ventricular Stroke Volume by 2-D Single Plane- MOD 67.029294856 mL Left Ventricular Stroke Volume by Teichholz Method -1.062212197 mL Left Ventricular Stroke Volume by 2-D Single Plane- MOD 53.192298167 mL  
 PV peak gradient 3.3 mmHg Mitral Valve Deceleration [place] 60.79748122 LA Volume 77.09 22 - 52 ml Right Atrial Area 4C 17.26 cm2 No results for input(s): FIO2I, IFO2, HCO3I, IHCO3, HCOPOC, PCO2I, PCOPOC, IPHI, PHI, PHPOC, PO2I, PO2POC in the last 72 hours. No lab exists for component: IPOC2 All Micro Results Procedure Component Value Units Date/Time CULTURE, URINE [[id number]] Collected:  12/22/19 1630 Order Status:  Completed Specimen:  Clean catch Updated:  12/22/19 2120 ECHO Nov 2019 Interpretation Summary Result status: Final result · Left Ventricle: Normal cavity size. Increased wall thickness.  Low normal systolic dysfunction. Estimated left ventricular ejection fraction is 51 - 55%. E/E' ratio is 28.72. · Left Atrium: Moderately dilated left atrium. · Right Ventricle: Reduced systolic function. · Right Atrium: Moderately dilated right atrium. · Aortic Valve: Mild aortic valve sclerosis with no evidence of reduced excursion. · Mitral Valve: Mitral valve thickening. Moderate mitral annular calcification. Mild mitral valve prolapse. Moderate mitral valve regurgitation is present. · Pulmonary Artery: Moderate pulmonary hypertension. Pulmonary arterial systolic pressure is 46 mmHg. · IVC/Hepatic Veins: Mildly elevated central venous pressure (5-10 mmHg); IVC diameter is less than 21 mm and collapses less than 50% with respiration. · Pericardium: Trivial pericardial effusion. Imaging: 
[x]I have personally reviewed the patients chest radiographs images and report Results [place] [facility] [place] [place] encounter on 12/22/19 XR CHEST PORT Narrative EXAM: XR CHEST PORT 
 
CLINICAL INDICATION/HISTORY: Atrial fibrillation 
-Additional: Congestive heart failure COMPARISON: Several prior exams, most recently 11/29/2019 TECHNIQUE: Portable frontal view of the chest 
 
_______________ FINDINGS: 
 
SUPPORT DEVICES: There is a right IJ approach dialysis catheter tip projecting 
in stable position. HEART AND MEDIASTINUM: Cardiac size and mediastinal contours remain stable, with 
redemonstration of cardiac enlargement. Dense mitral annular calcification. LUNGS AND PLEURAL SPACES: Central vascular congestion and mild interstitial 
edema is present along with small bilateral pleural effusions. Interval increase 
in left retrocardiac opacity noted with slight leftward shift of mediastinal 
structures. No pneumothorax. BONY THORAX AND SOFT TISSUES: No acute osseous abnormality. Advanced bilateral 
rotator cuff arthropathy. Partial visualization of postoperative changes related to anterior cervical discectomy and instrumented fusion. _______________ Impression IMPRESSION: 
 
 
1. Right IJ approach dialysis catheter in stable position. 2. Cardiomegaly, central vascular congestion/edema and bilateral pleural 
effusions overall similar to prior. 3. Interval increase in left retrocardiac atelectasis. No results found for this or any previous visit. IMPRESSION:  
· Atrial fibrillation with RVR- I48.91 
· Hypotension - I95.9 · Chronic diastolic CHF- U80.18 
· ESRD on dialysis - N18.6, Z99.2 
· UTI- N39.0 · Debility- R53.81 
·   
  
RECOMMENDATIONS:  
· Pulm: stable respirations; wean fio2 for o2 sats >91% · Cardiac: amiodarone drip; Afib seems controlled; trops neg; patient not needing pressor · ID: follow urine cx; continue ceftriaxone daily · Renal: follow IOs; dialysis per nephrology-planned for tomorrow · GI: oral diet as tolerated; SLP eval  
· Neuro: stable mentation · Hem: watch Hb and Plts; coumadin for Afib - target INR 2-3 · Nutrition: SLP eval-mechanical soft solids, chopped meat/thin liquid diet with aspiration precautions · Proph:  DVt and GI proph-coumadin and PPI · D/w patient and sister at bedside; updated medical management; discussed code status and agree with DNR decision Will defer respective systems problem management to primary and other consultant and follow patient in ICU with primary and other medical team 
Further recommendations will be based on the patient's response to recommended treatment and results of the investigation ordered. Quality Care: PPI, DVT prophylaxis, HOB elevated, Infection control all reviewed and addressed. · Lines/Tubes: PIVs 
ADVANCE DIRECTIVE: Full Code Ok to transfer to tele floor if cardiology is ok · Thank you for the consult High complexity decision making was performed in this consultation and evaluation of this patient who is at high risk for decompensation with multiple organ involvement Caty Hu MD

## 2019-12-23 NOTE — PROGRESS NOTES
conducted an initial consultation and Spiritual Assessment for Kaela Henry, who is a 80 y.o.,female. Patient is Holiness and very connected to her hilton and God. She was upbeat \"but tired. \"   
She has good support from her adult children. Her sister was just leaving when I arrived. Grateful for prayer. The reason the Patient came to the hospital is:  
Patient Active Problem List  
 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  [facility] 11/19/2019  Hyponatremia 11/15/2019  UTI (urinary tract infection) 11/10/2019  A-fib ([facility] 11/08/2019  Chronic diastolic congestive heart failure ([facility] 04/15/2019  Iron deficiency anemia 11/06/2018  Heart failure ([facility] 07/09/2018  Cobalamin deficiency 11/20/2013  Hypercholesterolemia 10/01/2013  Benign essential hypertension 10/25/2012  Hypothyroidism 10/25/2012  Hypertensive heart disease 10/05/2011 Initiated a relationship of care and support. Explored issues of hilton, belief, spirituality and Gnosticism/ritual needs. Listened empathically. Provided information about Spiritual Care Services. Offered prayer and assurance of continued prayers on patients behalf. Chart reviewed. Patient shared information about her medical narrative, spiritual journey and beliefs.  confirmed Patient's Mosque Affiliation. Patient processed feelings about current hospitalization. Chaplains will continue to follow and will provide pastoral care as needed or requested.  recommends bedside caregivers page  on duty if patient shows signs of acute spiritual or emotional distress. [address] [facility], M.Div. Board Certified [employer] [phone] - Office

## 2019-12-23 NOTE — PROGRESS NOTES
1930-Bedside verbal report received from RAOUL Gonzalez RN. Sbar, mar, labs, kardex and patient status reviewed. Assumed care of patient. Molly andrade verified. 2000-Assessment completed. No changes from off-going report. 0000-Assessment completed. No changes from previous. 0400-Assessment completed. 0710-Bedside verbal report given to RAOUL Gonzalez RN. Smar, mar, labs, labs, kardex and patient status reviewed. Relinquished care of patient.

## 2019-12-24 NOTE — PROGRESS NOTES
Chart reviewed pt remains in ICU, when discharged pt plans to return back to [facility] for rehab, will need insurance auth prior to discharge, cm will resume case on Thursday, updates sent to [facility].

## 2019-12-24 NOTE — CONSULTS
[facility] Consult Note Patient: Sakshi Spencer MRN: [id number]  SSN: [id number] YOB: 1935  Age: 80 y.o. Sex: female Date of Consultation: 12/22/2019 Referring Physician: Emily Wilcox Reason for Consultation: Atrial fibrillation with rapid ventricular rate Chief complain: My heart is beating fast 
 
HPI: 80year old female transferred from Rehab due to elevated heart rate. Patient denies any chest pain or shortness of breath. She denies any dizziness, palpitation, presyncope or syncope. She denies any orthopnea or PND She is bed bound and can not ambulate. She was given Cardizem in ER and blood pressure dropped. She was transferred to ICU for borderline blood pressure and atrial fibrillation with RVR. Past Medical History:  
Diagnosis Date  A-fib ([facility] [address]  Chronic kidney disease  Congestive heart failure (CHF) ([facility] [address]  COPD (chronic obstructive pulmonary disease) (HCC)  Dialysis patient [facility]) Past Surgical History:  
Procedure Laterality Date  IR INSERT TUNL CVC W/O PORT OVER 5 YR  11/15/2019 Current Facility-Administered Medications Medication Dose Route Frequency  nystatin (MYCOSTATIN) 100,000 unit/gram powder   Topical TID  acetaminophen (TYLENOL) tablet 650 mg  650 mg Oral Q6H PRN  pantoprazole (PROTONIX) injection 40 mg  40 mg IntraVENous Q24H  
 amiodarone (NEXTERONE) 360 mg in dextrose 200 mL (1.8 mg/mL) infusion  1 mg/min IntraVENous TITRATE  cefTRIAXone (ROCEPHIN) 1 g in sterile water (preservative free) 10 mL IV syringe  1 g IntraVENous Q24H  
 aspirin delayed-release tablet 81 mg  81 mg Oral DAILY  atorvastatin (LIPITOR) tablet 40 mg  40 mg Oral DAILY  PARoxetine (PAXIL) tablet 20 mg  20 mg Oral DAILY  ergocalciferol capsule 50,000 Units  50,000 Units Oral Q7D  Warfarin - Pharmacy to Dose   Other Rx Dosing/Monitoring Allergies and Intolerances: Allergies Allergen Reactions  Penicillins Hives  Valium [Diazepam] Other (comments) It made me cry more Family History:  
History reviewed. No pertinent family history. Social History: She  reports that she has quit smoking. She has never used smokeless tobacco.  She  reports no history of alcohol use. Review of Systems:  
 
Gen: No fever, chills, malaise, weight loss/gain. Heent: No headache, rhinorrhea, epistaxis, ear pain, hearing loss, sinus pain, neck pain/stiffness, sore throat. Heart: No chest pain, palpitations, CERVANTES, pnd, or orthopnea. Resp: No cough, hemoptysis, wheezing and shortness of breath. GI: No nausea, vomiting, diarrhea, constipation, melena or hematochezia. : No urinary obstruction, dysuria or hematuria. Derm: No rash, new skin lesion or pruritis. Musc/skeletal: no bone or joint complains. Vasc: No edema, cyanosis or claudication. Endo: No heat/cold intolerance, no polyuria,polydipsia or polyphagia. Neuro: No unilateral weakness, numbness, tingling. No seizures. Heme: No easy bruising or bleeding. Physical:  
Patient Vitals for the past 6 hrs: 
 Temp Pulse Resp BP SpO2  
12/24/19 0600  (!) 116 15 98/48 100 % 12/24/19 0500  (!) 108 15 97/46 99 % 12/24/19 0400 98.7 °F (37.1 °C) (!) 108 15 98/71 98 % 12/24/19 0315  (!) 103 13  100 % 12/24/19 0205  99 15  99 % 12/24/19 0200  (!) 114 15 (!) 81/50 100 % 12/24/19 0152  (!) 107 20  99 % 12/24/19 0132  (!) 107 18  98 % Exam:  
General Appearance: Comfortable, not using accessory muscles of respiration. HEENT: XANDER. HEAD: Atraumatic NECK: No JVD, no thyroidomeglay. CAROTIDS: No bruit LUNGS: Clear bilaterally. HEART: S1+S2 audible, irregularly irregular, no murmur, no pericardial rub. ABD: Non-tender, BS Audible EXT: No edema, and no cyanosis. VASCULAR EXAM: Pulses are intact. PSYCHIATRIC EXAM: Mood is appropriate. MUSCULOSKELETAL: Grossly no joint deformity. NEUROLOGICAL: AAO times 3 Review of Data:  
 LABS:  
Lab Results Component Value Date/Time WBC 10.5 12/24/2019 04:08 AM  
 HGB 9.4 (L) 12/24/2019 04:08 AM  
 HCT 29.2 (L) 12/24/2019 04:08 AM  
 PLATELET 915 64/16/3712 04:08 AM  
 
Lab Results Component Value Date/Time Sodium 129 (L) 12/24/2019 04:08 AM  
 Potassium 3.1 (L) 12/24/2019 04:08 AM  
 Chloride 92 (L) 12/24/2019 04:08 AM  
 CO2 27 12/24/2019 04:08 AM  
 Glucose 89 12/24/2019 04:08 AM  
 BUN 43 (H) 12/24/2019 04:08 AM  
 Creatinine 2.56 (H) 12/24/2019 04:08 AM  
 
No results found for: CHOL, CHOLX, CHLST, CHOLV, HDL, HDLP, LDL, LDLC, DLDLP, TGLX, TRIGL, TRIGP No results found for: GPT No results found for: HBA1C, HGBE8, ICZ3TKUM, [id number] Cardiology Procedures:  
Results for orders placed or performed during the hospital encounter of 12/22/19 EKG, 12 LEAD, INITIAL Result Value Ref Range Ventricular Rate 128 BPM  
 Atrial Rate 153 BPM  
 QRS Duration 92 ms Q-T Interval 302 ms QTC Calculation (Bezet) 440 ms Calculated R Axis 37 degrees Calculated T Axis 133 degrees Diagnosis Atrial fibrillation with rapid ventricular response Nonspecific T wave abnormality Abnormal ECG Impression / Plan:   
Patient Active Problem List  
Diagnosis Code  Heart failure ([facility]) I50.9  Benign essential hypertension I10  Chronic diastolic congestive heart failure ([facility]) I50.32  
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  
 ESRD needing dialysis ([facility]) N18.6, Z99.2  Debility R53.81  
 Hypotension I95.9  Atrial fibrillation with RVR ([facility]) I48.91 Persistent atrial fibrillation 80year old female with multiple co morbid condition is being managed for atrial fibrillation with hypotension. Troponin is negative. Echocardiogram 
Continue IV amiodarone. Continue antibiotics as per medical team. 
Plan discussed with patient and family member at bed side. 42 minutes of critical care time spent in the direct evaluation and treatment of this high risk patient. The reason for providing this level of medical care for this critically ill patient was due a critical illness that impaired one or more vital organ systems such that there was a high probability of imminent or life threatening deterioration in the patients condition. This care involved high complexity decision making to assess, manipulate, and support vital system functions, to treat this degree vital organ system failure and to prevent further life threatening deterioration of the patients condition. Signed By: Leana Nevarez MD   
 December 23, 2019

## 2019-12-24 NOTE — PROGRESS NOTES
Problem: Pressure Injury - Risk of 
Goal: *Prevention of pressure injury Description Document Joe Scale and appropriate interventions in the flowsheet. Outcome: Progressing Towards Goal 
Note: Pressure Injury Interventions: 
Sensory Interventions: Assess changes in LOC, Check visual cues for pain, Discuss PT/OT consult with provider, Float heels, Keep linens dry and wrinkle-free, Maintain/enhance activity level, Minimize linen layers, Monitor skin under medical devices, Pressure redistribution bed/mattress (bed type), Turn and reposition approx. every two hours (pillows and wedges if needed) Moisture Interventions: Absorbent underpads, Apply protective barrier, creams and emollients, Internal/External urinary devices, Check for incontinence Q2 hours and as needed, Limit adult briefs, Maintain skin hydration (lotion/cream), Minimize layers, Moisture barrier Activity Interventions: Pressure redistribution bed/mattress(bed type), PT/OT evaluation Mobility Interventions: Pressure redistribution bed/mattress (bed type), Float heels, PT/OT evaluation, Turn and reposition approx. every two hours(pillow and wedges) Nutrition Interventions: Document food/fluid/supplement intake Friction and Shear Interventions: Apply protective barrier, creams and emollients, Foam dressings/transparent film/skin sealants, Lift sheet, Minimize layers Problem: Patient Education: Go to Patient Education Activity Goal: Patient/Family Education Outcome: Progressing Towards Goal 
  
Problem: Falls - Risk of 
Goal: *Absence of Falls Description Document [facility] Fall Risk and appropriate interventions in the flowsheet. Outcome: Progressing Towards Goal 
Note: Fall Risk Interventions: 
  
 
  
 
Medication Interventions: Bed/chair exit alarm, Evaluate medications/consider consulting pharmacy Elimination Interventions: Bed/chair exit alarm, Call light in reach, Stay With Me (per policy), Patient to call for help with toileting needs, Toileting schedule/hourly rounds Problem: Patient Education: Go to Patient Education Activity Goal: Patient/Family Education Outcome: Progressing Towards Goal 
  
Problem: Patient Education: Go to Patient Education Activity Goal: Patient/Family Education Outcome: Progressing Towards Goal 
  
Problem: Chronic Renal Failure Goal: *Fluid and electrolytes stabilized Outcome: Progressing Towards Goal 
  
Problem: Patient Education: Go to Patient Education Activity Goal: Patient/Family Education Outcome: Progressing Towards Goal

## 2019-12-24 NOTE — PROGRESS NOTES
[facility] FLOW SHEET 
  
 
PATIENT INFORMATION Hospital: [address] [place] [address]   
[]1st Time Acute  []Stat[x] Routine []Urgent []Chronic Unit  
[]Acute Room []Bedside  []ICU/CCU []ER Isolation Precautions: [x]Dialysis[] Airborne []Contact []Droplet []Reverse Special Considerations:_______  [] Blood Consent Verified  [x]N/A Allergies:[] NKA [x] Penicillins, Valium_____________ Code Status []Full Code [x] DNR  [] Other_____ Diet: [x] Renal [] NPO [] Diabetic  
[] Enteral Feeding Diabetic: []Yes [x]No 
  
[x]Signed Treatment Consent Verified  
[x] Time Out/ Safety Check PRIMARY NURSE REPORT: FIRST INITIAL/ LAST NAME/TITLE 
PRE DIALYSIS:  Miguel Nolasco RN                                         TIME: 1000 ACCESS CATHETER ACCESS: [] N/A  [x] RIGHT  [] LEFT  [x] IJ  [] SUBCL [] FEM [] First use X-ray  [x] Tunnel     [] Non-Tunneled [x] No S/S infection  [] Redness [] Drainage  [] Cultured [] Swelling [] Pain  
                 [x] Medical Aseptic [] Prep Dressing Changed  
               [] Clotted [x] Patent []      Flows: [x] Good [] Poor [] Reversed If Access Problem Dr. Dina Craig: [] Yes [] No    Date:_____  [x] N/A[]  
GRAFT/FISTULA ACCESS:  [x] N/A  [] RIGHT  [] LEFT  [] UE   [] LE   
   [] AVG  [] AVF [] BUTTONHOLE 
  [] +BRUIT/THRILL [] MEDICAL ASEPTIC PREP [] No S/S infection  [] Redness [] Drainage  [] Cultured [] Swelling [] Pain If Access Problem Dr. Dina Craig: [] Yes [] No    Date:______ [x] N/A  
GENERAL ASSESSMENT  
LUNGS:  SaO2% ____ [] Clear [] Coarse [] Crackles [] Wheezing  
                                      [x] Diminished Location: [x] RLL [x] LLL [x] RUL [x] RML [x] NITA   
COUGH:  [] Productive [] Dry [x] N/A  RESPIRATIONS: [x] Easy [] Labored THERAPY: [] RA   [x] NC _2____. L/min    Mask: [] NRB [] Venti  _____O2% [] Ventilator [] Intubated [] Trach [] BiPap [] CPap [] HI Flow CARDIAC: [] Regular [x] Irregular [] Pericardial Rub [] JVD Monitored Rhythm:__afib____ [] N/A  
EDEMA: [x] None [] Generalized [] Facial [] Pedal [] UE [] LE 
           [] Pitting [] 1 [] 2 [] 3 [] 4    [] Right [] Left [] Bilateral  
SKIN:    [x] Warm [] Hot [] Cold  [x] Dry [] Pale [] Diaphoretic  
           [] Flushed [] Jaundiced [] Cyanotic [] Rash [] Weeping LOC:    [x] Alert  Oriented to: [x] Person [x] Place [x] Time  
          [] Confused [] Lethargic [] Medicated [] Non-responsive GI/ABDOMEN: [] Flat [] Distended [x] Soft [] Firm [] Diarrhea [x] Bowel Sounds Present [] Nausea [] Vomiting PAIN: [x] 0 [] 1 [] 2 [] 3 [] 4 [] 5 [] 6 [] 7 [] 8 [] 9 [] 10 Scale 1-10 Action/Follow Up_____ MOBILITY: [] Amb [] Amb/Assist [x] Bed  [] Wheelchair CURRENT LABS HBsAg ONLY: Date Drawn: 12/23/2019            [x] Negative [] Positive [] Unknown. HBsAb: Date Drawn: 12/23/2019             [x] Susceptible <10 [] Immune ?10 [] Unknown Date of Current Labs:  12/24/2019 EDUCATION Person Educated: [x] Patient [] Other_________ Knowledge base: [] None [x] Minimal [] Substantial   
Barriers to learning  [x] None _______________ Preferred method of learning: [] Written [x] Oral [] Visual [] Hands on Topic: [x] Access Care [] S&S of infection [] Fluid Management 
 [] K+  [x] Procedural  [] Albumin [] Medications [] Tx Options [] Transplant [] Diet [] Other Teaching Tools: [x] Explain [] Demonstration [] Handout_____ [] Video______  
RO/HEMODIAYLSIS MACHINE SAFETY CHECKS- BEFORE EACH TREATMENT  
     
 [] [facility] [facility]: Machine Serial #1:  [id number]   RO Serial #[id number] [x] [employer] [facility]: Machine Serial #2:  [id number]   RO Serial #[id number] Alarm Test: [x] Pass  Time__0955______ [x] RO/Machine Log Complete    [x] Extracorporeal circuit Tested for integrity Dialyzer_[id number]_________   Tubing__19H02-9__________ Dialysate: pH _7.4____  Temp. _37_____ Conductivity: Meter _14____ HD Machine__14___ CHLORINE TESTING- BEFORE EACH TREATMENT AND EVERY 4 HOURS Total Chlorine: [x] Less than 0.1 ppm Time:_1000____2nd Check Time:______ 
(If greater than 0.1 ppm from Primary then every 30 minutes from Secondary) TREATMENT INIATION-WITH DIALYSIS PRECAUTIONS [x] All Connections Secured   [x] Saline Line Double Clamped    [x] Venous Parameters Set [x] Arterial Parameters Set    [x] Prime Given 250 ml    
[x] Air Foam Detector Engaged PRE-TREATMENT  
UF Calculations: Wt to lose:_500___ml(+) Oral:_0_ml(+)IV Meds/Fluids/Blood prods____ml(+) Prime/Rinse_500___ml(=)Total UF Goal_1000___mL Scale Type:[x] Bed scale [] Sling Scale [] Wheel Chair Scale []  Not Ordered [] [] Unable to obtain pt on stretcher/ bed scale malfunctioning 
_____[] kg [] lbs Tx Initiation Note: Plan to remove 0.5 liters for 3 hours while monitoring patient's well-being and vital signs. [x] Time Out/Safety Check  Time:_1000____ INTRADIALYTIC MONITORING 
(SEE ATTACHED FLOWSHEET) POST TREATMENT Time Medication Dose Volume Route Initials [employer] Signatures Title Initials Time Emily Kahn RN RW 1100 Dialyzer cleared: [x] Good [] Fair [] Poor Blood Volume Processed _59.7___L Net UF Removed _500___mL Post Tx Access: AVF/AVG: Bleeding Stop Time      Art.___min Adolfo.____min []+bruit/thrill Catheter: Locking Solution  [x] Heparin 1 ml/1000 units [] Normal Saline                           
                                            [] New caps applied Art._2.2____ ml Adolfo._2.1____ml Post Assessment:   
          Skin: [x]Warm []Dry []Diaphoretic []Flushed [] Pale []Cyanotic Lungs: [x]Clear []Coarse []Crackles []Wheezing Cardiac: []Regular [x]Irregular  [x]Monitored rhythm_afib___ []N/A Edema: [x]None []General []Facial []Pedal  []UE []LE []RIGHT []LEFT Pain: [x]0 []1 []2 []3 []4 []5 []6 []7 []8 []9 []10  
POST Tx Note: Patient in room ICU3 dialyzed for 3 hours. Tolerated tx well with without complaint or complications. TDC functioning without complication accessing or BFR 
  
1000 ml UF removed with a net UF removal of 500 ml. Report given to RN with all questions answered. Primary Nurse Report: First initial/Last name/Title Post Dialysis:_RAOUL Granado RN_________________________         Time:_1320_______________ Abbreviations: AVG-arterial venous graft, AVF-arterial venous fistula, IJ-Internal Jugular, Subcl-Subclavian, Fem-Femoral, Tx-treatment, AP/HR-apical heart rate, DFR-dialysate flow rate, BFR-blood flow rate, AP-arterial pressure, -venous pressure, UF-ultrafiltrate, TMP-transmembrane pressure, Adolfo-Venous, Art-Arterial, RO-Reverse Osmosis

## 2019-12-24 NOTE — PROGRESS NOTES
[facility] Pulmonary, Critical Care, and Sleep Medicine Pulm/CC Note Name: Disha John MRN: [id number] : 1935 Hospital: [facility] [place] Date: 2019  Room: 103/ Subjective: This patient has been seen and evaluated at the request of Dr. Beth Daniels for patient with Afib and hypotension needing icu admission. Patient is a 80 y.o. female with hx of ESRD on dialysis started last month for CKD. She also has chronic Afib. Patient is in [facility] for rehab since last admission. She was sent to ER for tachycardia and shortness of breath. In the ER, she got cardizem and her BP dropped. Patient was given a 250 ml fluid bolus and moved to icu. She has been placed on amiodarone drip by Cardiology Dr Lisha Hyde. 19 Patient is having dialysis No worsening cough or SOB No CP or abd pains or vomiting. Afebrile; BP stable Tele-Afib rate 110s  ml overnight Drip: amiodarone 0.5 mg/hr Past Medical History:  
Diagnosis Date  A-fib ([address]  Chronic kidney disease  Congestive heart failure (CHF) ([address]  COPD (chronic obstructive pulmonary disease) (HCC)  Dialysis patient [facility]) Past Surgical History:  
Procedure Laterality Date  IR INSERT TUNL CVC W/O PORT OVER 5 YR  11/15/2019 Prior to Admission medications Medication Sig Start Date End Date Taking? Authorizing Provider  
warfarin (COUMADIN) 2 mg tablet Take 1 Tab by mouth daily. 19  Yes Emilee Ohara MD  
amiodarone (CORDARONE) 200 mg tablet Take 1 Tab by mouth daily. 19  Yes Darron Castellano MD  
atorvastatin (LIPITOR) 40 mg tablet Take 1 Tab by mouth daily. 10/30/19  Yes Reece Espinoza MD  
dilTIAZem (CARDIZEM) 30 mg tablet Take 1 Tab by mouth Before breakfast, lunch, and dinner. 19   Darron Castellano MD  
aspirin delayed-release 81 mg tablet Take 1 Tab by mouth daily.  10/29/19   Reece Espinoza MD  
 ergocalciferol (VITAMIN D2) 50,000 unit capsule Take 50,000 Units by mouth every seven (7) days. Napoleon Lee MD  
PARoxetine (PAXIL) 20 mg tablet Take 20 mg by mouth daily. Napoelon Lee MD  
furosemide (LASIX) 40 mg tablet Take 40 mg by mouth every other day. Napoleon Lee MD  
 
Allergies Allergen Reactions  Penicillins Hives  Valium [Diazepam] Other (comments) It made me cry more Social History Tobacco Use  Smoking status: Former Smoker  Smokeless tobacco: Never Used Substance Use Topics  Alcohol use: Never Frequency: Never History reviewed. No pertinent family history. Current Facility-Administered Medications Medication Dose Route Frequency  epoetin jonathan-epbx (RETACRIT) injection 6,000 Units  6,000 Units SubCUTAneous Q TUE, THU & SAT  nystatin (MYCOSTATIN) 100,000 unit/gram powder   Topical TID  pantoprazole (PROTONIX) injection 40 mg  40 mg IntraVENous Q24H  
 amiodarone (NEXTERONE) 360 mg in dextrose 200 mL (1.8 mg/mL) infusion  1 mg/min IntraVENous TITRATE  cefTRIAXone (ROCEPHIN) 1 g in sterile water (preservative free) 10 mL IV syringe  1 g IntraVENous Q24H  
 aspirin delayed-release tablet 81 mg  81 mg Oral DAILY  atorvastatin (LIPITOR) tablet 40 mg  40 mg Oral DAILY  PARoxetine (PAXIL) tablet 20 mg  20 mg Oral DAILY  ergocalciferol capsule 50,000 Units  50,000 Units Oral Q7D  Warfarin - Pharmacy to Dose   Other Rx Dosing/Monitoring Review of Systems: 
Limited due to patient condition Objective:  
Vital Signs:   
Visit Vitals /59 Pulse 100 Temp 98.8 °F (37.1 °C) Resp 20 Ht 4' 9\" (1.448 m) Wt 58.1 kg (128 lb) SpO2 100% Breastfeeding No  
BMI 27.70 kg/m² O2 Device: Nasal cannula O2 Flow Rate (L/min): 2 l/min Temp (24hrs), Av.3 °F (37.4 °C), Min:98.7 °F (37.1 °C), Max:100.1 °F (37.8 °C) Intake/Output:  
Last shift:       0701 -  1900 In: 230 [I.V.:230] Out: 0 Last 3 shifts: 12/22 1901 - 12/24 0700 In: 0 Out: 450 [Urine:450] Intake/Output Summary (Last 24 hours) at 12/24/2019 1049 Last data filed at 12/24/2019 1038 Gross per 24 hour Intake 230 ml Output 400 ml Net -170 ml Physical Exam:  
Comfortable; on 2 L nc o2; acyanotic; thin and frail Chest: RT chest wall dialysis cath HEENT: pupils not dilated, no scleral jaundice Neck: No adenopathy or thyroid swelling CVS: S1S2 no murmurs; JVD not elevated RS: Mod air entry bilaterally, decreased BS at bases, no wheezes or crackles Abd: soft, non tender, no hepatosplenomegaly, no abd distension Neuro: awake, moving all extremities Extrm: no leg edema or swelling or clubbing Skin: no rash Lymphatic: no cervical or supraclavicular adenopathy Data review:  
 
Recent Results (from the past 24 hour(s)) GLUCOSE, POC Collection Time: 12/23/19 11:15 AM  
Result Value Ref Range Glucose (POC) 126 (H) 70 - 110 mg/dL GLUCOSE, POC Collection Time: 12/23/19  4:22 PM  
Result Value Ref Range Glucose (POC) 105 70 - 110 mg/dL CULTURE, BLOOD Collection Time: 12/23/19 11:45 PM  
Result Value Ref Range Special Requests: NO SPECIAL REQUESTS Culture result: NO GROWTH AFTER 7 HOURS    
CBC WITH AUTOMATED DIFF Collection Time: 12/24/19  4:08 AM  
Result Value Ref Range WBC 10.5 4.6 - 13.2 K/uL  
 RBC 3.07 (L) 4.20 - 5.30 M/uL HGB 9.4 (L) 12.0 - 16.0 g/dL HCT 29.2 (L) 35.0 - 45.0 % MCV 95.1 74.0 - 97.0 FL  
 MCH 30.6 24.0 - 34.0 PG  
 MCHC 32.2 31.0 - 37.0 g/dL  
 RDW 18.3 (H) 11.6 - 14.5 % PLATELET 523 155 - 339 K/uL MPV 10.6 9.2 - 11.8 FL  
 NEUTROPHILS 93 (H) 40 - 73 % LYMPHOCYTES 3 (L) 21 - 52 % MONOCYTES 4 3 - 10 % EOSINOPHILS 0 0 - 5 % BASOPHILS 0 0 - 2 %  
 ABS. NEUTROPHILS 9.7 (H) 1.8 - 8.0 K/UL  
 ABS. LYMPHOCYTES 0.3 (L) 0.9 - 3.6 K/UL  
 ABS. MONOCYTES 0.4 0.05 - 1.2 K/UL  
 ABS. EOSINOPHILS 0.0 0.0 - 0.4 K/UL ABS. BASOPHILS 0.0 0.0 - 0.1 K/UL  
 DF AUTOMATED METABOLIC PANEL, COMPREHENSIVE Collection Time: 12/24/19  4:08 AM  
Result Value Ref Range Sodium 129 (L) 136 - 145 mmol/L Potassium 3.1 (L) 3.5 - 5.5 mmol/L Chloride 92 (L) 100 - 111 mmol/L  
 CO2 27 21 - 32 mmol/L Anion gap 10 3.0 - 18 mmol/L Glucose 89 74 - 99 mg/dL BUN 43 (H) 7.0 - 18 MG/DL Creatinine 2.56 (H) 0.6 - 1.3 MG/DL  
 BUN/Creatinine ratio 17 12 - 20 GFR est AA 22 (L) >60 ml/min/1.73m2 GFR est non-AA 18 (L) >60 ml/min/1.73m2 Calcium 7.7 (L) 8.5 - 10.1 MG/DL Bilirubin, total 0.5 0.2 - 1.0 MG/DL  
 ALT (SGPT) 18 13 - 56 U/L  
 AST (SGOT) 18 10 - 38 U/L Alk. phosphatase 97 45 - 117 U/L Protein, total 4.9 (L) 6.4 - 8.2 g/dL Albumin 2.1 (L) 3.4 - 5.0 g/dL Globulin 2.8 2.0 - 4.0 g/dL A-G Ratio 0.8 0.8 - 1.7 MAGNESIUM Collection Time: 12/24/19  4:08 AM  
Result Value Ref Range Magnesium 2.4 1.6 - 2.6 mg/dL PROTHROMBIN TIME + INR Collection Time: 12/24/19  4:08 AM  
Result Value Ref Range Prothrombin time 34.4 (H) 11.5 - 15.2 sec INR 3.4 (H) 0.8 - 1.2 No results for input(s): FIO2I, IFO2, HCO3I, IHCO3, HCOPOC, PCO2I, PCOPOC, IPHI, PHI, PHPOC, PO2I, PO2POC in the last 72 hours. No lab exists for component: IPOC2 All Micro Results Procedure Component Value Units Date/Time CULTURE, URINE [[id number]]  (Abnormal) Collected:  12/22/19 1630 Order Status:  Completed Specimen:  Clean catch Updated:  12/24/19 9599 Special Requests: NO SPECIAL REQUESTS Culture result: [id number] COLONIES/mL YEAST     
 CULTURE, BLOOD [[id number]] Collected:  12/23/19 2345 Order Status:  Completed Specimen:  Blood Updated:  12/24/19 4462 Special Requests: NO SPECIAL REQUESTS Culture result: NO GROWTH AFTER 7 HOURS     
  
 
 
 
ECHO Nov 2019 Interpretation Summary Result status: Final result · Left Ventricle: Normal cavity size. Increased wall thickness. Low normal systolic dysfunction. Estimated left ventricular ejection fraction is 51 - 55%. E/E' ratio is 28.72. · Left Atrium: Moderately dilated left atrium. · Right Ventricle: Reduced systolic function. · Right Atrium: Moderately dilated right atrium. · Aortic Valve: Mild aortic valve sclerosis with no evidence of reduced excursion. · Mitral Valve: Mitral valve thickening. Moderate mitral annular calcification. Mild mitral valve prolapse. Moderate mitral valve regurgitation is present. · Pulmonary Artery: Moderate pulmonary hypertension. Pulmonary arterial systolic pressure is 46 mmHg. · IVC/Hepatic Veins: Mildly elevated central venous pressure (5-10 mmHg); IVC diameter is less than 21 mm and collapses less than 50% with respiration. · Pericardium: Trivial pericardial effusion. Imaging: 
[x]I have personally reviewed the patients chest radiographs images and report Results from [facility] Encounter encounter on 12/22/19 XR CHEST PORT Narrative EXAM: XR CHEST PORT 
 
CLINICAL INDICATION/HISTORY: Atrial fibrillation 
-Additional: Congestive heart failure COMPARISON: Several prior exams, most recently 11/29/2019 TECHNIQUE: Portable frontal view of the chest 
 
_______________ FINDINGS: 
 
SUPPORT DEVICES: There is a right IJ approach dialysis catheter tip projecting 
in stable position. HEART AND MEDIASTINUM: Cardiac size and mediastinal contours remain stable, with 
redemonstration of cardiac enlargement. Dense mitral annular calcification. LUNGS AND PLEURAL SPACES: Central vascular congestion and mild interstitial 
edema is present along with small bilateral pleural effusions. Interval increase 
in left retrocardiac opacity noted with slight leftward shift of mediastinal 
structures. No pneumothorax. BONY THORAX AND SOFT TISSUES: No acute osseous abnormality.  Advanced bilateral 
 rotator cuff arthropathy. Partial visualization of postoperative changes related 
to anterior cervical discectomy and instrumented fusion. _______________ Impression IMPRESSION: 
 
 
1. Right IJ approach dialysis catheter in stable position. 2. Cardiomegaly, central vascular congestion/edema and bilateral pleural 
effusions overall similar to prior. 3. Interval increase in left retrocardiac atelectasis. No results found for this or any previous visit. IMPRESSION:  
· Atrial fibrillation with RVR- I48.91 
· Hypotension - I95.9 · Chronic diastolic CHF- D67.12 
· ESRD on dialysis - N18.6, Z99.2 
· UTI- N39.0 · Debility- R53.81 
·   
  
RECOMMENDATIONS:  
· Pulm: stable respirations; on nc O2; wean fio2 for o2 sats >91% · Cardiac: amiodarone drip; Afib seems controlled; trops neg; patient not needing pressor · ID: follow urine cx; continue ceftriaxone daily; no fevers; wbc improved · Renal: follow IOs; dialysis today; patient tolerating well so far; continue nephrology follow up · GI: oral diet as tolerated; SLP eval  
· Neuro: stable mentation · Hem: watch Hb and Plts-stable; coumadin for Afib; INR 3.4; hold coumadin today; target INR 2-3 · Nutrition: SLP eval-mechanical soft solids, chopped meat/thin liquid diet with aspiration precautions · Proph:  DVt and GI proph-coumadin and PPI · No family at bedside currently · DNR code status · Prognosis seems poor overall Will defer respective systems problem management to primary and other consultant and follow patient in ICU with primary and other medical team 
Further recommendations will be based on the patient's response to recommended treatment and results of the investigation ordered. Quality Care: PPI, DVT prophylaxis, HOB elevated, Infection control all reviewed and addressed.  
· Lines/Tubes: PIVs 
  
 
High complexity decision making was performed in this consultation and evaluation of this patient who is at high risk for decompensation with multiple organ involvement Caty Hu MD

## 2019-12-24 NOTE — PROGRESS NOTES
Hospitalist Progress Note Patient: Lukasz Escamilla MRN: [id number]  CSN: [id number] YOB: 1935  Age: 80 y.o. Sex: female DOA: 12/22/2019 LOS:  LOS: 2 days IMPRESSION and Plan: 
 
Lukasz Escamilla is a 80 y.o. female with Patient Active Problem List  
 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  ESRD needing dialysis ([address] 11/19/2019  Hyponatremia 11/15/2019  UTI (urinary tract infection) 11/10/2019  A-fib ([address] 11/08/2019  Chronic diastolic congestive heart failure ([address] 04/15/2019  Iron deficiency anemia 11/06/2018  Heart failure ([address] 07/09/2018  Cobalamin deficiency 11/20/2013  Hypercholesterolemia 10/01/2013  Benign essential hypertension 10/25/2012  Hypothyroidism 10/25/2012  Hypertensive heart disease 10/05/2011 Principal Problem: Hypotension (12/22/2019) Active Problems: Hypertensive heart disease (10/5/2011) Hypothyroidism (10/25/2012) Overview: Last Assessment & Plan: Most recent thyroid function studies done on 04/15/2019 show TSH and free T4 of 3.52 and 0.93 respectively. Notably she is not on thyroid  
    supplement so there is no current evidence that she is truly hypothyroid. Will continue to follow this periodically. A-fib ([address] (11/8/2019) UTI (urinary tract infection) (11/10/2019) Weakness generalized (11/19/2019) ESRD needing dialysis ([address] (11/19/2019) Debility (11/28/2019) Atrial fibrillation with RVR ([address] (12/22/2019) D/W Dr. Travis Prophet HD as ordered Patient's condition is fair Cont ICU care Recommend to continue hospitalization. Discussed with patient. Chief Complaints: Chief Complaint Patient presents with  Irregular Heart Beat SUBJECTIVE: 
 Pt is seen and examined. chart reviewed Feels okay Review of systems: 
 
Review of Systems Constitutional: Positive for malaise/fatigue. HENT: Negative. Eyes: Negative. Respiratory: Positive for shortness of breath. Cardiovascular: Positive for leg swelling. Negative for chest pain, palpitations and orthopnea. Gastrointestinal: Negative. Negative for abdominal pain, diarrhea and heartburn. Genitourinary: Negative for dysuria and hematuria. Skin: Negative. Neurological: Positive for weakness. Psychiatric/Behavioral: Negative for depression, substance abuse and suicidal ideas. The patient is not nervous/anxious. PE: 
Patient Vitals for the past 24 hrs: 
 BP Temp Pulse Resp SpO2  
12/24/19 1514 104/53  (!) 111    
12/24/19 1315 111/69  (!) 115 24   
12/24/19 1312 107/74  (!) 112 24   
12/24/19 1300 104/57  (!) 119 22   
12/24/19 1245 99/60  (!) 116 27   
12/24/19 1230 108/60  (!) 134 21   
12/24/19 1220   (!) 116 16 100 % 12/24/19 1215 93/56  (!) 114 16 100 % 12/24/19 1200 95/77  (!) 109 17   
12/24/19 1145 91/63  (!) 116 18 100 % 12/24/19 1135 99/57      
12/24/19 1130 (!) 82/56  (!) 128 21   
12/24/19 1115 93/69  (!) 126 18 94 % 12/24/19 1100 107/75  (!) 120 18 100 % 12/24/19 1045 111/59  (!) 114 14 100 % 12/24/19 1038 111/59  100 20   
12/24/19 1030 109/61  (!) 119 16 100 % 12/24/19 1015 107/53  (!) 107 14 100 % 12/24/19 1012 107/53  (!) 111 18   
12/24/19 1000 98/61  100 14 100 % 12/24/19 0900 95/63  (!) 101 15 100 % 12/24/19 0800 101/67 98.8 °F (37.1 °C) (!) 110 14 100 % 12/24/19 0600 98/48  (!) 116 15 100 % 12/24/19 0500 97/46  (!) 108 15 99 % 12/24/19 0400 98/71 98.7 °F (37.1 °C) (!) 108 15 98 % 12/24/19 0315   (!) 103 13 100 % 12/24/19 0205   99 15 99 % 12/24/19 0200 (!) 81/50  (!) 114 15 100 % 12/24/19 0152   (!) 107 20 99 % 12/24/19 0132   (!) 107 18 98 % 12/24/19 0100 99/63  (!) 110 15 98 % 12/24/19 0015   (!) 117 22 98 % 12/24/19 0000 93/52      
12/23/19 2317  100.1 °F (37.8 °C)     
12/23/19 2300 116/60  (!) 113 24 97 % 12/23/19 2200 108/51  (!) 115 26 98 % 12/23/19 2100 116/72  (!) 128 27   
12/23/19 2000   (!) 129 27 92 % 12/23/19 1945  99.9 °F (37.7 °C) (!) 117 21 100 % 12/23/19 1900 96/59  (!) 130 24 98 % 12/23/19 1806 91/55  (!) 122 19 92 % 12/23/19 1700 96/56  (!) 115 20 97 % 12/23/19 1620  99.1 °F (37.3 °C)     
12/23/19 1600 94/61 99.1 °F (37.3 °C) (!) 106 21 98 % 12/23/19 1539 93/49  (!) 113 17 100 % Intake/Output Summary (Last 24 hours) at 12/24/2019 1517 Last data filed at 12/24/2019 1312 Gross per 24 hour Intake 400.97 ml Output 900 ml Net -499.03 ml Patient Vitals for the past 120 hrs: 
 Weight 12/22/19 1130 58.1 kg (128 lb) 12/23/19 0807 58.1 kg (128 lb) Physical Exam 
Vitals signs and nursing note reviewed. Constitutional:   
   General: She is in acute distress. Neck: Musculoskeletal: Normal range of motion and neck supple. Vascular: No JVD. Cardiovascular:  
   Rate and Rhythm: Normal rate and regular rhythm. Heart sounds: Normal heart sounds. Pulmonary:  
   Effort: Respiratory distress present. Breath sounds: Normal breath sounds. Abdominal:  
   General: Bowel sounds are normal. There is no distension. Palpations: Abdomen is soft. Tenderness: There is no tenderness. There is no rebound. Musculoskeletal: Normal range of motion. Skin: 
   General: Skin is warm and dry. Neurological:  
   Mental Status: She is alert and oriented to person, place, and time. Psychiatric:     
   Mood and Affect: Affect normal.  
 
 
 
 
 
Intake and Output: 
Current Shift:  12/24 0701 - 12/24 1900 In: 401 [I.V.:401] Out: 500 Last three shifts:  12/22 1901 - 12/24 0700 In: 0 Out: 450 [Urine:450] Lab/Data Reviewed: Recent Results (from the past 8 hour(s)) GLUCOSE, POC Collection Time: 12/24/19  1:24 PM  
Result Value Ref Range Glucose (POC) 70 70 - 110 mg/dL Medications: 
Current Facility-Administered Medications Medication Dose Route Frequency  0.9% sodium chloride infusion  100 mL/hr IntraVENous DIALYSIS PRN  
 albumin human 25% (BUMINATE) solution 25 g  25 g IntraVENous Q1H PRN  
 epoetin jonathan-epbx (RETACRIT) injection 6,000 Units  6,000 Units SubCUTAneous Q TUE, THU & SAT  Warfarin- HOLD Dose PM of 12/24/2019  1 Each Other ONCE  
 amiodarone (CORDARONE) tablet 400 mg  400 mg Oral Q8H  
 midodrine (PROAMITINE) tablet 2.5 mg  2.5 mg Oral TID WITH MEALS  
 heparin (porcine) 1,000 unit/mL injection 4,300 Units  4,300 Units Hemodialysis DIALYSIS PRN  
 nystatin (MYCOSTATIN) 100,000 unit/gram powder   Topical TID  acetaminophen (TYLENOL) tablet 650 mg  650 mg Oral Q6H PRN  pantoprazole (PROTONIX) injection 40 mg  40 mg IntraVENous Q24H  cefTRIAXone (ROCEPHIN) 1 g in sterile water (preservative free) 10 mL IV syringe  1 g IntraVENous Q24H  
 aspirin delayed-release tablet 81 mg  81 mg Oral DAILY  atorvastatin (LIPITOR) tablet 40 mg  40 mg Oral DAILY  PARoxetine (PAXIL) tablet 20 mg  20 mg Oral DAILY  ergocalciferol capsule 50,000 Units  50,000 Units Oral Q7D  Warfarin - Pharmacy to Dose   Other Rx Dosing/Monitoring Recent Results (from the past 24 hour(s)) GLUCOSE, POC Collection Time: 12/23/19  4:22 PM  
Result Value Ref Range Glucose (POC) 105 70 - 110 mg/dL CULTURE, BLOOD Collection Time: 12/23/19 11:45 PM  
Result Value Ref Range Special Requests: NO SPECIAL REQUESTS Culture result: NO GROWTH AFTER 7 HOURS    
CBC WITH AUTOMATED DIFF Collection Time: 12/24/19  4:08 AM  
Result Value Ref Range WBC 10.5 4.6 - 13.2 K/uL  
 RBC 3.07 (L) 4.20 - 5.30 M/uL HGB 9.4 (L) 12.0 - 16.0 g/dL HCT 29.2 (L) 35.0 - 45.0 %  MCV 95.1 74.0 - 97.0 FL  
 MCH 30.6 24.0 - 34.0 PG  
 MCHC 32.2 31.0 - 37.0 g/dL  
 RDW 18.3 (H) 11.6 - 14.5 % PLATELET 841 161 - 464 K/uL MPV 10.6 9.2 - 11.8 FL  
 NEUTROPHILS 93 (H) 40 - 73 % LYMPHOCYTES 3 (L) 21 - 52 % MONOCYTES 4 3 - 10 % EOSINOPHILS 0 0 - 5 % BASOPHILS 0 0 - 2 %  
 ABS. NEUTROPHILS 9.7 (H) 1.8 - 8.0 K/UL  
 ABS. LYMPHOCYTES 0.3 (L) 0.9 - 3.6 K/UL  
 ABS. MONOCYTES 0.4 0.05 - 1.2 K/UL  
 ABS. EOSINOPHILS 0.0 0.0 - 0.4 K/UL  
 ABS. BASOPHILS 0.0 0.0 - 0.1 K/UL  
 DF AUTOMATED METABOLIC PANEL, COMPREHENSIVE Collection Time: 12/24/19  4:08 AM  
Result Value Ref Range Sodium 129 (L) 136 - 145 mmol/L Potassium 3.1 (L) 3.5 - 5.5 mmol/L Chloride 92 (L) 100 - 111 mmol/L  
 CO2 27 21 - 32 mmol/L Anion gap 10 3.0 - 18 mmol/L Glucose 89 74 - 99 mg/dL BUN 43 (H) 7.0 - 18 MG/DL Creatinine 2.56 (H) 0.6 - 1.3 MG/DL  
 BUN/Creatinine ratio 17 12 - 20 GFR est AA 22 (L) >60 ml/min/1.73m2 GFR est non-AA 18 (L) >60 ml/min/1.73m2 Calcium 7.7 (L) 8.5 - 10.1 MG/DL Bilirubin, total 0.5 0.2 - 1.0 MG/DL  
 ALT (SGPT) 18 13 - 56 U/L  
 AST (SGOT) 18 10 - 38 U/L Alk. phosphatase 97 45 - 117 U/L Protein, total 4.9 (L) 6.4 - 8.2 g/dL Albumin 2.1 (L) 3.4 - 5.0 g/dL Globulin 2.8 2.0 - 4.0 g/dL A-G Ratio 0.8 0.8 - 1.7 MAGNESIUM Collection Time: 12/24/19  4:08 AM  
Result Value Ref Range Magnesium 2.4 1.6 - 2.6 mg/dL PROTHROMBIN TIME + INR Collection Time: 12/24/19  4:08 AM  
Result Value Ref Range Prothrombin time 34.4 (H) 11.5 - 15.2 sec INR 3.4 (H) 0.8 - 1.2 GLUCOSE, POC Collection Time: 12/24/19  1:24 PM  
Result Value Ref Range Glucose (POC) 70 70 - 110 mg/dL Procedures/imaging: see electronic medical records for all procedures/Xrays and details which were not copied into this note but were reviewed prior to creation of Plan 
 
Samuel Jaimes MD  
12/24/2019, 3:17 PM

## 2019-12-24 NOTE — PROGRESS NOTES
1945- Report and care received, assessment completed per flow sheet. Alert, oriented, NAD, denies pain. 2213- HR ranging from 1teens to 130's, unsure of desired parameters.  updated, states goal for now is 100-110. Orders received for a one time dose of digoxin and to start a cardizem drip if HR not improved one hour after digoxin. 2317- Reassessment completed and without change except for fever. 2320-  updated regarding temperature of 100.1 and HR. Orders received for tylenol and a blood culture. 0400- Reassessment completed and without change.

## 2019-12-24 NOTE — PROGRESS NOTES
Pharmacy Dosing Services: Warfarin Consult for Warfarin Dosing by Pharmacy by Dr. Christiano Juan Consult provided for this 80 y.o.  female , for indication of Atrial Fibrillation. Day of Therapy (continuation from home) Dose to achieve an INR goal of 2-3 Order entered to *HOLD*  Warfarin today at 18:00. Supra-therapeutic INR today ==> 3.4 Significant drug interactions: Amiodarone LABS   
PT/INR Lab Results Component Value Date/Time INR 3.4 (H) 12/24/2019 04:08 AM  
  
Platelets Lab Results Component Value Date/Time PLATELET 322 09/25/3686 04:08 AM  
  
H/H Lab Results Component Value Date/Time HGB 9.4 (L) 12/24/2019 04:08 AM  
  
 
Warfarin Administrations (last 168 hours) Date/Time Action Medication Dose  
 12/23/19 1712 Given  
 warfarin (COUMADIN) tablet 2 mg 2 mg  
 12/22/19 2046 Given  
 warfarin (COUMADIN) tablet 2 mg 2 mg Pharmacy to follow daily and will provide subsequent Warfarin dosing based on clinical status. Bladimir Baron, Pharm. D. Clinical Pharmacist 
[phone]

## 2019-12-24 NOTE — PROGRESS NOTES
Problem: Chronic Renal Failure Goal: *Fluid and electrolytes stabilized Outcome: Progressing Towards Goal 
  
Problem: Patient Education: Go to Patient Education Activity Goal: Patient/Family Education Outcome: Progressing Towards Goal 
  
Intervention: Monitor/Manage Fluid Electrolyte/Acid Base Balance PROBLEM: HEMODIALYSIS ADULT INTERVENTION: Hemodynamic stabilization Maintain BP WNL for this patient while on hemodialysis INTERVENTION: Fluid Management Will attempt 500 ml total fluid removal as tolerated INTERVENTION: Metabolic / Electrolyte Imbalance Management K+ 4 potassium bath today with dialysis GOAL: Signs and symptoms of listed potential problems will be absent or manageable 
(reference Hemodialysis ADULT C[facility]) OUTCOME: Progressing HD planned for 3 hours today

## 2019-12-24 NOTE — PROGRESS NOTES
Nephrology Progress note Subjective:  
 
 
Mary Ellen Hobson is a 80 y.o. female with a past medical history significant for CHF, COPD, A. fib, ESRD on HD MWF noticed to have more palpitation in nursing home, with worsening weakness. Found to have low BP in ED with tachycardia. IV cardizem given, along with bolus iv fluid. Cardiology consulted, placed pt on iv amiodarone. Pt then admitted to ICU. Nephrology was consulted for further evaluation and management of her ERSD. Was een in ICU, feeling weak and BP borderline low. Patient had an uneventfu night. Awake and in NAD. SBP in 90's Admit Date: 12/22/2019 Principal Problem: Hypotension (12/22/2019) Active Problems: Hypertensive heart disease (10/5/2011) Hypothyroidism (10/25/2012) Overview: Last Assessment & Plan: Most recent thyroid function studies done on 04/15/2019 show TSH and free T4 of 3.52 and 0.93 respectively. Notably she is not on thyroid  
    supplement so there is no current evidence that she is truly hypothyroid. Will continue to follow this periodically. A-fib ([facility] (11/8/2019) UTI (urinary tract infection) (11/10/2019) Weakness generalized (11/19/2019) ESRD needing dialysis ([facility] [address] (11/19/2019) Debility (11/28/2019) Atrial fibrillation with RVR ([facility] [address] (12/22/2019) Current Facility-Administered Medications Medication Dose Route Frequency  nystatin (MYCOSTATIN) 100,000 unit/gram powder   Topical TID  acetaminophen (TYLENOL) tablet 650 mg  650 mg Oral Q6H PRN  pantoprazole (PROTONIX) injection 40 mg  40 mg IntraVENous Q24H  
 amiodarone (NEXTERONE) 360 mg in dextrose 200 mL (1.8 mg/mL) infusion  1 mg/min IntraVENous TITRATE  cefTRIAXone (ROCEPHIN) 1 g in sterile water (preservative free) 10 mL IV syringe  1 g IntraVENous Q24H  
 aspirin delayed-release tablet 81 mg  81 mg Oral DAILY  atorvastatin (LIPITOR) tablet 40 mg  40 mg Oral DAILY  PARoxetine (PAXIL) tablet 20 mg  20 mg Oral DAILY  ergocalciferol capsule 50,000 Units  50,000 Units Oral Q7D  Warfarin - Pharmacy to Dose   Other Rx Dosing/Monitoring Allergy: Allergies Allergen Reactions  Penicillins Hives  Valium [Diazepam] Other (comments) It made me cry more Objective:  
 
Visit Vitals BP 98/48 Pulse (!) 116 Temp 98.7 °F (37.1 °C) Resp 15 Ht 4' 9\" (1.448 m) Wt 58.1 kg (128 lb) SpO2 100% Breastfeeding No  
BMI 27.70 kg/m² Intake/Output Summary (Last 24 hours) at 12/24/2019 3056 Last data filed at 12/24/2019 0600 Gross per 24 hour Intake 0 ml Output 400 ml Net -400 ml Physical Exam:  
 
 
General: No acute distress HENT: Atraumatic and normocephalic Eyes: Normal conjunctiva Neck: Supple. No JVD Cardiovascular: Irreg. Pulmonary/Chest Wall: Clear to auscultation bilaterally Abdominal: Soft and non-tender Musculoskeletal: No edema Neurological: No focal deficits [facility] TDC in place. Data Review: 
Lab Results Component Value Date/Time Sodium 129 (L) 12/24/2019 04:08 AM  
 Potassium 3.1 (L) 12/24/2019 04:08 AM  
 Chloride 92 (L) 12/24/2019 04:08 AM  
 CO2 27 12/24/2019 04:08 AM  
 Anion gap 10 12/24/2019 04:08 AM  
 Glucose 89 12/24/2019 04:08 AM  
 BUN 43 (H) 12/24/2019 04:08 AM  
 Creatinine 2.56 (H) 12/24/2019 04:08 AM  
 BUN/Creatinine ratio 17 12/24/2019 04:08 AM  
 GFR est AA 22 (L) 12/24/2019 04:08 AM  
 GFR est non-AA 18 (L) 12/24/2019 04:08 AM  
 Calcium 7.7 (L) 12/24/2019 04:08 AM  
 
Lab Results Component Value Date/Time WBC 10.5 12/24/2019 04:08 AM  
 HGB 9.4 (L) 12/24/2019 04:08 AM  
 HCT 29.2 (L) 12/24/2019 04:08 AM  
 PLATELET 723 11/80/9967 04:08 AM  
 MCV 95.1 12/24/2019 04:08 AM  
 
Lab Results Component Value Date/Time Calcium 7.7 (L) 12/24/2019 04:08 AM  
 Phosphorus 3.7 11/28/2019 04:10 AM  
 
Lab Results Component Value Date/Time  Iron 11 (L) 11/16/2019 09:10 AM  
 TIBC 271 11/16/2019 09:10 AM  
 Iron % saturation 4 11/16/2019 09:10 AM  
 Ferritin 395 (H) 11/16/2019 09:10 AM  
 
Lab Results Component Value Date/Time Ferritin 395 (H) 11/16/2019 09:10 AM  
 
 
 
Impression: 1. ESRD on HD MWF 2. A fib with RVR, seen by Cardiology, on IV Amiodarone 3. Weakness 4. Hypotension 5. Anemia in CKD 6. Hyponatremia 7. Hypokalemia 8. Hypoalbuminemia 
  
Plan: 1. HD today with 4K bath. We will dialyze patient today in place of candelaria day. 2. Albumin prn on HD to Keep SBP > 90 
3. Epo for Anemia 4. Nutritional supplement 5. Renal Panel , CBC in a.m. Jose Smiley MD, MPH FACP Artis Draper [phone]

## 2019-12-24 NOTE — PROGRESS NOTES
Bedside shift change report received from HARINDER Browning RN. Report included the following information SBAR, Kardex, Intake/Output, MAR, Recent Results, Med Rec Status and Cardiac Rhythm AFIB with RVR and plan of care. 0800: Shift assessment complete. See flowsheet. 0900: Dialysis RN in setting patient up to begin dialysis shortly. 1215: Reassessment complete. See flowsheet. 1600: Reassessment complete. See flowsheet. Bedside shift change report given to Chuck Morrow. Report included the following information SBAR, Kardex, Intake/Output, MAR, Recent Results, Med Rec Status and Cardiac Rhythm AFIB with RVR and plan of care.

## 2019-12-24 NOTE — PROGRESS NOTES
Problem: Dysphagia (Adult) Goal: *Acute Goals and Plan of Care (Insert Text) Description Recommendations: 
Diet: Mechanical soft solids, chopped meat/thin liquid Meds: Per patient preferene Aspiration Precautions Oral Care TID Goals:  Patient will: 1. Tolerate PO trials with 0 s/s overt distress in 4/5 trials - goal met 2. Utilize compensatory swallow strategies/maneuvers (decrease bite/sip, size/rate, alt. liq/sol) with min cues in 4/5 trials - goal met 3. Perform oral-motor/laryngeal exercises to increase oropharyngeal swallow function with min cues - goal N/A 
4. Complete an objective swallow study (i.e., MBSS) to assess swallow integrity, r/o aspiration, and determine of safest LRD, min A - goal N/A Outcome: Goals Met SPEECH LANGUAGE PATHOLOGY DYSPHAGIA TREATMENT & DISCHARGE Patient: Shawna Morelos (12 y.o. female) Date: 12/24/2019 Diagnosis: Hypotension [I95.9] Atrial fibrillation with RVR ([address] [I48.91] Hypotension Precautions: Aspiration PLOF: SNF 
 
ASSESSMENT: 
Followed up with dysphagia intervention. A&Ox4. Multiple family members at bedside. Patient observed self-feeding thin liquid + straw and solid trials. Pt exhibited moderately delayed mastication of solids with otherwise adequate bolus cohesion and A-P transit. Further exhibited adequate swallow timing/reflex and hyolaryngeal excursion. Able to manipulate and clear with 0 clinical s/s aspiration. Pt safe for mechanical soft solids, chopped meat/thin liquid diet with aspiration precautions, slow rate, frequent breaks. Maximum therapeutic gains met; safest, least restrictive diet achieved in current in-patient/acute setting. Accordingly, SLP to d/c intervention at this time. Progression toward goals: 
[x]         Improving appropriately - goals met/approximated 
[]         Not making progress/Not appropriate - will d/c POC PLAN: 
Recommendations and Planned Interventions: Maximum therapeutic gains met; safest, least restrictive diet achieved. D/C ST intervention at this time. Discharge Recommendations:  Eduardo Augustineuel SUBJECTIVE:  
Patient stated I'm tired. OBJECTIVE:  
Cognitive and Communication Status: 
Neurologic State: Alert, Appropriate for age Orientation Level: Appropriate for age Cognition: Follows commands, Decreased attention/concentration Perception: Appears intact Perseveration: No perseveration noted Safety/Judgement: Awareness of environment, Good awareness of safety precautions Dysphagia Treatment: 
Oral Assessment: 
Oral Assessment Labial: No impairment Dentition: Upper & lower dentures Oral Hygiene: Good Lingual: No impairment Velum: No impairment Mandible: No impairment P.O. Trials: 
 Patient Position: FAK 12 Vocal quality prior to P.O.: No impairment Consistency Presented: Thin liquid, Puree, Solid How Presented: Self-fed/presented, Straw, Successive swallows, Spoon Bolus Acceptance: No impairment Bolus Formation/Control: Impaired Type of Impairment: Delayed, Mastication Propulsion: No impairment Oral Residue: None Initiation of Swallow: No impairment Laryngeal Elevation: Functional 
 Aspiration Signs/Symptoms: None Pharyngeal Phase Characteristics: Easily fatigued Effective Modifications: Alternate liquids/solids, Small sips and bites Cues for Modifications: Minimal 
   
 Oral Phase Severity: Mild-moderate Pharyngeal Phase Severity : Minimal 
 
PAIN: 
Pain level pre-treatment: 0/10 Pain level post-treatment: 0/10 After treatment:  
[]              Patient left in no apparent distress sitting up in chair 
[x]              Patient left in no apparent distress in bed 
[x]              Call bell left within reach [x]              Nursing notified 
[]              Caregiver present 
[]              Bed alarm activated COMMUNICATION/EDUCATION:  
 [x] Aspiration precautions; swallow safety; compensatory techniques []        Patient unable to participate in education; education ongoing with staff 
[]  Posted safety precautions in patient's room. [] Oral-motor/laryngeal strengthening exercises Thank you for this referral, ARIELLA Saldana Time Calculation: 10 mins

## 2019-12-25 NOTE — PROGRESS NOTES
[facility] Pulmonary, Critical Care, and Sleep Medicine Pulm/CC Note Name: Italia Holden MRN: [id number] : 1935 Hospital: [facility] [place] Date: 2019  Room: 103/ Subjective: This patient has been seen and evaluated at the request of Dr. London Keene for patient with Afib and hypotension needing icu admission. Patient is a 80 y.o. female with hx of ESRD on dialysis started last month for CKD. She also has chronic Afib. Patient is in [place] for rehab since last admission. She was sent to ER for tachycardia and shortness of breath. In the ER, she got cardizem and her BP dropped. Patient was given a 250 ml fluid bolus and moved to icu. She has been placed on amiodarone drip by Cardiology Dr Karma Butler. 19 Patient is in icu No acute issues overnight Patient has come off amiodarone drip S/p dialysis yesterday No worsening cough or SOB No CP or abd pains or vomiting. Afebrile; BP stable Tele-Afib rate 100-110s Past Medical History:  
Diagnosis Date  A-fib ([facility] [address]  Chronic kidney disease  Congestive heart failure (CHF) ([facility] [address]  COPD (chronic obstructive pulmonary disease) (HCC)  Dialysis patient [facility]) Past Surgical History:  
Procedure Laterality Date  IR INSERT TUNL CVC W/O PORT OVER 5 YR  11/15/2019 Prior to Admission medications Medication Sig Start Date End Date Taking? Authorizing Provider  
warfarin (COUMADIN) 2 mg tablet Take 1 Tab by mouth daily. 19  Yes Elpidio Truong MD  
amiodarone (CORDARONE) 200 mg tablet Take 1 Tab by mouth daily. 19  Yes Maldonado Sargent MD  
atorvastatin (LIPITOR) 40 mg tablet Take 1 Tab by mouth daily. 10/30/19  Yes Qasim Miller MD  
dilTIAZem (CARDIZEM) 30 mg tablet Take 1 Tab by mouth Before breakfast, lunch, and dinner. 19   Maldonado Sargent MD  
aspirin delayed-release 81 mg tablet Take 1 Tab by mouth daily.  10/29/19   Qasim Miller MD  
 ergocalciferol (VITAMIN D2) 50,000 unit capsule Take 50,000 Units by mouth every seven (7) days. Napoleon Lee MD  
PARoxetine (PAXIL) 20 mg tablet Take 20 mg by mouth daily. Napoleon Lee MD  
furosemide (LASIX) 40 mg tablet Take 40 mg by mouth every other day. Napoleon Lee MD  
 
Allergies Allergen Reactions  Penicillins Hives  Valium [Diazepam] Other (comments) It made me cry more Social History Tobacco Use  Smoking status: Former Smoker  Smokeless tobacco: Never Used Substance Use Topics  Alcohol use: Never Frequency: Never History reviewed. No pertinent family history. Current Facility-Administered Medications Medication Dose Route Frequency  epoetin jonathan-epbx (RETACRIT) injection 6,000 Units  6,000 Units SubCUTAneous Q TUE, THU & SAT  amiodarone (CORDARONE) tablet 400 mg  400 mg Oral Q8H  
 midodrine (PROAMITINE) tablet 2.5 mg  2.5 mg Oral TID WITH MEALS  dilTIAZem (CARDIZEM) IR tablet 30 mg  30 mg Oral TIDAC  nystatin (MYCOSTATIN) 100,000 unit/gram powder   Topical TID  pantoprazole (PROTONIX) injection 40 mg  40 mg IntraVENous Q24H  cefTRIAXone (ROCEPHIN) 1 g in sterile water (preservative free) 10 mL IV syringe  1 g IntraVENous Q24H  
 aspirin delayed-release tablet 81 mg  81 mg Oral DAILY  atorvastatin (LIPITOR) tablet 40 mg  40 mg Oral DAILY  PARoxetine (PAXIL) tablet 20 mg  20 mg Oral DAILY  ergocalciferol capsule 50,000 Units  50,000 Units Oral Q7D  Warfarin - Pharmacy to Dose   Other Rx Dosing/Monitoring Review of Systems: 
Limited due to patient condition Objective:  
Vital Signs:   
Visit Vitals /69 Pulse (!) 112 Temp 97.5 °F (36.4 °C) Resp 17 Ht 4' 9\" (1.448 m) Wt 58.1 kg (128 lb) SpO2 100% Breastfeeding No  
BMI 27.70 kg/m² O2 Device: Nasal cannula O2 Flow Rate (L/min): 2 l/min Temp (24hrs), Av.1 °F (36.7 °C), Min:97.5 °F (36.4 °C), Max:98.9 °F (37.2 °C) Intake/Output:  
Last shift:      No intake/output data recorded. Last 3 shifts: 12/23 1901 - 12/25 0700 In: 713.6 [P.O.:150; I.V.:563.6] Out: 1050 [Urine:550] Intake/Output Summary (Last 24 hours) at 12/25/2019 0884 Last data filed at 12/25/2019 1842 Gross per 24 hour Intake 483.6 ml Output 650 ml Net -166.4 ml Physical Exam:  
Comfortable; on 2 L nc o2; acyanotic; thin and frail Chest: RT chest wall dialysis cath HEENT: pupils not dilated, no scleral jaundice Neck: No adenopathy or thyroid swelling CVS: S1S2 no murmurs; JVD not elevated RS: Mod air entry bilaterally, decreased BS at bases, no wheezes or crackles Abd: soft, non tender, no hepatosplenomegaly, no abd distension Neuro: awake, moving all extremities Extrm: no leg edema or swelling or clubbing Skin: no rash Lymphatic: no cervical or supraclavicular adenopathy Data review:  
 
Recent Results (from the past 24 hour(s)) GLUCOSE, POC Collection Time: 12/24/19  1:24 PM  
Result Value Ref Range Glucose (POC) 70 70 - 110 mg/dL GLUCOSE, POC Collection Time: 12/24/19 10:30 PM  
Result Value Ref Range Glucose (POC) 101 70 - 110 mg/dL CBC WITH AUTOMATED DIFF Collection Time: 12/25/19  4:25 AM  
Result Value Ref Range WBC 9.4 4.6 - 13.2 K/uL  
 RBC 2.79 (L) 4.20 - 5.30 M/uL HGB 8.6 (L) 12.0 - 16.0 g/dL HCT 27.1 (L) 35.0 - 45.0 % MCV 97.1 (H) 74.0 - 97.0 FL  
 MCH 30.8 24.0 - 34.0 PG  
 MCHC 31.7 31.0 - 37.0 g/dL  
 RDW 18.6 (H) 11.6 - 14.5 % PLATELET 441 158 - 212 K/uL MPV 11.0 9.2 - 11.8 FL  
 NEUTROPHILS 91 (H) 40 - 73 % LYMPHOCYTES 3 (L) 21 - 52 % MONOCYTES 5 3 - 10 % EOSINOPHILS 1 0 - 5 % BASOPHILS 0 0 - 2 %  
 ABS. NEUTROPHILS 8.5 (H) 1.8 - 8.0 K/UL  
 ABS. LYMPHOCYTES 0.3 (L) 0.9 - 3.6 K/UL  
 ABS. MONOCYTES 0.5 0.05 - 1.2 K/UL  
 ABS. EOSINOPHILS 0.1 0.0 - 0.4 K/UL  
 ABS. BASOPHILS 0.0 0.0 - 0.1 K/UL  PLATELET COMMENTS LARGE PLATELETS    
 RBC COMMENTS ANISOCYTOSIS 
 2+ 
    
 RBC COMMENTS OVALOCYTES 1+ RBC COMMENTS ACANTHOCYTES 
FEW 
    
 DF AUTOMATED METABOLIC PANEL, COMPREHENSIVE Collection Time: 12/25/19  4:25 AM  
Result Value Ref Range Sodium 135 (L) 136 - 145 mmol/L Potassium 3.5 3.5 - 5.5 mmol/L Chloride 99 (L) 100 - 111 mmol/L  
 CO2 29 21 - 32 mmol/L Anion gap 7 3.0 - 18 mmol/L Glucose 80 74 - 99 mg/dL BUN 22 (H) 7.0 - 18 MG/DL Creatinine 1.75 (H) 0.6 - 1.3 MG/DL  
 BUN/Creatinine ratio 13 12 - 20 GFR est AA 34 (L) >60 ml/min/1.73m2 GFR est non-AA 28 (L) >60 ml/min/1.73m2 Calcium 8.6 8.5 - 10.1 MG/DL Bilirubin, total 0.7 0.2 - 1.0 MG/DL  
 ALT (SGPT) 24 13 - 56 U/L  
 AST (SGOT) 27 10 - 38 U/L Alk. phosphatase 262 (H) 45 - 117 U/L Protein, total 5.9 (L) 6.4 - 8.2 g/dL Albumin 2.9 (L) 3.4 - 5.0 g/dL Globulin 3.0 2.0 - 4.0 g/dL A-G Ratio 1.0 0.8 - 1.7 MAGNESIUM Collection Time: 12/25/19  4:25 AM  
Result Value Ref Range Magnesium 2.2 1.6 - 2.6 mg/dL PROTHROMBIN TIME + INR Collection Time: 12/25/19  4:25 AM  
Result Value Ref Range Prothrombin time 37.4 (H) 11.5 - 15.2 sec INR 3.8 (H) 0.8 - 1.2 No results for input(s): FIO2I, IFO2, HCO3I, IHCO3, HCOPOC, PCO2I, PCOPOC, IPHI, PHI, PHPOC, PO2I, PO2POC in the last 72 hours. No lab exists for component: IPOC2 All Micro Results Procedure Component Value Units Date/Time CULTURE, BLOOD [[id number]] Collected:  12/23/19 1565 Order Status:  Completed Specimen:  Blood Updated:  12/25/19 9435 Special Requests: NO SPECIAL REQUESTS Culture result: NO GROWTH 1 DAY     
 CULTURE, URINE [[id number]]  (Abnormal) Collected:  12/22/19 1630 Order Status:  Completed Specimen:  Clean catch Updated:  12/24/19 0430 Special Requests: NO SPECIAL REQUESTS Culture result: [id number] COLONIES/mL YEAST     
  
 
 
 
ECHO Nov 2019 Interpretation Summary Result status: Final result · Left Ventricle: Normal cavity size. Increased wall thickness. Low normal systolic dysfunction. Estimated left ventricular ejection fraction is 51 - 55%. E/E' ratio is 28.72. · Left Atrium: Moderately dilated left atrium. · Right Ventricle: Reduced systolic function. · Right Atrium: Moderately dilated right atrium. · Aortic Valve: Mild aortic valve sclerosis with no evidence of reduced excursion. · Mitral Valve: Mitral valve thickening. Moderate mitral annular calcification. Mild mitral valve prolapse. Moderate mitral valve regurgitation is present. · Pulmonary Artery: Moderate pulmonary hypertension. Pulmonary arterial systolic pressure is 46 mmHg. · IVC/Hepatic Veins: Mildly elevated central venous pressure (5-10 mmHg); IVC diameter is less than 21 mm and collapses less than 50% with respiration. · Pericardium: Trivial pericardial effusion. Imaging: 
[x]I have personally reviewed the patients chest radiographs images and report Results from [facility] Encounter encounter on 12/22/19 XR CHEST PORT Narrative EXAM: XR CHEST PORT 
 
CLINICAL INDICATION/HISTORY: Atrial fibrillation 
-Additional: Congestive heart failure COMPARISON: Several prior exams, most recently 11/29/2019 TECHNIQUE: Portable frontal view of the chest 
 
_______________ FINDINGS: 
 
SUPPORT DEVICES: There is a right IJ approach dialysis catheter tip projecting 
in stable position. HEART AND MEDIASTINUM: Cardiac size and mediastinal contours remain stable, with 
redemonstration of cardiac enlargement. Dense mitral annular calcification. LUNGS AND PLEURAL SPACES: Central vascular congestion and mild interstitial 
edema is present along with small bilateral pleural effusions. Interval increase 
in left retrocardiac opacity noted with slight leftward shift of mediastinal 
structures. No pneumothorax. BONY THORAX AND SOFT TISSUES: No acute osseous abnormality.  Advanced bilateral 
 rotator cuff arthropathy. Partial visualization of postoperative changes related 
to anterior cervical discectomy and instrumented fusion. _______________ Impression IMPRESSION: 
 
 
1. Right IJ approach dialysis catheter in stable position. 2. Cardiomegaly, central vascular congestion/edema and bilateral pleural 
effusions overall similar to prior. 3. Interval increase in left retrocardiac atelectasis. No results found for this or any previous visit. IMPRESSION:  
· Atrial fibrillation with RVR- I48.91 
· Hypotension - I95.9 · Chronic diastolic CHF- R24.75 
· ESRD on dialysis - N18.6, Z99.2 
· UTI- N39.0 · Debility- R53.81 
·   
  
RECOMMENDATIONS:  
· Pulm: stable respirations; on nc O2; wean fio2 for o2 sats >91% · Cardiac: oral cardizem and amiodarone drip; Afib seems better controlled; trops neg · ID: continue ceftriaxone daily-complete 5 days; no fevers; wbc improved · Renal: follow IOs; s/p dialysis yesterday; continue nephrology follow up · GI: oral diet as tolerated; SLP eval  
· Neuro: stable mentation · Hem: watch Hb and Plts-stable; coumadin for Afib; INR 3.8; hold coumadin today; target INR 2-3 · Nutrition: SLP eval-mechanical soft solids, chopped meat/thin liquid diet with aspiration precautions · Proph:  DVt and GI proph-coumadin and PPI · No family at bedside currently · DNR code status · Prognosis seems poor overall · Stable for tele transfer; Pulm/CC would sign off and see as needed Will defer respective systems problem management to primary and other consultant and follow patient in ICU with primary and other medical team 
Further recommendations will be based on the patient's response to recommended treatment and results of the investigation ordered. Quality Care: PPI, DVT prophylaxis, HOB elevated, Infection control all reviewed and addressed.  
· Lines/Tubes: PIVs 
  
 
High complexity decision making was performed in this consultation and evaluation of this patient Trevor Araujo MD

## 2019-12-25 NOTE — PROGRESS NOTES
Pharmacy Dosing Services: Warfarin Consult for Warfarin Dosing by Pharmacy by Dr. Jamey Cortez Consult provided for this 80 y.o.  female , for indication of Atrial Fibrillation. Day of Therapy (continuation from home ) Dose to achieve an INR goal of 2-3 Order entered HOLD Warfarin today Supra-therapeutic INR today ==> 3.8 Significant drug interactions: Amiodarone PT/INR Lab Results Component Value Date/Time INR 3.8 (H) 12/25/2019 04:25 AM  
  
Platelets Lab Results Component Value Date/Time PLATELET 777 87/24/8144 04:25 AM  
  
H/H Lab Results Component Value Date/Time HGB 8.6 (L) 12/25/2019 04:25 AM  
  
 
Warfarin Administrations (last 168 hours) Date/Time Action Medication Dose  
 12/23/19 1712 Given  
 warfarin (COUMADIN) tablet 2 mg 2 mg  
 12/22/19 2046 Given  
 warfarin (COUMADIN) tablet 2 mg 2 mg Pharmacy to follow daily and will provide subsequent Warfarin dosing based on clinical status. Arnetta Gowers, [facility] [place])  Contact information [phone] (2) assistive person

## 2019-12-25 NOTE — PROGRESS NOTES
PRN ATROVENT TX GIVEN FOR SOB. SPO2 91% ON 3L NC WITH RR 28-30. INCREASED TO 4L NC.  BS DIMINISHED WITH OCCASIONAL UPPER AIRWAY WHEEZES PRESENT AT END OF TX.  NO SIGNIFICANT IMPROVEMENT NOTED.

## 2019-12-25 NOTE — ROUTINE PROCESS
1100: Assumed care of pt from [address] [place] [address]. 
1219: paged Dr Bashir Carey pt havingg sob and elevated hr received order for lopressor. 1227: Pt HR now down pt still complaining of SOB. Received order for atrovent. Respiratory is up to see pt. 1244: pt receiving neb treatment by respiratory. O2  number improved but pt still c/o of SOB . 1250: paged Dr. Bashir Carey regarding pt status received order for IV lasix and stat chest xray. 1318: Lasix given xray complete. Pt resting a little more comfortably. 1448: pt resting comfortably no sign of distress.

## 2019-12-25 NOTE — PROGRESS NOTES
Nephrology Progress note Subjective:  
 
 
Danny Quesada is a 80 y.o. female with a past medical history significant for CHF, COPD, A. fib, ESRD on HD MWF noticed to have more palpitation in nursing home, with worsening weakness. Found to have low BP in ED with tachycardia. IV cardizem given, along with bolus iv fluid. Cardiology consulted, placed pt on iv amiodarone. Pt then admitted to ICU. Nephrology was consulted for further evaluation and management of her ERSD. Was een in ICU, feeling weak and BP borderline low. Resting comfortably, no new complaints. Last HD 12/24. HD schedule adjusted to free up candelaria day. Admit Date: 12/22/2019 Principal Problem: Hypotension (12/22/2019) Active Problems: Hypertensive heart disease (10/5/2011) Hypothyroidism (10/25/2012) Overview: Last Assessment & Plan: Most recent thyroid function studies done on 04/15/2019 show TSH and free T4 of 3.52 and 0.93 respectively. Notably she is not on thyroid  
    supplement so there is no current evidence that she is truly hypothyroid. Will continue to follow this periodically. A-fib ([facility] (11/8/2019) UTI (urinary tract infection) (11/10/2019) Weakness generalized (11/19/2019) ESRD needing dialysis ([facility] (11/19/2019) Debility (11/28/2019) Atrial fibrillation with RVR ([facility] (12/22/2019) Current Facility-Administered Medications Medication Dose Route Frequency  Warfarin - HOLD warfarin today    Other ONCE  
 ipratropium (ATROVENT) 0.02 % nebulizer solution 0.5 mg  0.5 mg Nebulization Q4H PRN  pantoprazole (PROTONIX) tablet 40 mg  40 mg Oral QPM  
 0.9% sodium chloride infusion  100 mL/hr IntraVENous DIALYSIS PRN  
 albumin human 25% (BUMINATE) solution 25 g  25 g IntraVENous Q1H PRN  
 epoetin jonathan-epbx (RETACRIT) injection 6,000 Units  6,000 Units SubCUTAneous Q TUE, THU & SAT  amiodarone (CORDARONE) tablet 400 mg  400 mg Oral Q8H  
  midodrine (PROAMITINE) tablet 2.5 mg  2.5 mg Oral TID WITH MEALS  
 heparin (porcine) 1,000 unit/mL injection 4,300 Units  4,300 Units Hemodialysis DIALYSIS PRN  
 dilTIAZem (CARDIZEM) IR tablet 30 mg  30 mg Oral TIDAC  nystatin (MYCOSTATIN) 100,000 unit/gram powder   Topical TID  acetaminophen (TYLENOL) tablet 650 mg  650 mg Oral Q6H PRN  
 cefTRIAXone (ROCEPHIN) 1 g in sterile water (preservative free) 10 mL IV syringe  1 g IntraVENous Q24H  
 aspirin delayed-release tablet 81 mg  81 mg Oral DAILY  atorvastatin (LIPITOR) tablet 40 mg  40 mg Oral DAILY  PARoxetine (PAXIL) tablet 20 mg  20 mg Oral DAILY  ergocalciferol capsule 50,000 Units  50,000 Units Oral Q7D  Warfarin - Pharmacy to Dose   Other Rx Dosing/Monitoring Allergy: Allergies Allergen Reactions  Penicillins Hives  Valium [Diazepam] Other (comments) It made me cry more Objective:  
 
Visit Vitals /63 Pulse (!) 101 Temp 97.4 °F (36.3 °C) Resp 16 Ht 4' 9\" (1.448 m) Wt 58.1 kg (128 lb) SpO2 91% Breastfeeding No  
BMI 27.70 kg/m² Intake/Output Summary (Last 24 hours) at 12/25/2019 1626 Last data filed at 12/25/2019 4742 Gross per 24 hour Intake 150 ml Output 150 ml Net 0 ml Physical Exam:  
 
 
General: No acute distress HENT: Atraumatic and normocephalic Eyes: Normal conjunctiva Neck: Supple. No JVD Cardiovascular: Irreg. Pulmonary/Chest Wall: Clear to auscultation bilaterally Abdominal: Soft and non-tender Musculoskeletal: No edema Neurological: No focal deficits [facility] TDC in place. Data Review: 
Lab Results Component Value Date/Time  Sodium 135 (L) 12/25/2019 04:25 AM  
 Potassium 3.5 12/25/2019 04:25 AM  
 Chloride 99 (L) 12/25/2019 04:25 AM  
 CO2 29 12/25/2019 04:25 AM  
 Anion gap 7 12/25/2019 04:25 AM  
 Glucose 80 12/25/2019 04:25 AM  
 BUN 22 (H) 12/25/2019 04:25 AM  
 Creatinine 1.75 (H) 12/25/2019 04:25 AM  
 BUN/Creatinine ratio 13 12/25/2019 04:25 AM  
 GFR est AA 34 (L) 12/25/2019 04:25 AM  
 GFR est non-AA 28 (L) 12/25/2019 04:25 AM  
 Calcium 8.6 12/25/2019 04:25 AM  
 
Lab Results Component Value Date/Time WBC 9.4 12/25/2019 04:25 AM  
 HGB 8.6 (L) 12/25/2019 04:25 AM  
 HCT 27.1 (L) 12/25/2019 04:25 AM  
 PLATELET 143 32/18/3424 04:25 AM  
 MCV 97.1 (H) 12/25/2019 04:25 AM  
 
Lab Results Component Value Date/Time Calcium 8.6 12/25/2019 04:25 AM  
 Phosphorus 3.7 11/28/2019 04:10 AM  
 
Lab Results Component Value Date/Time Iron 11 (L) 11/16/2019 09:10 AM  
 TIBC 271 11/16/2019 09:10 AM  
 Iron % saturation 4 11/16/2019 09:10 AM  
 Ferritin 395 (H) 11/16/2019 09:10 AM  
 
Lab Results Component Value Date/Time Ferritin 395 (H) 11/16/2019 09:10 AM  
 
 
 
Impression: 1. ESRD on HD MWF 2. A fib with RVR, seen by Cardiology, on IV Amiodarone 3. Weakness 4. Hypotension 5. Anemia in CKD 6. Hyponatremia 7. Hypokalemia 8. Hypoalbuminemia 
  
Plan: 1. No HD today. Next HD Fri 12/26 2. Albumin prn on HD to Keep SBP > 90 
3. Epo for Anemia 4. Nutritional supplement 5. Renal Panel , CBC in a.m. Leah Almonte MD, MPH FACP Artis Draper [phone]

## 2019-12-25 NOTE — PROGRESS NOTES
Cardiology Progress Note Patient: Pal Rasmussen        Sex: female          DOA: 12/22/2019 YOB: 1935      Age:  80 y.o.        LOS:  LOS: 3 days Patient seen and examined, chart reviewed. Assessment/Plan Patient Active Problem List  
Diagnosis Code  Heart failure ([facility]) I50.9  Benign essential hypertension I10  Chronic diastolic congestive heart failure ([facility]) I50.32  
 Cobalamin deficiency E53.8  Hypercholesterolemia E78.00  Hypertensive heart disease I11.9  Hypothyroidism E03.9  Iron deficiency anemia D50.9  A-fib ([facility]) I48.91  
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  
 ESRD needing dialysis ([facility]) N18.6, Z99.2  Debility R53.81  
 Hypotension I95.9  Atrial fibrillation with RVR ([facility]) I48.91 Persistent atrial fibrillation Plan: 
 
Continueamiodarone 400 mg three times a day Continue Midodrine 2.5 mg three times a day Continue Cardizem IR 30 mg three times per day and titrate as HR/BP response Titrate midodrine as per BP response Continue coumadin as per PT/INR Continue dialysis as per nephrologist 
Continue management as per hospital medicine Subjective:  
 cc: 
Denies any chest pain or shortness of breath REVIEW OF SYSTEMS:  
 
General: No fevers or chills. Cardiovascular: No chest pain,No palpitations, No orthopnea, No PND, No leg swelling, No claudication Pulmonary: No dyspnea. Gastrointestinal: No nausea, vomiting, bleeding Neurology: No Dizziness Objective:  
  
Visit Vitals /63 Pulse (!) 101 Temp 97.4 °F (36.3 °C) Resp 16 Ht 4' 9\" (1.448 m) Wt 58.1 kg (128 lb) SpO2 91% Breastfeeding No  
BMI 27.70 kg/m² Body mass index is 27.7 kg/m². Physical Exam: 
General Appearance: Comfortable, not using accessory muscles of respiration. HEENT: XANDER. HEAD: Atraumatic NECK: No JVD, no thyroidomeglay. CAROTIDS: No bruit LUNGS: Clear bilaterally. HEART: S1+S2 audible, irregularly irregular, no murmur, no pericardial rub. ABD: Non-tender, BS Audible NEUROLOGICAL: alert, follows verbal commands. Medication: 
Current Facility-Administered Medications Medication Dose Route Frequency  Warfarin - HOLD warfarin today    Other ONCE  
 ipratropium (ATROVENT) 0.02 % nebulizer solution 0.5 mg  0.5 mg Nebulization Q4H PRN  pantoprazole (PROTONIX) tablet 40 mg  40 mg Oral QPM  
 0.9% sodium chloride infusion  100 mL/hr IntraVENous DIALYSIS PRN  
 albumin human 25% (BUMINATE) solution 25 g  25 g IntraVENous Q1H PRN  
 epoetin jonathan-epbx (RETACRIT) injection 6,000 Units  6,000 Units SubCUTAneous Q TUE, THU & SAT  amiodarone (CORDARONE) tablet 400 mg  400 mg Oral Q8H  
 midodrine (PROAMITINE) tablet 2.5 mg  2.5 mg Oral TID WITH MEALS  
 heparin (porcine) 1,000 unit/mL injection 4,300 Units  4,300 Units Hemodialysis DIALYSIS PRN  
 dilTIAZem (CARDIZEM) IR tablet 30 mg  30 mg Oral TIDAC  nystatin (MYCOSTATIN) 100,000 unit/gram powder   Topical TID  acetaminophen (TYLENOL) tablet 650 mg  650 mg Oral Q6H PRN  
 cefTRIAXone (ROCEPHIN) 1 g in sterile water (preservative free) 10 mL IV syringe  1 g IntraVENous Q24H  
 aspirin delayed-release tablet 81 mg  81 mg Oral DAILY  atorvastatin (LIPITOR) tablet 40 mg  40 mg Oral DAILY  PARoxetine (PAXIL) tablet 20 mg  20 mg Oral DAILY  ergocalciferol capsule 50,000 Units  50,000 Units Oral Q7D  Warfarin - Pharmacy to Dose   Other Rx Dosing/Monitoring Lab/Data Reviewed: 
 
  
Recent Labs  
  12/25/19 
0425 12/24/19 
0408 12/23/19 
5058 WBC 9.4 10.5 17.0* HGB 8.6* 9.4* 10.0* HCT 27.1* 29.2* 31.6*  
 199 209 Recent Labs  
  12/25/19 
0425 12/24/19 
0408 12/23/19 
7809 * 129* 132* K 3.5 3.1* 3.8 CL 99* 92* 94* CO2 29 27 30 GLU 80 89 82 BUN 22* 43* 37* CREA 1.75* 2.56* 2.34* CA 8.6 7.7* 7.9* Signed By: Walker Bernal MD   
 December 25, 2019

## 2019-12-25 NOTE — PROGRESS NOTES
Cardiology Progress Note Patient: Alexandra Dejesus        Sex: female          DOA: 12/22/2019 YOB: 1935      Age:  80 y.o.        LOS:  LOS: 3 days Patient seen and examined, chart reviewed. Assessment/Plan Patient Active Problem List  
Diagnosis Code  Heart failure ([facility]) I50.9  Benign essential hypertension I10  Chronic diastolic congestive heart failure ([facility]) I50.32  
 Cobalamin deficiency E53.8  Hypercholesterolemia E78.00  Hypertensive heart disease I11.9  Hypothyroidism E03.9  Iron deficiency anemia D50.9  A-fib ([facility]) I48.91  
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  
 ESRD needing dialysis ([facility]) N18.6, Z99.2  Debility R53.81  
 Hypotension I95.9  Atrial fibrillation with RVR ([facility]) I48.91 Persistent atrial fibrillation Plan: 
 
Discontinue IV Amiodarone Start oral amiodarone 400 mg three times a day Start Midodrine 2.5 mg three times a day Will add beta blocker/calcium channel blocker as per HR/BP response/ 
Titrate midodrine as per BP response Continue coumadin as per PT/INR Continue dialysis as per nephrologist 
Continue management as per hospital medicine and pulmonary critical care Subjective:  
 cc: 
Denies any chest pain or shortness of breath REVIEW OF SYSTEMS:  
 
General: No fevers or chills. Cardiovascular: No chest pain,No palpitations, No orthopnea, No PND, No leg swelling, No claudication Pulmonary: No dyspnea. Gastrointestinal: No nausea, vomiting, bleeding Neurology: No Dizziness Objective:  
  
Visit Vitals /58 Pulse (!) 117 Temp 98.9 °F (37.2 °C) Resp 24 Ht 4' 9\" (1.448 m) Wt 58.1 kg (128 lb) SpO2 99% Breastfeeding No  
BMI 27.70 kg/m² Body mass index is 27.7 kg/m². Physical Exam: General Appearance: Comfortable, not using accessory muscles of respiration. HEENT: XANDER. HEAD: Atraumatic NECK: No JVD, no thyroidomeglay. CAROTIDS: No bruit LUNGS: Clear bilaterally. HEART: S1+S2 audible, irregularly irregular, no murmur, no pericardial rub. ABD: Non-tender, BS Audible NEUROLOGICAL: alert, follows verbal commands. Medication: 
Current Facility-Administered Medications Medication Dose Route Frequency  0.9% sodium chloride infusion  100 mL/hr IntraVENous DIALYSIS PRN  
 albumin human 25% (BUMINATE) solution 25 g  25 g IntraVENous Q1H PRN  
 epoetin jonathan-epbx (RETACRIT) injection 6,000 Units  6,000 Units SubCUTAneous Q TUE, THU & SAT  amiodarone (CORDARONE) tablet 400 mg  400 mg Oral Q8H  
 midodrine (PROAMITINE) tablet 2.5 mg  2.5 mg Oral TID WITH MEALS  
 heparin (porcine) 1,000 unit/mL injection 4,300 Units  4,300 Units Hemodialysis DIALYSIS PRN  
 dilTIAZem (CARDIZEM) IR tablet 30 mg  30 mg Oral TIDAC  nystatin (MYCOSTATIN) 100,000 unit/gram powder   Topical TID  acetaminophen (TYLENOL) tablet 650 mg  650 mg Oral Q6H PRN  pantoprazole (PROTONIX) injection 40 mg  40 mg IntraVENous Q24H  cefTRIAXone (ROCEPHIN) 1 g in sterile water (preservative free) 10 mL IV syringe  1 g IntraVENous Q24H  
 aspirin delayed-release tablet 81 mg  81 mg Oral DAILY  atorvastatin (LIPITOR) tablet 40 mg  40 mg Oral DAILY  PARoxetine (PAXIL) tablet 20 mg  20 mg Oral DAILY  ergocalciferol capsule 50,000 Units  50,000 Units Oral Q7D  Warfarin - Pharmacy to Dose   Other Rx Dosing/Monitoring Lab/Data Reviewed: 
 
  
Recent Labs  
  12/24/19 
0408 12/23/19 
9382 12/22/19 
1129 WBC 10.5 17.0* 12.1 HGB 9.4* 10.0* 10.0* HCT 29.2* 31.6* 31.5*  209 208 Recent Labs  
  12/24/19 
0408 12/23/19 
0636 12/22/19 
1129 * 132* 134* K 3.1* 3.8 3.6 CL 92* 94* 94* CO2 27 30 31 GLU 89 82 98 BUN 43* 37* 33* CREA 2.56* 2.34* 2.15* CA 7.7* 7.9* 8.0* Signed By: Leland Davison MD   
 December 25, 2019

## 2019-12-25 NOTE — PROGRESS NOTES
Hospitalist Progress Note Patient: Rachelle Mcclure MRN: [id number]  CSN: [id number] YOB: 1935  Age: 80 y.o. Sex: female DOA: 12/22/2019 LOS:  LOS: 3 days IMPRESSION and Plan: 
 
Rachelle Mcclure is a 80 y.o. female with Patient Active Problem List  
 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  ESRD needing dialysis ([address] 11/19/2019  Hyponatremia 11/15/2019  UTI (urinary tract infection) 11/10/2019  A-fib ([address] 11/08/2019  Chronic diastolic congestive heart failure ([address] 04/15/2019  Iron deficiency anemia 11/06/2018  Heart failure ([address] 07/09/2018  Cobalamin deficiency 11/20/2013  Hypercholesterolemia 10/01/2013  Benign essential hypertension 10/25/2012  Hypothyroidism 10/25/2012  Hypertensive heart disease 10/05/2011 Principal Problem: Hypotension (12/22/2019) Active Problems: Hypertensive heart disease (10/5/2011) Hypothyroidism (10/25/2012) Overview: Last Assessment & Plan: Most recent thyroid function studies done on 04/15/2019 show TSH and free T4 of 3.52 and 0.93 respectively. Notably she is not on thyroid  
    supplement so there is no current evidence that she is truly hypothyroid. Will continue to follow this periodically. A-fib ([address] (11/8/2019) UTI (urinary tract infection) (11/10/2019) Weakness generalized (11/19/2019) ESRD needing dialysis ([address] (11/19/2019) Debility (11/28/2019) Atrial fibrillation with RVR ([address] (12/22/2019) Okay to transfer out of ICU 
meds as ordered Repeat cxr HD per renal  
 
 
PT/OT and oob Recommend to continue hospitalization. Discussed with patient. Chief Complaints: Chief Complaint Patient presents with  Irregular Heart Beat SUBJECTIVE: 
Pt is seen and examined. Feels okay Review of systems: 
 
Review of Systems Constitutional: Positive for malaise/fatigue. HENT: Negative. Eyes: Negative. Respiratory: Positive for shortness of breath. Cardiovascular: Positive for leg swelling. Negative for chest pain, palpitations and orthopnea. Gastrointestinal: Negative. Negative for abdominal pain, diarrhea and heartburn. Genitourinary: Negative for dysuria and hematuria. Skin: Negative. Neurological: Positive for weakness. Psychiatric/Behavioral: Negative for depression, substance abuse and suicidal ideas. The patient is not nervous/anxious. PE: 
Patient Vitals for the past 24 hrs: 
 BP Temp Pulse Resp SpO2  
12/25/19 1240     91 % 12/25/19 1232   (!) 112  92 % 12/25/19 1218 (!) 154/97      
12/25/19 1128 126/63 97.4 °F (36.3 °C) (!) 136 16 99 % 12/25/19 1000 100/61 97.5 °F (36.4 °C) (!) 105 14 100 % 12/25/19 0900 100/59  (!) 124 23 99 % 12/25/19 0800 99/49 97.5 °F (36.4 °C) (!) 125 16 100 % 12/25/19 0755  97.5 °F (36.4 °C)     
12/25/19 0700 105/74  (!) 119 22 99 % 12/25/19 0657 103/69      
12/25/19 0600 95/58  (!) 112 17 100 % 12/25/19 0500 109/56  (!) 107 17 99 % 12/25/19 0404  97.6 °F (36.4 °C)     
12/25/19 0400 100/55  (!) 104 15 100 % 12/25/19 0300 (!) 87/47  (!) 115 16 99 % 12/25/19 0200 106/59  (!) 115 17 97 % 12/25/19 0100 108/60  (!) 117 22 97 % 12/25/19 0000 156/87 97.9 °F (36.6 °C) 85 23 95 % 12/24/19 2326  98.9 °F (37.2 °C)     
12/24/19 2300 105/58  (!) 117 24 99 % 12/24/19 2200 (!) 120/102  (!) 115 22 94 % 12/24/19 2100   (!) 116 21 100 % 12/24/19 2000 108/53  (!) 131 20 97 % 12/24/19 1900 109/54  (!) 111 22 100 % 12/24/19 1750   (!) 139    
12/24/19 1745   (!) 132    
12/24/19 1741   (!) 128    
12/24/19 1736   (!) 126    
12/24/19 1730   (!) 129 24 96 % 12/24/19 1725   (!) 128   12/24/19 1713   (!) 133    
12/24/19 1700 107/59  (!) 105 24 100 % 12/24/19 1630   (!) 124 26   
12/24/19 1600 99/49 98.7 °F (37.1 °C) (!) 112 22 99 % 12/24/19 1530 90/49  (!) 114 24   
12/24/19 1514 104/53  (!) 111    
12/24/19 1500 104/53  (!) 109 22   
12/24/19 1430 108/52  (!) 117 19 95 % 12/24/19 1400 95/49  (!) 120 23   
12/24/19 1330 96/61  (!) 117 21   
12/24/19 1315 111/69  (!) 115 24   
12/24/19 1312 107/74  (!) 112 24   
12/24/19 1300 104/57  (!) 120 22  Intake/Output Summary (Last 24 hours) at 12/25/2019 1256 Last data filed at 12/25/2019 2825 Gross per 24 hour Intake 312.63 ml Output 650 ml Net -337.37 ml Patient Vitals for the past 120 hrs: 
 Weight 12/22/19 1130 58.1 kg (128 lb) 12/23/19 0807 58.1 kg (128 lb) Physical Exam 
Vitals signs and nursing note reviewed. Constitutional:   
   General: She is in acute distress. Neck: Musculoskeletal: Normal range of motion and neck supple. Vascular: No JVD. Cardiovascular:  
   Rate and Rhythm: Normal rate and regular rhythm. Heart sounds: Normal heart sounds. Pulmonary:  
   Effort: Respiratory distress present. Breath sounds: Normal breath sounds. Abdominal:  
   General: Bowel sounds are normal. There is no distension. Palpations: Abdomen is soft. Tenderness: There is no tenderness. There is no rebound. Musculoskeletal: Normal range of motion. Skin: 
   General: Skin is warm and dry. Neurological:  
   Mental Status: She is alert and oriented to person, place, and time. Psychiatric:     
   Mood and Affect: Affect normal.  
 
 
 
 
 
Intake and Output: 
Current Shift:  No intake/output data recorded. Last three shifts:  12/23 1901 - 12/25 0700 In: 713.6 [P.O.:150; I.V.:563.6] Out: 1050 [Urine:550] Lab/Data Reviewed: 
No results found for this or any previous visit (from the past 8 hour(s)). Medications: Current Facility-Administered Medications Medication Dose Route Frequency  Warfarin - HOLD warfarin today    Other ONCE  
 ipratropium (ATROVENT) 0.02 % nebulizer solution 0.5 mg  0.5 mg Nebulization Q4H PRN  
 0.9% sodium chloride infusion  100 mL/hr IntraVENous DIALYSIS PRN  
 albumin human 25% (BUMINATE) solution 25 g  25 g IntraVENous Q1H PRN  
 epoetin jonathan-epbx (RETACRIT) injection 6,000 Units  6,000 Units SubCUTAneous Q TUE, THU & SAT  amiodarone (CORDARONE) tablet 400 mg  400 mg Oral Q8H  
 midodrine (PROAMITINE) tablet 2.5 mg  2.5 mg Oral TID WITH MEALS  
 heparin (porcine) 1,000 unit/mL injection 4,300 Units  4,300 Units Hemodialysis DIALYSIS PRN  
 dilTIAZem (CARDIZEM) IR tablet 30 mg  30 mg Oral TIDAC  nystatin (MYCOSTATIN) 100,000 unit/gram powder   Topical TID  acetaminophen (TYLENOL) tablet 650 mg  650 mg Oral Q6H PRN  pantoprazole (PROTONIX) injection 40 mg  40 mg IntraVENous Q24H  cefTRIAXone (ROCEPHIN) 1 g in sterile water (preservative free) 10 mL IV syringe  1 g IntraVENous Q24H  
 aspirin delayed-release tablet 81 mg  81 mg Oral DAILY  atorvastatin (LIPITOR) tablet 40 mg  40 mg Oral DAILY  PARoxetine (PAXIL) tablet 20 mg  20 mg Oral DAILY  ergocalciferol capsule 50,000 Units  50,000 Units Oral Q7D  Warfarin - Pharmacy to Dose   Other Rx Dosing/Monitoring Recent Results (from the past 24 hour(s)) GLUCOSE, POC Collection Time: 12/24/19  1:24 PM  
Result Value Ref Range Glucose (POC) 70 70 - 110 mg/dL GLUCOSE, POC Collection Time: 12/24/19 10:30 PM  
Result Value Ref Range Glucose (POC) 101 70 - 110 mg/dL CBC WITH AUTOMATED DIFF Collection Time: 12/25/19  4:25 AM  
Result Value Ref Range WBC 9.4 4.6 - 13.2 K/uL  
 RBC 2.79 (L) 4.20 - 5.30 M/uL HGB 8.6 (L) 12.0 - 16.0 g/dL HCT 27.1 (L) 35.0 - 45.0 % MCV 97.1 (H) 74.0 - 97.0 FL  
 MCH 30.8 24.0 - 34.0 PG  
 MCHC 31.7 31.0 - 37.0 g/dL  
 RDW 18.6 (H) 11.6 - 14.5 % PLATELET 512 906 - 368 K/uL MPV 11.0 9.2 - 11.8 FL  
 NEUTROPHILS 91 (H) 40 - 73 % LYMPHOCYTES 3 (L) 21 - 52 % MONOCYTES 5 3 - 10 % EOSINOPHILS 1 0 - 5 % BASOPHILS 0 0 - 2 %  
 ABS. NEUTROPHILS 8.5 (H) 1.8 - 8.0 K/UL  
 ABS. LYMPHOCYTES 0.3 (L) 0.9 - 3.6 K/UL  
 ABS. MONOCYTES 0.5 0.05 - 1.2 K/UL  
 ABS. EOSINOPHILS 0.1 0.0 - 0.4 K/UL  
 ABS. BASOPHILS 0.0 0.0 - 0.1 K/UL PLATELET COMMENTS LARGE PLATELETS    
 RBC COMMENTS ANISOCYTOSIS 2+ 
    
 RBC COMMENTS OVALOCYTES 1+ RBC COMMENTS ACANTHOCYTES 
FEW 
    
 DF AUTOMATED METABOLIC PANEL, COMPREHENSIVE Collection Time: 12/25/19  4:25 AM  
Result Value Ref Range Sodium 135 (L) 136 - 145 mmol/L Potassium 3.5 3.5 - 5.5 mmol/L Chloride 99 (L) 100 - 111 mmol/L  
 CO2 29 21 - 32 mmol/L Anion gap 7 3.0 - 18 mmol/L Glucose 80 74 - 99 mg/dL BUN 22 (H) 7.0 - 18 MG/DL Creatinine 1.75 (H) 0.6 - 1.3 MG/DL  
 BUN/Creatinine ratio 13 12 - 20 GFR est AA 34 (L) >60 ml/min/1.73m2 GFR est non-AA 28 (L) >60 ml/min/1.73m2 Calcium 8.6 8.5 - 10.1 MG/DL Bilirubin, total 0.7 0.2 - 1.0 MG/DL  
 ALT (SGPT) 24 13 - 56 U/L  
 AST (SGOT) 27 10 - 38 U/L Alk. phosphatase 262 (H) 45 - 117 U/L Protein, total 5.9 (L) 6.4 - 8.2 g/dL Albumin 2.9 (L) 3.4 - 5.0 g/dL Globulin 3.0 2.0 - 4.0 g/dL A-G Ratio 1.0 0.8 - 1.7 MAGNESIUM Collection Time: 12/25/19  4:25 AM  
Result Value Ref Range Magnesium 2.2 1.6 - 2.6 mg/dL PROTHROMBIN TIME + INR Collection Time: 12/25/19  4:25 AM  
Result Value Ref Range Prothrombin time 37.4 (H) 11.5 - 15.2 sec INR 3.8 (H) 0.8 - 1.2 Procedures/imaging: see electronic medical records for all procedures/Xrays and details which were not copied into this note but were reviewed prior to creation of Plan 
 
Yadira Hernandez MD  
12/25/2019, 3:17 PM

## 2019-12-25 NOTE — PROGRESS NOTES
Pharmacy Dosing Services: Pantoprazole This patient meets P & T approved criteria for conversion from IV to oral therapy for the following medication: Pantoprazole Protonix 40 mg IV daily was changed to Protonix 40 mg PO daily The pharmacist will continue to monitor the patient's status and advise the physician if conversion back to IV therapy is recommended. [address] [facility] Contact information: [phone]

## 2019-12-26 NOTE — PROGRESS NOTES
Hospitalist Progress Note-critical care note Patient: Alexandra Dejesus MRN: [id number]  CSN: [id number] YOB: 1935  Age: 80 y.o. Sex: female DOA: 12/22/2019 LOS:  LOS: 4 days Chief complaint: A. fib with RVR, end-stage renal disease, UTI, Assessment/Plan Hospital Problems  Date Reviewed: 12/22/2019 Codes Class Noted POA * (Principal) Hypotension ICD-10-CM: I95.9 ICD-9-CM: 458.9  12/22/2019 Unknown Atrial fibrillation with RVR ([facility] ICD-10-CM: I48.91 
ICD-9-CM: 427.31  12/22/2019 Unknown Debility ICD-10-CM: R53.81 ICD-9-CM: 799.3  11/28/2019 Yes Weakness generalized ICD-10-CM: R53.1 ICD-9-CM: 780.79  11/19/2019 Yes ESRD needing dialysis ([facility] ICD-10-CM: N18.6, Z99.2 ICD-9-CM: 585.6  11/19/2019 Yes  
   
 UTI (urinary tract infection) ICD-10-CM: N39.0 ICD-9-CM: 599.0  11/10/2019 Yes A-fib [facility]) ICD-10-CM: I48.91 
ICD-9-CM: 427.31  11/8/2019 Yes Hypothyroidism ICD-10-CM: E03.9 ICD-9-CM: 244.9  10/25/2012 Yes Overview Signed 10/26/2019  8:03 AM by Twin Underwood MD  
  Last Assessment & Plan: Most recent thyroid function studies done on 04/15/2019 show TSH and free T4 of 3.52 and 0.93 respectively. Notably she is not on thyroid supplement so there is no current evidence that she is truly hypothyroid. Will continue to follow this periodically. Hypertensive heart disease ICD-10-CM: I11.9 ICD-9-CM: 402.90  10/5/2011 Yes A. fib with RVR Heart rate is controlled with amiodarone and Cardizem, on warfarin, INR was elevated will continue hold that daily INR check Cardiologist on board UTI 
93889 COLONIES/mL YEAST We will DC Rocephin put Diflucan End-stage renal disease Nephrology on board continue dialyzed Debility, weakness Continue PT OT Hypotension resolved Subjective : feel OK Daughter was at the bedside 
 continue monitor INR level. PT OT. DC planning Disposition :1-2 days Daughter was at bedside. she was updated Review of systems: 
 
General: No fevers or chills. Cardiovascular: No chest pain or pressure. No palpitations. Pulmonary: No shortness of breath. Gastrointestinal: No nausea, vomiting. Vital signs/Intake and Output: 
Visit Vitals /55 Pulse 96 Temp 97.9 °F (36.6 °C) Resp 16 Ht 4' 9\" (1.448 m) Wt 61.4 kg (135 lb 4.8 oz) SpO2 100% Breastfeeding No  
BMI 29.28 kg/m² Current Shift:  12/26 0701 - 12/26 1900 In: 240 [P.O.:240] Out: - Last three shifts:  12/24 1901 - 12/26 0700 In: 150 [P.O.:150] Out: 150 [Urine:150] Physical Exam: 
General: WD, WN. Alert, cooperative, no acute distress   
HEENT: NC, Atraumatic. PERRLA, anicteric sclerae. Lungs: CTA Bilaterally. No Wheezing/Rhonchi/Rales. Heart:   irregular irregular,  + murmur, No Rubs, No Gallops Abdomen: Soft, Non distended, Non tender.  +Bowel sounds, Extremities: No c/c/e Psych:   Not anxious or agitated. Neurologic:  No acute neurological deficit. Labs: Results:  
   
Chemistry Recent Labs  
  12/26/19 0355 12/25/19 0425 12/24/19 0408 GLU 64* 80 89  135* 129*  
K 3.9 3.5 3.1*  
CL 98* 99* 92* CO2 28 29 27 BUN 31* 22* 43* CREA 2.22* 1.75* 2.56* CA 8.5 8.6 7.7* AGAP 10 7 10 BUCR 14 13 17 AP  --  262* 97  
TP  --  5.9* 4.9* ALB 2.6* 2.9* 2.1*  
GLOB  --  3.0 2.8 AGRAT  --  1.0 0.8 CBC w/Diff Recent Labs  
  12/26/19 0355 12/25/19 0425 12/24/19 
0408 WBC 12.1 9.4 10.5 RBC 2.97* 2.79* 3.07* HGB 9.2* 8.6* 9.4* HCT 29.1* 27.1* 29.2*  
 203 199 GRANS  --  91* 93* LYMPH  --  3* 3* EOS  --  1 0 Cardiac Enzymes No results for input(s): CPK, CKND1, CUONG in the last 72 hours. No lab exists for component: Darinellogan Nguyen Coagulation Recent Labs  
  12/26/19 
0355 12/25/19 
0425 PTP 45.8* 37.4* INR 5.0* 3.8*  
   
 Lipid Panel No results found for: CHOL, CHOLPOCT, CHOLX, CHLST, CHOLV, 150587, HDL, HDLP, LDL, LDLC, DLDLP, 770237, VLDLC, VLDL, TGLX, TRIGL, TRIGP, TGLPOCT, CHHD, CHHDX  
BNP No results for input(s): BNPP in the last 72 hours. Liver Enzymes Recent Labs  
  12/26/19 
0355 12/25/19 
0425 TP  --  5.9* ALB 2.6* 2.9* AP  --  262* SGOT  --  27 Thyroid Studies Lab Results Component Value Date/Time TSH 2.53 11/08/2019 01:25 PM  
    
Procedures/imaging: see electronic medical records for all procedures/Xrays and details which were not copied into this note but were reviewed prior to creation of Plan Wanda Heard MD

## 2019-12-26 NOTE — PROGRESS NOTES
Problem: Mobility Impaired (Adult and Pediatric) Goal: *Acute Goals and Plan of Care (Insert Text) Description Physical Therapy Goals Initiated 12/26/2019 and to be accomplished within 3 day(s) 1. Patient will move from supine <> sit with Min A in prep for out of bed activity and change of position. 2.  Patient will perform sit<> stand with Mod A with LRAD in prep for transfers/ambulation. 3.  Patient will transfer from bed <> chair with Max A with LRAD for time up in chair for completion of ADL activity. 4.  Patient will ambulate >5 feet with LRAD/Max A for improved functional mobility/safe discharge. Outcome: Progressing Towards Goal 
 PHYSICAL THERAPY EVALUATION Patient: Refugio Bentley (70 y.o. female) Date: 12/26/2019 Primary Diagnosis: Hypotension [I95.9] Atrial fibrillation with RVR ([address] [I48.91] Precautions:  Fall ASSESSMENT : 
Based on the objective data described below, the patient presents with decrease independence w/ bed mobility, transfers, gait, and step negotiation. Pt seen in supine prior to session. Pt reported no pain at this time. Pt PTA was currently at [address] [place] [address]. Per family, pt has been bed bound mostly at SNF and facility uses a lift to transfer pt from bed<>chair. Rehab has attempted to stand pt but unsuccessful. Pt requires Max-TA for mobility at this time. Attempted to stand x3 w/ RW and B/L Feet blocked for increase stability and support. Pt however demonstrates a posterior COG w/ RW despite VCs for upright static standing but unsuccessful at task. Pt transferred back to supine in bed after session, call bell and tray in reach, nurse notified after session. Will place pt on a 3 day trial at this time. Patient will benefit from skilled intervention to address the above impairments. Patients rehabilitation potential is considered to be Guarded Factors which may influence rehabilitation potential include:  
[]         None noted []         Mental ability/status [x]         Medical condition 
[x]         Home/family situation and support systems 
[x]         Safety awareness 
[]         Pain tolerance/management 
[]         Other: PLAN : 
Recommendations and Planned Interventions: 
[x]           Bed Mobility Training             [x]    Neuromuscular Re-Education 
[x]           Transfer Training                   []    Orthotic/Prosthetic Training 
[x]           Gait Training                          []    Modalities [x]           Therapeutic Exercises          []    Edema Management/Control 
[x]           Therapeutic Activities            [x]    Patient and Family Training/Education 
[]           Other (comment): Frequency/Duration: Patient will be followed by physical therapy 3 day trial to address goals. Discharge Recommendations: Rehab vs LTC Further Equipment Recommendations for Discharge: TBD by next level of care SUBJECTIVE:  
Patient stated I feel okay, my legs aren't bothering me anymore.  OBJECTIVE DATA SUMMARY:  
 
Past Medical History:  
Diagnosis Date A-fib ([facility] Chronic kidney disease Congestive heart failure (CHF) ([facility] COPD (chronic obstructive pulmonary disease) ([facility] Dialysis patient [facility]) Past Surgical History:  
Procedure Laterality Date IR INSERT TUNL CVC W/O PORT OVER 5 YR  11/15/2019 Barriers to Learning/Limitations: yes;  physical 
Compensate with: Verbal Cues and Tactile Cues Prior Level of Function/Home Situation:  
Home Situation Home Environment: Rehabilitation facility Living Alone: No 
Support Systems: Child(woody) Patient Expects to be Discharged to[de-identified] Rehabilitation facility Current DME Used/Available at Home: Walker, rolling Critical Behavior: 
Neurologic State: Alert;Confused Orientation Level: Oriented X4 Cognition: Follows commands Psychosocial 
Patient Behaviors: Calm Purposeful Interaction: Yes 
 Pt Identified Daily Priority: Clinical issues (comment) Kellen Process: Nurture loving kindness; Teaching/learning;Establish trust;Nurture spiritual self; Attend basic human needs;Create healing environment Caring Interventions: Reassure; Therapeutic modalities Reassure: Therapeutic listening; Informing; Acceptance;Caring rounds;Quiet presence Therapeutic Modalities: Intentional therapeutic touch Skin Condition/Temp: Dry;Warm 
Skin Integrity: Wound (add Wound LDA) Skin Integumentary Skin Color: Appropriate for ethnicity Skin Condition/Temp: Dry;Warm 
Skin Integrity: Wound (add Wound LDA) Turgor: Epidermis thin w/ loss of subcut tissue Hair Growth: Present Varicosities: Absent Strength:   
Strength: Generally decreased, functional 
Tone & Sensation:  
Tone: Normal 
Sensation: Intact Range Of Motion: 
AROM: Generally decreased, functional 
Functional Mobility: 
Bed Mobility: 
Rolling: Maximum assistance Supine to Sit: Maximum assistance; Total assistance;Assist x2 Sit to Supine: Maximum assistance; Total assistance;Assist x2 Scooting: Maximum assistance Transfers: 
Sit to Stand: Maximum assistance; Total assistance;Assist x2 Stand to Sit: Maximum assistance; Total assistance;Assist x2 Balance:  
Sitting: Intact Standing: Impaired; With support Standing - Static: Constant support;Poor Standing - Dynamic : Not tested Pain: 
Pain Scale 1: Numeric (0 - 10) Pain Intensity 1: 0 Activity Tolerance:  
Good Please refer to the flowsheet for vital signs taken during this treatment. After treatment:  
[]         Patient left in no apparent distress sitting up in chair 
[x]         Patient left in no apparent distress in bed 
[x]         Call bell left within reach [x]         Nursing notified 
[]         Caregiver present 
[]         Bed alarm activated COMMUNICATION/EDUCATION:  
[x]         Fall prevention education was provided and the patient/caregiver indicated understanding. [x]         Patient/family have participated as able in goal setting and plan of care. [x]         Patient/family agree to work toward stated goals and plan of care. []         Patient understands intent and goals of therapy, but is neutral about his/her participation. []         Patient is unable to participate in goal setting and plan of care. Thank you for this referral. 
Shelly Giang, PT Time Calculation: 19 mins Eval Complexity: History: HIGH Complexity :3+ comorbidities / personal factors will impact the outcome/ POC Exam:LOW Complexity : 1-2 Standardized tests and measures addressing body structure, function, activity limitation and / or participation in recreation  Presentation: LOW Complexity : Stable, uncomplicated  Clinical Decision Making:High Complexity standing only  Overall Complexity:HIGH

## 2019-12-26 NOTE — PROGRESS NOTES
Pharmacy Dosing Services:  Warfarin Consult for Warfarin Dosing by Pharmacy by Dr. Nimisha Price Consult provided for this 80 y.o.  female , for indication of Atrial Fibrillation. Dose to achieve an INR goal of 2-3 Warfarin to be Held Tonight ( INR 5.0 ). Significant drug interactions: Amiodarone LABS   
PT/INR Lab Results Component Value Date/Time INR 5.0 (H) 12/26/2019 03:55 AM  
  
Platelets Lab Results Component Value Date/Time PLATELET 554 28/14/2572 03:55 AM  
  
H/H Lab Results Component Value Date/Time HGB 9.2 (L) 12/26/2019 03:55 AM  
  
 
Warfarin Administrations (last 168 hours) Date/Time Action Medication Dose  
 12/23/19 1712 Given  
 warfarin (COUMADIN) tablet 2 mg 2 mg  
 12/22/19 2046 Given  
 warfarin (COUMADIN) tablet 2 mg 2 mg Pharmacy to follow daily and will provide subsequent Warfarin dosing based on clinical status. Coretta Samson, [facility]     Contact information    [phone]

## 2019-12-26 NOTE — PROGRESS NOTES
Shift uneventful. Pt stable no signs of distress. 3947 Bedside and Verbal shift change report given to Jaymie Polanco RN (oncoming nurse) by Mary Mckeon RN 
 (offgoing nurse). Report included the following information SBAR, Kardex, ED Summary, Intake/Output, MAR, Recent Results and Med Rec Status.

## 2019-12-26 NOTE — PROGRESS NOTES
Transition of care: anticipate rehab when medically cleared Met with patient and sister at bedside. Patient was at [facility] PTA. She would like to return there when she is d/c from hospital 
Patient goes to F to HD to Sheila Blas at [facility] at 10am and medicare pays for trasnportation to and from HD. Patient lives with her son however her goal is to return home. PT and OT ordered for evaluation. Patient has wound care to  Eval.  
Patient next HD is on Friday. Referral has been sent to [facility] for rehab they have not accepted at this time. CM has asked Med assist with medicaid she ha applied in October. CM has spoken with Julieta Khan, ian at [facility] she informed cm that patient had transitioned to LTC pending medicaid. Cm will continue to follow. Care Management Interventions PCP Verified by CM: Yes 
Palliative Care Criteria Met (RRAT>21 & CHF Dx)?: Yes 
Palliative Consult Recommended?: Yes Transition of Care Consult (CM Consult): [facility] Current Support Network: [address] [place] [address] Av

## 2019-12-26 NOTE — ROUTINE PROCESS
0700: Assumed care of pt from [facility]. 
1795: pt resting no sign of distress.

## 2019-12-26 NOTE — PROGRESS NOTES
Nephrology Progress note Subjective:  
 
 
Jose Bhatti is a 80 y.o. female with a past medical history significant for CHF, COPD, A. fib, ESRD on HD MWF noticed to have more palpitation in nursing home, with worsening weakness. Found to have low BP in ED with tachycardia. IV cardizem given, along with bolus iv fluid. Cardiology consulted, placed pt on iv amiodarone. Pt then admitted to ICU. Nephrology was consulted for further evaluation and management of her ERSD. Was een in ICU, feeling weak and BP borderline low. Resting comfortably, no new complaints. Last HD 12/24. HD schedule adjusted to free up candelaria day. No complaints today- eating breakfast 
 
Admit Date: 12/22/2019 Principal Problem: Hypotension (12/22/2019) Active Problems: Hypertensive heart disease (10/5/2011) Hypothyroidism (10/25/2012) Overview: Last Assessment & Plan: Most recent thyroid function studies done on 04/15/2019 show TSH and free T4 of 3.52 and 0.93 respectively. Notably she is not on thyroid  
    supplement so there is no current evidence that she is truly hypothyroid. Will continue to follow this periodically. A-fib ([facility] (11/8/2019) UTI (urinary tract infection) (11/10/2019) Weakness generalized (11/19/2019) ESRD needing dialysis ([facility] (11/19/2019) Debility (11/28/2019) Atrial fibrillation with RVR ([facility] (12/22/2019) Current Facility-Administered Medications Medication Dose Route Frequency  ipratropium (ATROVENT) 0.02 % nebulizer solution 0.5 mg  0.5 mg Nebulization Q4H PRN  pantoprazole (PROTONIX) tablet 40 mg  40 mg Oral QPM  
 0.9% sodium chloride infusion  100 mL/hr IntraVENous DIALYSIS PRN  
 albumin human 25% (BUMINATE) solution 25 g  25 g IntraVENous Q1H PRN  
 epoetin jonathan-epbx (RETACRIT) injection 6,000 Units  6,000 Units SubCUTAneous Q TUE, THU & SAT  amiodarone (CORDARONE) tablet 400 mg  400 mg Oral Q8H  
  midodrine (PROAMITINE) tablet 2.5 mg  2.5 mg Oral TID WITH MEALS  
 heparin (porcine) 1,000 unit/mL injection 4,300 Units  4,300 Units Hemodialysis DIALYSIS PRN  
 dilTIAZem (CARDIZEM) IR tablet 30 mg  30 mg Oral TIDAC  nystatin (MYCOSTATIN) 100,000 unit/gram powder   Topical TID  acetaminophen (TYLENOL) tablet 650 mg  650 mg Oral Q6H PRN  
 cefTRIAXone (ROCEPHIN) 1 g in sterile water (preservative free) 10 mL IV syringe  1 g IntraVENous Q24H  
 aspirin delayed-release tablet 81 mg  81 mg Oral DAILY  atorvastatin (LIPITOR) tablet 40 mg  40 mg Oral DAILY  PARoxetine (PAXIL) tablet 20 mg  20 mg Oral DAILY  ergocalciferol capsule 50,000 Units  50,000 Units Oral Q7D  Warfarin - Pharmacy to Dose   Other Rx Dosing/Monitoring Allergy: Allergies Allergen Reactions  Penicillins Hives  Valium [Diazepam] Other (comments) It made me cry more Objective:  
 
Visit Vitals /63 Pulse (!) 102 Temp 97.4 °F (36.3 °C) Resp 16 Ht 4' 9\" (1.448 m) Wt 61.4 kg (135 lb 4.8 oz) SpO2 100% Breastfeeding No  
BMI 29.28 kg/m² No intake or output data in the 24 hours ending 12/26/19 0835 Physical Exam:  
 
 
General: No acute distress HENT: Atraumatic and normocephalic Eyes: Normal conjunctiva Neck: Supple. No JVD Cardiovascular: Irreg. Pulmonary/Chest Wall: Clear to auscultation bilaterally Abdominal: Soft and non-tender Musculoskeletal: No edema Neurological: No focal deficits [facility] TDC in place. Data Review: 
Lab Results Component Value Date/Time  Sodium 136 12/26/2019 03:55 AM  
 Potassium 3.9 12/26/2019 03:55 AM  
 Chloride 98 (L) 12/26/2019 03:55 AM  
 CO2 28 12/26/2019 03:55 AM  
 Anion gap 10 12/26/2019 03:55 AM  
 Glucose 64 (L) 12/26/2019 03:55 AM  
 BUN 31 (H) 12/26/2019 03:55 AM  
 Creatinine 2.22 (H) 12/26/2019 03:55 AM  
 BUN/Creatinine ratio 14 12/26/2019 03:55 AM  
 GFR est AA 26 (L) 12/26/2019 03:55 AM  
 GFR est non-AA 21 (L) 12/26/2019 03:55 AM  
 Calcium 8.5 12/26/2019 03:55 AM  
 
Lab Results Component Value Date/Time WBC 12.1 12/26/2019 03:55 AM  
 HGB 9.2 (L) 12/26/2019 03:55 AM  
 HCT 29.1 (L) 12/26/2019 03:55 AM  
 PLATELET 493 96/38/0666 03:55 AM  
 MCV 98.0 (H) 12/26/2019 03:55 AM  
 
Lab Results Component Value Date/Time Calcium 8.5 12/26/2019 03:55 AM  
 Phosphorus 3.2 12/26/2019 03:55 AM  
 
Lab Results Component Value Date/Time Iron 11 (L) 11/16/2019 09:10 AM  
 TIBC 271 11/16/2019 09:10 AM  
 Iron % saturation 4 11/16/2019 09:10 AM  
 Ferritin 395 (H) 11/16/2019 09:10 AM  
 
Lab Results Component Value Date/Time Ferritin 395 (H) 11/16/2019 09:10 AM  
 
 
 
Impression: 1. ESRD on HD MWF 2. A fib with RVR, seen by Cardiology, on IV Amiodarone 3. Weakness 4. Hypotension 5. Anemia in CKD 6. Hyponatremia 7. Hypokalemia 8. Hypoalbuminemia 
  
Plan: 1. HD tomorrow 12/27 per usual schedule 2. Albumin prn on HD to Keep SBP > 90 
3. Epo for Anemia 4. Nutritional supplement Colin Medrano MD 
VIA [facility] [phone]

## 2019-12-26 NOTE — ROUTINE PROCESS
Bedside shift change report given to Lidia Prasad RN (oncoming nurse) by Alma Pride RN (offgoing nurse). Report included the following information SBAR, Kardex, ED Summary, Intake/Output and MAR.

## 2019-12-26 NOTE — CDMP QUERY
Pt admitted with A-Fib noted to have noted to have hypotension with elevated WBC, tachycardia, and UTI. If possible, please document in the progress notes and discharge summary if you are evaluating and /or treating any of the following: 
 
? Sepsis, present on admission, with associated organ dysfunction of hypotension ? Sepsis, now resolved,  
? Sepsis, not present on admission, causative organism __________ (please specify) ? No Sepsis, localized infection only _________ (please specify) ? Sepsis was ruled out (include corresponding diagnosis for patients clinical picture and treatment) ? Other, please specify ? Clinically unable to determine The medical record reflects the following: 
   Risk Factors: UTI, Hypotension Clinical Indicators: WBC 12/23 - 17.0; HR 12/23 - 130, 12/25 - 112; U/A 12/22 (+) Treatment: Vancomycin, Rocephin, Midodrine, Lopressor Thank you, Lore Romy, [address] [place] [address], [address] [place] [address] 1. At least 2 SIRS Criteria (Systemic Inflammatory Response Syndrome) (CMS) 
-Temp > 100.9 or < 96.8 
-HR > 90 
-RR > 20  
-WBC > 12,000 or < 4,000 or > 10% bands 2. Documented suspected or confirmed source of infection. (CMS) 3. Documented Organ Dysfunction by any ONE of the following. (CMS) 
     
the CMS guidelines 
-AMS (from baseline if known) -SBP<90 or MAP<65 
-Documentation of respiratory failure and use of either invasive mechanical ventilation (intubation) or non-invasive mechanical ventilation (CPAP/BIPAP) -Creat. > 2.0 (with no history of Chronic Kidney Disease) -Bilirubin > 2 mg / dl (with no history of liver disease) -PLT < 100,000 (with no history of thrombocytopenia) -INR > 1.5 or aPTT > 60 sec (for patients not on Warfarin therapy) -Lactic Acid > 2 mmol/ L

## 2019-12-26 NOTE — PROGRESS NOTES
Problem: Pressure Injury - Risk of 
Goal: *Prevention of pressure injury Description Document Joe Scale and appropriate interventions in the flowsheet. Outcome: Progressing Towards Goal 
Note: Pressure Injury Interventions: 
Sensory Interventions: Assess changes in LOC, Check visual cues for pain, Float heels, Keep linens dry and wrinkle-free, Maintain/enhance activity level, Minimize linen layers, Monitor skin under medical devices, Pad between skin to skin, Pressure redistribution bed/mattress (bed type) Moisture Interventions: Absorbent underpads, Apply protective barrier, creams and emollients, Internal/External urinary devices, Maintain skin hydration (lotion/cream), Minimize layers, Moisture barrier Activity Interventions: Pressure redistribution bed/mattress(bed type) Mobility Interventions: HOB 30 degrees or less, Float heels, Pressure redistribution bed/mattress (bed type) Nutrition Interventions: Document food/fluid/supplement intake, Offer support with meals,snacks and hydration Friction and Shear Interventions: Apply protective barrier, creams and emollients, Foam dressings/transparent film/skin sealants, HOB 30 degrees or less, Lift sheet, Minimize layers Problem: Falls - Risk of 
Goal: *Absence of Falls Description Document Aishwarya Pierre Fall Risk and appropriate interventions in the flowsheet. Outcome: Progressing Towards Goal 
Note: Fall Risk Interventions: 
  
 
  
 
Medication Interventions: Evaluate medications/consider consulting pharmacy, Patient to call before getting OOB, Teach patient to arise slowly Elimination Interventions: Call light in reach, Patient to call for help with toileting needs, Stay With Me (per policy), Toilet paper/wipes in reach, Toileting schedule/hourly rounds

## 2019-12-26 NOTE — CDMP QUERY
Pt admitted with A-fib. Pt noted to have hypotension documented on H&P. If possible, please document in the progress notes and d/c summary if you are evaluating and / or treating any of the following: ? Drug Induced Hypotension ? Hypotension due to Sepsis ? Chronic Hypotension ? Cardiovascular Collapse 
? Other, please specify ? Clinically unable to determine The medical record reflects the following: 
   Risk Factors: ESRD, AFIB, CHF Clinical Indicators: BP: 12/22-97/49, 85/56; 12/23-85/52, 87/53; 12/24-81/50 Treatment: IV fluid bolus, Midodrine Thank you, Julio Díaz, [address] [place] [address], [address] [place] [address] [address]

## 2019-12-26 NOTE — PROGRESS NOTES
Problem: Self Care Deficits Care Plan (Adult) Goal: *Acute Goals and Plan of Care (Insert Text) Description Initial Occupational Therapy Goals (12/26/2019) Within 7 day(s): 1. Patient will perform grooming seated EOB with setup x 10 minutes for increased independence with ADLs. 2. Patient will perform UB dressing with setup for increased independence with ADLs. 3. Patient will perform LB dressing with moderate assist  & A/E PRN for increased independence with ADLs. 4. Patient will perform all aspects of toileting with moderate assist for increased independence in ADLs 5. Patient will independently apply energy conservation techniques with 1 verbal cue(s) for increased independence with ADLs. 6. Patient will perform functional transfer to [facility] with moderate assist.   
Outcome: Progressing Towards Goal 
  
OCCUPATIONAL THERAPY EVALUATION Patient: Kaela Henry (64 y.o. female) Date: 12/26/2019 Primary Diagnosis: Hypotension [I95.9] Atrial fibrillation with RVR ([address] [I48.91] Precautions: DNR, Skin,  Fall PLOF: Patient was grossly mod I, using A/E for LE ADLs in October, but has had multiple hospitalizations and readmission from [facility] 
 
ASSESSMENT : 
Based on the objective data described below, the patient presents with functional declined d/t respiratory issues, ROM/crepitus, strength, habitus. Pt pleasant and motivated, but greatly limited by comorbidities. Pt able to sit EOB x 5-6 minutes, but leans back d/t impaired perception of center of gravity. Improved to pt able to sit at neutral and willing to attempt reaching forward, but felt as if falling forward when in neutral. Pt w/ near adequate ROM to assist in donning underwear. Pt reports using sock aide at baseline for socks and slip-on shoes.  
 
Education: Patient instructed on home safety, body mechanics for optimal respiratory effort, Energy Conservation/Work Simplification Techniques, adaptive strategies and adaptive dressing techniques including clothing modifications with patient  verbalizing understanding at this time. Patient will benefit from skilled intervention to address the above impairments. Patient's rehabilitation potential is considered to be Good Factors which may influence rehabilitation potential include:  
[]             None noted [x]             Mental ability/status [x]             Medical condition []             Home/family situation and support systems []             Safety awareness [x]             Pain tolerance/management 
[]             Other: PLAN : 
Recommendations and Planned Interventions:  
[x]               Self Care Training                  [x]      Therapeutic Activities [x]               Functional Mobility Training   [x]      Cognitive Retraining 
[x]               Therapeutic Exercises           [x]      Endurance Activities [x]               Balance Training                    [x]      Neuromuscular Re-Education []               Visual/Perceptual Training     [x]      Home Safety Training 
[x]               Patient Education                   [x]      Family Training/Education []               Other (comment): Frequency/Duration: Patient will be followed by occupational therapy 1-2 times per day/1-3 days per week to address goals. Discharge Recommendations: Rehab Further Equipment Recommendations for Discharge: To Be Determined (TBD) at next level of care SUBJECTIVE:  
Patient stated It's scary when you can't breathe.  OBJECTIVE DATA SUMMARY:  
 
Past Medical History:  
Diagnosis Date A-fib ([facility] Chronic kidney disease Congestive heart failure (CHF) ([facility] COPD (chronic obstructive pulmonary disease) ([facility] Dialysis patient [facility]) Past Surgical History:  
Procedure Laterality Date IR INSERT TUNL CVC W/O PORT OVER 5 YR  11/15/2019 Barriers to Learning/Limitations: yes;  sensory deficits-vision/hearing/speech and physical 
Compensate with: visual, verbal, tactile, kinesthetic cues/model Home Situation: Prior to Rehab, pt was living w/ family and performing ADLs w/ A/E and AD Home Situation Home Environment: Private Residence # Steps to Enter: 4 Rails to Enter: Yes Hand Rails : Bilateral 
Wheelchair Ramp: No 
One/Two Story Residence: One story Living Alone: No 
Support Systems: Child(woody) Patient Expects to be Discharged to[de-identified] Rehabilitation facility [facility] [[facility]  Right hand dominant   []  Left hand dominant Cognitive/Behavioral Status: 
Neurologic State: Alert;Confused Orientation Level: Oriented X4 Cognition: Follows commands;Memory loss Safety/Judgement: Awareness of environment Skin: at risk, thin Edema: BLE edema Vision/Perceptual:   
  Appears grossly [facility] Coordination: BUE Coordination: Generally decreased, functional 
Fine Motor Skills-Upper: Left Intact; Right Intact Gross Motor Skills-Upper: Left Intact; Right Intact Balance: 
Sitting: Intact Standing: Impaired; With support Standing - Static: Constant support;Poor Standing - Dynamic : Not tested Strength: BUE Strength: Generally decreased, functional 
 
Tone & Sensation: BUE Tone: Normal 
Sensation: Intact Range of Motion: BUE 
AROM: Generally decreased, functional 
 
Functional Mobility and Transfers for ADLs: 
Bed Mobility: 
Rolling: Maximum assistance Supine to Sit: Maximum assistance; Total assistance;Assist x2 Sit to Supine: Maximum assistance; Total assistance;Assist x2 Scooting: Maximum assistance Transfers: 
Sit to Stand: Maximum assistance; Total assistance;Assist x2 ADL Assessment:  
Feeding: Contact guard assistance;Minimum assistance; Additional time Oral Facial Hygiene/Grooming: Minimum assistance; Moderate assistance; Additional time Bathing: Maximum assistance Upper Body Dressing: Moderate assistance;Maximum assistance Lower Body Dressing: Total assistance Toileting: Total assistance ADL Intervention: 
Feeding Drink to Mouth: Contact guard assistance Grooming Washing Hands: Set-up(wipes) Brushing/Combing Hair: Moderate assistance;Maximum assistance Lower Body Dressing Assistance Socks: Total assistance (dependent)(uses sock aide at baseline) Toileting Bladder Hygiene: Total assistance (dependent)([place]) Cognitive Retraining Problem Solving: Inductive reason; Identifying the task; Identifying the problem;General alternative solution;Deductive reason; Awareness of environment Safety/Judgement: Awareness of environment Pain: 
Pain level pre-treatment: 5/10 Pain level post-treatment: 5/10 Pain Intervention(s): Medication provided by Nursing (see MAR); Rest, Ice, Repositioning Response to intervention: Nurse notified, See doc flow sheet Activity Tolerance:  
Fair-. Patient able to sit ~5 minute(s). Patient able to complete ADLs with frequent rest breaks. Patient limited by strength, ROM, balance. Patient unsteady. Please refer to the flowsheet for vital signs taken during this treatment. After treatment:  
[] Patient left in no apparent distress sitting up in chair 
[x] Patient left in no apparent distress in bed 
[x] Call bell left within reach [x] Nursing notified 
[x] Caregiver present/family [] Bed alarm activated COMMUNICATION/EDUCATION:  
[x] Role of Occupational Therapy in the acute care setting 
[x] Home safety education was provided and the patient/caregiver indicated understanding. [x] Patient/family have participated as able in goal setting and plan of care. [x] Patient/family agree to work toward stated goals and plan of care. [] Patient understands intent and goals of therapy, but is neutral about his/her participation. [] Patient is unable to participate in goal setting and plan of care. Thank you for this referral. 
Bre Carpenter Time Calculation: 23 mins Eval Complexity: History: HIGH Complexity : Extensive review of history including physical, cognitive and psychosocial history ; Examination: HIGH Complexity : 5 or more performance deficits relating to physical, cognitive , or psychosocial skils that result in activity limitations and / or participation restrictions; Decision Making:HIGH Complexity : Patient presents with comorbidities that affect occupational performance. Signifigant modification of tasks or assistance (eg, physical or verbal) with assessment (s) is necessary to enable patient to complete evaluation

## 2019-12-27 NOTE — PROGRESS NOTES
Shift uneventful.  
 
 
 
0600 Critical lab value documented SEE FLOWSHEETS. Dr. Shoshana Toledo aware and orders were placed. 0740 Bedside and Verbal shift change report given to Ambreen Bocanegra RN (oncoming nurse) by Yi Damian RN 
 (offgoing nurse). Report included the following information SBAR, Kardex, ED Summary, Procedure Summary, Intake/Output, MAR, Recent Results and Med Rec Status.

## 2019-12-27 NOTE — ROUTINE PROCESS
Bedside shift change report given to Matteo Rodríguez RN (oncoming nurse) by Nidia Koyanagi RN (offgoing nurse). Report included the following information SBAR, Kardex, ED Summary, Intake/Output and MAR.

## 2019-12-27 NOTE — PROGRESS NOTES
[facility]: [facility]                                                DR. Mehdi Falk    []1st Time Acute  []Stat[x] Routine []Urgent []Chronic Unit   []Acute Room []Bedside  []ICU/CCU []ER   Isolation Precautions: [x]Dialysis[] Airborne []Contact []Droplet []Reverse    Special Considerations:_______  [] Blood Consent Verified  [x]N/A   Allergies:[] NKA                 [x] Penicillins, Valium_____________ Code Status []Full Code [x] DNR  [] Other_____   Diet: [x] Renal [] NPO [] Diabetic   [] Enteral Feeding Diabetic: [x]Yes []No     [x]Signed Treatment Consent Verified   [x] Time Out/ Safety Check   PRIMARY NURSE REPORT: FIRST INITIAL/ LAST NAME/TITLE  PRE DIALYSIS:  Oral Oliva RN                                         TIME: 0930   ACCESS   CATHETER ACCESS: [] N/A  [x] RIGHT  [] LEFT  [x] IJ  [] SUBCL [] FEM                    [] First use X-ray  [x] Tunnel     [] Non-Tunneled      [x] No S/S infection  [] Redness [] Drainage  [] Cultured [] Swelling [] Pain                    [x] Medical Aseptic [] Prep Dressing Changed                  [] Clotted [x] Patent []      Flows: [x] Good [] Poor [] Reversed                 If Access Problem Dr. Kristina Marks: [] Yes [] No    Date:_____  [x] N/A[]   GRAFT/FISTULA ACCESS:  [x] N/A  [] RIGHT  [] LEFT  [] UE   [] LE       [] AVG  [] AVF [] BUTTONHOLE    [] +BRUIT/THRILL [] MEDICAL ASEPTIC PREP     [] No S/S infection  [] Redness [] Drainage  [] Cultured [] Swelling [] Pain              If Access Problem Dr. Kristina Marks: [] Yes [] No    Date:______ [x] N/A   GENERAL ASSESSMENT   LUNGS:  SaO2% ____ [] Clear [] Coarse [] Crackles [] Wheezing                                         [x] Diminished   Location: [x] RLL [x] LLL [x] RUL [x] RML [x] NITA    COUGH:  [] Productive [] Dry [x] N/A  RESPIRATIONS: [x] Easy [] Labored    THERAPY: [] RA   [x] NC _4____.  L/min    Mask: [] NRB [] Venti  _____O2% [] Ventilator [] Intubated [] Trach [] BiPap [] CPap [] HI Flow   CARDIAC: [] Regular [x] Irregular [] Pericardial Rub [] JVD               Monitored Rhythm:__afib____ [] N/A   EDEMA: [x] None [] Generalized [] Facial [] Pedal [] UE [] LE             [] Pitting [] 1 [] 2 [] 3 [] 4    [] Right [] Left [] Bilateral   SKIN:    [x] Warm [] Hot [] Cold  [x] Dry [] Pale [] Diaphoretic              [] Flushed [] Jaundiced [] Cyanotic [] Rash [] Weeping     LOC:    [x] Alert  Oriented to: [x] Person [x] Place [x] Time             [] Confused [] Lethargic [] Medicated [] Non-responsive    GI/ABDOMEN: [] Flat [] Distended [x] Soft [] Firm [] Diarrhea [x] Bowel Sounds Present [] Nausea [] Vomiting    PAIN: [x] 0 [] 1 [] 2 [] 3 [] 4 [] 5 [] 6 [] 7 [] 8 [] 9 [] 10          Scale 1-10 Action/Follow Up_____   MOBILITY: [] Amb [] Amb/Assist [x] Bed  [] Wheelchair    CURRENT LABS   HBsAg ONLY: Date Drawn: 12/23/2019            [x] Negative [] Positive [] Unknown.      HBsAb: Date Drawn: 12/23/2019             [x] Susceptible <10 [] Immune ?10 [] Unknown   Date of Current Labs:  12/24/2019     EDUCATION   Person Educated: [x] Patient [] Other_________   Knowledge base: [] None [x] Minimal [] Substantial    Barriers to learning  [x] None _______________   Preferred method of learning: [] Written [x] Oral [] Visual [] Hands on    Topic: [x] Access Care [] S&S of infection [] Fluid Management   [] K+  [x] Procedural  [] Albumin [] Medications [] Tx Options   [] Transplant [] Diet [] Other    Teaching Tools: [x] Explain [] Demonstration [] Handout_____ [] Video______   RO/HEMODIAYLSIS MACHINE SAFETY CHECKS- BEFORE EACH TREATMENT          [x] [employer] [facility]: Machine Serial #1:  [id number]   RO Serial #[id number]                       [] [email or web address] [place]: Machine Serial #2:  [id number]   RO Serial #[id number]    Alarm Test: [x] Pass  Time__0934______  [x] RO/Machine Log Complete    [x] Extracorporeal circuit Tested for integrity Dialyzer_[id number]_________   Tubing__19H02-9__________    Dialysate: pH _7.4____  Temp. _37_____ Conductivity: Meter _14.2____ HD Machine__14.2___   CHLORINE TESTING- BEFORE EACH TREATMENT AND EVERY 4 HOURS   Total Chlorine: [x] Less than 0.1 ppm Time:_1000____2nd Check Time:______  (If greater than 0.1 ppm from Primary then every 30 minutes from Secondary)   TREATMENT INIATION-WITH DIALYSIS PRECAUTIONS   [x] All Connections Secured   [x] Saline Line Double Clamped    [x] Venous Parameters Set [x] Arterial Parameters Set    [x] Prime Given 250 ml     [x] Air Foam Detector Engaged   PRE-TREATMENT   UF Calculations: Wt to lose:_1000___ml(+) Oral:_0_ml(+)IV Meds/Fluids/Blood prods____ml(+) Prime/Rinse_500___ml(=)Total UF Goal_1500___mL   Scale Type:[x] Bed scale [] Sling Scale [] Wheel Chair Scale []  Not Ordered [] [] Unable to obtain pt on stretcher/ bed scale malfunctioning  _____[] kg [] lbs   Tx Initiation Note: Plan to remove 1 liter for 3 hours while monitoring patient's well-being and vital signs.        [x] Time Out/Safety Check  Time:_0945____   INTRADIALYTIC MONITORING  (SEE ATTACHED FLOWSHEET)     POST TREATMENT    Time Medication Dose Volume Route    Initials                                           [employer] Signatures Title Initials Time   Reji Partida RN RW 1100               Dialyzer cleared: [x] Good [] Fair [] Poor     Blood Volume Processed _59.1___L   Net UF Removed _1000___mL  Post Tx Access:                  AVF/AVG: Bleeding Stop Time      Art.___min Adolfo.____min []+bruit/thrill                  Catheter: Locking Solution  [x] Heparin 1 ml/1000 units                                                    [] Normal Saline                                                                        [] New caps applied                                               Art._2.2____ ml Adolfo._2.1____ml  Post Assessment:              Skin: [x]Warm []Dry []Diaphoretic []Flushed [] Pale []Cyanotic Lungs: [x]Clear []Coarse []Crackles []Wheezing             Cardiac: []Regular [x]Irregular  [x]Monitored rhythm_afib___ []N/A            Edema: [x]None []General []Facial []Pedal  []UE []LE []RIGHT []LEFT            Pain: [x]0 []1 []2 []3 []4 []5 []6 []7 []8 []9 []10   POST Tx Note: Patient in room 345 dialyzed for 3 hours. Tolerated tx well with without complaint or complications. TDC functioning without complication accessing or BFR      1500 ml UF removed with a net UF removal of 1000 ml. Report given to IRIS Olivera RN with all questions answered. Primary Nurse Report: First initial/Last name/Title    Post Dialysis:_IIRS Olivera RN_________________________         Time:_1300_______________    Abbreviations: AVG-arterial venous graft, AVF-arterial venous fistula, IJ-Internal Jugular,  Subcl-Subclavian, Fem-Femoral, Tx-treatment, AP/HR-apical heart rate, DFR-dialysate flow rate, BFR-blood flow rate, AP-arterial pressure, -venous pressure, UF-ultrafiltrate, TMP-transmembrane pressure, Adolfo-Venous, Art-Arterial, RO-Reverse Osmosis

## 2019-12-27 NOTE — PROGRESS NOTES
Problem: Chronic Renal Failure  Goal: *Fluid and electrolytes stabilized  Outcome: Progressing Towards Goal     Problem: Patient Education: Go to Patient Education Activity  Goal: Patient/Family Education  Outcome: Progressing Towards Goal    Intervention: Monitor/Manage Fluid Electrolyte/Acid Base Balance    PROBLEM: HEMODIALYSIS ADULT    INTERVENTION: Hemodynamic stabilization  Maintain BP WNL for this patient while on hemodialysis    INTERVENTION: Fluid Management  Will attempt 1000 ml total fluid removal as tolerated    INTERVENTION: Metabolic / Electrolyte Imbalance Management  K+ 4 potassium bath today with dialysis    GOAL: Signs and symptoms of listed potential problems will be absent or manageable  (reference Hemodialysis ADULT CPG)    OUTCOME: Progressing  HD planned for 3 hours today

## 2019-12-27 NOTE — PROGRESS NOTES
Cardiology Progress Note Patient: Wes Marquis        Sex: female          DOA: 12/22/2019 YOB: 1935      Age:  80 y.o.        LOS:  LOS: 4 days Patient seen and examined, chart reviewed. Assessment/Plan Patient Active Problem List  
Diagnosis Code  Heart failure ([facility]) I50.9  Benign essential hypertension I10  Chronic diastolic congestive heart failure ([facility]) I50.32  
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) I48.91  
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  
 ESRD needing dialysis ([facility]) N18.6, Z99.2  Debility R53.81  
 Hypotension I95.9  Atrial fibrillation with RVR ([facility]) I48.91 Persistent atrial fibrillation - Heart rate is not under good control. Plan: 
 
Change amiodarone to 400 mg once a day Continue Midodrine 2.5 mg three times a day Increase Cardizem IR 60 mg three times per day and titrate as HR/BP response Titrate midodrine as per BP response Continue coumadin as per PT/INR Continue dialysis as per nephrologist 
Continue management as per hospital medicine Subjective:  
 cc: 
Denies any chest pain or shortness of breath REVIEW OF SYSTEMS:  
 
General: No fevers or chills. Cardiovascular: No chest pain,No palpitations, No orthopnea, No PND, No leg swelling, No claudication Pulmonary: No dyspnea. Gastrointestinal: No nausea, vomiting, bleeding Neurology: No Dizziness Objective:  
  
Visit Vitals /50 (BP 1 Location: Right arm, BP Patient Position: At rest) Pulse 98 Temp 97.4 °F (36.3 °C) Resp 16 Ht 4' 9\" (1.448 m) Wt 61.4 kg (135 lb 4.8 oz) SpO2 100% Breastfeeding No  
BMI 29.28 kg/m² Body mass index is 29.28 kg/m². Physical Exam: General Appearance: Comfortable, not using accessory muscles of respiration. HEENT: XANDER. HEAD: Atraumatic NECK: No JVD, no thyroidomeglay. CAROTIDS: No bruit LUNGS: Clear bilaterally. HEART: S1+S2 audible, irregularly irregular, no murmur, no pericardial rub. ABD: Non-tender, BS Audible NEUROLOGICAL: alert, follows verbal commands. Medication: 
Current Facility-Administered Medications Medication Dose Route Frequency  Warfarin - Hold Tonight ( INR 5.0 )  1 Each Other ONCE  
 [START ON 12/27/2019] amiodarone (CORDARONE) tablet 400 mg  400 mg Oral DAILY  dilTIAZem (CARDIZEM) IR tablet 60 mg  60 mg Oral TIDAC  magic mouthwash (FIRST-MOUTHWASH BLM) oral suspension 5 mL  5 mL Oral Q4H PRN  
 fluconazole (DIFLUCAN) tablet 50 mg  50 mg Oral DAILY  ipratropium (ATROVENT) 0.02 % nebulizer solution 0.5 mg  0.5 mg Nebulization Q4H PRN  pantoprazole (PROTONIX) tablet 40 mg  40 mg Oral QPM  
 0.9% sodium chloride infusion  100 mL/hr IntraVENous DIALYSIS PRN  
 albumin human 25% (BUMINATE) solution 25 g  25 g IntraVENous Q1H PRN  
 epoetin jonathan-epbx (RETACRIT) injection 6,000 Units  6,000 Units SubCUTAneous Q TUE, THU & SAT  midodrine (PROAMITINE) tablet 2.5 mg  2.5 mg Oral TID WITH MEALS  
 heparin (porcine) 1,000 unit/mL injection 4,300 Units  4,300 Units Hemodialysis DIALYSIS PRN  
 nystatin (MYCOSTATIN) 100,000 unit/gram powder   Topical TID  acetaminophen (TYLENOL) tablet 650 mg  650 mg Oral Q6H PRN  
 aspirin delayed-release tablet 81 mg  81 mg Oral DAILY  atorvastatin (LIPITOR) tablet 40 mg  40 mg Oral DAILY  PARoxetine (PAXIL) tablet 20 mg  20 mg Oral DAILY  ergocalciferol capsule 50,000 Units  50,000 Units Oral Q7D  Warfarin - Pharmacy to Dose   Other Rx Dosing/Monitoring Lab/Data Reviewed: 
 
  
Recent Labs  
  12/26/19 
0355 12/25/19 
0425 12/24/19 
0408 WBC 12.1 9.4 10.5 HGB 9.2* 8.6* 9.4* HCT 29.1* 27.1* 29.2*  
 203 199 Recent Labs  
  12/26/19 
0355 12/25/19 
0425 12/24/19 
0408  135* 129*  
K 3.9 3.5 3.1*  
CL 98* 99* 92* CO2 28 29 27 GLU 64* 80 89 BUN 31* 22* 43* CREA 2.22* 1.75* 2.56* CA 8.5 8.6 7.7* Signed By: Leland Davison MD   
 December 26, 2019

## 2019-12-27 NOTE — PROGRESS NOTES
0700 Assumed patient care from off-going nurse Nellie Henderson RN. Whiteboard updated, bed wheels locked, bed in lowest position, and call bell within reach. 0800 Assessment complete. Patient resting comfortably in bed with visitor present. No complaint of pain or SOB at this time. SpO2- 100% on 4L/min via NC. Call bell within reach. 1000 Patient receiving Dialysis. 1200 Reassessment complete. Patient resting comfortably in bed. No complaint of pain or SOB at this time. Call bell within reach. 1500 Discharged to [address] [place] [address]. Patient awaiting transportation.  Patient armband removed and shredded

## 2019-12-27 NOTE — DISCHARGE SUMMARY
[address]    Name:  Charlene Ontiveros  MR#:   [id number]  :  1935  ACCOUNT #:  [de-identified]  ADMIT DATE:  2019  DISCHARGE DATE:  2019    DISCHARGE DIAGNOSES  1. Atrial fibrillation with rapid ventricular response. 2.  Hypotension. 3.  Yeast urinary tract infection. 4.  End-stage renal disease on dialysis. 5.  Anemia of chronic kidney disease. HOSPITAL CONSULTANTS:  Yoseph Tovar MD, nephrology  2. Gita Payne MD, cardiology    HOSPITAL SUMMARY:  The patient is an 60-year-old. She is well known to the medical service. She is an elderly debilitated white female currently at skilled nursing facility on dialysis Monday, Wednesday, and Friday with chronic AFib, anticoagulated also with Coumadin. She was brought to the emergency room because she was hypotensive. She was feeling fatigued with minimal dyspnea at the nursing facility. She also had AFib with RVR. She is a do not resuscitate code status. She was admitted to the ICU and also noted to have a UTI. The patient had medication adjustments including an amiodarone drip temporarily then transitioned to oral amiodarone for her AFib rate control. For her hypotension, she has been started on midodrine and she is otherwise on Cardizem for better rate control of her AFib as well. She has been anticoagulated with Coumadin, but her INR has come up to 6.4 this morning. She received a dose of vitamin K down to 5.9. There are no signs of bleeding. Her H and H are stable at 9.2 and 29.1 from yesterday. Her Coumadin needs to be held until that level comes down between 2 and 3. I suspect the addition of an antifungal medicine has made the Coumadin level higher. She is on Diflucan for Candida glabrata in her urine. Her blood culture from admission is no growth. PHYSICAL EXAMINATION:  GENERAL:  Today, the patient is at her baseline. She is awake, alert, in good spirits, no acute distress.   She denies abdominal pain, nausea, vomiting, chest pain, or shortness of breath. She is currently dialyzing. VITAL SIGNS:  Blood pressure is 106/79, pulse 98, temperature 97.8, respiratory rate 16, SaO2 100% on room air. CARDIAC:  Irregular rhythm, regular rate. ABDOMEN:  Soft, nontender. LUNGS:  Clear bilaterally. EXTREMITIES:  Lower extremities trace ankle edema. PLAN:  Plan is to discharge back to the facility now that she is on all oral medications and her heart rate and blood pressure more stable. She will continue nystatin 5 mL four times daily for seven days for oral thrush, Lipitor 40 mg at night, vitamin D 50,000 units weekly, Paxil 20 mg daily, aspirin 81 mg daily, amiodarone 400 mg daily, Cardizem 60 mg three times daily, Diflucan 50 mg daily for seven days, midodrine 2.5 mg three times daily, nystatin powder to the groin area three times daily for seven days and Protonix 40 mg daily. She is a do not resuscitate code status. She is currently on a renal mechanical soft diet with Nepro at lunch and dinner. She needs an INR daily until it comes down between 2 and 3 and then start a very low dose of Coumadin with close monitoring of the INR thereafter. Her goal INR is 2 to 3, but hold Coumadin in the interim and do a daily check to ensure the INR is coming down. She is stable for discharge from the facility today. A 40 minutes on discharge time.       Viki Russell MD      [id number]  D:  12/27/2019 13:14  T:  12/27/2019 13:45  JOB #:  [id number]  CC:  Arnol Wilder DO

## 2019-12-27 NOTE — PROGRESS NOTES
Problem: Pressure Injury - Risk of 
Goal: *Prevention of pressure injury Description Document Joe Scale and appropriate interventions in the flowsheet. Outcome: Progressing Towards Goal 
Note: Pressure Injury Interventions: 
Sensory Interventions: Assess changes in LOC, Float heels, Keep linens dry and wrinkle-free, Maintain/enhance activity level, Minimize linen layers, Monitor skin under medical devices, Pad between skin to skin, Pressure redistribution bed/mattress (bed type) Moisture Interventions: Absorbent underpads, Apply protective barrier, creams and emollients, Maintain skin hydration (lotion/cream), Minimize layers, Moisture barrier, Offer toileting Q_hr Activity Interventions: Pressure redistribution bed/mattress(bed type), PT/OT evaluation Mobility Interventions: Float heels, HOB 30 degrees or less, Pressure redistribution bed/mattress (bed type) Nutrition Interventions: Document food/fluid/supplement intake Friction and Shear Interventions: Foam dressings/transparent film/skin sealants, HOB 30 degrees or less, Lift sheet, Lift team/patient mobility team, Minimize layers Problem: Falls - Risk of 
Goal: *Absence of Falls Description Document Rawland Stade Fall Risk and appropriate interventions in the flowsheet. Outcome: Progressing Towards Goal 
Note: Fall Risk Interventions: 
  
 
  
 
Medication Interventions: Evaluate medications/consider consulting pharmacy, Patient to call before getting OOB, Teach patient to arise slowly Elimination Interventions: Call light in reach, Patient to call for help with toileting needs, Stay With Me (per policy), Toileting schedule/hourly rounds

## 2019-12-27 NOTE — PROGRESS NOTES
Transition of care: [facility] when medically cleared for d/c Met with patient and sister at bedside. . Patient would like to return to [facility] when d/c. She will not be under skills Nursing care per Brendan Fowler at [facility] she will come LTC, while she is waiting on her medicaid approval. 
Patient has been transported to and from  from [facility] via medicare approval 
Patient was at [facility] prior to arrival. She does go to HD on Mwf at [facility] per sister around 1000am. 
Prior t [facility] she was living with her son but she wants to return to [facility]. UAI was competed last admission 1340 met with patient and her sister and daughter they are aware that the plan for d/c today they agree with d/c. Patient is to have HD today. Patient has completed her HD today Transition of Care Plan:  
Cm spoke with patient, daughter and sister they are all in agreement to d/c today. They asked for transportation to be set up Brendan Fowler at [facility] informed cm they will have  call for transportation for patient to go to HD. for MWF to [facility] at [address] [facility] will send clinicals to  Telephone call with Brendan Fowler at [facility] she is ble to accommodate today. Plan for transportation at 1600 Communication to Patient/Family: Met with patient and family, and they are agreeable to the transition plan. The Plan for Transition of Care is related to the following treatment goals: return to [facility] with LTC and to go to HD on MwF The Patient and/or patient representative Sister was provided with a choice of provider and agrees  
with the discharge plan. Yes [x] No [] Freedom of choice list was provided with basic dialogue that supports the patient's individualized plan of care/goals and shares the quality data associated with the providers. Yes [x] No [] SNF/Rehab Transition: 
Patient has been accepted to [facility] at [address] [facility], [address] [place] [address] for SNF and meets criteria for admission. Patient will transported by medical transport and expected to leave at 1600 Communication to SNF/Rehab: 
Bedside RN,  has been notified to update the transition plan to the facility and call report [phone]. Discharge information has been updated on the AVS and communicated through [facility] or [facility]. Discharge instructions to be fax'd to facility at [phone] Please include all hard scripts for controlled substances, med rec and dc summary, and AVS in packet. Please medicate for pain prior to dc if possible and needed to help offset delay when patient first arrives to facility. Reviewed and confirmed with facility, [facility] can manage the patient care needs for the following:  
 
Chino Garciaot with (X) only those applicable: 
Medication: 
[]Medications are available at the facility []IV Antibiotics []Controlled Substance  hard copies available sent. []Weekly Labs Equipment: 
[]CPAP/BiPAP []Wound Vacuum []Dong or Urinary Device []PICC/Central Line []Nebulizer []Ventilator Treatment: 
[]Isolation (for MRSA, VRE, etc.) []Surgical Drain Management []Tracheostomy Care 
[]Dressing Changes []Dialysis with transportation []PEG Care 
[x]Oxygen []Daily Weights for Heart Failure Dietary: 
[]Any diet limitations []Tube Feedings []Total Parenteral Management (TPN) Financial Resources: 
[]Medicaid Application Completed []UAI Completed and copy given to pt/family. [x]A screening has previously been completed. []Level II Completed 
 
[] Private pay individual who will not become  
financially eligible for Medicaid within 6 months from admission to a [address] [place] [address]. [] Individual refused to have screening conducted. []Medicaid Application Completed []The screening denied because it was determined individual did not need/did not qualify for nursing facility level of care. [] Out of state residents seeking direct admission to a [address] facility. [] Individuals who are inpatients of an out of state hospital, or in state or out of state veterans/ hospital and seek direct admission to a [address] facility [] Individuals who are pateints or residents of a state owned/operated facility that is licensed by Department of [employer] ([facility]) and seek direct admission to [address] facility [] A screening not required for enrollment in [facility] services as set out in 12 VAC 30- [] [facility] staff shall perform screenings of the [facility] clients. Advanced Care Plan: 
[]Surrogate Decision Maker of Care 
[]POA []Communicated Code Status and copy sent. Other:  
   
 
. Care Management Interventions PCP Verified by CM: Yes 
Palliative Care Criteria Met (RRAT>21 & CHF Dx)?: Yes 
Palliative Consult Recommended?: Yes Mode of Transport at Discharge: BLS Transition of Care Consult (CM Consult): SNF Partner SNF: Yes Current Support Network: Relative's Home Confirm Follow Up Transport: Family The Plan for Transition of Care is Related to the Following Treatment Goals : return to [facility] with LTC goals and continue with HD on MWF at [facility] The Patient and/or Patient Representative was Provided with a Choice of Provider and Agrees with the Discharge Plan?: Yes Freedom of Choice List was Provided with Basic Dialogue that Supports the Patient's Individualized Plan of Care/Goals, Treatment Preferences and Shares the Quality Data Associated with the Providers?: Yes [employer] Information Provided?: No 
Discharge Location Discharge Placement: [facility]

## 2019-12-28 PROBLEM — R06.02 SHORTNESS OF BREATH: Status: ACTIVE | Noted: 2019-01-01

## 2019-12-28 PROBLEM — R06.00 DYSPNEA: Status: ACTIVE | Noted: 2019-01-01

## 2019-12-28 NOTE — ED NOTES
Patient is a dialysis patient and does not regularly make urine, patient states she cannot void at this time

## 2019-12-28 NOTE — PROGRESS NOTES
Spoke with on call [address] [place] [address] ; she will be calling [facility] [place]; patient will qualify for hospice if she decides to stop dialysis. Will wait for return call from [place] for patient disposition.

## 2019-12-28 NOTE — H&P
History & Physical    Patient: Radha Murillo MRN: [id number]  CSN: [id number]    YOB: 1935  Age: 80 y.o. Sex: female      DOA: 12/28/2019  Primary Care Provider:  Jc Valerio DO      Assessment/Plan     Active Problems:    Benign essential hypertension (10/25/2012)      Overview: Last Assessment & Plan:       Blood pressure controlled on current regimen of Coreg 12.5 mg twice daily,       furosemide 40 mg every other day, Aldactone 25 mg daily. Continue       low-sodium diet. Will check electrolytes and renal function with today's       labs. Chronic diastolic congestive heart failure ([facility] (4/15/2019)      Overview: Last Assessment & Plan:       Appears to be well compensated presently. Her weight is now down to 133       on our scales and the patient tells me that she currently weighs 130 lb on       her scale. I believe that this is probably a good dry weight for her so       she may continue to rely on her scale and shoot for a dry weight of 130 lb       as her baseline. If she starts to drift upward with her weight or notices       puffiness or swelling in her legs or shortness of breath she is to start       taking her furosemide every day and call us. Otherwise continue current       dose of furosemide 40 mg every other day. Continue Aldactone 25 mg daily,       continue Coreg 12.5 mg b.i.d. continue low sodium diet. Will check       metabolic profile today to reassess serum electrolytes and renal function       given that she is on diuretic therapy. Hypothyroidism (10/25/2012)      Overview: Last Assessment & Plan:       Most recent thyroid function studies done on 04/15/2019 show TSH and free       T4 of 3.52 and 0.93 respectively. Notably she is not on thyroid       supplement so there is no current evidence that she is truly hypothyroid. Will continue to follow this periodically.       A-fib ([facility] (11/8/2019)      ESRD needing dialysis ([facility] (11/19/2019)        Initially patient decided to go back to [address] [place] [address] with hospice but then she changed her mind and wanted dialysis and full court press stopping short of reversing her DNR    Will consult renal for dialysis    Continue home meds and pulmonary toilet    Will check urine and treat empirically if we cannot get one as UTI can also cause problems         CC: Shortness of breath        HPI:     Ida Cowart is a 80 y.o. female who came to the ER with Shortness of breath which occurred suddenly at the rehab this am.  Rowdy Abdul was discharged yesterday from [facility] after admission for afib with RVR and UTI. She is DNR/DNI and is contemplating stopping dialysis and becoming comfort care. She is feeling better now with very little intervention except moving to hospital.    I have had lone more than 45 minute discussion with family today over end of life goals    Past Medical History:   Diagnosis Date    A-fib [facility])     Chronic kidney disease     Congestive heart failure (CHF) ([facility]     COPD (chronic obstructive pulmonary disease) ([facility]     Dialysis patient ([facility]     Hypotension        Past Surgical History:   Procedure Laterality Date    IR INSERT TUNL CVC W/O PORT OVER 5 YR  11/15/2019       History reviewed. No pertinent family history. Social History     Socioeconomic History    Marital status:      Spouse name: Not on file    Number of children: Not on file    Years of education: Not on file    Highest education level: Not on file   Tobacco Use    Smoking status: Former Smoker    Smokeless tobacco: Never Used   Substance and Sexual Activity    Alcohol use: Never     Frequency: Never    Drug use: Not Currently       Prior to Admission medications    Medication Sig Start Date End Date Taking? Authorizing Provider   warfarin (COUMADIN) 1 mg tablet Take 1 mg by mouth daily.    Yes Other, MD Napoleon   cholecalciferol (VITAMIN D3) (50,000 UNITS /1250 MCG) capsule Take 50,000 Units by mouth every seven (7) days. Yes Jesus, MD Napoleon   alum-mag hydroxide-simeth (MYLANTA) 200-200-20 mg/5 mL susp Take 30 mL by mouth every four (4) hours as needed. Yes Jesus, MD Napoleon   nystatin (MYCOSTATIN) powder Apply  to affected area three (3) times daily for 7 days. Apply to groin 12/27/19 1/3/20  Valentino Dew, MD   fluconazole (DIFLUCAN) 50 mg tablet Take 1 Tab by mouth daily for 7 days. FDA advises cautious prescribing of oral fluconazole in pregnancy. 12/28/19 1/4/20  Valentino Dew, MD   midodrine (PROAMITINE) 2.5 mg tablet Take 1 Tab by mouth three (3) times daily (with meals) for 30 days. 12/27/19 1/26/20  Valentino Dew, MD   nystatin (MYCOSTATIN) 100,000 unit/mL suspension Take 5 mL by mouth four (4) times daily for 7 days. swish and spit 12/27/19 1/3/20  Valentino Dew, MD   amiodarone (PACERONE) 400 mg tablet Take 1 Tab by mouth daily. 12/28/19   Valentino Dew, MD   dilTIAZem (CARDIZEM) 60 mg tablet Take 1 Tab by mouth Before breakfast, lunch, and dinner. 12/27/19   Valentino Dew, MD   pantoprazole (PROTONIX) 40 mg tablet Take 1 Tab by mouth every evening. 12/27/19   Valentino Dew, MD   atorvastatin (LIPITOR) 40 mg tablet Take 1 Tab by mouth daily. 10/30/19   Prescilla Sacks, MD   aspirin delayed-release 81 mg tablet Take 1 Tab by mouth daily. 10/29/19   Prescilla Sacks, MD   ergocalciferol (VITAMIN D2) 50,000 unit capsule Take 50,000 Units by mouth every seven (7) days. Jesus, MD Napoleon   PARoxetine (PAXIL) 20 mg tablet Take 20 mg by mouth daily. Jesus, MD Napoleon       Allergies   Allergen Reactions    Penicillins Hives    Valium [Diazepam] Other (comments)     It made me cry more       Review of Systems  Gen: No fever, chills, malaise, weight loss/gain. Heent: No headache, rhinorrhea, epistaxis, ear pain, hearing loss, sinus pain, neck pain/stiffness, sore throat. Heart: No chest pain, palpitations, CERVANTES, pnd, or orthopnea. Resp: No cough, hemoptysis, wheezing and shortness of breath.    GI: No nausea, vomiting, diarrhea, constipation, melena or hematochezia. : No urinary obstruction, dysuria or hematuria. Derm: No rash, new skin lesion or pruritis. Musc/skeletal: no bone or joint complains. Vasc: No edema, cyanosis or claudication. Endo: No heat/cold intolerance, no polyuria,polydipsia or polyphagia. Neuro: No unilateral weakness, numbness, tingling. No seizures. Heme: No easy bruising or bleeding. Physical Exam:     Physical Exam:  Visit Vitals  /53   Pulse (!) 101   Temp 97.6 °F (36.4 °C)   Resp 15   Ht 4' 9\" (1.448 m)   Wt 59.4 kg (131 lb)   SpO2 100%   BMI 28.35 kg/m²    O2 Flow Rate (L/min): 4 l/min O2 Device: Nasal cannula    Temp (24hrs), Av.6 °F (36.4 °C), Min:97.6 °F (36.4 °C), Max:97.6 °F (36.4 °C)    No intake/output data recorded. No intake/output data recorded. General:  Awake, cooperative, no distress. Head:  Normocephalic, without obvious abnormality, atraumatic. Eyes:  Conjunctivae/corneas clear, sclera anicteric, PERRL, EOMs intact. Nose: Nares normal. No drainage or sinus tenderness. Throat: Lips, mucosa, and tongue normal. .   Neck: Supple, symmetrical, trachea midline, no adenopathy. Lungs:   Clear to auscultation bilaterally. Heart:  Regular rate and rhythm, S1, S2 normal, no murmur, click, rub or gallop. Abdomen: Soft, non-tender. Bowel sounds normal. No masses,  No organomegaly. Extremities: Extremities normal, atraumatic, no cyanosis or edema. Pulses: 2+ and symmetric all extremities. Skin: Skin color, texture, turgor normal. No rashes or lesions   Neurologic: CNII-XII intact. No focal motor or sensory deficit.        Labs Reviewed:  Recent Results (from the past 24 hour(s))   PROTHROMBIN TIME + INR    Collection Time: 19  2:20 PM   Result Value Ref Range    Prothrombin time 20.2 (H) 11.5 - 15.2 sec    INR 1.8 (H) 0.8 - 1.2     EKG, 12 LEAD, INITIAL    Collection Time: 19  5:35 AM   Result Value Ref Range Ventricular Rate 108 BPM    Atrial Rate 100 BPM    QRS Duration 102 ms    Q-T Interval 332 ms    QTC Calculation (Bezet) 444 ms    Calculated R Axis 35 degrees    Calculated T Axis 129 degrees    Diagnosis       Atrial fibrillation with rapid ventricular response  Low voltage QRS  Nonspecific T wave abnormality , probably digitalis effect  Abnormal ECG  When compared with ECG of 22-DEC-2019 11:26,  No significant change was found     CBC WITH AUTOMATED DIFF    Collection Time: 12/28/19  5:46 AM   Result Value Ref Range    WBC 8.1 4.6 - 13.2 K/uL    RBC 2.99 (L) 4.20 - 5.30 M/uL    HGB 9.1 (L) 12.0 - 16.0 g/dL    HCT 29.1 (L) 35.0 - 45.0 %    MCV 97.3 (H) 74.0 - 97.0 FL    MCH 30.4 24.0 - 34.0 PG    MCHC 31.3 31.0 - 37.0 g/dL    RDW 18.5 (H) 11.6 - 14.5 %    PLATELET 880 856 - 320 K/uL    MPV 10.3 9.2 - 11.8 FL    NEUTROPHILS 81 (H) 40 - 73 %    LYMPHOCYTES 9 (L) 21 - 52 %    MONOCYTES 9 3 - 10 %    EOSINOPHILS 1 0 - 5 %    BASOPHILS 0 0 - 2 %    ABS. NEUTROPHILS 6.6 1.8 - 8.0 K/UL    ABS. LYMPHOCYTES 0.7 (L) 0.9 - 3.6 K/UL    ABS. MONOCYTES 0.8 0.05 - 1.2 K/UL    ABS. EOSINOPHILS 0.0 0.0 - 0.4 K/UL    ABS. BASOPHILS 0.0 0.0 - 0.1 K/UL    DF AUTOMATED     METABOLIC PANEL, COMPREHENSIVE    Collection Time: 12/28/19  5:46 AM   Result Value Ref Range    Sodium 137 136 - 145 mmol/L    Potassium 4.1 3.5 - 5.5 mmol/L    Chloride 100 100 - 111 mmol/L    CO2 30 21 - 32 mmol/L    Anion gap 7 3.0 - 18 mmol/L    Glucose 104 (H) 74 - 99 mg/dL    BUN 21 (H) 7.0 - 18 MG/DL    Creatinine 1.86 (H) 0.6 - 1.3 MG/DL    BUN/Creatinine ratio 11 (L) 12 - 20      GFR est AA 31 (L) >60 ml/min/1.73m2    GFR est non-AA 26 (L) >60 ml/min/1.73m2    Calcium 8.8 8.5 - 10.1 MG/DL    Bilirubin, total 1.0 0.2 - 1.0 MG/DL    ALT (SGPT) 33 13 - 56 U/L    AST (SGOT) 29 10 - 38 U/L    Alk.  phosphatase 198 (H) 45 - 117 U/L    Protein, total 5.7 (L) 6.4 - 8.2 g/dL    Albumin 2.9 (L) 3.4 - 5.0 g/dL    Globulin 2.8 2.0 - 4.0 g/dL    A-G Ratio 1.0 0.8 - 1.7 CARDIAC PANEL,(CK, CKMB & TROPONIN)    Collection Time: 12/28/19  5:46 AM   Result Value Ref Range    CK 14 (L) 26 - 192 U/L    CK - MB <1.0 <3.6 ng/ml    CK-MB Index  0.0 - 4.0 %     CALCULATION NOT PERFORMED WHEN RESULT IS BELOW LINEAR LIMIT    Troponin-I, QT <0.02 0.0 - 0.045 NG/ML   NT-PRO BNP    Collection Time: 12/28/19  5:46 AM   Result Value Ref Range    NT pro-BNP 28,789 (H) 0 - 1,800 PG/ML   PROTHROMBIN TIME + INR    Collection Time: 12/28/19  5:46 AM   Result Value Ref Range    Prothrombin time 15.5 (H) 11.5 - 15.2 sec    INR 1.3 (H) 0.8 - 1.2       Procedures/imaging: see electronic medical records for all procedures/Xrays and details which were not copied into this note but were reviewed prior to creation of Plan            CC: Geno Wilder, DO

## 2019-12-28 NOTE — PROGRESS NOTES
1645:  Observation notice provided in writing to patient and/or caregiver as well as verbal explanation of the policy. Patients who are in outpatient status also receive the Observation notice. 1245:  Per daughter, hospice nurse had 'an emergency' and was unable to visit patient at bedside; per daughter, patient wants to continue dialysis and is aware that this will negate her ability to have hospice care; discussed with Dr. Jessica Mccallum who will admit patient in OBS status as in her current condition, her sats are expected to drop again and she would be returning to THE [facility] ED within 24 hrs. Discussed this with daughter and will inform [facility] that patient will not be returning today.

## 2019-12-28 NOTE — ED PROVIDER NOTES
EMERGENCY DEPARTMENT HISTORY AND PHYSICAL EXAM    Date: 12/28/2019  Patient Name: Jorge Tomas    History of Presenting Illness     Chief Complaint   Patient presents with    Shortness of Breath         History Provided By: Patient, Patient's Son and Patient's Daughter    Phuong Del Valle is a 80 y.o. female with PMHX of CHF, A. fib, ESRD who presents to the emergency department C/O shortness of breath. Patient is chronically on 4 L nasal cannula. Discharge from hospital yesterday. She is out of [address] [place] [address]. Overnight patient was noted to be hypoxic and was having shortness of breath, sats were 70%. She was transferred to hospital and has since been feeling slightly better but does not feel as good as she did yesterday during discharge. Denies chest pain, nausea, vomiting. She states she had a dialysis session on Friday and her next also session is scheduled for Monday. Makes no to very minimal urine. At 3 hospital admissions since November with similar presentations. She is DNR/DNI. PCP: Vidal PURCELL, DO    Current Facility-Administered Medications   Medication Dose Route Frequency Provider Last Rate Last Dose    cephALEXin (KEFLEX) capsule 500 mg  500 mg Oral TID Jese Joseph MD   500 mg at 12/28/19 9544     Current Outpatient Medications   Medication Sig Dispense Refill    warfarin (COUMADIN) 1 mg tablet Take 1 mg by mouth daily.  cholecalciferol (VITAMIN D3) (50,000 UNITS /1250 MCG) capsule Take 50,000 Units by mouth every seven (7) days.  alum-mag hydroxide-simeth (MYLANTA) 200-200-20 mg/5 mL susp Take 30 mL by mouth every four (4) hours as needed.  nystatin (MYCOSTATIN) powder Apply  to affected area three (3) times daily for 7 days. Apply to groin 1 Bottle 0    fluconazole (DIFLUCAN) 50 mg tablet Take 1 Tab by mouth daily for 7 days. FDA advises cautious prescribing of oral fluconazole in pregnancy.  7 Tab 0    midodrine (PROAMITINE) 2.5 mg tablet Take 1 Tab by mouth three (3) times daily (with meals) for 30 days. 90 Tab 0    nystatin (MYCOSTATIN) 100,000 unit/mL suspension Take 5 mL by mouth four (4) times daily for 7 days. swish and spit 140 mL 0    amiodarone (PACERONE) 400 mg tablet Take 1 Tab by mouth daily. 10 Tab 0    dilTIAZem (CARDIZEM) 60 mg tablet Take 1 Tab by mouth Before breakfast, lunch, and dinner. 10 Tab 0    pantoprazole (PROTONIX) 40 mg tablet Take 1 Tab by mouth every evening. 10 Tab 0    atorvastatin (LIPITOR) 40 mg tablet Take 1 Tab by mouth daily. 30 Tab 0    aspirin delayed-release 81 mg tablet Take 1 Tab by mouth daily. 30 Tab 0    ergocalciferol (VITAMIN D2) 50,000 unit capsule Take 50,000 Units by mouth every seven (7) days.  PARoxetine (PAXIL) 20 mg tablet Take 20 mg by mouth daily. Past History     Past Medical History:  Past Medical History:   Diagnosis Date    [facility]     Chronic kidney disease     [facility]est[facility] failure [facility]F) [facility]     COPD (chronic obstructive pulmonary disease) [facility]     Dialysis patient ([facility]     Hypotension        Past Surgical History:  Past Surgical History:   Procedure Laterality Date    IR INSERT TUNL CVC W/O PORT OVER 5 YR  11/15/2019       Family History:  History reviewed. No pertinent family history. Social History:  Social History     Tobacco Use    Smoking status: Former Smoker    Smokeless tobacco: Never Used   Substance Use Topics    Alcohol use: Never     Frequency: Never    Drug use: Not Currently       Allergies: Allergies   Allergen Reactions    Penicillins Hives    Valium [Diazepam] Other (comments)     It made me cry more         Review of Systems   Review of Systems   Constitutional: Positive for fatigue. Negative for chills and fever. HENT: Negative for congestion, rhinorrhea and sinus pain. Respiratory: Positive for shortness of breath. Negative for cough and choking. Cardiovascular: Negative for chest pain and palpitations.    Gastrointestinal: Negative for abdominal distention, abdominal pain, diarrhea and nausea. Genitourinary: Positive for decreased urine volume. Skin: Negative for color change and rash. Neurological: Negative for dizziness and headaches. All other systems reviewed and are negative. Physical Exam     Vitals:    12/28/19 1100 12/28/19 1130 12/28/19 1159 12/28/19 1200   BP: 124/54 114/74  (!) 115/38   Pulse: (!) 114 (!) 110 98 90   Resp: 16 15 20 19   Temp:       SpO2: 100% 100% 100%    Weight:       Height:         Physical Exam  Vitals signs and nursing note reviewed. Constitutional:       General: She is not in acute distress. Appearance: She is well-developed. Comments: Chronically ill and frail   HENT:      Head: Normocephalic and atraumatic. Eyes:      Conjunctiva/sclera: Conjunctivae normal.      Pupils: Pupils are equal, round, and reactive to light. Neck:      Musculoskeletal: Normal range of motion and neck supple. Cardiovascular:      Rate and Rhythm: Regular rhythm. Tachycardia present. Heart sounds: Murmur (4/6 systolic) present. Pulmonary:      Effort: Pulmonary effort is normal. No respiratory distress. Breath sounds: Normal breath sounds. No stridor. No wheezing or rales. Chest:      Chest wall: No tenderness. Abdominal:      General: Bowel sounds are normal. There is no distension. Palpations: Abdomen is soft. Tenderness: There is no tenderness. There is no guarding or rebound. Musculoskeletal: Normal range of motion. General: No tenderness. Right lower leg: Edema present. Left lower leg: Edema present. Lymphadenopathy:      Cervical: No cervical adenopathy. Skin:     General: Skin is warm and dry. Coloration: Skin is pale. Neurological:      Mental Status: She is alert and oriented to person, place, and time. Cranial Nerves: No cranial nerve deficit. Motor: No abnormal muscle tone.       Coordination: Coordination normal. Psychiatric:         Behavior: Behavior normal.         Diagnostic Study Results     Labs -     Recent Results (from the past 12 hour(s))   EKG, 12 LEAD, INITIAL    Collection Time: 12/28/19  5:35 AM   Result Value Ref Range    Ventricular Rate 108 BPM    Atrial Rate 100 BPM    QRS Duration 102 ms    Q-T Interval 332 ms    QTC Calculation (Bezet) 444 ms    Calculated R Axis 35 degrees    Calculated T Axis 129 degrees    Diagnosis       Atrial fibrillation with rapid ventricular response  Low voltage QRS  Nonspecific T wave abnormality , probably digitalis effect  Abnormal ECG  When compared with ECG of 22-DEC-2019 11:26,  No significant change was found     CBC WITH AUTOMATED DIFF    Collection Time: 12/28/19  5:46 AM   Result Value Ref Range    WBC 8.1 4.6 - 13.2 K/uL    RBC 2.99 (L) 4.20 - 5.30 M/uL    HGB 9.1 (L) 12.0 - 16.0 g/dL    HCT 29.1 (L) 35.0 - 45.0 %    MCV 97.3 (H) 74.0 - 97.0 FL    MCH 30.4 24.0 - 34.0 PG    MCHC 31.3 31.0 - 37.0 g/dL    RDW 18.5 (H) 11.6 - 14.5 %    PLATELET 303 654 - 673 K/uL    MPV 10.3 9.2 - 11.8 FL    NEUTROPHILS 81 (H) 40 - 73 %    LYMPHOCYTES 9 (L) 21 - 52 %    MONOCYTES 9 3 - 10 %    EOSINOPHILS 1 0 - 5 %    BASOPHILS 0 0 - 2 %    ABS. NEUTROPHILS 6.6 1.8 - 8.0 K/UL    ABS. LYMPHOCYTES 0.7 (L) 0.9 - 3.6 K/UL    ABS. MONOCYTES 0.8 0.05 - 1.2 K/UL    ABS. EOSINOPHILS 0.0 0.0 - 0.4 K/UL    ABS.  BASOPHILS 0.0 0.0 - 0.1 K/UL    DF AUTOMATED     METABOLIC PANEL, COMPREHENSIVE    Collection Time: 12/28/19  5:46 AM   Result Value Ref Range    Sodium 137 136 - 145 mmol/L    Potassium 4.1 3.5 - 5.5 mmol/L    Chloride 100 100 - 111 mmol/L    CO2 30 21 - 32 mmol/L    Anion gap 7 3.0 - 18 mmol/L    Glucose 104 (H) 74 - 99 mg/dL    BUN 21 (H) 7.0 - 18 MG/DL    Creatinine 1.86 (H) 0.6 - 1.3 MG/DL    BUN/Creatinine ratio 11 (L) 12 - 20      GFR est AA 31 (L) >60 ml/min/1.73m2    GFR est non-AA 26 (L) >60 ml/min/1.73m2    Calcium 8.8 8.5 - 10.1 MG/DL    Bilirubin, total 1.0 0.2 - 1.0 MG/DL ALT (SGPT) 33 13 - 56 U/L    AST (SGOT) 29 10 - 38 U/L    Alk. phosphatase 198 (H) 45 - 117 U/L    Protein, total 5.7 (L) 6.4 - 8.2 g/dL    Albumin 2.9 (L) 3.4 - 5.0 g/dL    Globulin 2.8 2.0 - 4.0 g/dL    A-G Ratio 1.0 0.8 - 1.7     CARDIAC PANEL,(CK, CKMB & TROPONIN)    Collection Time: 12/28/19  5:46 AM   Result Value Ref Range    CK 14 (L) 26 - 192 U/L    CK - MB <1.0 <3.6 ng/ml    CK-MB Index  0.0 - 4.0 %     CALCULATION NOT PERFORMED WHEN RESULT IS BELOW LINEAR LIMIT    Troponin-I, QT <0.02 0.0 - 0.045 NG/ML   NT-PRO BNP    Collection Time: 12/28/19  5:46 AM   Result Value Ref Range    NT pro-BNP 28,789 (H) 0 - 1,800 PG/ML   PROTHROMBIN TIME + INR    Collection Time: 12/28/19  5:46 AM   Result Value Ref Range    Prothrombin time 15.5 (H) 11.5 - 15.2 sec    INR 1.3 (H) 0.8 - 1.2         Radiologic Studies -   XR CHEST PORT   Final Result   IMPRESSION:      Persistent pulmonary edema with bilateral pleural effusions and basilar   atelectasis. No new focal consolidation or pneumothorax. CT Results  (Last 48 hours)    None        CXR Results  (Last 48 hours)               12/28/19 0615  XR CHEST PORT Final result    Impression:  IMPRESSION:       Persistent pulmonary edema with bilateral pleural effusions and basilar   atelectasis. No new focal consolidation or pneumothorax. Narrative:  EXAM: CHEST RADIOGRAPH       CLINICAL INDICATION/HISTORY: Shortest of breath     > Additional: None       COMPARISON: 12/25/2019       TECHNIQUE: Portable frontal view of the chest       _______________       FINDINGS:       SUPPORT DEVICES: Right IJ central line in place with the tip near the cavoatrial   junction. HEART AND MEDIASTINUM: Heart size stable. Atherosclerotic calcification seen in   the aorta. LUNGS AND PLEURAL SPACES: Vascular and interstitial congestion again identified,   similar to the prior exam. Bilateral pleural effusions and basilar atelectasis. No pneumothorax.        BONES AND SOFT TISSUES: Degenerative changes in the shoulders and spine.       _______________                 Medications given in the ED-  Medications   cephALEXin (KEFLEX) capsule 500 mg (500 mg Oral Given 12/28/19 0944)         Medical Decision Making   I am the first provider for this patient. I reviewed the vital signs, available nursing notes, past medical history, past surgical history, family history and social history. Vital Signs-Reviewed the patient's vital signs. Pulse Oximetry Analysis - 100% on 4L     Cardiac Monitor:  Rate: 101 bpm  Rhythm: a-fib    EKG interpretation: (Preliminary)  EKG read by Dr. Beryl Winston   108 afib RVR    Records Reviewed: Nursing Notes, Old Medical Records, Previous Radiology Studies and Previous Laboratory Studies    Provider Notes (Medical Decision Making): Carly Young is a 80 y.o. female who returns to the emergency department with reported hypoxia and shortness of breath. Still does not feel at her baseline. Patient is chronically ill and end-stage CHF. Chest x-ray reveals pulmonary edema may be increased from her prior study yesterday. No signs of infection. Patient stable nasal cannula. Discussed this with the patient and found members. Patient does not feel comfortable being discharged. I will discuss with hospitalist.    Procedures:  Procedures    ED Course:   7:52 AM  I had lengthy discussion with the hospitalist Dr. acosta figure and patient's daughter, patient, patient's history. Discussed that if patient be admitted he would likely be an observation admission. Milad how she has end-stage disease and that her presentation today is in keeping with her chronic disease. She is stable on her baseline nasal cannula. Patient's daughter and her sister seem receptive to this.   I discussed that without a directive patient will be routinely transferred to the emergency department in a single time she has a drop in her pulse ox which will likely become more more frequent. Dr. Beto Richardson will also come down and speak with the patient and her family. 1:26 PM  Hospitalist and nursing staff and case management spoke to patient and her family. There was some emergency and hospice nurse was unable to come to department at this time to speak with patient and her family. Plan is to admit for observation so discussions of plans of care and continue patient can be observed for her reported shortness of breath at nursing home. Diagnosis and Disposition     Critical Care Time:     Core Measures:  For Hospitalized Patients:    1. Hospitalization Decision Time:  The decision to hospitalize the patient was made by Dr Elton Painter on 12/28/2019    2. Aspirin: Aspirin was not given because the patient did not present with a stroke at the time of their Emergency Department evaluation    1:27 PM  Patient is being admitted to the hospital by Dr Km Castillo. The results of their tests and reasons for their admission have been discussed with them and/or available family. They convey agreement and understanding for the need to be admitted and for their admission diagnosis. CONDITIONS ON ADMISSION:  Sepsis is not present at the time of admission. Deep Vein Thrombosis is not present at the time of admission. Thrombosis is not present at the time of admission. Urinary Tract Infection is not present at the time of admission. Pneumonia is not present at the time of admission. MRSA is not present at the time of admission. Wound infection is not present at the time of admission. Pressure Ulcer is not present at the time of admission. CLINICAL IMPRESSION:    1. Dyspnea, unspecified type      _______________________________      Please note that this dictation was completed with [employer] the [employer] voice recognition software. Quite often unanticipated grammatical, syntax, homophones, and other interpretive errors are inadvertently transcribed by the computer software.   Please disregard these errors. Please excuse any errors that have escaped final proofreading.

## 2019-12-28 NOTE — PROGRESS NOTES
Pharmacy Renal Dosing Services      Cephalexin was automatically dose-adjusted per THE [facility] P&T Committee Protocol, with respect to renal function. Consult provided for this   80 y.o. , female , for the indication of UTI  Dose adjusted to:  Cephalexin 250 mg PO q8h (x 20 doses remaining)    Pt Weight:   Wt Readings from Last 1 Encounters:   12/28/19 59.4 kg (130 lb 15.3 oz)         Previous Regimen  Cephalexin 500 mg PO three times daily (x 7 days)   Serum Creatinine Lab Results   Component Value Date/Time    Creatinine 1.86 (H) 12/28/2019 05:46 AM       Creatinine Clearance Estimated Creatinine Clearance: 18.2 mL/min (A) (based on SCr of 1.86 mg/dL (H)). BUN Lab Results   Component Value Date/Time    BUN 21 (H) 12/28/2019 05:46 AM           Pharmacy to continue to monitor patient daily. Will make dosage adjustments based upon changing renal function.   Signed Ever Leonard information:  [phone]

## 2019-12-28 NOTE — PROGRESS NOTES
Received call from Dr. Kyler Jama for hospice consult on patient who was recently discharged to [facility] from [facility] but returned due to sudden SOB. I met with POA daughter, [place], family members and patient at bedside to offer [facility] for Hospice; they have selected [facility] and call placed for referral; per Dr. Kyler Jama, her request is that patient is seen as soon as possible to facilitate return to [facility] which is what patient requests. At [facility] visit, patient was not in respiratory distress, HOB elevated and was drinking without difficult. Awaiting call back from on call [address] and referral will be placed.

## 2019-12-28 NOTE — ED TRIAGE NOTES
Patient brought to ED by EMS from [address] [place] [address] c/c SOB. Per EMS called to facility due to low SpO2, initially 70-80% when patient was lying down. Per EMS once [facility] raised and patient sat up ,SpO2 improved into 90's. PT arrives to ED in [facility], reports being dx yesterday at 1700 r/t AFIB.

## 2019-12-28 NOTE — PROGRESS NOTES
Maureen IN REPORT:    Verbal report received from Repairy, RN(name) on [address]  being received from ED(unit) for routine progression of care      Report consisted of patients Situation, Background, Assessment and   Recommendations(SBAR). Information from the following report(s) SBAR, Kardex, ED Summary and MAR was reviewed with the receiving nurse. Opportunity for questions and clarification was provided. Assessment completed upon patients arrival to unit and care assumed. Patient was placed on 4L/min O2 via NC and purewick in place upon arrival. No complaint of pain or SOB at this time. Call bell within reach. Family at bedside.

## 2019-12-28 NOTE — ED NOTES
Verbal shift change report given to Jose Elias Garcia (oncoming nurse) by Jeramy Aguilar (offgoing nurse). Report included the following information SBAR, ED Summary, Procedure Summary, Intake/Output, MAR and Recent Results.

## 2019-12-29 NOTE — PROGRESS NOTES
0700 Assumed patient care from [address]. Whiteboard updated, bed wheels locked, bed in lowest position, and call bell within reach. 0800 Assessment complete. Patient resting comfortably in bed. Complaint of SOB. 02- increased to 4.5L/min via NC. No complaint of pain. Family at bedside. Call bell within reach. 1200 Reassessment complete. Patient has slight cough. Lungs sound diminished, no crackles heard. Spoke with Speech therapy. Patient will continue on soft diet. No complaint of pain or SOB. Patient remains on   4.5L/min O2 via NC.    1600 Reassessment complete. Patient resting comfortably in bed. No complaint of pain or SOB.

## 2019-12-29 NOTE — PROGRESS NOTES
Problem: Dysphagia (Adult)  Goal: *Acute Goals and Plan of Care (Insert Text)  Description  Patient will:  1. Tolerate PO trials with 0 s/s overt distress in 4/5 trials  2. Utilize compensatory swallow strategies/maneuvers (decrease bite/sip, size/rate, alt. liq/sol) with min cues in 4/5 trials  3. Perform oral-motor/laryngeal exercises to increase oropharyngeal swallow function with min cues  4. Complete an objective swallow study (i.e., MBSS) to assess swallow integrity, r/o aspiration, and determine of safest LRD, min A as indicated/ordered by MD     Recommend:   Dental soft with thin liquids  Aspiration precautions  HOB >45 degrees during all intake and for at least 30 min after po   Small bites/sips, slow rate of intake, alternating bites/sips  Meds as tolerated     Outcome: Progressing Towards Goal      SPEECH LANGUAGE PATHOLOGY BEDSIDE SWALLOW   EVALUATION & TREATMENT     Patient: Sheridan Claudio (9 y.o. female)  Date: 12/29/2019  Primary Diagnosis: Dyspnea [R06.00]  Shortness of breath [R06.02]        Precautions: Aspiration      PLOF: Pt d/c'd from ST at [facility] on 12/24/19 with rec of [facility] soft/chopped and thin liquids. ASSESSMENT :  Based on the objective data described below, the patient presents with mild-mod oral and mild pharyngeal dysphagia. Pt accepted PO trials of regular solid, puree, thin liquids via straw. Pt demonstrated prolonged mastication with regular solid, decreased bolus control/formation and mild-mod oral residual between bites. Pt also had residual present in oral cavity prior to initiation of trials from AM meal. Daughter present for evaluation and reports that pt's son is on the way to get pt's dentures currently. No clinical s/sx of aspiration noted throughout trial, however delayed initiation of swallow present as well as weak laryngeal elevation to palpation. ST recommends continued soft solids with thin liquids with use of swallow strategies.  ST discussed recommendations with pt, daughter and RNSuly. ST to continue to follow to ensure diet tolerance. TREATMENT :  Skilled therapy initiated; Educated pt on aspiration precautions and importance of compensatory swallow techniques to decrease aspiration risk (decrease rate of intake & sip/bite size, upright @HOB for all po intake and ~30 minutes after po); verbalized comprehension. SLP to follow as indicated. Patient will benefit from skilled intervention to address the above impairments. Patient's rehabilitation potential is considered to be Fair  Factors which may influence rehabilitation potential include:   []            None noted  [x]            Mental ability/status  [x]            Medical condition  []            Home/family situation and support systems  []            Safety awareness  []            Pain tolerance/management  []            Other:      PLAN :  Recommendations and Planned Interventions:  Soft solids with thin liquids   Frequency/Duration: Patient will be followed by speech-language pathology 1-2 times per day/4-7 days per week to address goals. Discharge Recommendations: Eduardo Morrow and To Be Determined     SUBJECTIVE:   Patient stated my throat said \"ah\".     OBJECTIVE:     Past Medical History:   Diagnosis Date    A-fib ([facility]     Chronic kidney disease     Congestive heart failure (CHF) (H[facility])     [facility]     Dialysis patient ([facility]     Hypotension      Past Surgical History:   Procedure Laterality Date    IR INSERT TUNL CVC W/O PORT OVER 5 YR  11/15/2019     Home Situation:        Diet prior to admission: Mechanical soft/chopped meat with thin liquids  Current Diet:  Soft solid/thin liquids     Cognitive and Communication Status:  Neurologic State: Alert, Confused  Orientation Level: Oriented to person  Cognition: Follows commands, Impaired decision making           Oral Assessment:  Oral Assessment  Labial: No impairment  Dentition: Edentulous; Other (comment)(Son was on his way with dentures)  Lingual: Decreased strength  P.O. Trials:     Vocal quality prior to P.O.: No impairment  Consistency Presented: Thin liquid; Solid;Puree  How Presented: SLP-fed/presented;Self-fed/presented;Straw     Bolus Acceptance: No impairment  Bolus Formation/Control: Impaired  Type of Impairment: Incomplete;Mastication  Propulsion: Delayed (# of seconds)  Oral Residue: 10-50% of bolus  Initiation of Swallow: Delayed (# of seconds)  Laryngeal Elevation: Weak  Aspiration Signs/Symptoms: None  Pharyngeal Phase Characteristics: Multiple swallows; Poor endurance  Effective Modifications: Alternate liquids/solids  Cues for Modifications: Minimal-moderate       Oral Phase Severity: Mild-moderate  Pharyngeal Phase Severity : Mild    PAIN:  Pain level pre-treatment: 0/10   Pain level post-treatment: 0/10       After treatment:   []            Patient left in no apparent distress sitting up in chair  [x]            Patient left in no apparent distress in bed  [x]            Call bell left within reach  [x]            Nursing notified  [x]            Family present  []            Caregiver present  []            Bed alarm activated    COMMUNICATION/EDUCATION:   [x]            Aspiration precautions; swallow safety; compensatory techniques. []            Patient/family have participated as able in goal setting and plan of care. []            Patient/family agree to work toward stated goals and plan of care. []            Patient understands intent and goals of therapy; neutral about participation. []            Patient unable to participate in goal setting/plan of care; educ ongoing with interdisciplinary staff  []         Posted safety precautions in patient's room.     Thank you for this referral.    Kela Oppenheim, M.A., [address] [facility]  Speech-Language Pathologist    Time Calculation: 20 mins  Evaluation Time: 10 minutes   Treatment Time: 10 minutes

## 2019-12-29 NOTE — PROGRESS NOTES
Problem: Pressure Injury - Risk of  Goal: *Prevention of pressure injury  Description  Document Joe Scale and appropriate interventions in the flowsheet. Outcome: Progressing Towards Goal  Note: Pressure Injury Interventions:  Sensory Interventions: Minimize linen layers, Keep linens dry and wrinkle-free, Pressure redistribution bed/mattress (bed type), Turn and reposition approx. every two hours (pillows and wedges if needed)    Moisture Interventions: Absorbent underpads, Apply protective barrier, creams and emollients, Internal/External urinary devices, Maintain skin hydration (lotion/cream), Minimize layers, Moisture barrier    Activity Interventions: Pressure redistribution bed/mattress(bed type)    Mobility Interventions: Pressure redistribution bed/mattress (bed type), Turn and reposition approx. every two hours(pillow and wedges)    Nutrition Interventions: Document food/fluid/supplement intake                     Problem: Patient Education: Go to Patient Education Activity  Goal: Patient/Family Education  Outcome: Progressing Towards Goal     Problem: Falls - Risk of  Goal: *Absence of Falls  Description  Document Tio Arango Fall Risk and appropriate interventions in the flowsheet.   Outcome: Progressing Towards Goal  Note: Fall Risk Interventions:       Mentation Interventions: Bed/chair exit alarm, Adequate sleep, hydration, pain control, Door open when patient unattended, Toileting rounds    Medication Interventions: Bed/chair exit alarm, Patient to call before getting OOB    Elimination Interventions: Bed/chair exit alarm, Call light in reach, Patient to call for help with toileting needs, Toileting schedule/hourly rounds              Problem: Patient Education: Go to Patient Education Activity  Goal: Patient/Family Education  Outcome: Progressing Towards Goal

## 2019-12-29 NOTE — PROGRESS NOTES
Problem: Pressure Injury - Risk of  Goal: *Prevention of pressure injury  Description  Document Joe Scale and appropriate interventions in the flowsheet. 12/29/2019 0547 by Ahmet Schwarz RN  Outcome: Progressing Towards Goal  Note: Pressure Injury Interventions:  Sensory Interventions: Minimize linen layers, Keep linens dry and wrinkle-free, Pressure redistribution bed/mattress (bed type), Turn and reposition approx. every two hours (pillows and wedges if needed)    Moisture Interventions: Absorbent underpads, Apply protective barrier, creams and emollients, Internal/External urinary devices, Maintain skin hydration (lotion/cream), Minimize layers, Moisture barrier    Activity Interventions: Pressure redistribution bed/mattress(bed type)    Mobility Interventions: Pressure redistribution bed/mattress (bed type), Turn and reposition approx. every two hours(pillow and wedges)    Nutrition Interventions: Document food/fluid/supplement intake                  12/29/2019 0543 by Ahmet Schwarz RN  Outcome: Progressing Towards Goal  Note: Pressure Injury Interventions:  Sensory Interventions: Minimize linen layers, Keep linens dry and wrinkle-free, Pressure redistribution bed/mattress (bed type), Turn and reposition approx. every two hours (pillows and wedges if needed)    Moisture Interventions: Absorbent underpads, Apply protective barrier, creams and emollients, Internal/External urinary devices, Maintain skin hydration (lotion/cream), Minimize layers, Moisture barrier    Activity Interventions: Pressure redistribution bed/mattress(bed type)    Mobility Interventions: Pressure redistribution bed/mattress (bed type), Turn and reposition approx.  every two hours(pillow and wedges)    Nutrition Interventions: Document food/fluid/supplement intake                     Problem: Patient Education: Go to Patient Education Activity  Goal: Patient/Family Education  12/29/2019 0547 by Dianne Mota Christina Dennison RN  Outcome: Progressing Towards Goal  12/29/2019 0543 by Brad Mera RN  Outcome: Progressing Towards Goal     Problem: Falls - Risk of  Goal: *Absence of Falls  Description  Document Lynda Wall Fall Risk and appropriate interventions in the flowsheet.   12/29/2019 0547 by Brad Mera RN  Outcome: Progressing Towards Goal  Note: Fall Risk Interventions:       Mentation Interventions: Bed/chair exit alarm, Adequate sleep, hydration, pain control, Door open when patient unattended, Toileting rounds    Medication Interventions: Bed/chair exit alarm, Patient to call before getting OOB    Elimination Interventions: Bed/chair exit alarm, Call light in reach, Patient to call for help with toileting needs, Toileting schedule/hourly rounds           12/29/2019 0543 by Brad Mera RN  Outcome: Progressing Towards Goal  Note: Fall Risk Interventions:       Mentation Interventions: Bed/chair exit alarm, Adequate sleep, hydration, pain control, Door open when patient unattended, Toileting rounds    Medication Interventions: Bed/chair exit alarm, Patient to call before getting OOB    Elimination Interventions: Bed/chair exit alarm, Call light in reach, Patient to call for help with toileting needs, Toileting schedule/hourly rounds              Problem: Patient Education: Go to Patient Education Activity  Goal: Patient/Family Education  12/29/2019 0547 by Brad Mera RN  Outcome: Progressing Towards Goal  12/29/2019 0543 by Brad Mera RN  Outcome: Progressing Towards Goal     Problem: Fluid Volume - Risk of, Imbalanced  Goal: *Balanced intake and output  Outcome: Progressing Towards Goal     Problem: Patient Education: Go to Patient Education Activity  Goal: Patient/Family Education  Outcome: Progressing Towards Goal

## 2019-12-29 NOTE — PROGRESS NOTES
Bedside and Verbal shift change report given to Eulalio Valerio RN (oncoming nurse) by Marylen Delude, RN (offgoing nurse). Report included the following information SBAR, Kardex, Intake/Output, MAR and Recent Results.

## 2019-12-29 NOTE — PROGRESS NOTES
Pharmacy Renal Dosing Services    Cephalexin was automatically dose-adjusted per [facility] P&T Committee Protocol, with respect to renal function. Pt is a dialysis patient. Consult provided for this   80 y.o. , female , for the indication of UTI  Dose adjusted to:  Cephalexin 250 mg PO q12h (x 11 doses remaining)     Pt Weight:   Wt Readings from Last 1 Encounters:   12/28/19 62.8 kg (138 lb 6.4 oz)     Previous Regimen    Cephalexin 250 mg PO q8h (x 7 days)   Serum Creatinine Lab Results   Component Value Date/Time    Creatinine 1.86 (H) 12/28/2019 05:46 AM       Creatinine Clearance Estimated Creatinine Clearance: 18.6 mL/min (A) (based on SCr of 1.86 mg/dL (H)). BUN Lab Results   Component Value Date/Time    BUN 21 (H) 12/28/2019 05:46 AM         Pharmacy to continue to monitor patient daily. Will make dosage adjustments based upon changing renal function.   Signed Ever Gomez 53 information: [phone]

## 2019-12-29 NOTE — PROGRESS NOTES
[facility] / [facility] Daily Progress Note    Admitting time: 12/28/2019  5:30 AM  Today 12/29/2019      Principal Problem:    Shortness of breath (12/28/2019)    Active Problems:    Benign essential hypertension (10/25/2012)      Overview: Last Assessment & Plan:       Blood pressure controlled on current regimen of Coreg 12.5 mg twice daily,       furosemide 40 mg every other day, Aldactone 25 mg daily. Continue       low-sodium diet. Will check electrolytes and renal function with today's       labs. Chronic diastolic congestive heart failure ([facility] (4/15/2019)      Overview: Last Assessment & Plan:       Appears to be well compensated presently. Her weight is now down to 133       on our scales and the patient tells me that she currently weighs 130 lb on       her scale. I believe that this is probably a good dry weight for her so       she may continue to rely on her scale and shoot for a dry weight of 130 lb       as her baseline. If she starts to drift upward with her weight or notices       puffiness or swelling in her legs or shortness of breath she is to start       taking her furosemide every day and call us. Otherwise continue current       dose of furosemide 40 mg every other day. Continue Aldactone 25 mg daily,       continue Coreg 12.5 mg b.i.d. continue low sodium diet. Will check       metabolic profile today to reassess serum electrolytes and renal function       given that she is on diuretic therapy. Hypothyroidism (10/25/2012)      Overview: Last Assessment & Plan:       Most recent thyroid function studies done on 04/15/2019 show TSH and free       T4 of 3.52 and 0.93 respectively. Notably she is not on thyroid       supplement so there is no current evidence that she is truly hypothyroid. Will continue to follow this periodically.       A-fib ([facility] (11/8/2019)      ESRD needing dialysis ([facility] (11/19/2019)      Dyspnea (12/28/2019)          Subjective  Kasey Eden is a 80 y. o. female with a past medical history significant for CHF, COPD, A. fib, ESRD on HD MWF, who was brought to ED with SOB developed at rehab yesterday. Pt was just d/[facility]'d from [facility] after amdission for A-fib RVR and UTI. CXR showed persistent pulm edema and BL pl effusions. Pt and family have been considering comfort care, family are in agreement with hospice,while  pt insists on continuing with dialysis. Currently pt feels better, sating on 4L NC. She was last dialyzed here on Friday with no issues. Impression:      1. ESRD on HD MWF  2. SOB 2/2 VOL/pulm edema  3. Hypotension, on midodrine  4. Anemia in CKD  5. Hypoalbuminemia     Plan:      1. HD tomorrow per usual schedule  2. UF 1.5L as tolerated  3. Albumin prn on HD to Keep SBP > 90  4. Epo for Anemia  Discussed with Dr Stephanie Farah, will cont follow        Objective  Blood pressure 128/74, pulse 85, temperature 97.7 °F (36.5 °C), resp. rate 16, height 4' 9\" (1.448 m), weight 62.8 kg (138 lb 6.4 oz), SpO2 97 %.     Intake/Output Summary (Last 24 hours) at 12/29/2019 1039  Last data filed at 12/29/2019 0800  Gross per 24 hour   Intake 480 ml   Output    Net 480 ml     Wt Readings from Last 3 Encounters:   12/28/19 62.8 kg (138 lb 6.4 oz)   12/25/19 61.4 kg (135 lb 4.8 oz)   12/03/19 64.1 kg (141 lb 4.8 oz)       Gen: NAD  HEENT: NC/AT  Neck: supple, +JVD  Chest: decreased BS at bases  CVS: RRR  ABD: Soft, ND, NT  EXT: BL LE + edema    Medications  [unfilled]    Lab      Basic Metabolic Profile   Recent Labs     12/28/19  0546      CO2 30   BUN 21*      [unfilled] No components found for: PTHINT       CBC w/Diff    Recent Labs     12/28/19  0546   WBC 8.1   RBC 2.99*   HCT 29.1*   MCV 97.3*   MCH 30.4   MCHC 31.3   RDW 18.5*    Recent Labs     12/28/19  0546   MONOS 9   EOS 1   BASOS 0   RDW 18.5*        Jerod Singh MD  Pager: [phone]   Office: [phone]

## 2019-12-29 NOTE — PROGRESS NOTES
1900 - 2000. Assumed care of patient. Patient in bed, alert and oriented to name and , denies any complaints. No acute distress noted. Family at bedside. White board updated, bed wheels locked, call bell in reach. Shift summary: patient had an uneventful shift, no acute events, VS stable, see MAR and flowsheet. 2830. Bedside and Verbal shift change report given to Theresa Slaughter (oncoming nurse) by Saundra Duval RN (offgoing nurse). Report included the following information SBAR, Intake/Output, MAR and Recent Results.

## 2019-12-29 NOTE — PROGRESS NOTES
Problem: Pressure Injury - Risk of  Goal: *Prevention of pressure injury  Description  Document Joe Scale and appropriate interventions in the flowsheet. Outcome: Progressing Towards Goal  Note: Pressure Injury Interventions:  Sensory Interventions: Assess changes in LOC, Keep linens dry and wrinkle-free, Minimize linen layers    Moisture Interventions: Absorbent underpads, Minimize layers    Activity Interventions: PT/OT evaluation, Pressure redistribution bed/mattress(bed type)    Mobility Interventions: Pressure redistribution bed/mattress (bed type), HOB 30 degrees or less    Nutrition Interventions: Document food/fluid/supplement intake    Friction and Shear Interventions: Lift sheet, Minimize layers                Problem: Falls - Risk of  Goal: *Absence of Falls  Description  Document Chari Fall Risk and appropriate interventions in the flowsheet.   Outcome: Progressing Towards Goal  Note: Fall Risk Interventions:       Mentation Interventions: Adequate sleep, hydration, pain control, Familiar objects from home    Medication Interventions: Evaluate medications/consider consulting pharmacy    Elimination Interventions: Call light in reach, Patient to call for help with toileting needs              Problem: Fluid Volume - Risk of, Imbalanced  Goal: *Balanced intake and output  Outcome: Progressing Towards Goal

## 2019-12-29 NOTE — PROGRESS NOTES
Hospitalist Progress Note    Patient: Mulu Mayes MRN: [id number]  [facility]: [id number]    YOB: 1935  Age: 80 y.o. Sex: female    DOA: 12/28/2019 LOS:  LOS: 1 day                Assessment and Plan:    Principal Problem:    Shortness of breath (12/28/2019)    Active Problems:    Benign essential hypertension (10/25/2012)      Overview: Last Assessment & Plan:       Blood pressure controlled on current regimen of Coreg 12.5 mg twice daily,       furosemide 40 mg every other day, Aldactone 25 mg daily. Continue       low-sodium diet. Will check electrolytes and renal function with today's       labs. Chronic diastolic congestive heart failure ([facility] (4/15/2019)      Overview: Last Assessment & Plan:       Appears to be well compensated presently. Her weight is now down to 133       on our scales and the patient tells me that she currently weighs 130 lb on       her scale. I believe that this is probably a good dry weight for her so       she may continue to rely on her scale and shoot for a dry weight of 130 lb       as her baseline. If she starts to drift upward with her weight or notices       puffiness or swelling in her legs or shortness of breath she is to start       taking her furosemide every day and call us. Otherwise continue current       dose of furosemide 40 mg every other day. Continue Aldactone 25 mg daily,       continue Coreg 12.5 mg b.i.d. continue low sodium diet. Will check       metabolic profile today to reassess serum electrolytes and renal function       given that she is on diuretic therapy. Hypothyroidism (10/25/2012)      Overview: Last Assessment & Plan:       Most recent thyroid function studies done on 04/15/2019 show TSH and free       T4 of 3.52 and 0.93 respectively. Notably she is not on thyroid       supplement so there is no current evidence that she is truly hypothyroid. Will continue to follow this periodically.       A-fib ([facility] (11/8/2019)      ESRD needing dialysis ([facility] (11/19/2019)      Dyspnea (12/28/2019)         Will consult renal for dialysis tomorrow, they have seen her already  . She has decided she wants it     Continue home meds and pulmonary toilet     Empiric treatment of UTI    Trial; of Lasix . Not even sure its going to work. Discussed at length with family, they are in agreement with hospice. Patient is not       Chief complaint:  Shortness of breath      Subjective:    Feels short of breath today        Review of systems:    General: No fevers or chills. Cardiovascular: No chest pain or pressure. No palpitations. Pulmonary: No shortness of breath. Gastrointestinal: No nausea, vomiting. Objective:    Vital signs/Intake and Output:  Visit Vitals  /74   Pulse 85   Temp 97.7 °F (36.5 °C)   Resp 16   Ht 4' 9\" (1.448 m)   Wt 62.8 kg (138 lb 6.4 oz)   SpO2 97%   BMI 29.95 kg/m²     Current Shift:  12/29 0701 - 12/29 1900  In: 480 [P.O.:480]  Out: -   Last three shifts:  No intake/output data recorded. Physical Exam:  General: NAD, AAOx3. Non-toxic. HEENT: NC/AT. PERRLA, EOMI.  MMM. Lungs: Nml inspection. CTA B/L. No wheezing, rales or rhonchi. Heart:  S1S2 RRR,  PMI mid 5th IC space. No M/RG. Abdomen: Soft, NT/ND.  BS+. No peritoneal signs. Extremities: No C/C/E. Psych:   Nml affect. Neurologic:  2-12 intact. Strength 5/5 throughout.   Sensation symmetrical.          Labs: Results:       Chemistry Recent Labs     12/28/19  0546   *      K 4.1      CO2 30   BUN 21*   CREA 1.86*   CA 8.8   AGAP 7   BUCR 11*   *   TP 5.7*   ALB 2.9*   GLOB 2.8   AGRAT 1.0      CBC w/Diff Recent Labs     12/28/19  0546   WBC 8.1   RBC 2.99*   HGB 9.1*   HCT 29.1*      GRANS 81*   LYMPH 9*   EOS 1      Cardiac Enzymes Recent Labs     12/28/19  0546   CPK 14*   CKND1 CALCULATION NOT PERFORMED WHEN RESULT IS BELOW LINEAR LIMIT      Coagulation Recent Labs     12/28/19  0546 12/27/19  0471 PTP 15.5* 20.2*   INR 1.3* 1.8*       Lipid Panel No results found for: CHOL, CHOLPOCT, CHOLX, CHLST, CHOLV, 270602, HDL, HDLP, LDL, LDLC, DLDLP, 132795, VLDLC, VLDL, TGLX, TRIGL, TRIGP, TGLPOCT, CHHD, CHHDX   BNP No results for input(s): BNPP in the last 72 hours.    Liver Enzymes Recent Labs     12/28/19  0546   TP 5.7*   ALB 2.9*   *   SGOT 29      Thyroid Studies Lab Results   Component Value Date/Time    TSH 2.53 11/08/2019 01:25 PM        Procedures/imaging: see electronic medical records for all procedures/Xrays and details which were not copied into this note but were reviewed prior to creation of Plan

## 2019-12-30 PROBLEM — R06.02 SOB (SHORTNESS OF BREATH): Status: ACTIVE | Noted: 2019-01-01

## 2019-12-30 NOTE — ACP (ADVANCE CARE PLANNING)
Palliative Medicine Consult  [facility]: [phone] ([phone]  [facility]: [phone]    Patient status is DNR/DNI. She is on comfort measures. Time In: 0920  Time Out: 20900 [facility] Team Lisa Moffett, NP, and this LCSW) met with patient, daughter Guillermo Rodriguez, and sister Frances. The reason for this palliative consult is clarification of goals of care. Patient is not able to make decisions at this time as she is poorly responsive. All decision-making falls to her two adult children in the absence of an AMD. Patient's son defers all medical decision-making to patient's daughter. Daughter Guillermo Rodriguez ([phone]) wishes to d/c all aggressive therapies, including dialysis, and pursue comfort measures. PM Team assisted sister in completing POST form with the following measures: NO CPR; comfort measures; NO feeding tube. Provided family with original and two copies; copy placed on chart for scan into EMR. Patient [facility]. Family agreeable to information re hospice; hospice liaison informed. Family understandably emotional; support provided. PM Team to f/u as appropriate.      D/w Dr. Jose Fletcher  D/w floor nurse Deana Contreras

## 2019-12-30 NOTE — PROGRESS NOTES
Hospitalist Progress Note    Patient: Rosy Jaramillo MRN: [id number]  CSN: [id number]    YOB: 1935  Age: 80 y.o. Sex: female    DOA: 12/28/2019 LOS:  LOS: 2 days          Chief Complaint:    SOB      Assessment/Plan     Volume overload and SOB due to renal failure  Diastolic CHF chronic  Anemia of CKD  Severe debility  afib persistent  AC with coumadin  UTI-recent-yeast-treated with diflucan    Patient delerious and not participating in care decisions  Daughter and sister at bedside, meeting with palliative care    family wants comfort transition and hospice consult    Orders placed  This is best option for elderly female declining since development of renal failure and failure to progress at NH with PT and strengthening    Stop dialysis, DNR in place, hospice consult    Move to medical bed  Prognosis is terminal      Disposition :  Patient Active Problem List   Diagnosis Code    Heart failure ([facility] I50.9    Benign essential hypertension I10    Chronic diastolic congestive heart failure (HCC) I50.32    Cobalamin deficiency E53.8    Hypercholesterolemia E78.00    Hypertensive heart disease I11.9    Hypothyroidism E03.9    Iron deficiency anemia D50.9    A-fib ([facility] I48.91    UTI (urinary tract infection) N39.0    Hyponatremia E87.1    Weakness generalized R53.1    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    ESRD needing dialysis ([facility] N18.6, Z99.2    Debility R53.81    Hypotension I95.9    Atrial fibrillation with RVR (HCC) I48.91    Dyspnea R06.00    Shortness of breath R06.02       Subjective:    She has refused meds this am  Is confused  Not opening mouth or following directions  Family at bedsdie    Review of systems:    UTO      Vital signs/Intake and Output:  Visit Vitals  /67 (BP 1 Location: Left arm, BP Patient Position: At rest;Supine)   Pulse 89   Temp 97.6 °F (36.4 °C)   Resp 20   Ht 4' 9\" (1.448 m)   Wt 63.5 kg (139 lb 15.9 oz)   SpO2 93%   BMI 30.29 kg/m²     Current Shift:  No intake/output data recorded. Last three shifts:  12/28 1901 - 12/30 0700  In: 480 [P.O.:480]  Out: -     Exam:    General: elderly frail WF, confused  CVS:tachy, irreg, no M/R/G, S1/S2 heard, no thrill  Lungs:Clear to auscultation bilaterally, no wheezes, rhonchi, or rales  Abdomen: Soft, Nontender, No distention, Normal Bowel sounds, No hepatomegaly  Extremities: No C/C/E, pulses palpable 2+  Skin:normal texture and turgor, no rashes, no lesions  Neuro:not following commands                Labs: Results:       Chemistry Recent Labs     12/28/19  0546   *      K 4.1      CO2 30   BUN 21*   CREA 1.86*   CA 8.8   AGAP 7   BUCR 11*   *   TP 5.7*   ALB 2.9*   GLOB 2.8   AGRAT 1.0      CBC w/Diff Recent Labs     12/28/19  0546   WBC 8.1   RBC 2.99*   HGB 9.1*   HCT 29.1*      GRANS 81*   LYMPH 9*   EOS 1      Cardiac Enzymes Recent Labs     12/28/19  0546   CPK 14*   CKND1 CALCULATION NOT PERFORMED WHEN RESULT IS BELOW LINEAR LIMIT      Coagulation Recent Labs     12/30/19  0338 12/28/19  0546   PTP 17.9* 15.5*   INR 1.5* 1.3*       Lipid Panel No results found for: CHOL, CHOLPOCT, CHOLX, CHLST, CHOLV, 016052, HDL, HDLP, LDL, LDLC, DLDLP, 215626, VLDLC, VLDL, TGLX, TRIGL, TRIGP, TGLPOCT, CHHD, CHHDX   BNP No results for input(s): BNPP in the last 72 hours.    Liver Enzymes Recent Labs     12/28/19  0546   TP 5.7*   ALB 2.9*   *   SGOT 29      Thyroid Studies Lab Results   Component Value Date/Time    TSH 2.53 11/08/2019 01:25 PM        Procedures/imaging: see electronic medical records for all procedures/Xrays and details which were not copied into this note but were reviewed prior to creation of Ora Bess MD

## 2019-12-30 NOTE — PROGRESS NOTES
0700[facility] Pt is resting no sign of distress. 1000: pt to be discharged to [facility] with hospice care. Pt is now comfort care.

## 2019-12-30 NOTE — ROUTINE PROCESS
Bedside and Verbal shift change report given to FANNIE Velazco, RN (oncoming nurse) by BUZZ Zuniga RN (offgoing nurse). Report included the following information SBAR, Kardex, Intake/Output, MAR and Recent Results.

## 2019-12-30 NOTE — CONSULTS
[facility]: [phone]  [facility]: [phone]   [facility]: [phone]    Patient Name: Carly Young  YOB: 1935    Date of Initial Consult: 12/30/2019   Reason for Consult: care decisions  Requesting Provider: Dr Miguel Rico  Primary Care Physician: Brendan Muro DO      SUMMARY:   Carly Young is a 80y.o. year old with a past history of a fib, ESRD on HD, chronic diastolic congestive heart failure, COPD, hypotension, who was admitted on 12/28/2019 from [facility]  with a diagnosis of increased shortness of breath . Current medical issues leading to Palliative Medicine involvement include: 80year old female who is well known to our service who is readmitted for increased shortness of breath. She has been doing poorly for some time but electing to continue dialysis. Palliative medicine is consulted for care decisions and support. Kasey is now poorly responsive. Family who is very supportive understands her poor prognosis and end stage disease would like to stop HD concentrate on comfort and return to [facility] with hospice services. PALLIATIVE DIAGNOSES:   1. Goals of care   2. ESRD on HD  3. Shortness of breath   4. Debility        PLAN:   1. Goals of care Ms Dandy Bolanos is well known to our team. Seen this am. Poorly responsive and not able to participate in her medical decisions. Daughter Sagrario [place] and sister Frances at bedside. They have a very clear understanding of Kasey's poor prognosis. They feel dialysis is no longer beneficial and causing her more burden. Changing focus to comfort measures discussed at length of whish they were agreeable. Understanding this means dialysis will be stopped. [place]rario [place] and Frances are aware Ms Eden's time is very limited but it could be days to weeks. Goal is comfort. Use of comfort medications discussed, including side effect of sedation.  New POST form introduced and discussed, signed by daughter Neno Hodge son Bret Pelaez  not at bedside but leaves decisions to Brock Sexton. POST signed for DNR/DNI, comfort measures, no feeding tube. Hospice services at [address] [place] [address] discussed, daughter interested. Hospice liaison aware. Discussed with attending. 2. ESRD on HD. Family has decided to stop HD given poor prognosis, not likely receiving benefit any longer. Last HD was Friday 12/27/2019.   3. Shortness of breath appears comfortable at my visit. On nasal cannula. Use Roxanol to help control dyspnea. Discussed with family  4. Debility very frail elderly female who is bed bound. Lives in a nursing facility. Requires assistance with ADL's at baseline. 5. Initial consult note routed to primary continuity provider  6. Communicated plan of care with: Palliative IDT, daughter Brock Sexton, sister Zoila Giang, attending, hospice, RN     GOALS OF CARE: hospice support at [address] Proxy Stated Goals: Comfort      TREATMENT PREFERENCES:   Code Status: DNR/DNI    Advance Care Planning:  [x] The [facility] Interdisciplinary Team has updated the ACP Navigator with [address] and Patient Capacity    Primary Decision [address] ([address]):   Primary Decision Maker: Merline Griggs - Child - [phone]    Secondary Decision Maker: Marion Mcclendon - Other Non-relative - [phone]    Medical Interventions: Comfort measures   Artificially Administered Nutrition: No feeding tube     Other:  As far as possible, the palliative care team has discussed with patient / health care proxy about goals of care / treatment preferences for patient. HISTORY:     History obtained from: chart and family     CHIEF COMPLAINT: shortness of breath     HPI/SUBJECTIVE:    The patient is:   [] Verbal and participatory  [x] Non-participatory due to: poorly responsive    80year old female who presented from [address] [place] [address] with increased shortness of breath. Past medical history is as above but includes ESRD on HD.  Unfortunately Ms Dandy Bolanos is doing poorly and family has decided to discontinue dialysis. Care decisions re discussed     Clinical Pain Assessment (nonverbal scale for nonverbal patients): Clinical Pain Assessment  Severity: 0     Activity (Movement): Lying quietly, normal position    Duration: for how long has pt been experiencing pain (e.g., 2 days, 1 month, years)  Frequency: how often pain is an issue (e.g., several times per day, once every few days, constant)     FUNCTIONAL ASSESSMENT:     Palliative Performance Scale (PPS):  PPS: 20    ECOG  ECOG Status : Completely disabled     PSYCHOSOCIAL/SPIRITUAL SCREENING:      Any spiritual / Evangelical concerns: unable to assess for patient   [] Yes /  [] No    Caregiver Burnout:  [] Yes /  [x] No /  [] No Caregiver Present      Anticipatory grief assessment:  Unable to assess for patient   [] Normal  / [] Maladaptive        REVIEW OF SYSTEMS:     Positive and pertinent negative findings in ROS are noted above in HPI. The following systems were [] reviewed / [x] unable to be reviewed as noted in HPI  Other findings are noted below. Systems: constitutional, ears/nose/mouth/throat, respiratory, gastrointestinal, genitourinary, musculoskeletal, integumentary, neurologic, psychiatric, endocrine. Positive findings noted below. Modified ESAS Completed by: provider           Pain: 0           Dyspnea: 0           Stool Occurrence(s): 1        PHYSICAL EXAM:     Wt Readings from Last 3 Encounters:   12/30/19 63.5 kg (139 lb 15.9 oz)   12/25/19 61.4 kg (135 lb 4.8 oz)   12/03/19 64.1 kg (141 lb 4.8 oz)     Blood pressure (!) 106/38, pulse 80, temperature 97.3 °F (36.3 °C), resp. rate 20, height 4' 9\" (1.448 m), weight 63.5 kg (139 lb 15.9 oz), SpO2 (!) 88 %.   Pain:  Pain Scale 1: Numeric (0 - 10)  Pain Intensity 1: 0                 Last bowel movement:  X 1 yesterday     Constitutional: frail, elderly ill appearing who is lying in bed, poorly responsive, eyes closed in NAD  Respiratory: breathing a bit shallow but appears comfortable on nasal cannula   Skin: dry, cool to touch distally   Neurologic: did not open her eyes to her name, did not follow commands          HISTORY:     Principal Problem:    Shortness of breath (12/28/2019)    Active Problems:    Benign essential hypertension (10/25/2012)      Overview: Last Assessment & Plan:       Blood pressure controlled on current regimen of Coreg 12.5 mg twice daily,       furosemide 40 mg every other day, Aldactone 25 mg daily. Continue       low-sodium diet. Will check electrolytes and renal function with today's       labs. Chronic diastolic congestive heart failure ([facility].) (4/15/2019)      Overview: Last Assessment & Plan:       Appears to be well compensated presently. Her weight is now down to 133       on our scales and the patient tells me that she currently weighs 130 lb on       her scale. I believe that this is probably a good dry weight for her so       she may continue to rely on her scale and shoot for a dry weight of 130 lb       as her baseline. If she starts to drift upward with her weight or notices       puffiness or swelling in her legs or shortness of breath she is to start       taking her furosemide every day and call us. Otherwise continue current       dose of furosemide 40 mg every other day. Continue Aldactone 25 mg daily,       continue Coreg 12.5 mg b.i.d. continue low sodium diet. Will check       metabolic profile today to reassess serum electrolytes and renal function       given that she is on diuretic therapy. Hypothyroidism (10/25/2012)      Overview: Last Assessment & Plan:       Most recent thyroid function studies done on 04/15/2019 show TSH and free       T4 of 3.52 and 0.93 respectively. Notably she is not on thyroid       supplement so there is no current evidence that she is truly hypothyroid. Will continue to follow this periodically.       A-fib ([facility].) (11/8/2019)      ESRD needing dialysis ([facility].) (11/19/2019)      Dyspnea (12/28/2019)      SOB (shortness of breath) (12/30/2019)      Past Medical History:   Diagnosis Date    A-fib ([place] [address]     Chronic kidney disease     Congestive heart failure (CHF) (HCC)     COPD (chronic obstructive pulmonary disease) ([place] [address]     Dialysis patient ([place] [address].)     Hypotension       Past Surgical History:   Procedure Laterality Date    IR INSERT TUNL CVC W/O PORT OVER 5 YR  11/15/2019      History reviewed. No pertinent family history. History reviewed, no pertinent family history. Social History     Tobacco Use    Smoking status: Former Smoker    Smokeless tobacco: Never Used   Substance Use Topics    Alcohol use: Never     Frequency: Never     Allergies   Allergen Reactions    Penicillins Hives    Valium [Diazepam] Other (comments)     It made me cry more      Current Facility-Administered Medications   Medication Dose Route Frequency    ondansetron (ZOFRAN) injection 4 mg  4 mg IntraVENous Q4H PRN    LORazepam (INTENSOL) 2 mg/mL oral concentrate 1 mg  1 mg Oral Q1H PRN    morphine (ROXANOL) concentrated oral syringe 10 mg  10 mg Oral Q1H PRN    morphine injection 2 mg  2 mg IntraVENous Q15MIN PRN        LAB AND IMAGING FINDINGS:     Lab Results   Component Value Date/Time    WBC 8.1 12/28/2019 05:46 AM    HGB 9.1 (L) 12/28/2019 05:46 AM    PLATELET 715 30/93/0672 05:46 AM     Lab Results   Component Value Date/Time    Sodium 137 12/28/2019 05:46 AM    Potassium 4.1 12/28/2019 05:46 AM    Chloride 100 12/28/2019 05:46 AM    CO2 30 12/28/2019 05:46 AM    BUN 21 (H) 12/28/2019 05:46 AM    Creatinine 1.86 (H) 12/28/2019 05:46 AM    Calcium 8.8 12/28/2019 05:46 AM    Magnesium 2.2 12/27/2019 04:00 AM    Phosphorus 3.2 12/26/2019 03:55 AM      Lab Results   Component Value Date/Time    AST (SGOT) 29 12/28/2019 05:46 AM    Alk.  phosphatase 198 (H) 12/28/2019 05:46 AM    Protein, total 5.7 (L) 12/28/2019 05:46 AM    Albumin 2.9 (L) 12/28/2019 05:46 AM Globulin 2.8 12/28/2019 05:46 AM     Lab Results   Component Value Date/Time    INR 1.5 (H) 12/30/2019 03:38 AM    Prothrombin time 17.9 (H) 12/30/2019 03:38 AM    aPTT 35.8 12/22/2019 11:29 AM      Lab Results   Component Value Date/Time    Iron 11 (L) 11/16/2019 09:10 AM    TIBC 271 11/16/2019 09:10 AM    Iron % saturation 4 11/16/2019 09:10 AM    Ferritin 395 (H) 11/16/2019 09:10 AM      No results found for: PH, PCO2, PO2  No components found for: [place]   Lab Results   Component Value Date/Time    CK 14 (L) 12/28/2019 05:46 AM    CK - MB <1.0 12/28/2019 05:46 AM              Total time: 50 minutes   Counseling / coordination time, spent as noted above:  40 minutes   Discussed with daughter Camilla Devi, sister Jeremiah Nevarez, Attending     Prolonged service was provided for  []30 min   []75 min in face to face time in the presence of the patient, spent as noted above. Time Start:   Time End:   Note: this can only be billed with 94651 (initial) or 23685 (follow up). If multiple start / stop times, list each separately.

## 2019-12-30 NOTE — PROGRESS NOTES
[facility]  Nephrology Daily Progress Note    Admitting time: 12/28/2019  5:30 AM  Today 12/30/2019      Principal Problem:    Shortness of breath (12/28/2019)    Active Problems:    Benign essential hypertension (10/25/2012)      Overview: Last Assessment & Plan:       Blood pressure controlled on current regimen of Coreg 12.5 mg twice daily,       furosemide 40 mg every other day, Aldactone 25 mg daily. Continue       low-sodium diet. Will check electrolytes and renal function with today's       labs. Chronic diastolic congestive heart failure ([facility] 75.) (4/15/2019)      Overview: Last Assessment & Plan:       Appears to be well compensated presently. Her weight is now down to 133       on our scales and the patient tells me that she currently weighs 130 lb on       her scale. I believe that this is probably a good dry weight for her so       she may continue to rely on her scale and shoot for a dry weight of 130 lb       as her baseline. If she starts to drift upward with her weight or notices       puffiness or swelling in her legs or shortness of breath she is to start       taking her furosemide every day and call us. Otherwise continue current       dose of furosemide 40 mg every other day. Continue Aldactone 25 mg daily,       continue Coreg 12.5 mg b.i.d. continue low sodium diet. Will check       metabolic profile today to reassess serum electrolytes and renal function       given that she is on diuretic therapy. Hypothyroidism (10/25/2012)      Overview: Last Assessment & Plan:       Most recent thyroid function studies done on 04/15/2019 show TSH and free       T4 of 3.52 and 0.93 respectively. Notably she is not on thyroid       supplement so there is no current evidence that she is truly hypothyroid. Will continue to follow this periodically.       A-fib ([facility] (11/8/2019)      ESRD needing dialysis ([facility].) (11/19/2019)      Dyspnea (12/28/2019)          Subjective  Kasey Eden is a 80 y. o. female with a past medical history significant for CHF, COPD, A. fib, ESRD on HD MWF, who was brought to ED with SOB developed at rehab yesterday. Pt was just [facility] after amdission for A-fib RVR and UTI. CXR showed persistent pulm edema and BL pl effusions. Pt and family have been considering comfort care, family are in agreement with hospice,while  pt insists on continuing with dialysis. Encephalopathic this AM    Impression:      1. ESRD on HD MWF schedule  2. SOB 2/2 VOL/pulm edema  3. Hypotension, on midodrine  4. Anemia in CKD  5. Hypoalbuminemia     Plan:      Family wants to stop dialysis  Hospice consult  Will sign off      Objective  Blood pressure 114/67, pulse 89, temperature 97.6 °F (36.4 °C), resp. rate 20, height 4' 9\" (1.448 m), weight 63.5 kg (139 lb 15.9 oz), SpO2 93 %.     Intake/Output Summary (Last 24 hours) at 12/30/2019 0707  Last data filed at 12/29/2019 0800  Gross per 24 hour   Intake 480 ml   Output    Net 480 ml     Wt Readings from Last 3 Encounters:   12/30/19 63.5 kg (139 lb 15.9 oz)   12/25/19 61.4 kg (135 lb 4.8 oz)   12/03/19 64.1 kg (141 lb 4.8 oz)       Gen: No respiratory distress  HEENT: NC/AT  Neck: supple, +JVD  Chest: decreased BS at bases  CVS: RRR  ABD: Soft, ND, NT  EXT: BL LE + edema    Medications  reviewed    Lab  reviewed    Basic Metabolic Profile   Recent Labs     12/28/19  0546      CO2 30   BUN 21*      [unfilled] No components found for: PTHINT       CBC w/Diff    Recent Labs     12/28/19  0546   WBC 8.1   RBC 2.99*   HCT 29.1*   MCV 97.3*   MCH 30.4   MCHC 31.3   RDW 18.5*    Recent Labs     12/28/19  0546   MONOS 9   EOS 1   BASOS 0   RDW 18.5*          Signed By: Charity Hollis MD     December 30, 2019

## 2019-12-30 NOTE — HOSPICE
Pt/family pursuing hospice:yes   Admission dx approved by Hospice Medical Director: ESRD. Hospice co-morbid conditions- CHF, A fib  Anticipated hospital d/c date:12/30/2019  Discussion occurred with patient and/or family about preference of hospitalization once admitted to hospice: yes   Reviewed and discussed hospice philosophy and keeping the patient comfortable in their residence: yes     Narrative of events and/or assessment if applicable:   Spoke with daughter Dharmesh Madrid by phone. Discussed [address] [place] [address] philosophy, services, criteria, and IDT. Answered all questions. Dharmesh Madrid agreeable to hospice services. Per daughter, patient has oxygen already at [facility]. Thank you for the referral to [address] [place] [address]. If we can be of further assistance please contact [phone].         LUANA HudsonN, RN  Clinical Coordinator   [address], [address] [place] [address] Str.  [phone]  [email or web address]

## 2019-12-30 NOTE — PROGRESS NOTES
Bedside and Verbal shift change report given to Jorge Alberto Sexton RN (oncoming nurse) by Tejas Bermudez RN (offgoing nurse). Report included the following information SBAR, Kardex, Intake/Output, MAR and Recent Results.

## 2019-12-30 NOTE — PROGRESS NOTES
Transition of care: anticipate d/c to [facility] with BS hospice today  Met with patient and daughter Merlin Shelter and sister Frances  Patient is not coherent per daughter now  They asked cm if they could stop HD and have her return today to [facility]  with hospice. Cm has consulted with Dr Earline Cummings d/c is to return to [facility] with BS hospice. Cm has spoken with Agustin Yates at hospice and she is aware of plan for d/c to [facility] today. CM has spoken with London Escamilla at [facility] she is able to accommodate today    Transition of Care Plan:     Communication to Patient/Family:  Met with patient and family, and they are agreeable to the transition plan. The Plan for Transition of Care is related to the following treatment goals: LTC with hospice  The Patient and/or patient representative Merlin Shelter patients daughter was provided with a choice of provider and agrees   with the discharge plan. Yes [x] No []    Freedom of choice list was provided with basic dialogue that supports the patient's individualized plan of care/goals and shares the quality data associated with the providers. Yes [x] No []    SNF/Rehab Transition:  Patient has been accepted to [facility] at [address] [facility], [facility] for SNF and meets criteria for admission. [facility]  and expected to leave between 2173-5020 by [place]    Communication to SNF/Rehab:  Bedside RN,  has been notified to update the transition plan to the facility and call report [phone]. Discharge information has been updated on the AVS and communicated through [facility] or [facility] Link. Discharge instructions to be fax'd to facility at [phone]     Please include all hard scripts for controlled substances, med rec and dc summary, and AVS in packet. Please medicate for pain prior to dc if possible and needed to help offset delay when patient first arrives to facility.     Reviewed and confirmed with facility, [facility] can manage the patient care needs for the following:     [place] with (X) only those applicable:  Medication:  []Medications are available at the facility  []IV Antibiotics    []Controlled Substance  hard copies available sent. []Weekly Labs    Equipment:  []CPAP/BiPAP  []Wound Vacuum  []Dong or Urinary Device  []PICC/Central Line  []Nebulizer  []Ventilator    Treatment:  []Isolation (for MRSA, VRE, etc.)  []Surgical Drain Management  []Tracheostomy Care  []Dressing Changes  []Dialysis with transportation  []PEG Care  []Oxygen  []Daily Weights for Heart Failure    Dietary:  []Any diet limitations  []Tube Feedings   []Total Parenteral Management (TPN)    Financial Resources:  []Medicaid Application Completed    []UAI Completed and copy given to pt/family. [x]A screening has previously been completed. []Level II Completed    [] Private pay individual who will not become   financially eligible for Medicaid within 6 months from admission to a [address] [place] [address] facility. [] Individual refused to have screening conducted. []Medicaid Application Completed    []The screening denied because it was determined individual did not need/did not qualify for nursing facility level of care. [] Out of state residents seeking direct admission to a [address] facility. [] Individuals who are inpatients of an out of state hospital, or in state or out of state veterans/ hospital and seek direct admission to a [address] facility  [] Individuals who are pateints or residents of a state owned/operated facility that is licensed by [employer] of [employer]) and seek direct admission to [address] [place] [address]  [] A screening not required for enrollment in [address] [place] [address] [address] services as set out in [address] [place] [address] 30-  [] [facility]) staff shall perform screenings of the [facility] clients. Advanced Care Plan:  []Surrogate Decision Maker of Care  []POA  []Communicated Code Status and copy sent.     Other:       Care Management Interventions  PCP Verified by CM:  Yes  Mode of Transport at Discharge: BLS  Transition of Care Consult (CM Consult): SNF  [address]: Yes  Partner SNF: Yes  Current Support Network: Relative's Home  Confirm Follow Up Transport: Family  The Plan for Transition of Care is Related to the Following Treatment Goals : LTC with hospice  The Patient and/or Patient Representative was Provided with a Choice of Provider and Agrees with the Discharge Plan?: Yes  Name of the Patient Representative Who was Provided with a Choice of Provider and Agrees with the Discharge Plan: Teagan Trevino daughter has been decsion maker for patient due to incoherent at this time  Freedom of Choice List was Provided with Basic Dialogue that Supports the Patient's Individualized Plan of Care/Goals, Treatment Preferences and Shares the Quality Data Associated with the Providers?: Yes  [employer] Information Provided?: No  Discharge Location  Discharge Placement: Long Term Care(bs hospice)

## 2019-12-30 NOTE — PALLIATIVE CARE
Full note to follow     Ms Xavier Jaimes is well known to our service. Ms Xavier Jaimes is poorly responsive this am. Not able to participate in her medical decisions. Daughter Levar Graves and sister at bedside. They are very aware of her poor and terminal prognosis. They wish to stop dialysis. Move to comfort measures. Will ask hospice to see. POST form reviewed and signed by jony Graevs for comfort measures, DNR/DNI no feeding tubes.

## 2019-12-30 NOTE — DISCHARGE SUMMARY
[address]    Name:  Angela Land  MR#:   [id number]  :  1935  ACCOUNT #:  [de-identified]  ADMIT DATE:  2019  DISCHARGE DATE:  2019      HOSPITAL SUMMARY:  Under observation. Code status is DNR. The patient going back under hospice to [address] [place] [address] facility. Morphine and Ativan per hospice guidelines prescribed for her. No other regular medications. Palliative care consult done during stay. The patient is 80years old. She is a terminal end-stage renal disease patient with chronic diastolic heart failure, AFib, hypothyroidism, failure to thrive, and progressive for the last few months. Has been progressively declining as she developed end-stage renal disease. She has had multiple hospital stays. She came back after discharge this past Friday on the  with increased shortness of breath, evidence for volume overload due to her underlying end-stage renal disease, and subsequently admitted back to the hospital service. The patient remained delirious and encephalopathic, not able to participate in her own decisions. At this point, the family met with Palliative Care; they want to stop dialysis and transition into comfort care which is most appropriate. With that said, the patient shall be transferred back to [address] [place] [address] under comfort measures. Medications as noted above. The patient today is delirious, not oriented, not following commands. Blood pressure is 114/67, pulse 89, temperature 97.6, respiratory rate 20, SaO2 is 93% on 4 liters. Delirious, elderly, frail white female in no acute distress. Lungs, diminished breath sounds at the bases. Cardiac exam, irregular rhythm, regular rate. Abdomen soft, nontender. Lower extremities without pitting edema. No further dialysis, comfort measures only, transition back to [address] [place] [address] today. 45 minutes total on discharge time, discussion with the family and examination of the patient as well.       VIVIEN CHOI MD ASIM [email or web address]  D:  12/30/2019 10:34  T:  12/30/2019 11:58  JOB #:  [id number]

## 2019-12-30 NOTE — PROGRESS NOTES
Speech Therapy Note:    Change in goals of care now focused on comfort measures only.  Skilled speech therapy is not indicated at this time. Will assist as needed; please consult if dysphagia intervention is medically indicated and consistent with patient's goals of care.       Caryn Hernandez M.S., [address] [facility]  Speech Language Pathologist

## 2020-01-01 ENCOUNTER — HOME CARE VISIT (OUTPATIENT)
Dept: HOSPICE | Facility: HOSPICE | Age: 85
End: 2020-01-01
Payer: MEDICARE

## 2020-01-01 ENCOUNTER — PATIENT OUTREACH (OUTPATIENT)
Dept: CASE MANAGEMENT | Age: 85
End: 2020-01-01

## 2020-01-01 ENCOUNTER — HOME CARE VISIT (OUTPATIENT)
Dept: SCHEDULING | Facility: HOME HEALTH | Age: 85
End: 2020-01-01
Payer: MEDICARE

## 2020-01-01 VITALS
DIASTOLIC BLOOD PRESSURE: 58 MMHG | TEMPERATURE: 96.5 F | SYSTOLIC BLOOD PRESSURE: 84 MMHG | OXYGEN SATURATION: 80 % | HEART RATE: 69 BPM | RESPIRATION RATE: 14 BRPM

## 2020-01-01 VITALS
WEIGHT: 139 LBS | RESPIRATION RATE: 20 BRPM | BODY MASS INDEX: 29.99 KG/M2 | OXYGEN SATURATION: 93 % | DIASTOLIC BLOOD PRESSURE: 67 MMHG | HEIGHT: 57 IN | SYSTOLIC BLOOD PRESSURE: 114 MMHG | HEART RATE: 89 BPM | TEMPERATURE: 97.6 F

## 2020-01-01 PROCEDURE — 0651 HSPC ROUTINE HOME CARE

## 2020-01-01 PROCEDURE — G0156 HHCP-SVS OF AIDE,EA 15 MIN: HCPCS

## 2020-01-03 LAB
ATRIAL RATE: 153 BPM
CALCULATED R AXIS, ECG10: 37 DEGREES
CALCULATED T AXIS, ECG11: 133 DEGREES
DIAGNOSIS, 93000: NORMAL
Q-T INTERVAL, ECG07: 302 MS
QRS DURATION, ECG06: 92 MS
QTC CALCULATION (BEZET), ECG08: 440 MS
VENTRICULAR RATE, ECG03: 128 BPM

## 2020-01-03 PROCEDURE — 0651 HSPC ROUTINE HOME CARE

## 2020-01-04 PROCEDURE — 0651 HSPC ROUTINE HOME CARE

## 2020-01-05 LAB
ATRIAL RATE: 100 BPM
CALCULATED R AXIS, ECG10: 35 DEGREES
CALCULATED T AXIS, ECG11: 129 DEGREES
DIAGNOSIS, 93000: NORMAL
Q-T INTERVAL, ECG07: 332 MS
QRS DURATION, ECG06: 102 MS
QTC CALCULATION (BEZET), ECG08: 444 MS
VENTRICULAR RATE, ECG03: 108 BPM

## 2020-01-05 PROCEDURE — 0651 HSPC ROUTINE HOME CARE

## 2020-01-06 PROCEDURE — 0651 HSPC ROUTINE HOME CARE
